# Patient Record
Sex: FEMALE | Race: WHITE | Employment: OTHER | URBAN - METROPOLITAN AREA
[De-identification: names, ages, dates, MRNs, and addresses within clinical notes are randomized per-mention and may not be internally consistent; named-entity substitution may affect disease eponyms.]

---

## 2020-09-27 ENCOUNTER — APPOINTMENT (OUTPATIENT)
Dept: GENERAL RADIOLOGY | Age: 75
End: 2020-09-27
Payer: MEDICARE

## 2020-09-27 ENCOUNTER — HOSPITAL ENCOUNTER (EMERGENCY)
Age: 75
Discharge: HOME | End: 2020-09-27
Attending: EMERGENCY MEDICINE
Payer: MEDICARE

## 2020-09-27 VITALS
HEART RATE: 62 BPM | SYSTOLIC BLOOD PRESSURE: 126 MMHG | WEIGHT: 166.89 LBS | RESPIRATION RATE: 19 BRPM | OXYGEN SATURATION: 100 % | BODY MASS INDEX: 32.59 KG/M2 | DIASTOLIC BLOOD PRESSURE: 41 MMHG | TEMPERATURE: 98.2 F

## 2020-09-27 PROCEDURE — 29125 APPL SHORT ARM SPLINT STATIC: CPT

## 2020-09-27 PROCEDURE — 99283 EMERGENCY DEPT VISIT LOW MDM: CPT

## 2020-09-27 PROCEDURE — 73120 X-RAY EXAM OF HAND: CPT

## 2020-09-27 PROCEDURE — 73100 X-RAY EXAM OF WRIST: CPT

## 2020-09-27 RX ORDER — FENTANYL CITRATE 50 UG/ML
50 INJECTION, SOLUTION INTRAMUSCULAR; INTRAVENOUS ONCE
Status: DISCONTINUED | OUTPATIENT
Start: 2020-09-27 | End: 2020-09-27 | Stop reason: HOSPADM

## 2020-09-27 RX ORDER — ONDANSETRON 4 MG/1
4 TABLET, ORALLY DISINTEGRATING ORAL ONCE
Status: DISCONTINUED | OUTPATIENT
Start: 2020-09-27 | End: 2020-09-27 | Stop reason: HOSPADM

## 2020-09-27 RX ORDER — HYDROCODONE BITARTRATE AND ACETAMINOPHEN 5; 325 MG/1; MG/1
1 TABLET ORAL EVERY 6 HOURS PRN
Qty: 10 TABLET | Refills: 0 | Status: SHIPPED | OUTPATIENT
Start: 2020-09-27 | End: 2020-09-27

## 2020-09-27 ASSESSMENT — PAIN SCALES - GENERAL: PAINLEVEL_OUTOF10: 10

## 2020-09-27 ASSESSMENT — PAIN DESCRIPTION - LOCATION: LOCATION: WRIST

## 2020-09-27 ASSESSMENT — PAIN DESCRIPTION - PAIN TYPE: TYPE: ACUTE PAIN

## 2020-09-27 ASSESSMENT — PAIN DESCRIPTION - ORIENTATION: ORIENTATION: RIGHT

## 2020-09-27 NOTE — ED TRIAGE NOTES
Mechanical fall, was going out for a \"girls day\" and tripped over a leg of a chair while attempting to kiss her .     Did not hit head

## 2020-09-27 NOTE — ED NOTES
Bed: [facility]  Expected date: 9/27/20  Expected time: 12:13 PM  Means of arrival: [facility]  Comments:  75F mechanical fall, wrist injury     Barrett Cheney RN  09/27/20 7632

## 2020-09-28 ENCOUNTER — TELEPHONE (OUTPATIENT)
Dept: ORTHOPEDIC SURGERY | Age: 75
End: 2020-09-28

## 2020-09-28 NOTE — ED PROVIDER NOTES
As physician-in-triage, I performed a medical screening history and physical exam on Nelson Avitia. I also cared for and evaluated the patient in conjunction with the ED Advanced Practice Provider. All diagnostic, treatment, and disposition decisions were made by myself in conjunction with the advanced practice provider. For all further details of the patient's emergency department visit, please see the advanced practice provider's documentation. Patient presents the ER for evaluation acute mechanical fall and injury, patient has positive deformity the right wrist no pulse deficit. No elbow tenderness. No shoulder tenderness. No cervical thoracic or lumbar spine tenderness. GCS of 15. She is evidence of acute injury articular fracture distal radius and ulnar styloid, positive volar splint, outpatient follow-up with orthopedics. Definitive surgical management after splinting. Analgesia as necessary. Return if worse or new symptoms. Impression: Acute intra-articular fracture distal radius and ulnar styloid, dominant hand right.       Rodrick Morales MD  69/63/40 [id number]       Rodrick Morales MD  87/63/07 [id number]

## 2020-09-29 ENCOUNTER — OFFICE VISIT (OUTPATIENT)
Dept: ORTHOPEDIC SURGERY | Age: 75
End: 2020-09-29
Payer: MEDICARE

## 2020-09-29 ENCOUNTER — OFFICE VISIT (OUTPATIENT)
Dept: PRIMARY CARE CLINIC | Age: 75
End: 2020-09-29
Payer: MEDICARE

## 2020-09-29 VITALS — HEIGHT: 60 IN | TEMPERATURE: 97.7 F | BODY MASS INDEX: 31.41 KG/M2 | WEIGHT: 160 LBS | RESPIRATION RATE: 16 BRPM

## 2020-09-29 PROCEDURE — G8428 CUR MEDS NOT DOCUMENT: HCPCS | Performed by: NURSE PRACTITIONER

## 2020-09-29 PROCEDURE — G8417 CALC BMI ABV UP PARAM F/U: HCPCS | Performed by: ORTHOPAEDIC SURGERY

## 2020-09-29 PROCEDURE — G8427 DOCREV CUR MEDS BY ELIG CLIN: HCPCS | Performed by: ORTHOPAEDIC SURGERY

## 2020-09-29 PROCEDURE — 99203 OFFICE O/P NEW LOW 30 MIN: CPT | Performed by: ORTHOPAEDIC SURGERY

## 2020-09-29 PROCEDURE — 1090F PRES/ABSN URINE INCON ASSESS: CPT | Performed by: ORTHOPAEDIC SURGERY

## 2020-09-29 PROCEDURE — 4040F PNEUMOC VAC/ADMIN/RCVD: CPT | Performed by: ORTHOPAEDIC SURGERY

## 2020-09-29 PROCEDURE — 1036F TOBACCO NON-USER: CPT | Performed by: ORTHOPAEDIC SURGERY

## 2020-09-29 PROCEDURE — G8417 CALC BMI ABV UP PARAM F/U: HCPCS | Performed by: NURSE PRACTITIONER

## 2020-09-29 PROCEDURE — 99211 OFF/OP EST MAY X REQ PHY/QHP: CPT | Performed by: NURSE PRACTITIONER

## 2020-09-29 PROCEDURE — G8400 PT W/DXA NO RESULTS DOC: HCPCS | Performed by: ORTHOPAEDIC SURGERY

## 2020-09-29 PROCEDURE — 3017F COLORECTAL CA SCREEN DOC REV: CPT | Performed by: ORTHOPAEDIC SURGERY

## 2020-09-29 PROCEDURE — 1123F ACP DISCUSS/DSCN MKR DOCD: CPT | Performed by: ORTHOPAEDIC SURGERY

## 2020-09-29 RX ORDER — CLINDAMYCIN HYDROCHLORIDE 300 MG/1
300 CAPSULE ORAL 3 TIMES DAILY
Qty: 15 CAPSULE | Refills: 0 | Status: SHIPPED | OUTPATIENT
Start: 2020-09-29 | End: 2020-10-04

## 2020-09-29 NOTE — ED PROVIDER NOTES
[address] [facility]        Pt Name: Deep Knight  MRN: [id number]  [place] 1945  Date of evaluation: 9/27/2020  Provider: Jose Kang PA-C  PCP: Rick Sanchez MD     I have seen and evaluated this patient with my supervising physician Tori Jolly. CHIEF COMPLAINT       Chief Complaint   Patient presents with    Fall     mechanical fall with right wrist injury       HISTORY OF PRESENT ILLNESS   (Location/Symptom, Timing/Onset, Context/Setting, Quality, Duration, Modifying Factors, Severity)  Note limiting factors. Deep Knight is a 77 y.o. female who presents here to the emergency department, she states that she was on her way to a [employer] day out, she tripped over the cord of a electric reclining chair, fell onto her outstretched right wrist, now admits to pain and disfigurement. Pain level 10/10 with movement better with rest.  She denies any other injury, denies elbow pain, neck pain, loss of consciousness or head injury. Nursing Notes were all reviewed and agreed with or any disagreements were addressed  in the HPI.     REVIEW OF SYSTEMS    (2-9 systems for level 4, 10 or more for level 5)     Review of Systems   Pertinent positives and negatives in the HPI otherwise ROS negative    PAST MEDICAL HISTORY     Past Medical History:   Diagnosis Date    Anxiety     CAD (coronary artery disease)     Cerebral artery occlusion with cerebral infarction ([address]     mini strokes    Depression     Hx of blood clots 2011     lungs after heart attach    IBS (irritable bowel syndrome)     w constipation    MI (myocardial infarction) ([address]     Pneumonia     Prolonged emergence from general anesthesia     Renal failure        SURGICAL HISTORY     Past Surgical History:   Procedure Laterality Date    ARM SURGERY Left     DIAPHRAGMATIC HERNIA REPAIR      EYE SURGERY      HAND SURGERY Left [address]       Discharge Medication List as of 9/27/2020  2:05 PM      CONTINUE these medications which have NOT CHANGED    Details   pantoprazole sodium (PROTONIX) 40 MG PACK packet Take 40 mg by mouth every morning (before breakfast)      carvedilol (COREG) 6.25 MG tablet Take 6.25 mg by mouth 2 times daily (with meals)      traMADol (ULTRAM) 50 MG tablet Take 50 mg by mouth every 6 hours as needed for Pain      atorvastatin (LIPITOR) 10 MG tablet Take 10 mg by mouth daily      LORazepam (ATIVAN) 0.5 MG tablet Take 0.5 mg by mouth daily Indications: 2 mg at nite prn      nortriptyline (PAMELOR) 10 MG capsule Take 10 mg by mouth nightly      lisinopril (PRINIVIL;ZESTRIL) 2.5 MG tablet Take 2.5 mg by mouth daily      fexofenadine (ALLEGRA) 30 MG tablet Take 30 mg by mouth 2 times daily      promethazine (PHENERGAN) 12.5 MG tablet Take 12.5 mg by mouth every 6 hours as needed for Nausea      polyethyl glycol-propyl glycol 0.4-0.3 % (SYSTANE) 0.4-0.3 % ophthalmic solution 1 drop as needed for Dry Eyes (6 drops daily)      cycloSPORINE (RESTASIS) 0.05 % ophthalmic emulsion Place 2 drops into the left eye 2 times daily      loteprednol (ALREX) 0.2 % SUSP 1 drop 2 times daily      fluticasone (FLONASE) 50 MCG/ACT nasal spray 1 spray by Nasal route daily      solifenacin (VESICARE) 5 MG tablet Take 10 mg by mouth daily      conjugated estrogens (PREMARIN) 0.625 MG/GM vaginal cream Place vaginally daily Place vaginally daily. , Vaginal, Historical Med      vitamin D (CHOLECALCIFEROL) 1000 UNITS TABS tablet Take 1,000 Units by mouth daily      Multiple Vitamins-Minerals (THERAPEUTIC MULTIVITAMIN-MINERALS) tablet Take 1 tablet by mouth daily      polycarbophil (FIBERCON) 625 MG tablet Take 625 mg by mouth daily      calcium gluconate 500 MG tablet Take 500 mg by mouth daily      vitamin B-12 (CYANOCOBALAMIN) 100 MCG tablet Take 50 mcg by mouth daily      polyethylene glycol (GLYCOLAX) packet Take 17 g by mouth daily as needed for Constipation             ALLERGIES     Augmentin [amoxicillin-pot clavulanate]; Bactrim [sulfamethoxazole-trimethoprim]; Carafate [sucralfate]; Hyoscyamine; Macrobid [nitrofurantoin]; Oxybutynin; Pcn [penicillins]; Pravastatin; Prevacid [lansoprazole]; Prilosec [omeprazole]; Sulfamethoxazole; and Trazodone and nefazodone    FAMILYHISTORY     History reviewed. No pertinent family history. SOCIAL HISTORY       Social History     Socioeconomic History    Marital status:      Spouse name: None    Number of children: None    Years of education: None    Highest education level: None   Occupational History    None   Social Needs    Financial resource strain: None    Food insecurity     Worry: None     Inability: None    Transportation needs     Medical: None     Non-medical: None   Tobacco Use    Smoking status: Never Smoker    Smokeless tobacco: Never Used   Substance and Sexual Activity    Alcohol use: No    Drug use: No    Sexual activity: None   Lifestyle    Physical activity     Days per week: None     Minutes per session: None    Stress: None   Relationships    Social connections     Talks on phone: None     Gets together: None     Attends Shinto service: None     Active member of club or organization: None     Attends meetings of clubs or organizations: None     Relationship status: None    Intimate partner violence     Fear of current or ex partner: None     Emotionally abused: None     Physically abused: None     Forced sexual activity: None   Other Topics Concern    None   Social History Narrative    None       SCREENINGS             PHYSICAL EXAM    (up to 7 for level 4, 8 or more for level 5)     ED Triage Vitals [09/27/20 1229]   BP Temp Temp src Pulse Resp SpO2 Height Weight   118/67 98.1 °F (36.7 °C) -- 65 15 99 % -- 166 lb 14.2 oz (75.7 kg)       Physical Exam  Vitals signs and nursing note reviewed.    Constitutional:       Appearance: She is well-developed. She is not diaphoretic. HENT:      Head: Normocephalic and atraumatic. Right Ear: External ear normal.      Left Ear: External ear normal.      Nose: Nose normal.   Eyes:      General:         Right eye: No discharge. Left eye: No discharge. Neck:      Musculoskeletal: Normal range of motion and neck supple. Pulmonary:      Effort: Pulmonary effort is normal. No respiratory distress. Breath sounds: No stridor. Musculoskeletal:      Right wrist: She exhibits decreased range of motion, tenderness, bony tenderness, swelling and deformity. Arms:    Skin:     General: Skin is warm and dry. Coloration: Skin is not pale. Neurological:      Mental Status: She is alert and oriented to person, place, and time. Psychiatric:         Behavior: Behavior normal.         DIAGNOSTIC RESULTS   LABS:    Labs Reviewed - No data to display    All other labs were within normal range or not returned as of this dictation. EKG: All EKG's are interpreted by the Emergency Department Physician who either signs orCo-signs this chart in the absence of a cardiologist.  Please see their note for interpretation of EKG. RADIOLOGY:   Non-plain film images such as CT, Ultrasound and MRI are read by the radiologist. Plain radiographic images are visualized andpreliminarily interpreted by the  ED Provider with the below findings:        Interpretation perthe Radiologist below, if available at the time of this note:    XR HAND RIGHT (2 VIEWS)   Final Result   Acute, intraarticular fracture of the distal radius. Acute fracture of the ulna styloid. No acute fracture of the hand. Findings of osteoarthritis. XR WRIST RIGHT (2 VIEWS)   Final Result   Acute, intraarticular fracture of the distal radius. Acute fracture of the ulna styloid. No acute fracture of the hand. Findings of osteoarthritis.            Xr Wrist Right (2 Views)    Result Date: 9/27/2020  EXAMINATION: 3 XRAY VIEWS OF THE RIGHT WRIST; 3 XRAY VIEWS OF THE RIGHT HAND 9/27/2020 12:44 pm COMPARISON: None. HISTORY: ORDERING SYSTEM PROVIDED HISTORY: TRAUMA, TECHNOLOGIST PROVIDED HISTORY: Reason for exam:->TRAUMA, Reason for Exam: right wrist pain after fall Acuity: Acute Type of Exam: Initial FINDINGS: Right wrist- There is an intra-articular fracture of the distal radius. The primary fracture line through the metaphysis is transverse. There are suspected vertical communications to the articular surface. There is mild dorsal dislocation and mild apex volar angulation. Fracture is impacted. There is a nondisplaced, oblique fracture through the base of the ulna styloid. There are cyst-like changes in the carpal bones, including scaphoid, capitate and lunate, likely degenerative. Right hand- No acute fracture or osseous injury of the hand is identified. There is a mild of osteoarthritis pattern, and including subchondral sclerosis of peritrapezial joints and interphalangeal joint space narrowing with mild osseous hypertrophy. Soft tissues are unremarkable. Acute, intraarticular fracture of the distal radius. Acute fracture of the ulna styloid. No acute fracture of the hand. Findings of osteoarthritis. Xr Hand Right (2 Views)    Result Date: 9/27/2020  EXAMINATION: 3 XRAY VIEWS OF THE RIGHT WRIST; 3 XRAY VIEWS OF THE RIGHT HAND 9/27/2020 12:44 pm COMPARISON: None. HISTORY: ORDERING SYSTEM PROVIDED HISTORY: TRAUMA, TECHNOLOGIST PROVIDED HISTORY: Reason for exam:->TRAUMA, Reason for Exam: right wrist pain after fall Acuity: Acute Type of Exam: Initial FINDINGS: Right wrist- There is an intra-articular fracture of the distal radius. The primary fracture line through the metaphysis is transverse. There are suspected vertical communications to the articular surface. There is mild dorsal dislocation and mild apex volar angulation. Fracture is impacted.  There is a nondisplaced, oblique fracture through the base of the ulna styloid. There are cyst-like changes in the carpal bones, including scaphoid, capitate and lunate, likely degenerative. Right hand- No acute fracture or osseous injury of the hand is identified. There is a mild of osteoarthritis pattern, and including subchondral sclerosis of peritrapezial joints and interphalangeal joint space narrowing with mild osseous hypertrophy. Soft tissues are unremarkable. Acute, intraarticular fracture of the distal radius. Acute fracture of the ulna styloid. No acute fracture of the hand. Findings of osteoarthritis. PROCEDURES   Unless otherwise noted below, none     Procedures   Volar OCL splint was placed by the emergency department technician, it was applied appropriately and the patient was neurovascularly intact as observed by myself. CRITICAL CARE TIME   N/A    CONSULTS:  None      EMERGENCY DEPARTMENT COURSE and DIFFERENTIAL DIAGNOSIS/MDM:   Vitals:    Vitals:    09/27/20 1229 09/27/20 1412   BP: 118/67 (!) 126/41   Pulse: 65 62   Resp: 15 19   Temp: 98.1 °F (36.7 °C) 98.2 °F (36.8 °C)   SpO2: 99% 100%   Weight: 166 lb 14.2 oz (75.7 kg)        Patient was given thefollowing medications:  Medications - No data to display        Fracture noted, patient denied wanting any other pain medication as she wanted to make sure that she could leave and continue to go out and enjoy the rest of her day. FINAL IMPRESSION      1.  Closed fracture of right wrist, initial encounter          DISPOSITION/PLAN   DISPOSITION Decision To Discharge 09/27/2020 02:22:49 PM      PATIENT REFERREDTO:  Joy Jackson MD  [address] [place] [address]  [phone]    Call in 2 days  For a recheck in 2-7 days      DISCHARGE MEDICATIONS:  Discharge Medication List as of 9/27/2020  2:05 PM          DISCONTINUED MEDICATIONS:  Discharge Medication List as of 9/27/2020  2:05 PM                 (Please note that portions ofthis note were completed with a voice recognition program.  Efforts were made to edit the dictations but occasionally words are mis-transcribed.)    Amish Lackey PA-C (electronically signed)           Amish Lackey PA-C  09/29/20 2272

## 2020-09-29 NOTE — PROGRESS NOTES
Newt Erp received a viral test for COVID-19. They were educated on isolation and quarantine as appropriate. For any symptoms, they were directed to seek care from their PCP, given contact information to establish with a doctor, directed to an urgent care or the emergency room.

## 2020-09-29 NOTE — PATIENT INSTRUCTIONS

## 2020-09-29 NOTE — LETTER
[address] [place] [address] Ortho & Spine  Surgery Scheduling Form:  DEMOGRAPHICS:                                                                                                              .  Patient Name:  Derick Cobb  Patient :  1945   Patient SS#:      Patient Phone:  [phone] (home) [phone] (work) Alt. Patient Phone:    Patient Address:  [place] [id number]    PCP:  Joselin Mcbride MD  Insurance:    Payor/Plan Subscr  Sex Relation Sub. Ins. ID Effective Group Num   1. MEDICARE - MEAurther [place] 1945 Female  [id number] 1/1/15                                    [address] [address]   2. [address] [place] M 1945 Female  [id number] PLAN F                                    [address]   Insurance ID Number:  DIAGNOSIS & PROCEDURE:                                                                                            .  Diagnosis:   Right distal radius fracture    S52.571A  Operation:  Open reduction internal fixation right wrist  [id number]  Location:  [facility]   Surgeon:  Tayler Mancilla MD    SCHEDULING INFORMATION:                                                                                         .    Surgeon's Scheduling Instruction:  10/02/2020    Requested Date:   10/2/2020 OR Time:  7:30am  Patient Arrival Time:  6:00am  OR Time Required:  45  Minutes  Anesthesia:  General    SA Required:  Yes  Equipment:  [place]  Mini C-Arm:  Yes    Standard C-Arm:  No  Status:  Outpatient    PAT Required:  Yes  Latex Allergy:  unknown    Defibrilator or Pacemaker:  unknown  Isolation Precautions:  unknown  Comments:                       Merari Roy MD 20   BILLING INFORMATION:                                                                                                    .    Procedure:       CPT Code Modifier                  Pre-Certification:

## 2020-09-29 NOTE — PROGRESS NOTES
CHIEF COMPLAINT: Right wrist pain/ moderately displaced distal radius fracture. DATE OF INJURY: 9/27/2020    HISTORY:  Ms. Bhanu Espinoza is a 77 y.o.  female right handed who presents today for evaluation of a right wrist injury. The patient reports that this injury occurred when she was leaning over to give her  a kiss and tripped over his chair. She was first seen and evaluated in [facility] ER, when she was x-rayed and splinted, and asked to f/u with Orthopedics. The patient denies any other injuries. Rates pain a 8-9/10 VAS severe, sharp, aching, constant and show no change. Movement makes the pain worse, the splint and resting makes the pain better. Alleviating factors elevation and rest. No numbness or tingling sensation.       Past Medical History:   Diagnosis Date    Anxiety     CAD (coronary artery disease)     Cerebral artery occlusion with cerebral infarction ([address]     mini strokes    Depression     Hx of blood clots 2011     lungs after heart attach    IBS (irritable bowel syndrome)     w constipation    MI (myocardial infarction) ([address]     Pneumonia     Prolonged emergence from general anesthesia     Renal failure        Past Surgical History:   Procedure Laterality Date    ARM SURGERY Left     DIAPHRAGMATIC HERNIA REPAIR      EYE SURGERY      HAND SURGERY Left        Social History     Socioeconomic History    Marital status:      Spouse name: Not on file    Number of children: Not on file    Years of education: Not on file    Highest education level: Not on file   Occupational History    Not on file   Social Needs    Financial resource strain: Not on file    Food insecurity     Worry: Not on file     Inability: Not on file    Transportation needs     Medical: Not on file     Non-medical: Not on file   Tobacco Use    Smoking status: Never Smoker    Smokeless tobacco: Never Used   Substance and Sexual Activity    Alcohol use: No    Drug use: No    Sexual activity: Not on file   Lifestyle    Physical activity     Days per week: Not on file     Minutes per session: Not on file    Stress: Not on file   Relationships    Social connections     Talks on phone: Not on file     Gets together: Not on file     Attends Scientologist service: Not on file     Active member of club or organization: Not on file     Attends meetings of clubs or organizations: Not on file     Relationship status: Not on file    Intimate partner violence     Fear of current or ex partner: Not on file     Emotionally abused: Not on file     Physically abused: Not on file     Forced sexual activity: Not on file   Other Topics Concern    Not on file   Social History Narrative    Not on file       History reviewed. No pertinent family history.     Current Outpatient Medications on File Prior to Visit   Medication Sig Dispense Refill    pantoprazole sodium (PROTONIX) 40 MG PACK packet Take 40 mg by mouth every morning (before breakfast)      carvedilol (COREG) 6.25 MG tablet Take 6.25 mg by mouth 2 times daily (with meals)      traMADol (ULTRAM) 50 MG tablet Take 50 mg by mouth every 6 hours as needed for Pain      atorvastatin (LIPITOR) 10 MG tablet Take 10 mg by mouth daily      LORazepam (ATIVAN) 0.5 MG tablet Take 0.5 mg by mouth daily Indications: 2 mg at nite prn      nortriptyline (PAMELOR) 10 MG capsule Take 10 mg by mouth nightly      lisinopril (PRINIVIL;ZESTRIL) 2.5 MG tablet Take 2.5 mg by mouth daily      fexofenadine (ALLEGRA) 30 MG tablet Take 30 mg by mouth 2 times daily      promethazine (PHENERGAN) 12.5 MG tablet Take 12.5 mg by mouth every 6 hours as needed for Nausea      polyethyl glycol-propyl glycol 0.4-0.3 % (SYSTANE) 0.4-0.3 % ophthalmic solution 1 drop as needed for Dry Eyes (6 drops daily)      cycloSPORINE (RESTASIS) 0.05 % ophthalmic emulsion Place 2 drops into the left eye 2 times daily      loteprednol (ALREX) 0.2 % SUSP 1 drop 2 times daily      fluticasone (FLONASE) 50 MCG/ACT nasal spray 1 spray by Nasal route daily      solifenacin (VESICARE) 5 MG tablet Take 10 mg by mouth daily      conjugated estrogens (PREMARIN) 0.625 MG/GM vaginal cream Place vaginally daily Place vaginally daily.  vitamin D (CHOLECALCIFEROL) 1000 UNITS TABS tablet Take 1,000 Units by mouth daily      Multiple Vitamins-Minerals (THERAPEUTIC MULTIVITAMIN-MINERALS) tablet Take 1 tablet by mouth daily      polycarbophil (FIBERCON) 625 MG tablet Take 625 mg by mouth daily      calcium gluconate 500 MG tablet Take 500 mg by mouth daily      vitamin B-12 (CYANOCOBALAMIN) 100 MCG tablet Take 50 mcg by mouth daily      polyethylene glycol (GLYCOLAX) packet Take 17 g by mouth daily as needed for Constipation       No current facility-administered medications on file prior to visit. Pertinent items are noted in HPI  Review of systems reviewed from Patient History Form dated on 9/29/2020 and available in the patient's chart under the Media tab. PHYSICAL EXAMINATION:  Ms. Yaritza Coelho is a very pleasant 77 y.o.  female who presents today in no acute distress, awake, alert, and oriented. She is well dressed, nourished and  groomed. Patient with normal affect. Height is  5' (1.524 m), weight is 160 lb (72.6 kg), Body mass index is 31.25 kg/m². Resting respiratory rate is 16. On evaluation of her right upper extremity, there is minimal deformity. There is moderate swelling and moderate ecchymosis. She is tender to palpation over the distal radius, and otherwise nontender over the remainder of the extremity. Range of motion is decreased secondary to pain over the right wrist, but no mechanical block. The skin overlying the right wrist is intact without evidence of lesion, laceration or abrasion. Distal pulses are 2+ and symmetric bilaterally. Sensation is grossly intact to light touch and symmetric bilaterally.     IMAGING:  Xrays dated on 9/27/2020, 3 views of right

## 2020-09-30 ENCOUNTER — TELEPHONE (OUTPATIENT)
Dept: PHARMACY | Age: 75
End: 2020-09-30

## 2020-09-30 NOTE — PROGRESS NOTES
Pt unable to source drug allergies and what happens when she takes what, Yohan/ donavan notifed to assist patient with this and her medications for DOS. Pt understands to stop asa for procedure but not sure what to take dos.     Pharmacy also consulted to assist

## 2020-09-30 NOTE — PROGRESS NOTES
Preoperative Screening for Elective Surgery/Invasive Procedures While COVID-19 present in the community     Have you tested positive or have been told to self-isolate for COVID-19 like symptoms within the past 28 days? n   Do you currently have any of the following symptoms? o Fever >100.0 F or 99.9 F in immunocompromised patients?n  o New onset cough, shortness of breath or difficulty breathing?n  o New onset sore throat, myalgia (muscle aches and pains), headache, loss of taste/smell or diarrhea?n   Have you had a potential exposure to COVID-19 within the past 14 days by:  o Close contact with a confirmed case? n  o Close contact with a healthcare worker,  or essential infrastructure worker (grocery store, [employer], gas station, public utilities or transportation)? YES  o Do you reside in a congregate setting such as; skilled nursing facility, adult home, correctional facility, homeless shelter or other institutional setting? n  o Have you had recent travel to a known COVID-19 hotspot? Pt reside in [facility] if the patient has a positive screen by answering yes to one or more of the above questions. Patients who test positive or screen positive prior to surgery or on the day of surgery should be evaluated in conjunction with the surgeon/proceduralist/anesthesiologist to determine the urgency of the procedure.

## 2020-09-30 NOTE — PROGRESS NOTES
C-Difficile admission screening and protocol:     * Admitted with diarrhea? n     *Prior history of C-Diff. In last 3 months?n     *Antibiotic use in the past 6-8 weeks?n     *Prior hospitalization or nursing home in the last month?n        [address] time_____6_______        Surgery time____________    Take the following medications with a sip of water:    Do not eat or drink anything after 12:00 midnight prior to your surgery. This includes water chewing gum, mints and ice chips. You may brush your teeth and gargle the morning of your surgery, but do not swallow the water     Please see your family doctor/pediatrician for a history and physical and/or concerning medications. Bring any test results/reports from your physicians office. If you are under the care of a heart doctor or specialist doctor, please be aware that you may be asked to them for clearance    You may be asked to stop blood thinners such as Coumadin, Plavix, Fragmin, Lovenox, etc., or any anti-inflammatories such as:  Aspirin, Ibuprofen, Advil, Naproxen prior to your surgery. We also ask that you stop any OTC medications such as fish oil, vitamin E, glucosamine, garlic, Multivitamins, COQ 10, etc.    We ask that you do not smoke 24 hours prior to surgery  We ask that you do not  drink any alcoholic beverages 24 hours prior to surgery     You must make arrangements for a responsible adult to take you home after your surgery. For your safety you will not be allowed to leave alone or drive yourself home. Your surgery will be cancelled if you do not have a ride home. Also for your safety, it is strongly suggested that someone stay with you the first 24 hours after your surgery. A parent or legal guardian must accompany a child scheduled for surgery and plan to stay at the hospital until the child is discharged. Please do not bring other children with you.     For your comfort, please wear simple loose fitting clothing to the hospital.  Please do not bring valuables. Do not wear any make-up or nail polish on your fingers or toes      For your safety, please do not wear any jewelry or body piercing's on the day of surgery. All jewelry must be removed. If you have dentures, they will be removed before going to operating room. For your convenience, we will provide you with a container. If you wear contact lenses or glasses, they will be removed, please bring a case for them. If you have a living will and a durable power of  for healthcare, please bring in a copy. As part of our patient safety program to minimize surgical site infections, we ask you to do the following:    · Please notify your surgeon if you develop any illness between         now and the  day of your surgery. · This includes a cough, cold, fever, sore throat, nausea,         or vomiting, and diarrhea, etc.  ·  Please notify your surgeon if you experience dizziness, shortness         of breath or blurred vision between now and the time of your surgery. Do not shave your operative site 96 hours prior to surgery. For face and neck surgery, men may use an electric razor 48 hours   prior to surgery. You may shower the night before surgery or the morning of   your surgery with an antibacterial soap. You will need to bring a photo ID and insurance card    [facility] has an onsite pharmacy, would you like to utilize our pharmacy     If you will be staying overnight and use a C-pap machine, please bring   your C-pap to hospital     Our goal is to provide you with excellent care, therefore, visitors will be limited to two(2) in the room at a time so that we may focus on providing this care for you. Please contact pre-admission testing if you have any further questions.                  [facility] phone number:  [phone] PAT fax number:  [phone]  Please note these are generalized instructions for all surgical cases, you may be provided with more specific instructions according to your surgery.

## 2020-09-30 NOTE — PROGRESS NOTES
Pre-admission testing medication reconciliation attempted today. Kayden Jurfernando is unsure of current medications. Patient chart sent to [facility] for further review. Pt states her  helps her but he is not home and the 'List\" is in a cabnit she can not reach. Pt has hX of multiple mini strokes, 15+ medications, 77years old and multiple allergies she does not know what happens when given but states,  may know.     Brittany Lancaster RN

## 2020-10-01 ENCOUNTER — ANESTHESIA EVENT (OUTPATIENT)
Dept: OPERATING ROOM | Age: 75
End: 2020-10-01
Payer: MEDICARE

## 2020-10-01 LAB — SARS-COV-2, NAA: NOT DETECTED

## 2020-10-02 ENCOUNTER — HOSPITAL ENCOUNTER (OUTPATIENT)
Age: 75
Setting detail: OUTPATIENT SURGERY
Discharge: HOME | End: 2020-10-02
Attending: ORTHOPAEDIC SURGERY | Admitting: ORTHOPAEDIC SURGERY
Payer: MEDICARE

## 2020-10-02 ENCOUNTER — ANESTHESIA (OUTPATIENT)
Dept: OPERATING ROOM | Age: 75
End: 2020-10-02
Payer: MEDICARE

## 2020-10-02 ENCOUNTER — APPOINTMENT (OUTPATIENT)
Dept: GENERAL RADIOLOGY | Age: 75
End: 2020-10-02
Attending: ORTHOPAEDIC SURGERY
Payer: MEDICARE

## 2020-10-02 VITALS
WEIGHT: 150.79 LBS | OXYGEN SATURATION: 97 % | TEMPERATURE: 97 F | HEIGHT: 60 IN | SYSTOLIC BLOOD PRESSURE: 145 MMHG | HEART RATE: 70 BPM | DIASTOLIC BLOOD PRESSURE: 73 MMHG | BODY MASS INDEX: 29.61 KG/M2 | RESPIRATION RATE: 16 BRPM

## 2020-10-02 VITALS
TEMPERATURE: 98.6 F | RESPIRATION RATE: 5 BRPM | SYSTOLIC BLOOD PRESSURE: 109 MMHG | OXYGEN SATURATION: 98 % | DIASTOLIC BLOOD PRESSURE: 63 MMHG

## 2020-10-02 PROBLEM — S52.501A CLOSED FRACTURE OF RIGHT DISTAL RADIUS: Status: ACTIVE | Noted: 2020-10-02

## 2020-10-02 PROCEDURE — 7100000001 HC PACU RECOVERY - ADDTL 15 MIN: Performed by: ORTHOPAEDIC SURGERY

## 2020-10-02 PROCEDURE — 73100 X-RAY EXAM OF WRIST: CPT

## 2020-10-02 PROCEDURE — 7100000011 HC PHASE II RECOVERY - ADDTL 15 MIN: Performed by: ORTHOPAEDIC SURGERY

## 2020-10-02 PROCEDURE — 2709999900 HC NON-CHARGEABLE SUPPLY: Performed by: ORTHOPAEDIC SURGERY

## 2020-10-02 PROCEDURE — 6360000002 HC RX W HCPCS: Performed by: ORTHOPAEDIC SURGERY

## 2020-10-02 PROCEDURE — 3209999900 FLUORO FOR SURGICAL PROCEDURES

## 2020-10-02 PROCEDURE — 3600000014 HC SURGERY LEVEL 4 ADDTL 15MIN: Performed by: ORTHOPAEDIC SURGERY

## 2020-10-02 PROCEDURE — 7100000010 HC PHASE II RECOVERY - FIRST 15 MIN: Performed by: ORTHOPAEDIC SURGERY

## 2020-10-02 PROCEDURE — 2580000003 HC RX 258: Performed by: ORTHOPAEDIC SURGERY

## 2020-10-02 PROCEDURE — 25609 OPTX DST RD XART FX/EP SEP3+: CPT | Performed by: ORTHOPAEDIC SURGERY

## 2020-10-02 PROCEDURE — 6360000002 HC RX W HCPCS

## 2020-10-02 PROCEDURE — 6360000002 HC RX W HCPCS: Performed by: NURSE ANESTHETIST, CERTIFIED REGISTERED

## 2020-10-02 PROCEDURE — 2500000003 HC RX 250 WO HCPCS: Performed by: NURSE ANESTHETIST, CERTIFIED REGISTERED

## 2020-10-02 PROCEDURE — 25609 OPTX DST RD XART FX/EP SEP3+: CPT | Performed by: NURSE PRACTITIONER

## 2020-10-02 PROCEDURE — 2500000003 HC RX 250 WO HCPCS: Performed by: ORTHOPAEDIC SURGERY

## 2020-10-02 PROCEDURE — 6360000002 HC RX W HCPCS: Performed by: ANESTHESIOLOGY

## 2020-10-02 PROCEDURE — C1713 ANCHOR/SCREW BN/BN,TIS/BN: HCPCS | Performed by: ORTHOPAEDIC SURGERY

## 2020-10-02 PROCEDURE — 3600000004 HC SURGERY LEVEL 4 BASE: Performed by: ORTHOPAEDIC SURGERY

## 2020-10-02 PROCEDURE — 3700000001 HC ADD 15 MINUTES (ANESTHESIA): Performed by: ORTHOPAEDIC SURGERY

## 2020-10-02 PROCEDURE — 3700000000 HC ANESTHESIA ATTENDED CARE: Performed by: ORTHOPAEDIC SURGERY

## 2020-10-02 PROCEDURE — 2720000010 HC SURG SUPPLY STERILE: Performed by: ORTHOPAEDIC SURGERY

## 2020-10-02 PROCEDURE — 2580000003 HC RX 258: Performed by: ANESTHESIOLOGY

## 2020-10-02 PROCEDURE — 7100000000 HC PACU RECOVERY - FIRST 15 MIN: Performed by: ORTHOPAEDIC SURGERY

## 2020-10-02 DEVICE — LOCKING SCREW, FULLY THREADED,T8
Type: IMPLANTABLE DEVICE | Site: WRIST | Status: FUNCTIONAL
Brand: VARIAX

## 2020-10-02 DEVICE — VOLAR DR PLATE INTERM. RIGHT EXTRASHORT
Type: IMPLANTABLE DEVICE | Site: WRIST | Status: FUNCTIONAL
Brand: VARIAX

## 2020-10-02 DEVICE — BONE SCREW, FULLY THREADED, T8
Type: IMPLANTABLE DEVICE | Site: WRIST | Status: FUNCTIONAL
Brand: VARIAX

## 2020-10-02 RX ORDER — FENTANYL CITRATE 50 UG/ML
25 INJECTION, SOLUTION INTRAMUSCULAR; INTRAVENOUS EVERY 5 MIN PRN
Status: DISCONTINUED | OUTPATIENT
Start: 2020-10-02 | End: 2020-10-02 | Stop reason: HOSPADM

## 2020-10-02 RX ORDER — NORTRIPTYLINE HYDROCHLORIDE 25 MG/1
1 CAPSULE ORAL NIGHTLY
COMMUNITY
Start: 2020-09-17

## 2020-10-02 RX ORDER — ONDANSETRON 2 MG/ML
INJECTION INTRAMUSCULAR; INTRAVENOUS
Status: COMPLETED
Start: 2020-10-02 | End: 2020-10-02

## 2020-10-02 RX ORDER — SODIUM CHLORIDE 0.9 % (FLUSH) 0.9 %
10 SYRINGE (ML) INJECTION PRN
Status: DISCONTINUED | OUTPATIENT
Start: 2020-10-02 | End: 2020-10-02 | Stop reason: HOSPADM

## 2020-10-02 RX ORDER — LABETALOL HYDROCHLORIDE 5 MG/ML
5 INJECTION, SOLUTION INTRAVENOUS EVERY 10 MIN PRN
Status: DISCONTINUED | OUTPATIENT
Start: 2020-10-02 | End: 2020-10-02 | Stop reason: HOSPADM

## 2020-10-02 RX ORDER — BUPIVACAINE HYDROCHLORIDE 5 MG/ML
INJECTION, SOLUTION EPIDURAL; INTRACAUDAL
Status: COMPLETED | OUTPATIENT
Start: 2020-10-02 | End: 2020-10-02

## 2020-10-02 RX ORDER — FAMOTIDINE 40 MG/1
1 TABLET, FILM COATED ORAL NIGHTLY
COMMUNITY
Start: 2020-02-24

## 2020-10-02 RX ORDER — DEXAMETHASONE SODIUM PHOSPHATE 4 MG/ML
INJECTION, SOLUTION INTRA-ARTICULAR; INTRALESIONAL; INTRAMUSCULAR; INTRAVENOUS; SOFT TISSUE PRN
Status: DISCONTINUED | OUTPATIENT
Start: 2020-10-02 | End: 2020-10-02 | Stop reason: SDUPTHER

## 2020-10-02 RX ORDER — PROPOFOL 10 MG/ML
INJECTION, EMULSION INTRAVENOUS PRN
Status: DISCONTINUED | OUTPATIENT
Start: 2020-10-02 | End: 2020-10-02 | Stop reason: SDUPTHER

## 2020-10-02 RX ORDER — SODIUM CHLORIDE 9 MG/ML
INJECTION, SOLUTION INTRAVENOUS CONTINUOUS
Status: DISCONTINUED | OUTPATIENT
Start: 2020-10-02 | End: 2020-10-02 | Stop reason: HOSPADM

## 2020-10-02 RX ORDER — NITROGLYCERIN 0.4 MG/1
TABLET SUBLINGUAL PRN
Status: ON HOLD | COMMUNITY
Start: 2020-03-28 | End: 2021-01-01 | Stop reason: HOSPADM

## 2020-10-02 RX ORDER — MIDAZOLAM HYDROCHLORIDE 1 MG/ML
INJECTION INTRAMUSCULAR; INTRAVENOUS PRN
Status: DISCONTINUED | OUTPATIENT
Start: 2020-10-02 | End: 2020-10-02 | Stop reason: SDUPTHER

## 2020-10-02 RX ORDER — METHIMAZOLE 5 MG/1
2.5 TABLET ORAL
Status: ON HOLD | COMMUNITY
Start: 2019-11-05 | End: 2021-01-01

## 2020-10-02 RX ORDER — AMLODIPINE BESYLATE 2.5 MG/1
1 TABLET ORAL DAILY
Status: ON HOLD | COMMUNITY
Start: 2019-02-08 | End: 2021-01-01 | Stop reason: HOSPADM

## 2020-10-02 RX ORDER — SODIUM CHLORIDE 0.9 % (FLUSH) 0.9 %
10 SYRINGE (ML) INJECTION EVERY 12 HOURS SCHEDULED
Status: DISCONTINUED | OUTPATIENT
Start: 2020-10-02 | End: 2020-10-02 | Stop reason: HOSPADM

## 2020-10-02 RX ORDER — FENTANYL CITRATE 50 UG/ML
INJECTION, SOLUTION INTRAMUSCULAR; INTRAVENOUS PRN
Status: DISCONTINUED | OUTPATIENT
Start: 2020-10-02 | End: 2020-10-02 | Stop reason: SDUPTHER

## 2020-10-02 RX ORDER — LIDOCAINE HYDROCHLORIDE 20 MG/ML
INJECTION, SOLUTION EPIDURAL; INFILTRATION; INTRACAUDAL; PERINEURAL PRN
Status: DISCONTINUED | OUTPATIENT
Start: 2020-10-02 | End: 2020-10-02 | Stop reason: SDUPTHER

## 2020-10-02 RX ORDER — FLUTICASONE PROPIONATE 50 MCG
1 SPRAY, SUSPENSION (ML) NASAL NIGHTLY PRN
COMMUNITY
End: 2022-01-01

## 2020-10-02 RX ORDER — ATORVASTATIN CALCIUM 10 MG/1
1 TABLET, FILM COATED ORAL NIGHTLY
Status: ON HOLD | COMMUNITY
Start: 2020-08-23 | End: 2021-01-01 | Stop reason: HOSPADM

## 2020-10-02 RX ORDER — ONDANSETRON 2 MG/ML
4 INJECTION INTRAMUSCULAR; INTRAVENOUS ONCE
Status: COMPLETED | OUTPATIENT
Start: 2020-10-02 | End: 2020-10-02

## 2020-10-02 RX ORDER — PROMETHAZINE HYDROCHLORIDE 25 MG/ML
6.25 INJECTION, SOLUTION INTRAMUSCULAR; INTRAVENOUS
Status: DISCONTINUED | OUTPATIENT
Start: 2020-10-02 | End: 2020-10-02 | Stop reason: HOSPADM

## 2020-10-02 RX ORDER — ASPIRIN 81 MG/1
81 TABLET ORAL EVERY MORNING
COMMUNITY

## 2020-10-02 RX ORDER — ONDANSETRON 2 MG/ML
INJECTION INTRAMUSCULAR; INTRAVENOUS PRN
Status: DISCONTINUED | OUTPATIENT
Start: 2020-10-02 | End: 2020-10-02 | Stop reason: SDUPTHER

## 2020-10-02 RX ORDER — CLINDAMYCIN HYDROCHLORIDE 300 MG/1
300 CAPSULE ORAL EVERY 6 HOURS
Status: ON HOLD | COMMUNITY
Start: 2020-09-29 | End: 2020-10-02

## 2020-10-02 RX ORDER — CHOLECALCIFEROL (VITAMIN D3) 125 MCG
1000 CAPSULE ORAL DAILY
COMMUNITY

## 2020-10-02 RX ORDER — CARVEDILOL 6.25 MG/1
TABLET ORAL
Status: ON HOLD | COMMUNITY
Start: 2020-07-26 | End: 2021-01-01 | Stop reason: HOSPADM

## 2020-10-02 RX ADMIN — HYDROMORPHONE HYDROCHLORIDE 0.5 MG: 1 INJECTION, SOLUTION INTRAMUSCULAR; INTRAVENOUS; SUBCUTANEOUS at 08:25

## 2020-10-02 RX ADMIN — FENTANYL CITRATE 50 MCG: 50 INJECTION INTRAMUSCULAR; INTRAVENOUS at 07:40

## 2020-10-02 RX ADMIN — CEFAZOLIN 2 G: 10 INJECTION, POWDER, FOR SOLUTION INTRAVENOUS at 07:29

## 2020-10-02 RX ADMIN — HYDROMORPHONE HYDROCHLORIDE 0.5 MG: 1 INJECTION, SOLUTION INTRAMUSCULAR; INTRAVENOUS; SUBCUTANEOUS at 08:39

## 2020-10-02 RX ADMIN — ONDANSETRON 4 MG: 2 INJECTION INTRAMUSCULAR; INTRAVENOUS at 09:10

## 2020-10-02 RX ADMIN — FENTANYL CITRATE 50 MCG: 50 INJECTION INTRAMUSCULAR; INTRAVENOUS at 07:49

## 2020-10-02 RX ADMIN — MIDAZOLAM 2 MG: 1 INJECTION INTRAMUSCULAR; INTRAVENOUS at 07:29

## 2020-10-02 RX ADMIN — SODIUM CHLORIDE: 9 INJECTION, SOLUTION INTRAVENOUS at 07:30

## 2020-10-02 RX ADMIN — ONDANSETRON 4 MG: 2 INJECTION INTRAMUSCULAR; INTRAVENOUS at 07:57

## 2020-10-02 RX ADMIN — PROPOFOL 125 MG: 10 INJECTION, EMULSION INTRAVENOUS at 07:35

## 2020-10-02 RX ADMIN — LIDOCAINE HYDROCHLORIDE 5 ML: 20 INJECTION, SOLUTION EPIDURAL; INFILTRATION; INTRACAUDAL; PERINEURAL at 07:34

## 2020-10-02 RX ADMIN — DEXAMETHASONE SODIUM PHOSPHATE 4 MG: 4 INJECTION, SOLUTION INTRAMUSCULAR; INTRAVENOUS at 07:42

## 2020-10-02 ASSESSMENT — PAIN DESCRIPTION - ONSET
ONSET: ON-GOING
ONSET: ON-GOING
ONSET: AWAKENED FROM SLEEP
ONSET: ON-GOING

## 2020-10-02 ASSESSMENT — PAIN DESCRIPTION - PAIN TYPE
TYPE: SURGICAL PAIN
TYPE: ACUTE PAIN
TYPE: SURGICAL PAIN

## 2020-10-02 ASSESSMENT — PAIN DESCRIPTION - FREQUENCY
FREQUENCY: CONTINUOUS

## 2020-10-02 ASSESSMENT — PULMONARY FUNCTION TESTS
PIF_VALUE: 20
PIF_VALUE: 10
PIF_VALUE: 18
PIF_VALUE: 11
PIF_VALUE: 10
PIF_VALUE: 19
PIF_VALUE: 3
PIF_VALUE: 3
PIF_VALUE: 1
PIF_VALUE: 4
PIF_VALUE: 21
PIF_VALUE: 1
PIF_VALUE: 18
PIF_VALUE: 3
PIF_VALUE: 16
PIF_VALUE: 0
PIF_VALUE: 18
PIF_VALUE: 3
PIF_VALUE: 18
PIF_VALUE: 20
PIF_VALUE: 12
PIF_VALUE: 1
PIF_VALUE: 3
PIF_VALUE: 18
PIF_VALUE: 3
PIF_VALUE: 3
PIF_VALUE: 19
PIF_VALUE: 3
PIF_VALUE: 18
PIF_VALUE: 3
PIF_VALUE: 19
PIF_VALUE: 3
PIF_VALUE: 20
PIF_VALUE: 18
PIF_VALUE: 10
PIF_VALUE: 1
PIF_VALUE: 3
PIF_VALUE: 19
PIF_VALUE: 3
PIF_VALUE: 18
PIF_VALUE: 1
PIF_VALUE: 18

## 2020-10-02 ASSESSMENT — PAIN DESCRIPTION - DESCRIPTORS
DESCRIPTORS: ACHING

## 2020-10-02 ASSESSMENT — PAIN SCALES - GENERAL
PAINLEVEL_OUTOF10: 7
PAINLEVEL_OUTOF10: 7
PAINLEVEL_OUTOF10: 5
PAINLEVEL_OUTOF10: 7
PAINLEVEL_OUTOF10: 0
PAINLEVEL_OUTOF10: 7
PAINLEVEL_OUTOF10: 7
PAINLEVEL_OUTOF10: 0

## 2020-10-02 ASSESSMENT — PAIN DESCRIPTION - LOCATION
LOCATION: WRIST

## 2020-10-02 ASSESSMENT — PAIN DESCRIPTION - ORIENTATION
ORIENTATION: RIGHT

## 2020-10-02 ASSESSMENT — PAIN DESCRIPTION - PROGRESSION
CLINICAL_PROGRESSION: NOT CHANGED

## 2020-10-02 NOTE — BRIEF OP NOTE
Brief Postoperative Note      Patient: Monique Parada  YOB: 1945  MRN: [id number]    Date of Procedure: 10/2/2020    Pre-Op Diagnosis: Right distal radius fracture    Post-Op Diagnosis: Same       Procedure(s):  OPEN REDUCTION INTERNAL FIXATION RIGHT WRIST    Surgeon(s):  Merlinda Goring, MD    Assistant:  Surgical Assistant: Emmette Koyanagi  Physician Assistant: Isaiah Foley    Anesthesia: General    Estimated Blood Loss (mL): Minimal    Complications: None    Specimens:   * No specimens in log *    Implants:  Implant Name Type Inv. Item Serial No.  Lot No. LRB No. Used Action   PLATE VOLAR  INTRMED RT 10HL SHRT Screw/Plate/Nail/Ricci PLATE VOLAR  INTRMED RT 10HL SHRT  RENEE: ORTH[employer]  Right 1 Implanted   SCREW LK 2.7X14MM Screw/Plate/Nail/Ricci SCREW LK 2.7X14MM  RENEE: ORTHOPAEDICS  Right 1 Implanted   SCREW LOCKING 2. [id number] Screw/Plate/Nail/Ricci SCREW LOCKING 2. [id number]  RENEE: ORTHOPAEDICS  Right 1 Implanted   SCREW LOCKING 2. [id number] Screw/Plate/Nail/Ricci SCREW LOCKING 2. [id number]  RENEE: ORTHOPAEDICS  Right 5 Implanted   SCREW LOCKING [id number] Screw/Plate/Nail/Ricci SCREW LOCKING [id number]  RENEE: ORTHOPAEDICS  Right 2 Implanted   SCREW T8 BONE 2. [id number] Screw/Plate/Nail/Ricci SCREW T8 BONE 2. [id number]  RENEE: ORTHOPAEDICS  Right 1 Implanted         Drains: * No LDAs found *    Findings: Same    Electronically signed by Merlinda Goring, MD on 10/2/2020 at 8:22 AM

## 2020-10-02 NOTE — PROGRESS NOTES
Alert and oriented. tolerated sitting up and po fluids and crackers well. Denies pain. C/o slight nausea. Dressing remains clean dry intact. Fingers are warm, move well, ciara well.

## 2020-10-02 NOTE — H&P
Preoperative H&P Update    The patient's History and Physical in the medical record from 9/29/2020 was reviewed by me today. Past Medical History:   Diagnosis Date    Anxiety     CAD (coronary artery disease)     Cerebral artery occlusion with cerebral infarction ([address]     mini strokes    Depression     Graves disease     Hx of blood clots 2011     lungs after heart attach    Hyperlipidemia     Hypertension     IBS (irritable bowel syndrome)     w constipation    Kidney disease, chronic, stage III (moderate, EGFR 30-59 ml/min) (HCC)     [facility] infarction) [facility])     Pneumonia     Prolonged emergence from general anesthesia     Renal failure     Sleep apnea     does not need to use cpap per MD due to weight loss    Thyroid disease     hyper thyroidism    Urinary incontinence      Past Surgical History:   Procedure Laterality Date    ARM SURGERY Left     CHOLECYSTECTOMY      COLONOSCOPY      DIAPHRAGMATIC HERNIA REPAIR      EYE SURGERY      contaract    HAND SURGERY Left      No current facility-administered medications on file prior to encounter.       Current Outpatient Medications on File Prior to Encounter   Medication Sig Dispense Refill    aspirin EC 81 MG EC tablet Take 81 mg by mouth daily      pantoprazole sodium (PROTONIX) 40 MG PACK packet Take 40 mg by mouth every morning (before breakfast)      carvedilol (COREG) 6.25 MG tablet Take 6.25 mg by mouth 2 times daily (with meals)      traMADol (ULTRAM) 50 MG tablet Take 50 mg by mouth every 6 hours as needed for Pain      atorvastatin (LIPITOR) 10 MG tablet Take 10 mg by mouth daily      LORazepam (ATIVAN) 0.5 MG tablet Take 0.5 mg by mouth daily Indications: 2 mg at nite prn      nortriptyline (PAMELOR) 10 MG capsule Take 10 mg by mouth nightly      lisinopril (PRINIVIL;ZESTRIL) 2.5 MG tablet Take 2.5 mg by mouth daily      fexofenadine (ALLEGRA) 30 MG tablet Take 30 mg by mouth 2 times daily      polyethyl glycol-propyl glycol 0.4-0.3 % (SYSTANE) 0.4-0.3 % ophthalmic solution 1 drop as needed for Dry Eyes (6 drops daily)      cycloSPORINE (RESTASIS) 0.05 % ophthalmic emulsion Place 2 drops into the left eye 2 times daily      loteprednol (ALREX) 0.2 % SUSP 1 drop 2 times daily      fluticasone (FLONASE) 50 MCG/ACT nasal spray 1 spray by Nasal route daily      solifenacin (VESICARE) 5 MG tablet Take 10 mg by mouth daily      vitamin D (CHOLECALCIFEROL) 1000 UNITS TABS tablet Take 1,000 Units by mouth daily      Multiple Vitamins-Minerals (THERAPEUTIC MULTIVITAMIN-MINERALS) tablet Take 1 tablet by mouth daily      polycarbophil (FIBERCON) 625 MG tablet Take 625 mg by mouth daily      calcium gluconate 500 MG tablet Take 500 mg by mouth daily      vitamin B-12 (CYANOCOBALAMIN) 100 MCG tablet Take 50 mcg by mouth daily      polyethylene glycol (GLYCOLAX) packet Take 17 g by mouth daily as needed for Constipation      clindamycin (CLEOCIN) 300 MG capsule Take 1 capsule by mouth 3 times daily for 5 days 15 capsule 0    promethazine (PHENERGAN) 12.5 MG tablet Take 12.5 mg by mouth every 6 hours as needed for Nausea      conjugated estrogens (PREMARIN) 0.625 MG/GM vaginal cream Place vaginally daily Place vaginally daily. Allergies   Allergen Reactions    Augmentin [Amoxicillin-Pot Clavulanate]     Bactrim [Sulfamethoxazole-Trimethoprim]     Carafate [Sucralfate]     Hyoscyamine     Macrobid [Nitrofurantoin]     Oxybutynin     Pcn [Penicillins]     Pravastatin     Prevacid [Lansoprazole]     Prilosec [Omeprazole]     Sulfamethoxazole     Trazodone And Nefazodone       I reviewed the HPI, medications, allergies, reason for surgery, diagnosis and treatment plan and there has been no change. The patient was evaluated by me today.  Physical exam findings for this update include:    Vitals:    10/02/20 0632   BP: (!) 141/117   Pulse: 81   Resp: 18   Temp: 97.5 °F (36.4 °C)   SpO2: 99%     Airway

## 2020-10-02 NOTE — OP NOTE
[address] [place] [address] [facility]                                OPERATIVE REPORT    PATIENT NAME: Margot Garcia                   :        1945  MED REC NO:   [id number]                          ROOM:  ACCOUNT NO:   [de-identified]                           ADMIT DATE: 10/02/2020  PROVIDER:     Ewa Booth MD    DATE OF PROCEDURE:  10/02/2020    PRIMARY CARE PHYSICIAN:  Obdulia Okeefe MD    PREOPERATIVE DIAGNOSIS:  Right distal radius three-part intra-articular  displaced fracture. POSTOPERATIVE DIAGNOSIS:  Right distal radius three-part intra-articular  displaced fracture. OPERATION PERFORMED:  Open treatment of right distal radius three-part  intra-articular fracture with open reduction and internal fixation. SURGEON:  Ewa Booth MD    ASSISTANT:  Gina Simmonds, CNP    ANESTHESIA:  General anesthesia. ESTIMATED BLOOD LOSS:  Minimal.    COMPLICATIONS:  None. TOURNIQUET:  Right upper arm, 250 mmHg. IMPLANTS USED:  Gerson titanium intermediate volar locking plate with a  total of seven distal 2.7 locking screws and two proximal screws. INDICATIONS:  This is a 71-year-old white female, right-hand dominant,  who sustained a fall with a right wrist injury. She was found to have  an intra-articular displaced distal radius fracture. All risks,  benefits, and alternatives were discussed with the patient, and she  agreed to proceed with the surgical treatment. OPERATIVE PROCEDURE:  The patient's right wrist was marked. She  received 900 mg of clindamycin IV preoperatively. The patient was then  brought to the operating room and underwent general anesthesia. A  well-padded tourniquet was placed, right upper arm. The right upper  extremity was then prepped and draped in regular sterile routine  fashion. A time-out was called, confirming the patient's name, site,  and procedure.     Esmarch was used for exsanguination and tourniquet was inflated to 250  mmHg. A volar approach was performed. An incision was made over the  FCR tendon, and the tendon sheath was opened. We then incised the  pronator quadratus muscle with the cautery. We exposed the fracture and  was found to be moderately displaced. We used a Dairy elevator and we  were able to realign the fracture anatomically. It was found to be a  three-part intra-articular fracture. While maintaining the reduction, because it was unstable, we used a  styloid process K-wire for provisional fixation. While maintaining the  reduction, we put the intermediate volar locking plate from [employer] in  appropriate position. We put one screw in the oblong hole and we  reduced the fracture to the plate and locked it with a total of seven  distal 2.7 locking screws. We added one more locking screw in the  shaft. Overall, we were very satisfied with the anatomic reduction and  position of all the screws. At this point, we let the tourniquet down and hemostasis was secured. We irrigated the incision copiously with normal saline mixed with  gentamicin. We closed the subcu with a 3-0 Vicryl and the skin with a  4-0 Monocryl. Steri-Strips were then applied. Dressing was then  applied in the form of Xeroform, 4x4, sterile Webril, and a volar splint  was applied. The patient tolerated the procedure well and was taken to the recovery  in stable condition. Reggie Pompa CNP was 1st Assist given the nature of the procedure that needed advanced assistance. POSTOPERATIVE PLAN:  The patient will be discharged home.   She can start  range of motion of her fingers and in two weeks, we can start range of  motion of the wrist.        Opal Dye MD    D: 10/02/2020 9:06:11       T: 10/02/2020 13:38:33     [email or web address]  Job#: [id number]     Doc#: [id number]    CC:  Jose Luis Quiñonez MD

## 2020-10-02 NOTE — ANESTHESIA PRE PROCEDURE
Outpatient Medications on File Prior to Encounter   Medication Sig Dispense Refill    aspirin EC 81 MG EC tablet Take 81 mg by mouth daily      pantoprazole sodium (PROTONIX) 40 MG PACK packet Take 40 mg by mouth every morning (before breakfast)      carvedilol (COREG) 6.25 MG tablet Take 6.25 mg by mouth 2 times daily (with meals)      traMADol (ULTRAM) 50 MG tablet Take 50 mg by mouth every 6 hours as needed for Pain      atorvastatin (LIPITOR) 10 MG tablet Take 10 mg by mouth daily      LORazepam (ATIVAN) 0.5 MG tablet Take 0.5 mg by mouth daily Indications: 2 mg at nite prn      nortriptyline (PAMELOR) 10 MG capsule Take 10 mg by mouth nightly      lisinopril (PRINIVIL;ZESTRIL) 2.5 MG tablet Take 2.5 mg by mouth daily      fexofenadine (ALLEGRA) 30 MG tablet Take 30 mg by mouth 2 times daily      polyethyl glycol-propyl glycol 0.4-0.3 % (SYSTANE) 0.4-0.3 % ophthalmic solution 1 drop as needed for Dry Eyes (6 drops daily)      cycloSPORINE (RESTASIS) 0.05 % ophthalmic emulsion Place 2 drops into the left eye 2 times daily      loteprednol (ALREX) 0.2 % SUSP 1 drop 2 times daily      fluticasone (FLONASE) 50 MCG/ACT nasal spray 1 spray by Nasal route daily      solifenacin (VESICARE) 5 MG tablet Take 10 mg by mouth daily      vitamin D (CHOLECALCIFEROL) 1000 UNITS TABS tablet Take 1,000 Units by mouth daily      Multiple Vitamins-Minerals (THERAPEUTIC MULTIVITAMIN-MINERALS) tablet Take 1 tablet by mouth daily      polycarbophil (FIBERCON) 625 MG tablet Take 625 mg by mouth daily      calcium gluconate 500 MG tablet Take 500 mg by mouth daily      vitamin B-12 (CYANOCOBALAMIN) 100 MCG tablet Take 50 mcg by mouth daily      polyethylene glycol (GLYCOLAX) packet Take 17 g by mouth daily as needed for Constipation      clindamycin (CLEOCIN) 300 MG capsule Take 1 capsule by mouth 3 times daily for 5 days 15 capsule 0    promethazine (PHENERGAN) 12.5 MG tablet Take 12.5 mg by mouth every 6 hours as needed for Nausea      conjugated estrogens (PREMARIN) 0.625 MG/GM vaginal cream Place vaginally daily Place vaginally daily. Current Facility-Administered Medications   Medication Dose Route Frequency Provider Last Rate Last Dose    0.9 % sodium chloride infusion   Intravenous Continuous Kristen Tafoya MD        sodium chloride flush 0.9 % injection 10 mL  10 mL Intravenous 2 times per day Kristen Tafoya MD        sodium chloride flush 0.9 % injection 10 mL  10 mL Intravenous PRN Kristen Tafoya MD        ceFAZolin (ANCEF) 2 g in dextrose 5 % 100 mL IVPB  2 g Intravenous Once Graciela Arora MD         Vital Signs (Current)   Vitals:    10/02/20 06   BP: (!) 141/117   Pulse: 81   Resp: 18   Temp: 97.5 °F (36.4 °C)   SpO2: 99%     Vital Signs Statistics (for past 48 hrs)     Temp  Av.5 °F (36.4 °C)  Min: 97.5 °F (36.4 °C)   Min taken time: 10/02/20 6301  Max: 97.5 °F (36.4 °C)   Max taken time: 10/02/20 0632  Pulse  Av  Min: 80   Min taken time: 10/02/20 0632  Max: 81   Max taken time: 10/02/20 0632  Resp  Av  Min: 25   Min taken time: 10/02/20 6346  Max: 18   Max taken time: 10/02/20 0632  BP  Min: 141/117   Min taken time: 10/02/20 8103  Max: 141/117   Max taken time: 10/02/20 0632  SpO2  Av %  Min: 99 %   Min taken time: 10/02/20 8260  Max: 99 %   Max taken time: 10/02/20 0632    BP Readings from Last 3 Encounters:   10/02/20 (!) 141/117   20 (!) 126/41   // 120/60     BMI  Body mass index is 29.45 kg/m². Estimated body mass index is 29.45 kg/m² as calculated from the following:    Height as of this encounter: 5' (1.524 m). Weight as of this encounter: 150 lb 12.7 oz (68.4 kg).     CBC No results found for: WBC, RBC, HGB, HCT, MCV, RDW, PLT  CMP  No results found for: NA, K, CL, CO2, BUN, CREATININE, GFRAA, AGRATIO, LABGLOM, GLUCOSE, PROT, CALCIUM, BILITOT, ALKPHOS, AST, ALT  BMP  No results found for: NA, K, CL, CO2, BUN, CREATININE, CALCIUM, GFRAA, LABGLOM,

## 2020-10-02 NOTE — PROGRESS NOTES
Call placed to Dr. Mylene Kline pt remains pain 7/10, appears to be sleeping sats drop to 88-89 and recovers to low 90's quickly, no more IV narcotics per anesthesia

## 2020-10-02 NOTE — ANESTHESIA POSTPROCEDURE EVALUATION
[facility] Department of Anesthesiology  Post-Anesthesia Note       Name:  Emy Swain                                  Age:  77 y.o. MRN:  [id number]     Last Vitals & Oxygen Saturation: BP (!) 145/73   Pulse 70   Temp 97 °F (36.1 °C) (Temporal)   Resp 16   Ht 5' (1.524 m)   Wt 150 lb 12.7 oz (68.4 kg)   SpO2 97%   BMI 29.45 kg/m²   Patient Vitals for the past 4 hrs:   BP Temp Temp src Pulse Resp SpO2   10/02/20 1053 (!) 145/73 -- -- 70 16 97 %   10/02/20 1025 (!) 149/75 97 °F (36.1 °C) Temporal 78 16 97 %   10/02/20 1015 -- 97.1 °F (36.2 °C) Temporal -- -- --   10/02/20 1005 134/89 -- -- 65 16 100 %   10/02/20 1000 (!) 139/102 -- -- 67 14 100 %   10/02/20 0955 -- -- -- 68 17 97 %   10/02/20 0950 -- -- -- 60 18 99 %   10/02/20 0945 115/69 -- -- 56 15 100 %   10/02/20 0940 -- -- -- 60 12 100 %   10/02/20 0935 -- -- -- 57 19 96 %   10/02/20 0930 (!) 143/66 -- -- 50 17 96 %   10/02/20 0925 -- -- -- 58 15 99 %   10/02/20 0920 -- -- -- 59 17 100 %   10/02/20 0915 (!) 145/67 -- -- 61 15 95 %   10/02/20 0910 -- -- -- 61 19 99 %   10/02/20 0905 -- -- -- 62 15 91 %   10/02/20 0900 139/62 -- -- 59 21 99 %   10/02/20 0856 -- -- -- 62 19 99 %   10/02/20 0855 -- -- -- 62 9 (!) 87 %   10/02/20 0850 -- -- -- 60 19 98 %   10/02/20 0845 133/66 -- -- 63 19 97 %   10/02/20 0844 -- -- -- 62 18 (!) 88 %   10/02/20 0840 -- -- -- 66 16 92 %   10/02/20 0835 124/71 -- -- 65 13 90 %   10/02/20 0830 124/72 -- -- 69 12 91 %   10/02/20 0825 134/77 -- -- 71 17 98 %   10/02/20 0820 121/64 -- -- 69 14 97 %   10/02/20 0816 121/64 97 °F (36.1 °C) Temporal 70 13 94 %       Level of consciousness:  Awake, alert    Respiratory: Respirations easy, no distress. Stable. Cardiovascular: Hemodynamically stable. Hydration: Adequate. PONV: Adequately managed. Post-op pain: Adequately controlled. Post-op assessment: Tolerated anesthetic well without complication. Complications:  None.     Meenakshi Nava MD  October 2, 2020 11:58 AM

## 2020-10-16 ENCOUNTER — OFFICE VISIT (OUTPATIENT)
Dept: ORTHOPEDIC SURGERY | Age: 75
End: 2020-10-16
Payer: MEDICARE

## 2020-10-16 VITALS — WEIGHT: 150 LBS | BODY MASS INDEX: 29.45 KG/M2 | HEIGHT: 60 IN | TEMPERATURE: 97.3 F

## 2020-10-16 PROCEDURE — APPNB15 APP NON BILLABLE TIME 0-15 MINS: Performed by: NURSE PRACTITIONER

## 2020-10-16 PROCEDURE — 99024 POSTOP FOLLOW-UP VISIT: CPT | Performed by: NURSE PRACTITIONER

## 2020-10-16 PROCEDURE — L3908 WHO COCK-UP NONMOLDE PRE OTS: HCPCS | Performed by: ORTHOPAEDIC SURGERY

## 2020-10-18 NOTE — PROGRESS NOTES
DIAGNOSIS:  Right distal radius 3 parts intra articular comminuted fracture, status post ORIF. DATE OF SURGERY:  10/2/2020. HISTORY OF PRESENT ILLNESS:  Ms. Dalton Webb 77 y.o.  female right handed returns today  for 2 weeks postoperative visit. The patient denies any significant pain in the right wrist.  Rates pain a 0-1/10 VAS mild, aching, intermittent and are improving. Aggravating factors movement. Alleviating factors rest.  No numbness or tingling sensation. No fever or Chills. She  is in a splint. PHYSICAL EXAMINATION:  The incision is completely healed . No signs of any erythema or drainage. She  has no pain with the active or passive range of motion of the right wrist, but decrease ROM. She  has intact sensation, distally, and she  is neurovascularly intact. IMAGING:  Three views right wrist taken today in the office showed anatomic alignment of right distal radius, plate and screws in good position, no loosening. IMPRESSION:  2 weeks out from right distal radius ORIF and doing very well. PLAN:  I have told the patient to work on ROM, as well as strengthening exercises. A removable forearm brace applied. No heavy impact activities. The patient will come back for a follow up in 6 weeks. At that time, we will take 3 views of the right wrist. PT if needed then. As this patient has demonstrated risk factors for osteoporosis, such as age greater than [de-identified] years and evidence of a fracture, I have referred the patient back to the primary care physician for evaluation for osteoporosis, including consideration for DEXA scanning, if this is felt to be clinically indicated. The patient is advised to contact the primary care physician to follow-up for further evaluation. Procedures    Callie Lopez Titan Wrist Long Forearm Brace     Patient was prescribed a Callie Lopez Titan Wrist and Forearm Brace.    The right wrist will require stabilization / immobilization from this semi-rigid / rigid orthosis to improve their function. The orthosis will assist in protecting the affected area, provide functional support and facilitate healing. The patient was educated and fit by a healthcare professional with expert knowledge and specialization in brace application while under the direct supervision of the treating physician. Verbal and written instructions for the use of and application of this item were provided. They were instructed to contact the office immediately should the brace result in increased pain, decreased sensation, increased swelling or worsening of the condition.          Lucian Heller, ULYSSES - CNP

## 2020-11-25 ENCOUNTER — OFFICE VISIT (OUTPATIENT)
Dept: ORTHOPEDIC SURGERY | Age: 75
End: 2020-11-25

## 2020-11-25 VITALS — BODY MASS INDEX: 29.45 KG/M2 | TEMPERATURE: 97.2 F | HEIGHT: 60 IN | WEIGHT: 150 LBS

## 2020-11-25 PROCEDURE — 99024 POSTOP FOLLOW-UP VISIT: CPT | Performed by: ORTHOPAEDIC SURGERY

## 2020-11-25 NOTE — PROGRESS NOTES
DIAGNOSIS:  Right distal radius 3 parts intra articular comminuted fracture, status post ORIF. DATE OF SURGERY:  10/2/2020. HISTORY OF PRESENT ILLNESS:  Ms. Nicholson Catching 77 y.o.  female right handed returns today  for 2 months postoperative visit. The patient denies any significant pain in the right wrist.  Rates pain a 0/10 VAS mild, aching, intermittent and are improving. Aggravating factors movement. Alleviating factors rest. No numbness or tingling sensation. No fever or Chills. PHYSICAL EXAMINATION:  The incision is completely healed . No signs of any erythema or drainage. She has no pain with the active or passive range of motion of the right wrist, but decrease ROM. She  has intact sensation, distally, and she  is neurovascularly intact. IMAGING:  Three views right wrist taken today in the office showed anatomic alignment of right distal radius, plate and screws in good position, no loosening. IMPRESSION:  2 months out from right distal radius ORIF and doing very well. PLAN:  I have told the patient to work on ROM, as well as strengthening exercises. D/c removable forearm brace. No heavy impact activities. The patient will come back for a follow up in 6 weeks. At that time, we will take 3 views of the right wrist. PT if needed then. As this patient has demonstrated risk factors for osteoporosis, such as age greater than [de-identified] years and evidence of a fracture, I have referred the patient back to the primary care physician for evaluation for osteoporosis, including consideration for DEXA scanning, if this is felt to be clinically indicated. The patient is advised to contact the primary care physician to follow-up for further evaluation.        Heather Shea MD

## 2021-01-01 ENCOUNTER — TELEPHONE (OUTPATIENT)
Dept: CARDIOLOGY CLINIC | Age: 76
End: 2021-01-01

## 2021-01-01 ENCOUNTER — APPOINTMENT (OUTPATIENT)
Dept: GENERAL RADIOLOGY | Age: 76
DRG: 215 | End: 2021-01-01
Payer: MEDICARE

## 2021-01-01 ENCOUNTER — APPOINTMENT (OUTPATIENT)
Dept: CARDIAC CATH/INVASIVE PROCEDURES | Age: 76
DRG: 215 | End: 2021-01-01
Payer: MEDICARE

## 2021-01-01 ENCOUNTER — APPOINTMENT (OUTPATIENT)
Dept: CT IMAGING | Age: 76
DRG: 637 | End: 2021-01-01
Payer: MEDICARE

## 2021-01-01 ENCOUNTER — OFFICE VISIT (OUTPATIENT)
Dept: CARDIOLOGY CLINIC | Age: 76
End: 2021-01-01
Payer: MEDICARE

## 2021-01-01 ENCOUNTER — TELEPHONE (OUTPATIENT)
Dept: PHARMACY | Age: 76
End: 2021-01-01

## 2021-01-01 ENCOUNTER — APPOINTMENT (OUTPATIENT)
Dept: CT IMAGING | Age: 76
DRG: 215 | End: 2021-01-01
Payer: MEDICARE

## 2021-01-01 ENCOUNTER — VIRTUAL VISIT (OUTPATIENT)
Dept: PHARMACY | Age: 76
End: 2021-01-01
Payer: MEDICARE

## 2021-01-01 ENCOUNTER — HOSPITAL ENCOUNTER (INPATIENT)
Age: 76
LOS: 3 days | Discharge: HOME HEALTH | DRG: 637 | End: 2021-09-16
Attending: STUDENT IN AN ORGANIZED HEALTH CARE EDUCATION/TRAINING PROGRAM | Admitting: STUDENT IN AN ORGANIZED HEALTH CARE EDUCATION/TRAINING PROGRAM
Payer: MEDICARE

## 2021-01-01 ENCOUNTER — HOSPITAL ENCOUNTER (INPATIENT)
Age: 76
LOS: 12 days | Discharge: HOME HEALTH | DRG: 215 | End: 2021-08-30
Attending: EMERGENCY MEDICINE | Admitting: INTERNAL MEDICINE
Payer: MEDICARE

## 2021-01-01 ENCOUNTER — HOSPITAL ENCOUNTER (OUTPATIENT)
Dept: CARDIAC REHAB | Age: 76
Setting detail: THERAPIES SERIES
Discharge: HOME | End: 2021-12-27
Payer: MEDICARE

## 2021-01-01 VITALS
WEIGHT: 136.02 LBS | OXYGEN SATURATION: 95 % | HEART RATE: 69 BPM | RESPIRATION RATE: 16 BRPM | TEMPERATURE: 98.1 F | DIASTOLIC BLOOD PRESSURE: 69 MMHG | HEIGHT: 60 IN | BODY MASS INDEX: 26.71 KG/M2 | SYSTOLIC BLOOD PRESSURE: 121 MMHG

## 2021-01-01 VITALS
HEIGHT: 60 IN | OXYGEN SATURATION: 100 % | DIASTOLIC BLOOD PRESSURE: 64 MMHG | HEART RATE: 86 BPM | BODY MASS INDEX: 26.07 KG/M2 | WEIGHT: 132.8 LBS | SYSTOLIC BLOOD PRESSURE: 120 MMHG

## 2021-01-01 VITALS
HEIGHT: 60 IN | BODY MASS INDEX: 23.13 KG/M2 | DIASTOLIC BLOOD PRESSURE: 60 MMHG | HEART RATE: 85 BPM | WEIGHT: 117.8 LBS | SYSTOLIC BLOOD PRESSURE: 98 MMHG

## 2021-01-01 VITALS
HEIGHT: 60 IN | SYSTOLIC BLOOD PRESSURE: 100 MMHG | BODY MASS INDEX: 22.38 KG/M2 | HEART RATE: 83 BPM | WEIGHT: 114 LBS | DIASTOLIC BLOOD PRESSURE: 70 MMHG

## 2021-01-01 VITALS
DIASTOLIC BLOOD PRESSURE: 64 MMHG | HEART RATE: 116 BPM | WEIGHT: 125 LBS | HEIGHT: 60 IN | OXYGEN SATURATION: 98 % | BODY MASS INDEX: 24.54 KG/M2 | SYSTOLIC BLOOD PRESSURE: 112 MMHG

## 2021-01-01 VITALS
HEART RATE: 97 BPM | SYSTOLIC BLOOD PRESSURE: 142 MMHG | TEMPERATURE: 97.6 F | HEIGHT: 60 IN | DIASTOLIC BLOOD PRESSURE: 74 MMHG | RESPIRATION RATE: 16 BRPM | WEIGHT: 141.09 LBS | OXYGEN SATURATION: 92 % | BODY MASS INDEX: 27.7 KG/M2

## 2021-01-01 DIAGNOSIS — E87.6 HYPOKALEMIA: Primary | ICD-10-CM

## 2021-01-01 DIAGNOSIS — I50.21 ACUTE SYSTOLIC HEART FAILURE (HCC): Primary | ICD-10-CM

## 2021-01-01 DIAGNOSIS — I25.10 CORONARY ARTERY DISEASE INVOLVING NATIVE CORONARY ARTERY OF NATIVE HEART WITHOUT ANGINA PECTORIS: ICD-10-CM

## 2021-01-01 DIAGNOSIS — I21.02 ST ELEVATION MYOCARDIAL INFARCTION INVOLVING LEFT ANTERIOR DESCENDING (LAD) CORONARY ARTERY (HCC): Primary | ICD-10-CM

## 2021-01-01 DIAGNOSIS — E16.2 HYPOGLYCEMIA: ICD-10-CM

## 2021-01-01 DIAGNOSIS — K52.9 GASTROENTERITIS: ICD-10-CM

## 2021-01-01 DIAGNOSIS — I50.22 CHRONIC SYSTOLIC CONGESTIVE HEART FAILURE (HCC): Primary | ICD-10-CM

## 2021-01-01 DIAGNOSIS — I21.02 ST ELEVATION MYOCARDIAL INFARCTION INVOLVING LEFT ANTERIOR DESCENDING (LAD) CORONARY ARTERY (HCC): ICD-10-CM

## 2021-01-01 DIAGNOSIS — I50.21 ACUTE SYSTOLIC HEART FAILURE (HCC): ICD-10-CM

## 2021-01-01 DIAGNOSIS — R53.83 OTHER FATIGUE: ICD-10-CM

## 2021-01-01 DIAGNOSIS — E87.1 HYPONATREMIA: ICD-10-CM

## 2021-01-01 DIAGNOSIS — Z99.81 REQUIRES OXYGEN THERAPY: ICD-10-CM

## 2021-01-01 DIAGNOSIS — I21.3 ST ELEVATION MYOCARDIAL INFARCTION (STEMI), UNSPECIFIED ARTERY (HCC): Primary | ICD-10-CM

## 2021-01-01 DIAGNOSIS — N17.9 ACUTE RENAL FAILURE, UNSPECIFIED ACUTE RENAL FAILURE TYPE (HCC): Primary | ICD-10-CM

## 2021-01-01 LAB
A/G RATIO: 0.7 (ref 1.1–2.2)
ABO/RH: NORMAL
ACETOHEXAMIDE: NOT DETECTED NG/ML
ALBUMIN SERPL-MCNC: 2.6 G/DL (ref 3.4–5)
ALBUMIN SERPL-MCNC: 2.7 G/DL (ref 3.4–5)
ALBUMIN SERPL-MCNC: 2.7 G/DL (ref 3.4–5)
ALBUMIN SERPL-MCNC: 2.8 G/DL (ref 3.4–5)
ALBUMIN SERPL-MCNC: 2.8 G/DL (ref 3.4–5)
ALBUMIN SERPL-MCNC: 2.9 G/DL (ref 3.4–5)
ALBUMIN SERPL-MCNC: 3 G/DL (ref 3.4–5)
ALBUMIN SERPL-MCNC: 3.1 G/DL (ref 3.4–5)
ALBUMIN SERPL-MCNC: 3.7 G/DL (ref 3.4–5)
ALBUMIN SERPL-MCNC: 3.7 G/DL (ref 3.4–5)
ALP BLD-CCNC: 101 U/L (ref 40–129)
ALP BLD-CCNC: 101 U/L (ref 40–129)
ALP BLD-CCNC: 104 U/L (ref 40–129)
ALP BLD-CCNC: 104 U/L (ref 40–129)
ALP BLD-CCNC: 112 U/L (ref 40–129)
ALP BLD-CCNC: 119 U/L (ref 40–129)
ALP BLD-CCNC: 121 U/L (ref 40–129)
ALP BLD-CCNC: 128 U/L (ref 40–129)
ALP BLD-CCNC: 136 U/L (ref 40–129)
ALP BLD-CCNC: 143 U/L (ref 40–129)
ALP BLD-CCNC: 161 U/L (ref 40–129)
ALP BLD-CCNC: 208 U/L (ref 40–129)
ALP BLD-CCNC: 273 U/L (ref 40–129)
ALP BLD-CCNC: 86 U/L (ref 40–129)
ALP BLD-CCNC: 87 U/L (ref 40–129)
ALP BLD-CCNC: 90 U/L (ref 40–129)
ALP BLD-CCNC: 94 U/L (ref 40–129)
ALP BLD-CCNC: 98 U/L (ref 40–129)
ALT SERPL-CCNC: 10 U/L (ref 10–40)
ALT SERPL-CCNC: 10 U/L (ref 10–40)
ALT SERPL-CCNC: 105 U/L (ref 10–40)
ALT SERPL-CCNC: 11 U/L (ref 10–40)
ALT SERPL-CCNC: 13 U/L (ref 10–40)
ALT SERPL-CCNC: 14 U/L (ref 10–40)
ALT SERPL-CCNC: 145 U/L (ref 10–40)
ALT SERPL-CCNC: 16 U/L (ref 10–40)
ALT SERPL-CCNC: 19 U/L (ref 10–40)
ALT SERPL-CCNC: 19 U/L (ref 10–40)
ALT SERPL-CCNC: 20 U/L (ref 10–40)
ALT SERPL-CCNC: 28 U/L (ref 10–40)
ALT SERPL-CCNC: 40 U/L (ref 10–40)
ALT SERPL-CCNC: 66 U/L (ref 10–40)
ANION GAP SERPL CALCULATED.3IONS-SCNC: 10 MMOL/L (ref 3–16)
ANION GAP SERPL CALCULATED.3IONS-SCNC: 11 MMOL/L (ref 3–16)
ANION GAP SERPL CALCULATED.3IONS-SCNC: 12 MMOL/L (ref 3–16)
ANION GAP SERPL CALCULATED.3IONS-SCNC: 13 MMOL/L (ref 3–16)
ANION GAP SERPL CALCULATED.3IONS-SCNC: 14 MMOL/L (ref 3–16)
ANION GAP SERPL CALCULATED.3IONS-SCNC: 15 MMOL/L (ref 3–16)
ANION GAP SERPL CALCULATED.3IONS-SCNC: 16 MMOL/L (ref 3–16)
ANION GAP SERPL CALCULATED.3IONS-SCNC: 16 MMOL/L (ref 3–16)
ANION GAP SERPL CALCULATED.3IONS-SCNC: 19 MMOL/L (ref 3–16)
ANION GAP SERPL CALCULATED.3IONS-SCNC: 21 MMOL/L (ref 3–16)
ANISOCYTOSIS: ABNORMAL
ANTI-XA UNFRAC HEPARIN: 0.18 IU/ML (ref 0.3–0.7)
ANTIBODY SCREEN: NORMAL
APTT: 30 SEC (ref 26.2–38.6)
APTT: 34.8 SEC (ref 26.2–38.6)
AST SERPL-CCNC: 125 U/L (ref 15–37)
AST SERPL-CCNC: 17 U/L (ref 15–37)
AST SERPL-CCNC: 17 U/L (ref 15–37)
AST SERPL-CCNC: 18 U/L (ref 15–37)
AST SERPL-CCNC: 18 U/L (ref 15–37)
AST SERPL-CCNC: 19 U/L (ref 15–37)
AST SERPL-CCNC: 23 U/L (ref 15–37)
AST SERPL-CCNC: 23 U/L (ref 15–37)
AST SERPL-CCNC: 236 U/L (ref 15–37)
AST SERPL-CCNC: 24 U/L (ref 15–37)
AST SERPL-CCNC: 27 U/L (ref 15–37)
AST SERPL-CCNC: 29 U/L (ref 15–37)
AST SERPL-CCNC: 34 U/L (ref 15–37)
AST SERPL-CCNC: 38 U/L (ref 15–37)
AST SERPL-CCNC: 44 U/L (ref 15–37)
AST SERPL-CCNC: 49 U/L (ref 15–37)
AST SERPL-CCNC: 494 U/L (ref 15–37)
AST SERPL-CCNC: 77 U/L (ref 15–37)
ATYPICAL LYMPHOCYTE RELATIVE PERCENT: 2 % (ref 0–6)
BACTERIA: ABNORMAL /HPF
BASE EXCESS ARTERIAL: -1.2 MMOL/L (ref -3–3)
BASE EXCESS ARTERIAL: -2 (ref -3–3)
BASE EXCESS ARTERIAL: -5.4 MMOL/L (ref -3–3)
BASE EXCESS MIXED: -1
BASE EXCESS MIXED: 0
BASE EXCESS MIXED: 4
BASE EXCESS VENOUS: -5 MMOL/L
BASOPHILS ABSOLUTE: 0 K/UL (ref 0–0.2)
BASOPHILS ABSOLUTE: 0.1 K/UL (ref 0–0.2)
BASOPHILS RELATIVE PERCENT: 0 %
BASOPHILS RELATIVE PERCENT: 0.2 %
BASOPHILS RELATIVE PERCENT: 0.3 %
BASOPHILS RELATIVE PERCENT: 0.4 %
BASOPHILS RELATIVE PERCENT: 0.5 %
BASOPHILS RELATIVE PERCENT: 0.6 %
BASOPHILS RELATIVE PERCENT: 0.7 %
BASOPHILS RELATIVE PERCENT: 0.9 %
BILIRUB SERPL-MCNC: 0.4 MG/DL (ref 0–1)
BILIRUB SERPL-MCNC: 0.5 MG/DL (ref 0–1)
BILIRUB SERPL-MCNC: 0.6 MG/DL (ref 0–1)
BILIRUB SERPL-MCNC: 0.7 MG/DL (ref 0–1)
BILIRUB SERPL-MCNC: 0.8 MG/DL (ref 0–1)
BILIRUB SERPL-MCNC: 1 MG/DL (ref 0–1)
BILIRUB SERPL-MCNC: 1 MG/DL (ref 0–1)
BILIRUB SERPL-MCNC: 1.1 MG/DL (ref 0–1)
BILIRUB SERPL-MCNC: 1.2 MG/DL (ref 0–1)
BILIRUB SERPL-MCNC: 1.2 MG/DL (ref 0–1)
BILIRUB SERPL-MCNC: 1.5 MG/DL (ref 0–1)
BILIRUB SERPL-MCNC: 1.7 MG/DL (ref 0–1)
BILIRUB SERPL-MCNC: 2.3 MG/DL (ref 0–1)
BILIRUBIN DIRECT: 0.3 MG/DL (ref 0–0.3)
BILIRUBIN DIRECT: 0.4 MG/DL (ref 0–0.3)
BILIRUBIN DIRECT: <0.2 MG/DL (ref 0–0.3)
BILIRUBIN URINE: ABNORMAL
BILIRUBIN URINE: NEGATIVE
BILIRUBIN, INDIRECT: 0.7 MG/DL (ref 0–1)
BILIRUBIN, INDIRECT: 0.8 MG/DL (ref 0–1)
BILIRUBIN, INDIRECT: 0.9 MG/DL (ref 0–1)
BILIRUBIN, INDIRECT: 1.1 MG/DL (ref 0–1)
BILIRUBIN, INDIRECT: 1.3 MG/DL (ref 0–1)
BILIRUBIN, INDIRECT: ABNORMAL MG/DL (ref 0–1)
BLOOD BANK DISPENSE STATUS: NORMAL
BLOOD BANK PRODUCT CODE: NORMAL
BLOOD, URINE: ABNORMAL
BLOOD, URINE: NEGATIVE
BPU ID: NORMAL
BUN BLDV-MCNC: 19 MG/DL (ref 7–20)
BUN BLDV-MCNC: 20 MG/DL (ref 7–20)
BUN BLDV-MCNC: 23 MG/DL (ref 7–20)
BUN BLDV-MCNC: 24 MG/DL (ref 7–20)
BUN BLDV-MCNC: 25 MG/DL (ref 7–20)
BUN BLDV-MCNC: 25 MG/DL (ref 7–20)
BUN BLDV-MCNC: 26 MG/DL (ref 7–20)
BUN BLDV-MCNC: 26 MG/DL (ref 7–20)
BUN BLDV-MCNC: 27 MG/DL (ref 7–20)
BUN BLDV-MCNC: 30 MG/DL (ref 7–20)
BUN BLDV-MCNC: 31 MG/DL (ref 7–20)
BUN BLDV-MCNC: 32 MG/DL (ref 7–20)
BUN BLDV-MCNC: 33 MG/DL (ref 7–20)
BUN BLDV-MCNC: 34 MG/DL (ref 7–20)
BUN BLDV-MCNC: 35 MG/DL (ref 7–20)
BUN BLDV-MCNC: 35 MG/DL (ref 7–20)
BUN BLDV-MCNC: 36 MG/DL (ref 7–20)
BUN BLDV-MCNC: 40 MG/DL (ref 7–20)
BUN BLDV-MCNC: 51 MG/DL (ref 7–20)
BUN BLDV-MCNC: 54 MG/DL (ref 7–20)
CALCIUM SERPL-MCNC: 7.9 MG/DL (ref 8.3–10.6)
CALCIUM SERPL-MCNC: 8 MG/DL (ref 8.3–10.6)
CALCIUM SERPL-MCNC: 8 MG/DL (ref 8.3–10.6)
CALCIUM SERPL-MCNC: 8.1 MG/DL (ref 8.3–10.6)
CALCIUM SERPL-MCNC: 8.2 MG/DL (ref 8.3–10.6)
CALCIUM SERPL-MCNC: 8.2 MG/DL (ref 8.3–10.6)
CALCIUM SERPL-MCNC: 8.3 MG/DL (ref 8.3–10.6)
CALCIUM SERPL-MCNC: 8.3 MG/DL (ref 8.3–10.6)
CALCIUM SERPL-MCNC: 8.4 MG/DL (ref 8.3–10.6)
CALCIUM SERPL-MCNC: 8.6 MG/DL (ref 8.3–10.6)
CALCIUM SERPL-MCNC: 8.7 MG/DL (ref 8.3–10.6)
CALCIUM SERPL-MCNC: 9 MG/DL (ref 8.3–10.6)
CALCIUM SERPL-MCNC: 9.3 MG/DL (ref 8.3–10.6)
CARBOXYHEMOGLOBIN ARTERIAL: 1.3 % (ref 0–1.5)
CARBOXYHEMOGLOBIN ARTERIAL: 1.7 % (ref 0–1.5)
CARBOXYHEMOGLOBIN: 1.3 %
CHLORIDE BLD-SCNC: 100 MMOL/L (ref 99–110)
CHLORIDE BLD-SCNC: 101 MMOL/L (ref 99–110)
CHLORIDE BLD-SCNC: 101 MMOL/L (ref 99–110)
CHLORIDE BLD-SCNC: 102 MMOL/L (ref 99–110)
CHLORIDE BLD-SCNC: 104 MMOL/L (ref 99–110)
CHLORIDE BLD-SCNC: 105 MMOL/L (ref 99–110)
CHLORIDE BLD-SCNC: 89 MMOL/L (ref 99–110)
CHLORIDE BLD-SCNC: 91 MMOL/L (ref 99–110)
CHLORIDE BLD-SCNC: 92 MMOL/L (ref 99–110)
CHLORIDE BLD-SCNC: 94 MMOL/L (ref 99–110)
CHLORIDE BLD-SCNC: 95 MMOL/L (ref 99–110)
CHLORIDE BLD-SCNC: 95 MMOL/L (ref 99–110)
CHLORIDE BLD-SCNC: 96 MMOL/L (ref 99–110)
CHLORIDE BLD-SCNC: 97 MMOL/L (ref 99–110)
CHLORIDE BLD-SCNC: 98 MMOL/L (ref 99–110)
CHLORIDE BLD-SCNC: 98 MMOL/L (ref 99–110)
CHLORIDE BLD-SCNC: 99 MMOL/L (ref 99–110)
CHLORIDE BLD-SCNC: 99 MMOL/L (ref 99–110)
CHLORIDE URINE RANDOM: 65 MMOL/L
CHLORPROPAMIDE: NOT DETECTED NG/ML
CHOLESTEROL, TOTAL: 126 MG/DL (ref 0–199)
CLARITY: ABNORMAL
CLARITY: CLEAR
CO2: 14 MMOL/L (ref 21–32)
CO2: 16 MMOL/L (ref 21–32)
CO2: 17 MMOL/L (ref 21–32)
CO2: 18 MMOL/L (ref 21–32)
CO2: 19 MMOL/L (ref 21–32)
CO2: 20 MMOL/L (ref 21–32)
CO2: 23 MMOL/L (ref 21–32)
CO2: 23 MMOL/L (ref 21–32)
CO2: 24 MMOL/L (ref 21–32)
CO2: 24 MMOL/L (ref 21–32)
CO2: 25 MMOL/L (ref 21–32)
CO2: 25 MMOL/L (ref 21–32)
CO2: 26 MMOL/L (ref 21–32)
CO2: 27 MMOL/L (ref 21–32)
CO2: 27 MMOL/L (ref 21–32)
COLOR: ABNORMAL
COLOR: YELLOW
COMMENT UA: ABNORMAL
CREAT SERPL-MCNC: 1.2 MG/DL (ref 0.6–1.2)
CREAT SERPL-MCNC: 1.3 MG/DL (ref 0.6–1.2)
CREAT SERPL-MCNC: 1.4 MG/DL (ref 0.6–1.2)
CREAT SERPL-MCNC: 1.5 MG/DL (ref 0.6–1.2)
CREAT SERPL-MCNC: 1.5 MG/DL (ref 0.6–1.2)
CREAT SERPL-MCNC: 1.6 MG/DL (ref 0.6–1.2)
CREAT SERPL-MCNC: 1.6 MG/DL (ref 0.6–1.2)
CREAT SERPL-MCNC: 1.7 MG/DL (ref 0.6–1.2)
CREAT SERPL-MCNC: 2 MG/DL (ref 0.6–1.2)
CREAT SERPL-MCNC: 2.5 MG/DL (ref 0.6–1.2)
CREAT SERPL-MCNC: 2.9 MG/DL (ref 0.6–1.2)
CREAT SERPL-MCNC: 3.7 MG/DL (ref 0.6–1.2)
CREAT SERPL-MCNC: 3.9 MG/DL (ref 0.6–1.2)
DESCRIPTION BLOOD BANK: NORMAL
DIGOXIN LEVEL: 1.7 NG/ML (ref 0.8–2)
EKG ATRIAL RATE: 100 BPM
EKG ATRIAL RATE: 100 BPM
EKG ATRIAL RATE: 113 BPM
EKG ATRIAL RATE: 84 BPM
EKG ATRIAL RATE: 84 BPM
EKG ATRIAL RATE: 97 BPM
EKG ATRIAL RATE: 98 BPM
EKG ATRIAL RATE: 98 BPM
EKG DIAGNOSIS: NORMAL
EKG P AXIS: 3 DEGREES
EKG P AXIS: 33 DEGREES
EKG P AXIS: 36 DEGREES
EKG P AXIS: 46 DEGREES
EKG P AXIS: 48 DEGREES
EKG P AXIS: 48 DEGREES
EKG P AXIS: 55 DEGREES
EKG P AXIS: 57 DEGREES
EKG P-R INTERVAL: 160 MS
EKG P-R INTERVAL: 166 MS
EKG P-R INTERVAL: 180 MS
EKG P-R INTERVAL: 184 MS
EKG P-R INTERVAL: 190 MS
EKG P-R INTERVAL: 192 MS
EKG P-R INTERVAL: 194 MS
EKG P-R INTERVAL: 204 MS
EKG Q-T INTERVAL: 308 MS
EKG Q-T INTERVAL: 348 MS
EKG Q-T INTERVAL: 352 MS
EKG Q-T INTERVAL: 360 MS
EKG Q-T INTERVAL: 364 MS
EKG Q-T INTERVAL: 372 MS
EKG Q-T INTERVAL: 374 MS
EKG Q-T INTERVAL: 392 MS
EKG QRS DURATION: 76 MS
EKG QRS DURATION: 82 MS
EKG QRS DURATION: 84 MS
EKG QRS DURATION: 88 MS
EKG QRS DURATION: 90 MS
EKG QRS DURATION: 98 MS
EKG QTC CALCULATION (BAZETT): 411 MS
EKG QTC CALCULATION (BAZETT): 422 MS
EKG QTC CALCULATION (BAZETT): 449 MS
EKG QTC CALCULATION (BAZETT): 459 MS
EKG QTC CALCULATION (BAZETT): 463 MS
EKG QTC CALCULATION (BAZETT): 469 MS
EKG QTC CALCULATION (BAZETT): 474 MS
EKG QTC CALCULATION (BAZETT): 479 MS
EKG R AXIS: -2 DEGREES
EKG R AXIS: -21 DEGREES
EKG R AXIS: -8 DEGREES
EKG R AXIS: -80 DEGREES
EKG R AXIS: -84 DEGREES
EKG R AXIS: 1 DEGREES
EKG R AXIS: 25 DEGREES
EKG R AXIS: 30 DEGREES
EKG T AXIS: -54 DEGREES
EKG T AXIS: -89 DEGREES
EKG T AXIS: 156 DEGREES
EKG T AXIS: 19 DEGREES
EKG T AXIS: 55 DEGREES
EKG T AXIS: 76 DEGREES
EKG T AXIS: 80 DEGREES
EKG T AXIS: 90 DEGREES
EKG VENTRICULAR RATE: 100 BPM
EKG VENTRICULAR RATE: 100 BPM
EKG VENTRICULAR RATE: 113 BPM
EKG VENTRICULAR RATE: 84 BPM
EKG VENTRICULAR RATE: 84 BPM
EKG VENTRICULAR RATE: 97 BPM
EKG VENTRICULAR RATE: 98 BPM
EKG VENTRICULAR RATE: 98 BPM
EOSINOPHILS ABSOLUTE: 0 K/UL (ref 0–0.6)
EOSINOPHILS ABSOLUTE: 0 K/UL (ref 0–0.6)
EOSINOPHILS ABSOLUTE: 0.1 K/UL (ref 0–0.6)
EOSINOPHILS ABSOLUTE: 0.2 K/UL (ref 0–0.6)
EOSINOPHILS ABSOLUTE: 0.3 K/UL (ref 0–0.6)
EOSINOPHILS RELATIVE PERCENT: 0 %
EOSINOPHILS RELATIVE PERCENT: 0.5 %
EOSINOPHILS RELATIVE PERCENT: 0.7 %
EOSINOPHILS RELATIVE PERCENT: 0.9 %
EOSINOPHILS RELATIVE PERCENT: 1 %
EOSINOPHILS RELATIVE PERCENT: 1.5 %
EOSINOPHILS RELATIVE PERCENT: 1.6 %
EOSINOPHILS RELATIVE PERCENT: 1.7 %
EOSINOPHILS RELATIVE PERCENT: 1.8 %
EOSINOPHILS RELATIVE PERCENT: 2 %
EOSINOPHILS RELATIVE PERCENT: 2 %
EOSINOPHILS RELATIVE PERCENT: 2.2 %
EOSINOPHILS RELATIVE PERCENT: 2.3 %
EOSINOPHILS RELATIVE PERCENT: 2.4 %
EOSINOPHILS RELATIVE PERCENT: 2.5 %
EOSINOPHILS RELATIVE PERCENT: 2.6 %
EOSINOPHILS RELATIVE PERCENT: 2.7 %
EPITHELIAL CELLS, UA: ABNORMAL /HPF (ref 0–5)
ESTIMATED AVERAGE GLUCOSE: 119.8 MG/DL
FERRITIN: 253.6 NG/ML (ref 15–150)
FIBRINOGEN: 475 MG/DL (ref 200–397)
GFR AFRICAN AMERICAN: 14
GFR AFRICAN AMERICAN: 14
GFR AFRICAN AMERICAN: 19
GFR AFRICAN AMERICAN: 23
GFR AFRICAN AMERICAN: 29
GFR AFRICAN AMERICAN: 35
GFR AFRICAN AMERICAN: 38
GFR AFRICAN AMERICAN: 38
GFR AFRICAN AMERICAN: 41
GFR AFRICAN AMERICAN: 41
GFR AFRICAN AMERICAN: 44
GFR AFRICAN AMERICAN: 48
GFR AFRICAN AMERICAN: 53
GFR NON-AFRICAN AMERICAN: 11
GFR NON-AFRICAN AMERICAN: 12
GFR NON-AFRICAN AMERICAN: 16
GFR NON-AFRICAN AMERICAN: 19
GFR NON-AFRICAN AMERICAN: 24
GFR NON-AFRICAN AMERICAN: 29
GFR NON-AFRICAN AMERICAN: 31
GFR NON-AFRICAN AMERICAN: 31
GFR NON-AFRICAN AMERICAN: 34
GFR NON-AFRICAN AMERICAN: 34
GFR NON-AFRICAN AMERICAN: 37
GFR NON-AFRICAN AMERICAN: 40
GFR NON-AFRICAN AMERICAN: 44
GLIMEPIRIDE: NOT DETECTED NG/ML
GLIPIZIDE: PRESENT NG/ML
GLOBULIN: 3.9 G/DL
GLOBULIN: 4.1 G/DL
GLOBULIN: 4.2 G/DL
GLOBULIN: 4.3 G/DL
GLOBULIN: 4.5 G/DL
GLOBULIN: 5.4 G/DL
GLUCOSE BLD-MCNC: 105 MG/DL (ref 70–99)
GLUCOSE BLD-MCNC: 106 MG/DL (ref 70–99)
GLUCOSE BLD-MCNC: 108 MG/DL (ref 70–99)
GLUCOSE BLD-MCNC: 109 MG/DL (ref 70–99)
GLUCOSE BLD-MCNC: 111 MG/DL (ref 70–99)
GLUCOSE BLD-MCNC: 114 MG/DL (ref 70–99)
GLUCOSE BLD-MCNC: 116 MG/DL (ref 70–99)
GLUCOSE BLD-MCNC: 119 MG/DL (ref 70–99)
GLUCOSE BLD-MCNC: 119 MG/DL (ref 70–99)
GLUCOSE BLD-MCNC: 121 MG/DL (ref 70–99)
GLUCOSE BLD-MCNC: 122 MG/DL (ref 70–99)
GLUCOSE BLD-MCNC: 123 MG/DL (ref 70–99)
GLUCOSE BLD-MCNC: 125 MG/DL (ref 70–99)
GLUCOSE BLD-MCNC: 128 MG/DL (ref 70–99)
GLUCOSE BLD-MCNC: 128 MG/DL (ref 70–99)
GLUCOSE BLD-MCNC: 129 MG/DL (ref 70–99)
GLUCOSE BLD-MCNC: 130 MG/DL (ref 70–99)
GLUCOSE BLD-MCNC: 131 MG/DL (ref 70–99)
GLUCOSE BLD-MCNC: 131 MG/DL (ref 70–99)
GLUCOSE BLD-MCNC: 135 MG/DL (ref 70–99)
GLUCOSE BLD-MCNC: 137 MG/DL (ref 70–99)
GLUCOSE BLD-MCNC: 138 MG/DL (ref 70–99)
GLUCOSE BLD-MCNC: 139 MG/DL (ref 70–99)
GLUCOSE BLD-MCNC: 140 MG/DL (ref 70–99)
GLUCOSE BLD-MCNC: 141 MG/DL (ref 70–99)
GLUCOSE BLD-MCNC: 141 MG/DL (ref 70–99)
GLUCOSE BLD-MCNC: 142 MG/DL (ref 70–99)
GLUCOSE BLD-MCNC: 144 MG/DL (ref 70–99)
GLUCOSE BLD-MCNC: 147 MG/DL (ref 70–99)
GLUCOSE BLD-MCNC: 148 MG/DL (ref 70–99)
GLUCOSE BLD-MCNC: 151 MG/DL (ref 70–99)
GLUCOSE BLD-MCNC: 154 MG/DL (ref 70–99)
GLUCOSE BLD-MCNC: 154 MG/DL (ref 70–99)
GLUCOSE BLD-MCNC: 155 MG/DL (ref 70–99)
GLUCOSE BLD-MCNC: 155 MG/DL (ref 70–99)
GLUCOSE BLD-MCNC: 157 MG/DL (ref 70–99)
GLUCOSE BLD-MCNC: 158 MG/DL (ref 70–99)
GLUCOSE BLD-MCNC: 160 MG/DL (ref 70–99)
GLUCOSE BLD-MCNC: 161 MG/DL (ref 70–99)
GLUCOSE BLD-MCNC: 162 MG/DL (ref 70–99)
GLUCOSE BLD-MCNC: 163 MG/DL (ref 70–99)
GLUCOSE BLD-MCNC: 165 MG/DL (ref 70–99)
GLUCOSE BLD-MCNC: 165 MG/DL (ref 70–99)
GLUCOSE BLD-MCNC: 168 MG/DL (ref 70–99)
GLUCOSE BLD-MCNC: 169 MG/DL (ref 70–99)
GLUCOSE BLD-MCNC: 169 MG/DL (ref 70–99)
GLUCOSE BLD-MCNC: 170 MG/DL (ref 70–99)
GLUCOSE BLD-MCNC: 172 MG/DL (ref 70–99)
GLUCOSE BLD-MCNC: 173 MG/DL (ref 70–99)
GLUCOSE BLD-MCNC: 174 MG/DL (ref 70–99)
GLUCOSE BLD-MCNC: 176 MG/DL (ref 70–99)
GLUCOSE BLD-MCNC: 176 MG/DL (ref 70–99)
GLUCOSE BLD-MCNC: 180 MG/DL (ref 70–99)
GLUCOSE BLD-MCNC: 180 MG/DL (ref 70–99)
GLUCOSE BLD-MCNC: 182 MG/DL (ref 70–99)
GLUCOSE BLD-MCNC: 184 MG/DL (ref 70–99)
GLUCOSE BLD-MCNC: 185 MG/DL (ref 70–99)
GLUCOSE BLD-MCNC: 185 MG/DL (ref 70–99)
GLUCOSE BLD-MCNC: 187 MG/DL (ref 70–99)
GLUCOSE BLD-MCNC: 187 MG/DL (ref 70–99)
GLUCOSE BLD-MCNC: 188 MG/DL (ref 70–99)
GLUCOSE BLD-MCNC: 188 MG/DL (ref 70–99)
GLUCOSE BLD-MCNC: 191 MG/DL (ref 70–99)
GLUCOSE BLD-MCNC: 191 MG/DL (ref 70–99)
GLUCOSE BLD-MCNC: 192 MG/DL (ref 70–99)
GLUCOSE BLD-MCNC: 195 MG/DL (ref 70–99)
GLUCOSE BLD-MCNC: 195 MG/DL (ref 70–99)
GLUCOSE BLD-MCNC: 200 MG/DL (ref 70–99)
GLUCOSE BLD-MCNC: 201 MG/DL (ref 70–99)
GLUCOSE BLD-MCNC: 202 MG/DL (ref 70–99)
GLUCOSE BLD-MCNC: 202 MG/DL (ref 70–99)
GLUCOSE BLD-MCNC: 217 MG/DL (ref 70–99)
GLUCOSE BLD-MCNC: 221 MG/DL (ref 70–99)
GLUCOSE BLD-MCNC: 222 MG/DL (ref 70–99)
GLUCOSE BLD-MCNC: 240 MG/DL (ref 70–99)
GLUCOSE BLD-MCNC: 245 MG/DL (ref 70–99)
GLUCOSE BLD-MCNC: 288 MG/DL (ref 70–99)
GLUCOSE BLD-MCNC: 39 MG/DL (ref 70–99)
GLUCOSE BLD-MCNC: 48 MG/DL (ref 70–99)
GLUCOSE BLD-MCNC: 59 MG/DL (ref 70–99)
GLUCOSE BLD-MCNC: 93 MG/DL (ref 70–99)
GLUCOSE URINE: NEGATIVE MG/DL
GLUCOSE URINE: NEGATIVE MG/DL
GLYBURIDE: NOT DETECTED NG/ML
HAPTOGLOBIN: <10 MG/DL (ref 30–200)
HBA1C MFR BLD: 5.8 %
HCO3 ARTERIAL: 21.3 MMOL/L (ref 21–29)
HCO3 ARTERIAL: 23.6 MMOL/L (ref 21–29)
HCO3 ARTERIAL: 23.7 MMOL/L (ref 21–29)
HCO3 VENOUS: 21 MMOL/L (ref 23–29)
HCO3, MIXED: 24.9 MMOL/L
HCO3, MIXED: 25.5 MMOL/L
HCO3, MIXED: 28.9 MMOL/L
HCT VFR BLD CALC: 19.5 % (ref 36–48)
HCT VFR BLD CALC: 23.3 % (ref 36–48)
HCT VFR BLD CALC: 24.3 % (ref 36–48)
HCT VFR BLD CALC: 24.9 % (ref 36–48)
HCT VFR BLD CALC: 25.8 % (ref 36–48)
HCT VFR BLD CALC: 25.9 % (ref 36–48)
HCT VFR BLD CALC: 26.1 % (ref 36–48)
HCT VFR BLD CALC: 26.1 % (ref 36–48)
HCT VFR BLD CALC: 26.5 % (ref 36–48)
HCT VFR BLD CALC: 27.2 % (ref 36–48)
HCT VFR BLD CALC: 28.2 % (ref 36–48)
HCT VFR BLD CALC: 28.8 % (ref 36–48)
HCT VFR BLD CALC: 29.7 % (ref 36–48)
HCT VFR BLD CALC: 29.9 % (ref 36–48)
HCT VFR BLD CALC: 30.2 % (ref 36–48)
HCT VFR BLD CALC: 30.8 % (ref 36–48)
HCT VFR BLD CALC: 30.9 % (ref 36–48)
HCT VFR BLD CALC: 33.8 % (ref 36–48)
HCT VFR BLD CALC: 37.4 % (ref 36–48)
HCT VFR BLD CALC: 40.8 % (ref 36–48)
HDLC SERPL-MCNC: 58 MG/DL (ref 40–60)
HEMOGLOBIN, ART, EXTENDED: 10.3 G/DL (ref 12–16)
HEMOGLOBIN, ART, EXTENDED: 13.4 G/DL (ref 12–16)
HEMOGLOBIN: 10 G/DL (ref 12–16)
HEMOGLOBIN: 10 G/DL (ref 12–16)
HEMOGLOBIN: 10.2 G/DL (ref 12–16)
HEMOGLOBIN: 10.4 G/DL (ref 12–16)
HEMOGLOBIN: 10.5 G/DL (ref 12–16)
HEMOGLOBIN: 10.6 G/DL (ref 12–16)
HEMOGLOBIN: 11.7 G/DL (ref 12–16)
HEMOGLOBIN: 12.6 G/DL (ref 12–16)
HEMOGLOBIN: 13.7 G/DL (ref 12–16)
HEMOGLOBIN: 6.7 G/DL (ref 12–16)
HEMOGLOBIN: 8.2 G/DL (ref 12–16)
HEMOGLOBIN: 8.4 G/DL (ref 12–16)
HEMOGLOBIN: 8.5 G/DL (ref 12–16)
HEMOGLOBIN: 8.8 G/DL (ref 12–16)
HEMOGLOBIN: 9 G/DL (ref 12–16)
HEMOGLOBIN: 9.1 G/DL (ref 12–16)
HEMOGLOBIN: 9.1 G/DL (ref 12–16)
HEMOGLOBIN: 9.2 G/DL (ref 12–16)
HEMOGLOBIN: 9.5 G/DL (ref 12–16)
HEMOGLOBIN: 9.7 G/DL (ref 12–16)
HEPARIN INDUCED PLATELET ANTIBODY: NEGATIVE
INR BLD: 1.08 (ref 0.88–1.12)
INR BLD: 1.12 (ref 0.88–1.12)
IRON SATURATION: 13 % (ref 15–50)
IRON: 32 UG/DL (ref 37–145)
KETONES, URINE: NEGATIVE MG/DL
KETONES, URINE: NEGATIVE MG/DL
LACTIC ACID: 1.4 MMOL/L (ref 0.4–2)
LDL CHOLESTEROL CALCULATED: 58 MG/DL
LEUKOCYTE ESTERASE, URINE: ABNORMAL
LEUKOCYTE ESTERASE, URINE: NEGATIVE
LIPASE: 23 U/L (ref 13–60)
LIPASE: 53 U/L (ref 13–60)
LV EF: 25 %
LVEF MODALITY: NORMAL
LYMPHOCYTES ABSOLUTE: 0.5 K/UL (ref 1–5.1)
LYMPHOCYTES ABSOLUTE: 0.6 K/UL (ref 1–5.1)
LYMPHOCYTES ABSOLUTE: 0.7 K/UL (ref 1–5.1)
LYMPHOCYTES ABSOLUTE: 0.8 K/UL (ref 1–5.1)
LYMPHOCYTES ABSOLUTE: 0.8 K/UL (ref 1–5.1)
LYMPHOCYTES ABSOLUTE: 0.9 K/UL (ref 1–5.1)
LYMPHOCYTES ABSOLUTE: 0.9 K/UL (ref 1–5.1)
LYMPHOCYTES ABSOLUTE: 1 K/UL (ref 1–5.1)
LYMPHOCYTES ABSOLUTE: 1.1 K/UL (ref 1–5.1)
LYMPHOCYTES ABSOLUTE: 1.3 K/UL (ref 1–5.1)
LYMPHOCYTES ABSOLUTE: 1.3 K/UL (ref 1–5.1)
LYMPHOCYTES ABSOLUTE: 1.6 K/UL (ref 1–5.1)
LYMPHOCYTES ABSOLUTE: 1.8 K/UL (ref 1–5.1)
LYMPHOCYTES ABSOLUTE: 1.9 K/UL (ref 1–5.1)
LYMPHOCYTES ABSOLUTE: 1.9 K/UL (ref 1–5.1)
LYMPHOCYTES ABSOLUTE: 2 K/UL (ref 1–5.1)
LYMPHOCYTES ABSOLUTE: 2.4 K/UL (ref 1–5.1)
LYMPHOCYTES RELATIVE PERCENT: 10.9 %
LYMPHOCYTES RELATIVE PERCENT: 12.8 %
LYMPHOCYTES RELATIVE PERCENT: 13.4 %
LYMPHOCYTES RELATIVE PERCENT: 14 %
LYMPHOCYTES RELATIVE PERCENT: 14.6 %
LYMPHOCYTES RELATIVE PERCENT: 15.1 %
LYMPHOCYTES RELATIVE PERCENT: 19.1 %
LYMPHOCYTES RELATIVE PERCENT: 19.9 %
LYMPHOCYTES RELATIVE PERCENT: 20 %
LYMPHOCYTES RELATIVE PERCENT: 21 %
LYMPHOCYTES RELATIVE PERCENT: 3 %
LYMPHOCYTES RELATIVE PERCENT: 6.4 %
LYMPHOCYTES RELATIVE PERCENT: 8 %
LYMPHOCYTES RELATIVE PERCENT: 8.2 %
LYMPHOCYTES RELATIVE PERCENT: 9 %
LYMPHOCYTES RELATIVE PERCENT: 9.6 %
LYMPHOCYTES RELATIVE PERCENT: 9.9 %
MAGNESIUM: 1.4 MG/DL (ref 1.8–2.4)
MAGNESIUM: 1.5 MG/DL (ref 1.8–2.4)
MAGNESIUM: 1.7 MG/DL (ref 1.8–2.4)
MAGNESIUM: 1.8 MG/DL (ref 1.8–2.4)
MAGNESIUM: 1.9 MG/DL (ref 1.8–2.4)
MAGNESIUM: 2 MG/DL (ref 1.8–2.4)
MAGNESIUM: 2.1 MG/DL (ref 1.8–2.4)
MAGNESIUM: 2.2 MG/DL (ref 1.8–2.4)
MAGNESIUM: 2.2 MG/DL (ref 1.8–2.4)
MAGNESIUM: 2.3 MG/DL (ref 1.8–2.4)
MCH RBC QN AUTO: 30.1 PG (ref 26–34)
MCH RBC QN AUTO: 30.5 PG (ref 26–34)
MCH RBC QN AUTO: 30.9 PG (ref 26–34)
MCH RBC QN AUTO: 30.9 PG (ref 26–34)
MCH RBC QN AUTO: 31 PG (ref 26–34)
MCH RBC QN AUTO: 31.2 PG (ref 26–34)
MCH RBC QN AUTO: 31.5 PG (ref 26–34)
MCH RBC QN AUTO: 31.7 PG (ref 26–34)
MCH RBC QN AUTO: 31.8 PG (ref 26–34)
MCH RBC QN AUTO: 31.9 PG (ref 26–34)
MCH RBC QN AUTO: 31.9 PG (ref 26–34)
MCH RBC QN AUTO: 32.3 PG (ref 26–34)
MCH RBC QN AUTO: 32.3 PG (ref 26–34)
MCH RBC QN AUTO: 32.4 PG (ref 26–34)
MCH RBC QN AUTO: 32.4 PG (ref 26–34)
MCH RBC QN AUTO: 32.5 PG (ref 26–34)
MCH RBC QN AUTO: 32.8 PG (ref 26–34)
MCH RBC QN AUTO: 33.7 PG (ref 26–34)
MCHC RBC AUTO-ENTMCNC: 33.3 G/DL (ref 31–36)
MCHC RBC AUTO-ENTMCNC: 33.5 G/DL (ref 31–36)
MCHC RBC AUTO-ENTMCNC: 33.5 G/DL (ref 31–36)
MCHC RBC AUTO-ENTMCNC: 33.7 G/DL (ref 31–36)
MCHC RBC AUTO-ENTMCNC: 33.8 G/DL (ref 31–36)
MCHC RBC AUTO-ENTMCNC: 34 G/DL (ref 31–36)
MCHC RBC AUTO-ENTMCNC: 34.3 G/DL (ref 31–36)
MCHC RBC AUTO-ENTMCNC: 34.3 G/DL (ref 31–36)
MCHC RBC AUTO-ENTMCNC: 34.4 G/DL (ref 31–36)
MCHC RBC AUTO-ENTMCNC: 34.5 G/DL (ref 31–36)
MCHC RBC AUTO-ENTMCNC: 34.5 G/DL (ref 31–36)
MCHC RBC AUTO-ENTMCNC: 34.8 G/DL (ref 31–36)
MCHC RBC AUTO-ENTMCNC: 34.9 G/DL (ref 31–36)
MCHC RBC AUTO-ENTMCNC: 35 G/DL (ref 31–36)
MCHC RBC AUTO-ENTMCNC: 35.1 G/DL (ref 31–36)
MCHC RBC AUTO-ENTMCNC: 35.2 G/DL (ref 31–36)
MCHC RBC AUTO-ENTMCNC: 35.3 G/DL (ref 31–36)
MCHC RBC AUTO-ENTMCNC: 35.5 G/DL (ref 31–36)
MCV RBC AUTO: 88.2 FL (ref 80–100)
MCV RBC AUTO: 88.9 FL (ref 80–100)
MCV RBC AUTO: 89.1 FL (ref 80–100)
MCV RBC AUTO: 89.9 FL (ref 80–100)
MCV RBC AUTO: 90 FL (ref 80–100)
MCV RBC AUTO: 90.8 FL (ref 80–100)
MCV RBC AUTO: 91.8 FL (ref 80–100)
MCV RBC AUTO: 92.4 FL (ref 80–100)
MCV RBC AUTO: 92.6 FL (ref 80–100)
MCV RBC AUTO: 92.7 FL (ref 80–100)
MCV RBC AUTO: 93 FL (ref 80–100)
MCV RBC AUTO: 93.6 FL (ref 80–100)
MCV RBC AUTO: 94.1 FL (ref 80–100)
MCV RBC AUTO: 94.2 FL (ref 80–100)
MCV RBC AUTO: 94.2 FL (ref 80–100)
MCV RBC AUTO: 95.1 FL (ref 80–100)
MCV RBC AUTO: 95.3 FL (ref 80–100)
MCV RBC AUTO: 95.6 FL (ref 80–100)
MCV RBC AUTO: 96.2 FL (ref 80–100)
MCV RBC AUTO: 96.4 FL (ref 80–100)
METAMYELOCYTES RELATIVE PERCENT: 1 %
METAMYELOCYTES RELATIVE PERCENT: 1 %
METHEMOGLOBIN ARTERIAL: 0.2 %
METHEMOGLOBIN ARTERIAL: 0.6 %
METHEMOGLOBIN VENOUS: 0.5 %
MICROSCOPIC EXAMINATION: NORMAL
MICROSCOPIC EXAMINATION: YES
MONOCYTES ABSOLUTE: 0.5 K/UL (ref 0–1.3)
MONOCYTES ABSOLUTE: 0.6 K/UL (ref 0–1.3)
MONOCYTES ABSOLUTE: 0.7 K/UL (ref 0–1.3)
MONOCYTES ABSOLUTE: 0.8 K/UL (ref 0–1.3)
MONOCYTES ABSOLUTE: 0.8 K/UL (ref 0–1.3)
MONOCYTES ABSOLUTE: 0.9 K/UL (ref 0–1.3)
MONOCYTES ABSOLUTE: 1.1 K/UL (ref 0–1.3)
MONOCYTES ABSOLUTE: 1.1 K/UL (ref 0–1.3)
MONOCYTES ABSOLUTE: 1.3 K/UL (ref 0–1.3)
MONOCYTES ABSOLUTE: 1.3 K/UL (ref 0–1.3)
MONOCYTES ABSOLUTE: 1.4 K/UL (ref 0–1.3)
MONOCYTES ABSOLUTE: 1.5 K/UL (ref 0–1.3)
MONOCYTES RELATIVE PERCENT: 10.4 %
MONOCYTES RELATIVE PERCENT: 11.5 %
MONOCYTES RELATIVE PERCENT: 12.4 %
MONOCYTES RELATIVE PERCENT: 12.5 %
MONOCYTES RELATIVE PERCENT: 13.7 %
MONOCYTES RELATIVE PERCENT: 4 %
MONOCYTES RELATIVE PERCENT: 5 %
MONOCYTES RELATIVE PERCENT: 5.5 %
MONOCYTES RELATIVE PERCENT: 7.2 %
MONOCYTES RELATIVE PERCENT: 7.3 %
MONOCYTES RELATIVE PERCENT: 8 %
MONOCYTES RELATIVE PERCENT: 8.3 %
MONOCYTES RELATIVE PERCENT: 8.5 %
MONOCYTES RELATIVE PERCENT: 8.7 %
MONOCYTES RELATIVE PERCENT: 8.9 %
MONOCYTES RELATIVE PERCENT: 9.1 %
MONOCYTES RELATIVE PERCENT: 9.6 %
MRSA SCREEN RT-PCR: NORMAL
NATEGLINIDE: NOT DETECTED NG/ML
NEUTROPHILS ABSOLUTE: 14.4 K/UL (ref 1.7–7.7)
NEUTROPHILS ABSOLUTE: 15.9 K/UL (ref 1.7–7.7)
NEUTROPHILS ABSOLUTE: 4 K/UL (ref 1.7–7.7)
NEUTROPHILS ABSOLUTE: 4.3 K/UL (ref 1.7–7.7)
NEUTROPHILS ABSOLUTE: 4.8 K/UL (ref 1.7–7.7)
NEUTROPHILS ABSOLUTE: 5.1 K/UL (ref 1.7–7.7)
NEUTROPHILS ABSOLUTE: 6.1 K/UL (ref 1.7–7.7)
NEUTROPHILS ABSOLUTE: 6.3 K/UL (ref 1.7–7.7)
NEUTROPHILS ABSOLUTE: 6.8 K/UL (ref 1.7–7.7)
NEUTROPHILS ABSOLUTE: 7.5 K/UL (ref 1.7–7.7)
NEUTROPHILS ABSOLUTE: 7.8 K/UL (ref 1.7–7.7)
NEUTROPHILS ABSOLUTE: 8.3 K/UL (ref 1.7–7.7)
NEUTROPHILS ABSOLUTE: 8.5 K/UL (ref 1.7–7.7)
NEUTROPHILS ABSOLUTE: 9.3 K/UL (ref 1.7–7.7)
NEUTROPHILS ABSOLUTE: 9.4 K/UL (ref 1.7–7.7)
NEUTROPHILS ABSOLUTE: 9.6 K/UL (ref 1.7–7.7)
NEUTROPHILS ABSOLUTE: 9.7 K/UL (ref 1.7–7.7)
NEUTROPHILS RELATIVE PERCENT: 63.5 %
NEUTROPHILS RELATIVE PERCENT: 65.9 %
NEUTROPHILS RELATIVE PERCENT: 69.2 %
NEUTROPHILS RELATIVE PERCENT: 70 %
NEUTROPHILS RELATIVE PERCENT: 70.2 %
NEUTROPHILS RELATIVE PERCENT: 72.6 %
NEUTROPHILS RELATIVE PERCENT: 74.3 %
NEUTROPHILS RELATIVE PERCENT: 74.5 %
NEUTROPHILS RELATIVE PERCENT: 75.4 %
NEUTROPHILS RELATIVE PERCENT: 78 %
NEUTROPHILS RELATIVE PERCENT: 78.8 %
NEUTROPHILS RELATIVE PERCENT: 79.6 %
NEUTROPHILS RELATIVE PERCENT: 80.1 %
NEUTROPHILS RELATIVE PERCENT: 81.6 %
NEUTROPHILS RELATIVE PERCENT: 85 %
NEUTROPHILS RELATIVE PERCENT: 87.4 %
NEUTROPHILS RELATIVE PERCENT: 89 %
NITRITE, URINE: NEGATIVE
NITRITE, URINE: POSITIVE
O2 SAT, ARTERIAL: 86 % (ref 93–100)
O2 SAT, ARTERIAL: 98.4 %
O2 SAT, ARTERIAL: >100 %
O2 SAT, MIXED: 64 %
O2 SAT, MIXED: 65 %
O2 SAT, MIXED: 75 %
O2 SAT, VEN: 41 %
O2 THERAPY: ABNORMAL
ORGANISM: ABNORMAL
OSMOLALITY URINE: 235 MOSM/KG (ref 390–1070)
OSMOLALITY: 292 MOSM/KG (ref 280–301)
PCO2 ARTERIAL: 38.9 MMHG (ref 35–45)
PCO2 ARTERIAL: 41.6 MM HG (ref 35–45)
PCO2 ARTERIAL: 44.7 MMHG (ref 35–45)
PCO2 MIXED: 44 MM HG
PCO2 MIXED: 45.3 MM HG
PCO2 MIXED: 47.5 MM HG
PCO2, VEN: 41.1 MMHG (ref 40–50)
PDW BLD-RTO: 14.5 % (ref 12.4–15.4)
PDW BLD-RTO: 14.7 % (ref 12.4–15.4)
PDW BLD-RTO: 14.8 % (ref 12.4–15.4)
PDW BLD-RTO: 14.8 % (ref 12.4–15.4)
PDW BLD-RTO: 14.9 % (ref 12.4–15.4)
PDW BLD-RTO: 15 % (ref 12.4–15.4)
PDW BLD-RTO: 15.4 % (ref 12.4–15.4)
PDW BLD-RTO: 16.3 % (ref 12.4–15.4)
PDW BLD-RTO: 16.3 % (ref 12.4–15.4)
PDW BLD-RTO: 16.6 % (ref 12.4–15.4)
PDW BLD-RTO: 17.1 % (ref 12.4–15.4)
PDW BLD-RTO: 17.1 % (ref 12.4–15.4)
PDW BLD-RTO: 17.4 % (ref 12.4–15.4)
PDW BLD-RTO: 17.4 % (ref 12.4–15.4)
PDW BLD-RTO: 18.3 % (ref 12.4–15.4)
PDW BLD-RTO: 18.4 % (ref 12.4–15.4)
PDW BLD-RTO: 18.4 % (ref 12.4–15.4)
PDW BLD-RTO: 18.6 % (ref 12.4–15.4)
PDW BLD-RTO: 18.8 % (ref 12.4–15.4)
PDW BLD-RTO: 19 % (ref 12.4–15.4)
PERFORMED ON: ABNORMAL
PERFORMED ON: NORMAL
PH ARTERIAL: 7.29 (ref 7.35–7.45)
PH ARTERIAL: 7.36 (ref 7.35–7.45)
PH ARTERIAL: 7.39 (ref 7.35–7.45)
PH UA: 5 (ref 5–8)
PH UA: 5.5 (ref 5–8)
PH VENOUS: 7.32 (ref 7.35–7.45)
PH, MIXED: 7.34 (ref 7.35–7.45)
PH, MIXED: 7.36 (ref 7.35–7.45)
PH, MIXED: 7.41 (ref 7.35–7.45)
PHOSPHORUS: 2.7 MG/DL (ref 2.5–4.9)
PHOSPHORUS: 2.8 MG/DL (ref 2.5–4.9)
PHOSPHORUS: 2.9 MG/DL (ref 2.5–4.9)
PHOSPHORUS: 3 MG/DL (ref 2.5–4.9)
PHOSPHORUS: 3.1 MG/DL (ref 2.5–4.9)
PHOSPHORUS: 3.3 MG/DL (ref 2.5–4.9)
PHOSPHORUS: 3.4 MG/DL (ref 2.5–4.9)
PHOSPHORUS: 3.5 MG/DL (ref 2.5–4.9)
PHOSPHORUS: 3.6 MG/DL (ref 2.5–4.9)
PHOSPHORUS: 3.7 MG/DL (ref 2.5–4.9)
PHOSPHORUS: 4.9 MG/DL (ref 2.5–4.9)
PHOSPHORUS: 5 MG/DL (ref 2.5–4.9)
PLATELET # BLD: 111 K/UL (ref 135–450)
PLATELET # BLD: 111 K/UL (ref 135–450)
PLATELET # BLD: 115 K/UL (ref 135–450)
PLATELET # BLD: 119 K/UL (ref 135–450)
PLATELET # BLD: 136 K/UL (ref 135–450)
PLATELET # BLD: 141 K/UL (ref 135–450)
PLATELET # BLD: 147 K/UL (ref 135–450)
PLATELET # BLD: 149 K/UL (ref 135–450)
PLATELET # BLD: 152 K/UL (ref 135–450)
PLATELET # BLD: 158 K/UL (ref 135–450)
PLATELET # BLD: 169 K/UL (ref 135–450)
PLATELET # BLD: 190 K/UL (ref 135–450)
PLATELET # BLD: 193 K/UL (ref 135–450)
PLATELET # BLD: 201 K/UL (ref 135–450)
PLATELET # BLD: 219 K/UL (ref 135–450)
PLATELET # BLD: 237 K/UL (ref 135–450)
PLATELET # BLD: 49 K/UL (ref 135–450)
PLATELET # BLD: 58 K/UL (ref 135–450)
PLATELET # BLD: 62 K/UL (ref 135–450)
PLATELET # BLD: 64 K/UL (ref 135–450)
PLATELET # BLD: 84 K/UL (ref 135–450)
PLATELET SLIDE REVIEW: ABNORMAL
PMV BLD AUTO: 10 FL (ref 5–10.5)
PMV BLD AUTO: 7.7 FL (ref 5–10.5)
PMV BLD AUTO: 7.9 FL (ref 5–10.5)
PMV BLD AUTO: 8 FL (ref 5–10.5)
PMV BLD AUTO: 8.2 FL (ref 5–10.5)
PMV BLD AUTO: 8.3 FL (ref 5–10.5)
PMV BLD AUTO: 8.5 FL (ref 5–10.5)
PMV BLD AUTO: 8.6 FL (ref 5–10.5)
PMV BLD AUTO: 8.7 FL (ref 5–10.5)
PMV BLD AUTO: 8.7 FL (ref 5–10.5)
PMV BLD AUTO: 8.8 FL (ref 5–10.5)
PMV BLD AUTO: 9 FL (ref 5–10.5)
PMV BLD AUTO: 9.1 FL (ref 5–10.5)
PMV BLD AUTO: 9.2 FL (ref 5–10.5)
PMV BLD AUTO: 9.2 FL (ref 5–10.5)
PMV BLD AUTO: 9.3 FL (ref 5–10.5)
PO2 ARTERIAL: 104 MMHG (ref 75–108)
PO2 ARTERIAL: 147 MMHG (ref 75–108)
PO2 ARTERIAL: 54 MM HG (ref 75–108)
PO2 MIXED: 35 MM HG
PO2 MIXED: 36 MM HG
PO2 MIXED: 40 MM HG
PO2, VEN: <30 MMHG
POC ACT LR: 140 SEC
POC ACT LR: 145 SEC
POC ACT LR: 147 SEC
POC ACT LR: 148 SEC
POC ACT LR: 148 SEC
POC ACT LR: 151 SEC
POC ACT LR: 152 SEC
POC ACT LR: 153 SEC
POC ACT LR: 154 SEC
POC ACT LR: 155 SEC
POC ACT LR: 156 SEC
POC ACT LR: 157 SEC
POC ACT LR: 158 SEC
POC ACT LR: 159 SEC
POC ACT LR: 160 SEC
POC ACT LR: 161 SEC
POC ACT LR: 161 SEC
POC ACT LR: 162 SEC
POC ACT LR: 163 SEC
POC ACT LR: 164 SEC
POC ACT LR: 165 SEC
POC ACT LR: 166 SEC
POC ACT LR: 167 SEC
POC ACT LR: 167 SEC
POC ACT LR: 168 SEC
POC ACT LR: 169 SEC
POC ACT LR: 169 SEC
POC ACT LR: 170 SEC
POC ACT LR: 171 SEC
POC ACT LR: 172 SEC
POC ACT LR: 173 SEC
POC ACT LR: 174 SEC
POC ACT LR: 175 SEC
POC ACT LR: 176 SEC
POC ACT LR: 176 SEC
POC ACT LR: 177 SEC
POC ACT LR: 178 SEC
POC ACT LR: 179 SEC
POC ACT LR: 180 SEC
POC ACT LR: 181 SEC
POC ACT LR: 183 SEC
POC ACT LR: 184 SEC
POC ACT LR: 185 SEC
POC ACT LR: 187 SEC
POC ACT LR: 187 SEC
POC ACT LR: 189 SEC
POC ACT LR: 191 SEC
POC ACT LR: 191 SEC
POC ACT LR: 192 SEC
POC ACT LR: 194 SEC
POC ACT LR: 195 SEC
POC ACT LR: 197 SEC
POC ACT LR: 198 SEC
POC ACT LR: 198 SEC
POC ACT LR: 216 SEC
POC ACT LR: 232 SEC
POC ACT LR: 235 SEC
POC ACT LR: 293 SEC
POC ACT LR: 302 SEC
POC ACT LR: 357 SEC
POC ACT LR: 375 SEC
POC ACT LR: >400 SEC
POC SAMPLE TYPE: ABNORMAL
POC SAMPLE TYPE: ABNORMAL
POC SAMPLE TYPE: NORMAL
POC SAMPLE TYPE: NORMAL
POLYCHROMASIA: ABNORMAL
POTASSIUM REFLEX MAGNESIUM: 4.5 MMOL/L (ref 3.5–5.1)
POTASSIUM REFLEX MAGNESIUM: 4.6 MMOL/L (ref 3.5–5.1)
POTASSIUM REFLEX MAGNESIUM: 5.4 MMOL/L (ref 3.5–5.1)
POTASSIUM SERPL-SCNC: 3.3 MMOL/L (ref 3.5–5.1)
POTASSIUM SERPL-SCNC: 3.3 MMOL/L (ref 3.5–5.1)
POTASSIUM SERPL-SCNC: 3.5 MMOL/L (ref 3.5–5.1)
POTASSIUM SERPL-SCNC: 3.6 MMOL/L (ref 3.5–5.1)
POTASSIUM SERPL-SCNC: 3.7 MMOL/L (ref 3.5–5.1)
POTASSIUM SERPL-SCNC: 3.7 MMOL/L (ref 3.5–5.1)
POTASSIUM SERPL-SCNC: 3.8 MMOL/L (ref 3.5–5.1)
POTASSIUM SERPL-SCNC: 4 MMOL/L (ref 3.5–5.1)
POTASSIUM SERPL-SCNC: 4 MMOL/L (ref 3.5–5.1)
POTASSIUM SERPL-SCNC: 4.2 MMOL/L (ref 3.5–5.1)
POTASSIUM SERPL-SCNC: 4.4 MMOL/L (ref 3.5–5.1)
POTASSIUM SERPL-SCNC: 4.6 MMOL/L (ref 3.5–5.1)
POTASSIUM SERPL-SCNC: 5.2 MMOL/L (ref 3.5–5.1)
POTASSIUM SERPL-SCNC: 5.2 MMOL/L (ref 3.5–5.1)
POTASSIUM, UR: 20.4 MMOL/L
PREALBUMIN: 18.3 MG/DL (ref 20–40)
PRO-BNP: 1506 PG/ML (ref 0–449)
PRO-BNP: 3479 PG/ML (ref 0–449)
PROCALCITONIN: 0.09 NG/ML (ref 0–0.15)
PROCALCITONIN: 0.11 NG/ML (ref 0–0.15)
PROCALCITONIN: 0.16 NG/ML (ref 0–0.15)
PROCALCITONIN: 0.16 NG/ML (ref 0–0.15)
PROCALCITONIN: 0.19 NG/ML (ref 0–0.15)
PROCALCITONIN: 0.19 NG/ML (ref 0–0.15)
PROCALCITONIN: 0.22 NG/ML (ref 0–0.15)
PROCALCITONIN: 0.22 NG/ML (ref 0–0.15)
PROCALCITONIN: 0.3 NG/ML (ref 0–0.15)
PROCALCITONIN: 0.33 NG/ML (ref 0–0.15)
PROCALCITONIN: 0.38 NG/ML (ref 0–0.15)
PROCALCITONIN: 0.56 NG/ML (ref 0–0.15)
PROTEIN UA: >=300 MG/DL
PROTEIN UA: NEGATIVE MG/DL
PROTHROMBIN TIME: 12.3 SEC (ref 9.9–12.7)
PROTHROMBIN TIME: 12.7 SEC (ref 9.9–12.7)
RBC # BLD: 2.07 M/UL (ref 4–5.2)
RBC # BLD: 2.49 M/UL (ref 4–5.2)
RBC # BLD: 2.63 M/UL (ref 4–5.2)
RBC # BLD: 2.8 M/UL (ref 4–5.2)
RBC # BLD: 2.85 M/UL (ref 4–5.2)
RBC # BLD: 2.86 M/UL (ref 4–5.2)
RBC # BLD: 2.87 M/UL (ref 4–5.2)
RBC # BLD: 2.88 M/UL (ref 4–5.2)
RBC # BLD: 2.93 M/UL (ref 4–5.2)
RBC # BLD: 2.94 M/UL (ref 4–5.2)
RBC # BLD: 3.1 M/UL (ref 4–5.2)
RBC # BLD: 3.11 M/UL (ref 4–5.2)
RBC # BLD: 3.13 M/UL (ref 4–5.2)
RBC # BLD: 3.22 M/UL (ref 4–5.2)
RBC # BLD: 3.24 M/UL (ref 4–5.2)
RBC # BLD: 3.27 M/UL (ref 4–5.2)
RBC # BLD: 3.27 M/UL (ref 4–5.2)
RBC # BLD: 3.6 M/UL (ref 4–5.2)
RBC # BLD: 3.89 M/UL (ref 4–5.2)
RBC # BLD: 4.29 M/UL (ref 4–5.2)
RBC # BLD: NORMAL 10*6/UL
RBC UA: ABNORMAL /HPF (ref 0–4)
REPAGLINIDE: NOT DETECTED NG/ML
SLIDE REVIEW: ABNORMAL
SODIUM BLD-SCNC: 127 MMOL/L (ref 136–145)
SODIUM BLD-SCNC: 128 MMOL/L (ref 136–145)
SODIUM BLD-SCNC: 129 MMOL/L (ref 136–145)
SODIUM BLD-SCNC: 129 MMOL/L (ref 136–145)
SODIUM BLD-SCNC: 130 MMOL/L (ref 136–145)
SODIUM BLD-SCNC: 131 MMOL/L (ref 136–145)
SODIUM BLD-SCNC: 132 MMOL/L (ref 136–145)
SODIUM BLD-SCNC: 133 MMOL/L (ref 136–145)
SODIUM BLD-SCNC: 134 MMOL/L (ref 136–145)
SODIUM BLD-SCNC: 135 MMOL/L (ref 136–145)
SODIUM BLD-SCNC: 135 MMOL/L (ref 136–145)
SODIUM BLD-SCNC: 136 MMOL/L (ref 136–145)
SODIUM BLD-SCNC: 139 MMOL/L (ref 136–145)
SODIUM URINE: 63 MMOL/L
SPECIFIC GRAVITY UA: 1.01 (ref 1–1.03)
SPECIFIC GRAVITY UA: 1.01 (ref 1–1.03)
T4 FREE: 2.3 NG/DL (ref 0.9–1.8)
TCO2 ARTERIAL: 22.6 MMOL/L
TCO2 ARTERIAL: 24.8 MMOL/L
TCO2 ARTERIAL: 25 MMOL/L
TCO2 CALC MIXED: 26 MMOL/L
TCO2 CALC MIXED: 27 MMOL/L
TCO2 CALC MIXED: 30 MMOL/L
TCO2 CALC VENOUS: 22 MMOL/L
TOLAZAMIDE: NOT DETECTED NG/ML
TOLBUTAMIDE: NOT DETECTED NG/ML
TOTAL IRON BINDING CAPACITY: 252 UG/DL (ref 260–445)
TOTAL PROTEIN: 6.1 G/DL (ref 6.4–8.2)
TOTAL PROTEIN: 6.3 G/DL (ref 6.4–8.2)
TOTAL PROTEIN: 6.6 G/DL (ref 6.4–8.2)
TOTAL PROTEIN: 6.8 G/DL (ref 6.4–8.2)
TOTAL PROTEIN: 7.1 G/DL (ref 6.4–8.2)
TOTAL PROTEIN: 7.2 G/DL (ref 6.4–8.2)
TOTAL PROTEIN: 7.2 G/DL (ref 6.4–8.2)
TOTAL PROTEIN: 7.3 G/DL (ref 6.4–8.2)
TOTAL PROTEIN: 7.4 G/DL (ref 6.4–8.2)
TOTAL PROTEIN: 7.4 G/DL (ref 6.4–8.2)
TOTAL PROTEIN: 7.6 G/DL (ref 6.4–8.2)
TOTAL PROTEIN: 7.6 G/DL (ref 6.4–8.2)
TOTAL PROTEIN: 8.9 G/DL (ref 6.4–8.2)
TOTAL PROTEIN: 9.1 G/DL (ref 6.4–8.2)
TRIGL SERPL-MCNC: 52 MG/DL (ref 0–150)
TROPONIN: 0.03 NG/ML
TROPONIN: 1.14 NG/ML
TROPONIN: 8.67 NG/ML
TROPONIN: <0.01 NG/ML
TSH REFLEX FT4: 0.2 UIU/ML (ref 0.27–4.2)
URINE CULTURE, ROUTINE: ABNORMAL
URINE REFLEX TO CULTURE: NORMAL
URINE REFLEX TO CULTURE: YES
URINE TYPE: ABNORMAL
URINE TYPE: NORMAL
UROBILINOGEN, URINE: 0.2 E.U./DL
UROBILINOGEN, URINE: 1 E.U./DL
VLDLC SERPL CALC-MCNC: 10 MG/DL
WBC # BLD: 10.2 K/UL (ref 4–11)
WBC # BLD: 10.5 K/UL (ref 4–11)
WBC # BLD: 10.6 K/UL (ref 4–11)
WBC # BLD: 10.7 K/UL (ref 4–11)
WBC # BLD: 11 K/UL (ref 4–11)
WBC # BLD: 12.1 K/UL (ref 4–11)
WBC # BLD: 12.4 K/UL (ref 4–11)
WBC # BLD: 12.7 K/UL (ref 4–11)
WBC # BLD: 12.9 K/UL (ref 4–11)
WBC # BLD: 15.7 K/UL (ref 4–11)
WBC # BLD: 16.7 K/UL (ref 4–11)
WBC # BLD: 17.9 K/UL (ref 4–11)
WBC # BLD: 19.2 K/UL (ref 4–11)
WBC # BLD: 5.7 K/UL (ref 4–11)
WBC # BLD: 5.7 K/UL (ref 4–11)
WBC # BLD: 6.3 K/UL (ref 4–11)
WBC # BLD: 6.4 K/UL (ref 4–11)
WBC # BLD: 9.1 K/UL (ref 4–11)
WBC # BLD: 9.6 K/UL (ref 4–11)
WBC # BLD: 9.6 K/UL (ref 4–11)
WBC UA: ABNORMAL /HPF (ref 0–5)

## 2021-01-01 PROCEDURE — 2580000003 HC RX 258: Performed by: INTERNAL MEDICINE

## 2021-01-01 PROCEDURE — 6370000000 HC RX 637 (ALT 250 FOR IP): Performed by: INTERNAL MEDICINE

## 2021-01-01 PROCEDURE — 99232 SBSQ HOSP IP/OBS MODERATE 35: CPT | Performed by: INTERNAL MEDICINE

## 2021-01-01 PROCEDURE — 36592 COLLECT BLOOD FROM PICC: CPT

## 2021-01-01 PROCEDURE — 2100000000 HC CCU R&B

## 2021-01-01 PROCEDURE — 85347 COAGULATION TIME ACTIVATED: CPT

## 2021-01-01 PROCEDURE — 6360000002 HC RX W HCPCS: Performed by: STUDENT IN AN ORGANIZED HEALTH CARE EDUCATION/TRAINING PROGRAM

## 2021-01-01 PROCEDURE — C1874 STENT, COATED/COV W/DEL SYS: HCPCS

## 2021-01-01 PROCEDURE — 99291 CRITICAL CARE FIRST HOUR: CPT | Performed by: INTERNAL MEDICINE

## 2021-01-01 PROCEDURE — 2500000003 HC RX 250 WO HCPCS

## 2021-01-01 PROCEDURE — 2500000003 HC RX 250 WO HCPCS: Performed by: INTERNAL MEDICINE

## 2021-01-01 PROCEDURE — 6360000002 HC RX W HCPCS: Performed by: INTERNAL MEDICINE

## 2021-01-01 PROCEDURE — G8484 FLU IMMUNIZE NO ADMIN: HCPCS | Performed by: NURSE PRACTITIONER

## 2021-01-01 PROCEDURE — 97166 OT EVAL MOD COMPLEX 45 MIN: CPT

## 2021-01-01 PROCEDURE — 93308 TTE F-UP OR LMTD: CPT

## 2021-01-01 PROCEDURE — G8400 PT W/DXA NO RESULTS DOC: HCPCS | Performed by: NURSE PRACTITIONER

## 2021-01-01 PROCEDURE — 37799 UNLISTED PX VASCULAR SURGERY: CPT

## 2021-01-01 PROCEDURE — 83735 ASSAY OF MAGNESIUM: CPT

## 2021-01-01 PROCEDURE — 80053 COMPREHEN METABOLIC PANEL: CPT

## 2021-01-01 PROCEDURE — 93454 CORONARY ARTERY ANGIO S&I: CPT | Performed by: INTERNAL MEDICINE

## 2021-01-01 PROCEDURE — 2700000000 HC OXYGEN THERAPY PER DAY

## 2021-01-01 PROCEDURE — 84145 PROCALCITONIN (PCT): CPT

## 2021-01-01 PROCEDURE — 93010 ELECTROCARDIOGRAM REPORT: CPT | Performed by: INTERNAL MEDICINE

## 2021-01-01 PROCEDURE — 85025 COMPLETE CBC W/AUTO DIFF WBC: CPT

## 2021-01-01 PROCEDURE — 6360000002 HC RX W HCPCS: Performed by: NURSE PRACTITIONER

## 2021-01-01 PROCEDURE — 94660 CPAP INITIATION&MGMT: CPT

## 2021-01-01 PROCEDURE — G0480 DRUG TEST DEF 1-7 CLASSES: HCPCS

## 2021-01-01 PROCEDURE — 80076 HEPATIC FUNCTION PANEL: CPT

## 2021-01-01 PROCEDURE — C1751 CATH, INF, PER/CENT/MIDLINE: HCPCS

## 2021-01-01 PROCEDURE — 84100 ASSAY OF PHOSPHORUS: CPT

## 2021-01-01 PROCEDURE — 99222 1ST HOSP IP/OBS MODERATE 55: CPT | Performed by: THORACIC SURGERY (CARDIOTHORACIC VASCULAR SURGERY)

## 2021-01-01 PROCEDURE — 85730 THROMBOPLASTIN TIME PARTIAL: CPT

## 2021-01-01 PROCEDURE — 94761 N-INVAS EAR/PLS OXIMETRY MLT: CPT

## 2021-01-01 PROCEDURE — C8929 TTE W OR WO FOL WCON,DOPPLER: HCPCS

## 2021-01-01 PROCEDURE — 71045 X-RAY EXAM CHEST 1 VIEW: CPT

## 2021-01-01 PROCEDURE — 83935 ASSAY OF URINE OSMOLALITY: CPT

## 2021-01-01 PROCEDURE — 93005 ELECTROCARDIOGRAM TRACING: CPT | Performed by: INTERNAL MEDICINE

## 2021-01-01 PROCEDURE — 1111F DSCHRG MED/CURRENT MED MERGE: CPT | Performed by: NURSE PRACTITIONER

## 2021-01-01 PROCEDURE — 1090F PRES/ABSN URINE INCON ASSESS: CPT | Performed by: NURSE PRACTITIONER

## 2021-01-01 PROCEDURE — 87641 MR-STAPH DNA AMP PROBE: CPT

## 2021-01-01 PROCEDURE — C1887 CATHETER, GUIDING: HCPCS

## 2021-01-01 PROCEDURE — 94760 N-INVAS EAR/PLS OXIMETRY 1: CPT

## 2021-01-01 PROCEDURE — 2580000003 HC RX 258: Performed by: NURSE PRACTITIONER

## 2021-01-01 PROCEDURE — 87088 URINE BACTERIA CULTURE: CPT

## 2021-01-01 PROCEDURE — 83605 ASSAY OF LACTIC ACID: CPT

## 2021-01-01 PROCEDURE — 99233 SBSQ HOSP IP/OBS HIGH 50: CPT | Performed by: INTERNAL MEDICINE

## 2021-01-01 PROCEDURE — 93005 ELECTROCARDIOGRAM TRACING: CPT | Performed by: STUDENT IN AN ORGANIZED HEALTH CARE EDUCATION/TRAINING PROGRAM

## 2021-01-01 PROCEDURE — 85610 PROTHROMBIN TIME: CPT

## 2021-01-01 PROCEDURE — 99153 MOD SED SAME PHYS/QHP EA: CPT

## 2021-01-01 PROCEDURE — 3017F COLORECTAL CA SCREEN DOC REV: CPT | Performed by: NURSE PRACTITIONER

## 2021-01-01 PROCEDURE — 4A1239Z MONITORING OF CARDIAC OUTPUT, PERCUTANEOUS APPROACH: ICD-10-PCS | Performed by: INTERNAL MEDICINE

## 2021-01-01 PROCEDURE — 94375 RESPIRATORY FLOW VOLUME LOOP: CPT | Performed by: INTERNAL MEDICINE

## 2021-01-01 PROCEDURE — 2580000003 HC RX 258: Performed by: STUDENT IN AN ORGANIZED HEALTH CARE EDUCATION/TRAINING PROGRAM

## 2021-01-01 PROCEDURE — 1123F ACP DISCUSS/DSCN MKR DOCD: CPT | Performed by: NURSE PRACTITIONER

## 2021-01-01 PROCEDURE — 92921 PR PRQ TRLUML CORONARY ANGIOPLASTY ADDL BRANCH: CPT | Performed by: INTERNAL MEDICINE

## 2021-01-01 PROCEDURE — 4A133B3 MONITORING OF ARTERIAL PRESSURE, PULMONARY, PERCUTANEOUS APPROACH: ICD-10-PCS | Performed by: INTERNAL MEDICINE

## 2021-01-01 PROCEDURE — C1753 CATH, INTRAVAS ULTRASOUND: HCPCS

## 2021-01-01 PROCEDURE — 36415 COLL VENOUS BLD VENIPUNCTURE: CPT

## 2021-01-01 PROCEDURE — 6360000002 HC RX W HCPCS

## 2021-01-01 PROCEDURE — G8420 CALC BMI NORM PARAMETERS: HCPCS | Performed by: NURSE PRACTITIONER

## 2021-01-01 PROCEDURE — P9035 PLATELET PHERES LEUKOREDUCED: HCPCS

## 2021-01-01 PROCEDURE — 70450 CT HEAD/BRAIN W/O DYE: CPT

## 2021-01-01 PROCEDURE — 86923 COMPATIBILITY TEST ELECTRIC: CPT

## 2021-01-01 PROCEDURE — 2060000000 HC ICU INTERMEDIATE R&B

## 2021-01-01 PROCEDURE — 81003 URINALYSIS AUTO W/O SCOPE: CPT

## 2021-01-01 PROCEDURE — 97116 GAIT TRAINING THERAPY: CPT

## 2021-01-01 PROCEDURE — 99152 MOD SED SAME PHYS/QHP 5/>YRS: CPT | Performed by: INTERNAL MEDICINE

## 2021-01-01 PROCEDURE — 36569 INSJ PICC 5 YR+ W/O IMAGING: CPT

## 2021-01-01 PROCEDURE — 84134 ASSAY OF PREALBUMIN: CPT

## 2021-01-01 PROCEDURE — 82803 BLOOD GASES ANY COMBINATION: CPT

## 2021-01-01 PROCEDURE — 2709999900 HC NON-CHARGEABLE SUPPLY

## 2021-01-01 PROCEDURE — 84443 ASSAY THYROID STIM HORMONE: CPT

## 2021-01-01 PROCEDURE — 99233 SBSQ HOSP IP/OBS HIGH 50: CPT | Performed by: THORACIC SURGERY (CARDIOTHORACIC VASCULAR SURGERY)

## 2021-01-01 PROCEDURE — 1036F TOBACCO NON-USER: CPT | Performed by: NURSE PRACTITIONER

## 2021-01-01 PROCEDURE — 80048 BASIC METABOLIC PNL TOTAL CA: CPT

## 2021-01-01 PROCEDURE — 99284 EMERGENCY DEPT VISIT MOD MDM: CPT

## 2021-01-01 PROCEDURE — 86900 BLOOD TYPING SEROLOGIC ABO: CPT

## 2021-01-01 PROCEDURE — B2111ZZ FLUOROSCOPY OF MULTIPLE CORONARY ARTERIES USING LOW OSMOLAR CONTRAST: ICD-10-PCS | Performed by: INTERNAL MEDICINE

## 2021-01-01 PROCEDURE — 92920 PRQ TRLUML C ANGIOP 1ART&/BR: CPT

## 2021-01-01 PROCEDURE — C9602 PERC D-E COR STENT ATHER S: HCPCS

## 2021-01-01 PROCEDURE — 97530 THERAPEUTIC ACTIVITIES: CPT

## 2021-01-01 PROCEDURE — 85384 FIBRINOGEN ACTIVITY: CPT

## 2021-01-01 PROCEDURE — 92979 ENDOLUMINL IVUS OCT C EA: CPT

## 2021-01-01 PROCEDURE — 99152 MOD SED SAME PHYS/QHP 5/>YRS: CPT

## 2021-01-01 PROCEDURE — 4A023N7 MEASUREMENT OF CARDIAC SAMPLING AND PRESSURE, LEFT HEART, PERCUTANEOUS APPROACH: ICD-10-PCS | Performed by: INTERNAL MEDICINE

## 2021-01-01 PROCEDURE — 6370000000 HC RX 637 (ALT 250 FOR IP): Performed by: STUDENT IN AN ORGANIZED HEALTH CARE EDUCATION/TRAINING PROGRAM

## 2021-01-01 PROCEDURE — 83036 HEMOGLOBIN GLYCOSYLATED A1C: CPT

## 2021-01-01 PROCEDURE — 86850 RBC ANTIBODY SCREEN: CPT

## 2021-01-01 PROCEDURE — C1894 INTRO/SHEATH, NON-LASER: HCPCS

## 2021-01-01 PROCEDURE — 2580000003 HC RX 258

## 2021-01-01 PROCEDURE — 97110 THERAPEUTIC EXERCISES: CPT

## 2021-01-01 PROCEDURE — 93460 R&L HRT ART/VENTRICLE ANGIO: CPT | Performed by: INTERNAL MEDICINE

## 2021-01-01 PROCEDURE — 80061 LIPID PANEL: CPT

## 2021-01-01 PROCEDURE — 92978 ENDOLUMINL IVUS OCT C 1ST: CPT

## 2021-01-01 PROCEDURE — 97535 SELF CARE MNGMENT TRAINING: CPT

## 2021-01-01 PROCEDURE — 92979 ENDOLUMINL IVUS OCT C EA: CPT | Performed by: INTERNAL MEDICINE

## 2021-01-01 PROCEDURE — 82436 ASSAY OF URINE CHLORIDE: CPT

## 2021-01-01 PROCEDURE — 76937 US GUIDE VASCULAR ACCESS: CPT

## 2021-01-01 PROCEDURE — 0271376 DILATION OF CORONARY ARTERY, TWO ARTERIES, BIFURCATION, WITH FOUR OR MORE DRUG-ELUTING INTRALUMINAL DEVICES, PERCUTANEOUS APPROACH: ICD-10-PCS | Performed by: INTERNAL MEDICINE

## 2021-01-01 PROCEDURE — 99214 OFFICE O/P EST MOD 30 MIN: CPT | Performed by: NURSE PRACTITIONER

## 2021-01-01 PROCEDURE — 93460 R&L HRT ART/VENTRICLE ANGIO: CPT

## 2021-01-01 PROCEDURE — 99231 SBSQ HOSP IP/OBS SF/LOW 25: CPT | Performed by: THORACIC SURGERY (CARDIOTHORACIC VASCULAR SURGERY)

## 2021-01-01 PROCEDURE — 87086 URINE CULTURE/COLONY COUNT: CPT

## 2021-01-01 PROCEDURE — 94010 BREATHING CAPACITY TEST: CPT

## 2021-01-01 PROCEDURE — 99213 OFFICE O/P EST LOW 20 MIN: CPT | Performed by: NURSE PRACTITIONER

## 2021-01-01 PROCEDURE — C1769 GUIDE WIRE: HCPCS

## 2021-01-01 PROCEDURE — 85027 COMPLETE CBC AUTOMATED: CPT

## 2021-01-01 PROCEDURE — 81001 URINALYSIS AUTO W/SCOPE: CPT

## 2021-01-01 PROCEDURE — 83880 ASSAY OF NATRIURETIC PEPTIDE: CPT

## 2021-01-01 PROCEDURE — P9016 RBC LEUKOCYTES REDUCED: HCPCS

## 2021-01-01 PROCEDURE — 93000 ELECTROCARDIOGRAM COMPLETE: CPT | Performed by: NURSE PRACTITIONER

## 2021-01-01 PROCEDURE — 4040F PNEUMOC VAC/ADMIN/RCVD: CPT | Performed by: NURSE PRACTITIONER

## 2021-01-01 PROCEDURE — 33990 INSJ PERQ VAD L HRT ARTERIAL: CPT | Performed by: INTERNAL MEDICINE

## 2021-01-01 PROCEDURE — 84484 ASSAY OF TROPONIN QUANT: CPT

## 2021-01-01 PROCEDURE — P9041 ALBUMIN (HUMAN),5%, 50ML: HCPCS | Performed by: INTERNAL MEDICINE

## 2021-01-01 PROCEDURE — 83010 ASSAY OF HAPTOGLOBIN QUANT: CPT

## 2021-01-01 PROCEDURE — 33992 RMVL PERQ LEFT HEART VAD: CPT | Performed by: INTERNAL MEDICINE

## 2021-01-01 PROCEDURE — 02C13ZZ EXTIRPATION OF MATTER FROM CORONARY ARTERY, TWO ARTERIES, PERCUTANEOUS APPROACH: ICD-10-PCS | Performed by: INTERNAL MEDICINE

## 2021-01-01 PROCEDURE — 84300 ASSAY OF URINE SODIUM: CPT

## 2021-01-01 PROCEDURE — G8427 DOCREV CUR MEDS BY ELIG CLIN: HCPCS | Performed by: NURSE PRACTITIONER

## 2021-01-01 PROCEDURE — 86901 BLOOD TYPING SEROLOGIC RH(D): CPT

## 2021-01-01 PROCEDURE — 74176 CT ABD & PELVIS W/O CONTRAST: CPT

## 2021-01-01 PROCEDURE — 82728 ASSAY OF FERRITIN: CPT

## 2021-01-01 PROCEDURE — 6360000004 HC RX CONTRAST MEDICATION: Performed by: INTERNAL MEDICINE

## 2021-01-01 PROCEDURE — 2720000010 HC SURG SUPPLY STERILE

## 2021-01-01 PROCEDURE — G8417 CALC BMI ABV UP PARAM F/U: HCPCS | Performed by: NURSE PRACTITIONER

## 2021-01-01 PROCEDURE — 85520 HEPARIN ASSAY: CPT

## 2021-01-01 PROCEDURE — 71046 X-RAY EXAM CHEST 2 VIEWS: CPT

## 2021-01-01 PROCEDURE — 80162 ASSAY OF DIGOXIN TOTAL: CPT

## 2021-01-01 PROCEDURE — 99223 1ST HOSP IP/OBS HIGH 75: CPT | Performed by: INTERNAL MEDICINE

## 2021-01-01 PROCEDURE — 97162 PT EVAL MOD COMPLEX 30 MIN: CPT

## 2021-01-01 PROCEDURE — C1725 CATH, TRANSLUMIN NON-LASER: HCPCS

## 2021-01-01 PROCEDURE — 87186 SC STD MICRODIL/AGAR DIL: CPT

## 2021-01-01 PROCEDURE — B241ZZ3 ULTRASONOGRAPHY OF MULTIPLE CORONARY ARTERIES, INTRAVASCULAR: ICD-10-PCS | Performed by: INTERNAL MEDICINE

## 2021-01-01 PROCEDURE — 99213 OFFICE O/P EST LOW 20 MIN: CPT | Performed by: THORACIC SURGERY (CARDIOTHORACIC VASCULAR SURGERY)

## 2021-01-01 PROCEDURE — 02HA3RZ INSERTION OF SHORT-TERM EXTERNAL HEART ASSIST SYSTEM INTO HEART, PERCUTANEOUS APPROACH: ICD-10-PCS | Performed by: INTERNAL MEDICINE

## 2021-01-01 PROCEDURE — 94150 VITAL CAPACITY TEST: CPT

## 2021-01-01 PROCEDURE — C9600 PERC DRUG-EL COR STENT SING: HCPCS

## 2021-01-01 PROCEDURE — 02HV33Z INSERTION OF INFUSION DEVICE INTO SUPERIOR VENA CAVA, PERCUTANEOUS APPROACH: ICD-10-PCS | Performed by: INTERNAL MEDICINE

## 2021-01-01 PROCEDURE — 83540 ASSAY OF IRON: CPT

## 2021-01-01 PROCEDURE — 93970 EXTREMITY STUDY: CPT

## 2021-01-01 PROCEDURE — 33990 INSJ PERQ VAD L HRT ARTERIAL: CPT

## 2021-01-01 PROCEDURE — 93005 ELECTROCARDIOGRAM TRACING: CPT | Performed by: NURSE PRACTITIONER

## 2021-01-01 PROCEDURE — 83690 ASSAY OF LIPASE: CPT

## 2021-01-01 PROCEDURE — 71250 CT THORAX DX C-: CPT

## 2021-01-01 PROCEDURE — 5A0221D ASSISTANCE WITH CARDIAC OUTPUT USING IMPELLER PUMP, CONTINUOUS: ICD-10-PCS | Performed by: INTERNAL MEDICINE

## 2021-01-01 PROCEDURE — 93880 EXTRACRANIAL BILAT STUDY: CPT

## 2021-01-01 PROCEDURE — 93798 PHYS/QHP OP CAR RHAB W/ECG: CPT

## 2021-01-01 PROCEDURE — 02HQ32Z INSERTION OF MONITORING DEVICE INTO RIGHT PULMONARY ARTERY, PERCUTANEOUS APPROACH: ICD-10-PCS | Performed by: INTERNAL MEDICINE

## 2021-01-01 PROCEDURE — 83930 ASSAY OF BLOOD OSMOLALITY: CPT

## 2021-01-01 PROCEDURE — B2151ZZ FLUOROSCOPY OF LEFT HEART USING LOW OSMOLAR CONTRAST: ICD-10-PCS | Performed by: INTERNAL MEDICINE

## 2021-01-01 PROCEDURE — 84133 ASSAY OF URINE POTASSIUM: CPT

## 2021-01-01 PROCEDURE — 92920 PRQ TRLUML C ANGIOP 1ART&/BR: CPT | Performed by: INTERNAL MEDICINE

## 2021-01-01 PROCEDURE — 02PA3RZ REMOVAL OF SHORT-TERM EXTERNAL HEART ASSIST SYSTEM FROM HEART, PERCUTANEOUS APPROACH: ICD-10-PCS | Performed by: INTERNAL MEDICINE

## 2021-01-01 PROCEDURE — 83550 IRON BINDING TEST: CPT

## 2021-01-01 PROCEDURE — 33992 RMVL PERQ LEFT HEART VAD: CPT

## 2021-01-01 PROCEDURE — 99239 HOSP IP/OBS DSCHRG MGMT >30: CPT | Performed by: INTERNAL MEDICINE

## 2021-01-01 PROCEDURE — 86022 PLATELET ANTIBODIES: CPT

## 2021-01-01 PROCEDURE — C1761 HC CATH TRANSLUM INTRAVASCULAR LITHOTRIPSY CORONARY: HCPCS

## 2021-01-01 PROCEDURE — 92941 PRQ TRLML REVSC TOT OCCL AMI: CPT

## 2021-01-01 PROCEDURE — 6360000004 HC RX CONTRAST MEDICATION

## 2021-01-01 PROCEDURE — 03FY3ZZ FRAGMENTATION OF UPPER ARTERY, PERCUTANEOUS APPROACH: ICD-10-PCS | Performed by: INTERNAL MEDICINE

## 2021-01-01 PROCEDURE — 02713ZZ DILATION OF CORONARY ARTERY, TWO ARTERIES, PERCUTANEOUS APPROACH: ICD-10-PCS | Performed by: INTERNAL MEDICINE

## 2021-01-01 PROCEDURE — 92941 PRQ TRLML REVSC TOT OCCL AMI: CPT | Performed by: INTERNAL MEDICINE

## 2021-01-01 PROCEDURE — C1724 CATH, TRANS ATHEREC,ROTATION: HCPCS

## 2021-01-01 PROCEDURE — 85049 AUTOMATED PLATELET COUNT: CPT

## 2021-01-01 PROCEDURE — APPNB15 APP NON BILLABLE TIME 0-15 MINS: Performed by: NURSE PRACTITIONER

## 2021-01-01 PROCEDURE — 92933 PRQ TRLML C ATHRC ST ANGIOP1: CPT | Performed by: INTERNAL MEDICINE

## 2021-01-01 PROCEDURE — 84439 ASSAY OF FREE THYROXINE: CPT

## 2021-01-01 PROCEDURE — 92978 ENDOLUMINL IVUS OCT C 1ST: CPT | Performed by: INTERNAL MEDICINE

## 2021-01-01 RX ORDER — INSULIN GLARGINE 100 [IU]/ML
8 INJECTION, SOLUTION SUBCUTANEOUS EVERY MORNING
Status: DISCONTINUED | OUTPATIENT
Start: 2021-01-01 | End: 2021-01-01 | Stop reason: HOSPADM

## 2021-01-01 RX ORDER — ALBUMIN, HUMAN INJ 5% 5 %
12.5 SOLUTION INTRAVENOUS ONCE
Status: COMPLETED | OUTPATIENT
Start: 2021-01-01 | End: 2021-01-01

## 2021-01-01 RX ORDER — FUROSEMIDE 10 MG/ML
20 INJECTION INTRAMUSCULAR; INTRAVENOUS DAILY
Status: DISCONTINUED | OUTPATIENT
Start: 2021-01-01 | End: 2021-01-01

## 2021-01-01 RX ORDER — METHIMAZOLE 5 MG/1
2.5 TABLET ORAL
Status: DISCONTINUED | OUTPATIENT
Start: 2021-01-01 | End: 2021-01-01

## 2021-01-01 RX ORDER — UBIDECARENONE 75 MG
50 CAPSULE ORAL DAILY
Status: DISCONTINUED | OUTPATIENT
Start: 2021-01-01 | End: 2021-01-01 | Stop reason: HOSPADM

## 2021-01-01 RX ORDER — MORPHINE SULFATE 2 MG/ML
2 INJECTION, SOLUTION INTRAMUSCULAR; INTRAVENOUS
Status: ACTIVE | OUTPATIENT
Start: 2021-01-01 | End: 2021-01-01

## 2021-01-01 RX ORDER — MILRINONE LACTATE 0.2 MG/ML
0.15 INJECTION, SOLUTION INTRAVENOUS CONTINUOUS
Status: DISCONTINUED | OUTPATIENT
Start: 2021-01-01 | End: 2021-01-01

## 2021-01-01 RX ORDER — ATORVASTATIN CALCIUM 40 MG/1
40 TABLET, FILM COATED ORAL DAILY
Qty: 90 TABLET | Refills: 3 | Status: SHIPPED | OUTPATIENT
Start: 2021-01-01

## 2021-01-01 RX ORDER — ACETAMINOPHEN 325 MG/1
650 TABLET ORAL EVERY 4 HOURS PRN
Status: DISCONTINUED | OUTPATIENT
Start: 2021-01-01 | End: 2021-01-01 | Stop reason: HOSPADM

## 2021-01-01 RX ORDER — EPTIFIBATIDE 0.75 MG/ML
1 INJECTION, SOLUTION INTRAVENOUS CONTINUOUS
Status: ACTIVE | OUTPATIENT
Start: 2021-01-01 | End: 2021-01-01

## 2021-01-01 RX ORDER — LISINOPRIL 2.5 MG/1
2.5 TABLET ORAL DAILY
Qty: 30 TABLET | Refills: 3 | Status: ON HOLD | OUTPATIENT
Start: 2021-01-01 | End: 2021-01-01 | Stop reason: HOSPADM

## 2021-01-01 RX ORDER — FUROSEMIDE 10 MG/ML
40 INJECTION INTRAMUSCULAR; INTRAVENOUS ONCE
Status: COMPLETED | OUTPATIENT
Start: 2021-01-01 | End: 2021-01-01

## 2021-01-01 RX ORDER — FUROSEMIDE 10 MG/ML
20 INJECTION INTRAMUSCULAR; INTRAVENOUS ONCE
Status: DISCONTINUED | OUTPATIENT
Start: 2021-01-01 | End: 2021-01-01

## 2021-01-01 RX ORDER — SODIUM CHLORIDE 0.9 % (FLUSH) 0.9 %
5-40 SYRINGE (ML) INJECTION EVERY 12 HOURS SCHEDULED
Status: DISCONTINUED | OUTPATIENT
Start: 2021-01-01 | End: 2021-01-01 | Stop reason: SDUPTHER

## 2021-01-01 RX ORDER — MIDODRINE HYDROCHLORIDE 5 MG/1
5 TABLET ORAL 3 TIMES DAILY
Qty: 90 TABLET | Refills: 3 | Status: SHIPPED | OUTPATIENT
Start: 2021-01-01 | End: 2021-01-01

## 2021-01-01 RX ORDER — DESVENLAFAXINE 25 MG/1
25 TABLET, EXTENDED RELEASE ORAL DAILY
Status: DISCONTINUED | OUTPATIENT
Start: 2021-01-01 | End: 2021-01-01 | Stop reason: HOSPADM

## 2021-01-01 RX ORDER — DEXTROSE MONOHYDRATE 50 MG/ML
100 INJECTION, SOLUTION INTRAVENOUS PRN
Status: DISCONTINUED | OUTPATIENT
Start: 2021-01-01 | End: 2021-01-01 | Stop reason: HOSPADM

## 2021-01-01 RX ORDER — HEPARIN SODIUM 1000 [USP'U]/ML
4000 INJECTION, SOLUTION INTRAVENOUS; SUBCUTANEOUS ONCE
Status: DISCONTINUED | OUTPATIENT
Start: 2021-01-01 | End: 2021-01-01

## 2021-01-01 RX ORDER — ROSUVASTATIN CALCIUM 40 MG/1
40 TABLET, COATED ORAL EVERY EVENING
COMMUNITY
End: 2021-01-01 | Stop reason: ALTCHOICE

## 2021-01-01 RX ORDER — DOCUSATE SODIUM 100 MG/1
100 CAPSULE, LIQUID FILLED ORAL DAILY
Status: DISCONTINUED | OUTPATIENT
Start: 2021-01-01 | End: 2021-01-01 | Stop reason: HOSPADM

## 2021-01-01 RX ORDER — FAMOTIDINE 20 MG/1
20 TABLET, FILM COATED ORAL DAILY
Status: DISCONTINUED | OUTPATIENT
Start: 2021-01-01 | End: 2021-01-01 | Stop reason: HOSPADM

## 2021-01-01 RX ORDER — NEOMYCIN/POLYMYXIN B/PRAMOXINE 3.5-10K-1
1 CREAM (GRAM) TOPICAL DAILY
COMMUNITY

## 2021-01-01 RX ORDER — SPIRONOLACTONE 25 MG/1
25 TABLET ORAL DAILY
Status: DISCONTINUED | OUTPATIENT
Start: 2021-01-01 | End: 2021-01-01 | Stop reason: HOSPADM

## 2021-01-01 RX ORDER — SPIRONOLACTONE 25 MG/1
TABLET ORAL
Qty: 30 TABLET | Refills: 3 | OUTPATIENT
Start: 2021-01-01

## 2021-01-01 RX ORDER — HEPARIN SODIUM 10000 [USP'U]/100ML
0-3000 INJECTION, SOLUTION INTRAVENOUS CONTINUOUS
Status: DISCONTINUED | OUTPATIENT
Start: 2021-01-01 | End: 2021-01-01

## 2021-01-01 RX ORDER — MIDODRINE HYDROCHLORIDE 10 MG/1
10 TABLET ORAL 3 TIMES DAILY
Qty: 90 TABLET | Refills: 3 | Status: SHIPPED | OUTPATIENT
Start: 2021-01-01 | End: 2022-01-01

## 2021-01-01 RX ORDER — DEXTROSE MONOHYDRATE 25 G/50ML
12.5 INJECTION, SOLUTION INTRAVENOUS PRN
Status: DISCONTINUED | OUTPATIENT
Start: 2021-01-01 | End: 2021-01-01 | Stop reason: HOSPADM

## 2021-01-01 RX ORDER — MAGNESIUM SULFATE IN WATER 40 MG/ML
2000 INJECTION, SOLUTION INTRAVENOUS ONCE
Status: DISCONTINUED | OUTPATIENT
Start: 2021-01-01 | End: 2021-01-01

## 2021-01-01 RX ORDER — SODIUM CHLORIDE 0.9 % (FLUSH) 0.9 %
5-40 SYRINGE (ML) INJECTION EVERY 12 HOURS SCHEDULED
Status: DISCONTINUED | OUTPATIENT
Start: 2021-01-01 | End: 2021-01-01 | Stop reason: HOSPADM

## 2021-01-01 RX ORDER — FUROSEMIDE 40 MG/1
40 TABLET ORAL DAILY
Qty: 30 TABLET | Refills: 3 | Status: ON HOLD | OUTPATIENT
Start: 2021-01-01 | End: 2021-01-01 | Stop reason: HOSPADM

## 2021-01-01 RX ORDER — EPTIFIBATIDE 0.75 MG/ML
1 INJECTION, SOLUTION INTRAVENOUS CONTINUOUS
Status: DISCONTINUED | OUTPATIENT
Start: 2021-01-01 | End: 2021-01-01 | Stop reason: SDUPTHER

## 2021-01-01 RX ORDER — DEXTROSE MONOHYDRATE 25 G/50ML
INJECTION, SOLUTION INTRAVENOUS
Status: COMPLETED
Start: 2021-01-01 | End: 2021-01-01

## 2021-01-01 RX ORDER — TICAGRELOR 90 MG/1
TABLET ORAL
Qty: 30 TABLET | Refills: 5 | Status: SHIPPED | OUTPATIENT
Start: 2021-01-01

## 2021-01-01 RX ORDER — LEVOTHYROXINE SODIUM 0.1 MG/1
100 TABLET ORAL DAILY
Status: DISCONTINUED | OUTPATIENT
Start: 2021-01-01 | End: 2021-01-01 | Stop reason: HOSPADM

## 2021-01-01 RX ORDER — FUROSEMIDE 10 MG/ML
40 INJECTION INTRAMUSCULAR; INTRAVENOUS DAILY
Status: DISCONTINUED | OUTPATIENT
Start: 2021-01-01 | End: 2021-01-01

## 2021-01-01 RX ORDER — NICOTINE POLACRILEX 4 MG
15 LOZENGE BUCCAL PRN
Status: DISCONTINUED | OUTPATIENT
Start: 2021-01-01 | End: 2021-01-01 | Stop reason: HOSPADM

## 2021-01-01 RX ORDER — ROSUVASTATIN CALCIUM 40 MG/1
40 TABLET, COATED ORAL NIGHTLY
Status: DISCONTINUED | OUTPATIENT
Start: 2021-01-01 | End: 2021-01-01 | Stop reason: HOSPADM

## 2021-01-01 RX ORDER — SODIUM CHLORIDE 9 MG/ML
INJECTION, SOLUTION INTRAVENOUS CONTINUOUS
Status: DISCONTINUED | OUTPATIENT
Start: 2021-01-01 | End: 2021-01-01

## 2021-01-01 RX ORDER — SODIUM CHLORIDE 9 MG/ML
25 INJECTION, SOLUTION INTRAVENOUS PRN
Status: DISCONTINUED | OUTPATIENT
Start: 2021-01-01 | End: 2021-01-01 | Stop reason: HOSPADM

## 2021-01-01 RX ORDER — METOPROLOL SUCCINATE 25 MG/1
12.5 TABLET, EXTENDED RELEASE ORAL DAILY
Qty: 30 TABLET | Refills: 3 | Status: SHIPPED | OUTPATIENT
Start: 2021-01-01 | End: 2021-01-01

## 2021-01-01 RX ORDER — GLIPIZIDE 5 MG/1
5 TABLET ORAL ONCE
Status: ON HOLD | COMMUNITY
End: 2021-01-01 | Stop reason: HOSPADM

## 2021-01-01 RX ORDER — FAMOTIDINE 20 MG/1
20 TABLET, FILM COATED ORAL NIGHTLY
Status: DISCONTINUED | OUTPATIENT
Start: 2021-01-01 | End: 2021-01-01 | Stop reason: HOSPADM

## 2021-01-01 RX ORDER — METOPROLOL SUCCINATE 25 MG/1
TABLET, EXTENDED RELEASE ORAL
Qty: 30 TABLET | Refills: 3 | OUTPATIENT
Start: 2021-01-01

## 2021-01-01 RX ORDER — SODIUM CHLORIDE 0.9 % (FLUSH) 0.9 %
5-40 SYRINGE (ML) INJECTION PRN
Status: DISCONTINUED | OUTPATIENT
Start: 2021-01-01 | End: 2021-01-01 | Stop reason: SDUPTHER

## 2021-01-01 RX ORDER — LIDOCAINE HYDROCHLORIDE 10 MG/ML
5 INJECTION, SOLUTION EPIDURAL; INFILTRATION; INTRACAUDAL; PERINEURAL ONCE
Status: DISCONTINUED | OUTPATIENT
Start: 2021-01-01 | End: 2021-01-01 | Stop reason: HOSPADM

## 2021-01-01 RX ORDER — SODIUM CHLORIDE, SODIUM LACTATE, POTASSIUM CHLORIDE, AND CALCIUM CHLORIDE .6; .31; .03; .02 G/100ML; G/100ML; G/100ML; G/100ML
1000 INJECTION, SOLUTION INTRAVENOUS ONCE
Status: COMPLETED | OUTPATIENT
Start: 2021-01-01 | End: 2021-01-01

## 2021-01-01 RX ORDER — SODIUM CHLORIDE 9 MG/ML
25 INJECTION, SOLUTION INTRAVENOUS PRN
Status: DISCONTINUED | OUTPATIENT
Start: 2021-01-01 | End: 2021-01-01 | Stop reason: SDUPTHER

## 2021-01-01 RX ORDER — ROSUVASTATIN CALCIUM 40 MG/1
40 TABLET, COATED ORAL NIGHTLY
Qty: 30 TABLET | Refills: 3 | Status: SHIPPED | OUTPATIENT
Start: 2021-01-01 | End: 2021-01-01

## 2021-01-01 RX ORDER — POLYVINYL ALCOHOL 14 MG/ML
2 SOLUTION/ DROPS OPHTHALMIC PRN
Status: DISCONTINUED | OUTPATIENT
Start: 2021-01-01 | End: 2021-01-01 | Stop reason: HOSPADM

## 2021-01-01 RX ORDER — DEXTROSE, SODIUM CHLORIDE, SODIUM LACTATE, POTASSIUM CHLORIDE, AND CALCIUM CHLORIDE 5; .6; .31; .03; .02 G/100ML; G/100ML; G/100ML; G/100ML; G/100ML
INJECTION, SOLUTION INTRAVENOUS CONTINUOUS
Status: DISPENSED | OUTPATIENT
Start: 2021-01-01 | End: 2021-01-01

## 2021-01-01 RX ORDER — LISINOPRIL 5 MG/1
2.5 TABLET ORAL DAILY
Status: DISCONTINUED | OUTPATIENT
Start: 2021-01-01 | End: 2021-01-01 | Stop reason: HOSPADM

## 2021-01-01 RX ORDER — ATROPINE SULFATE 0.4 MG/ML
0.5 AMPUL (ML) INJECTION
Status: ACTIVE | OUTPATIENT
Start: 2021-01-01 | End: 2021-01-01

## 2021-01-01 RX ORDER — 0.9 % SODIUM CHLORIDE 0.9 %
1000 INTRAVENOUS SOLUTION INTRAVENOUS ONCE
Status: COMPLETED | OUTPATIENT
Start: 2021-01-01 | End: 2021-01-01

## 2021-01-01 RX ORDER — PROMETHAZINE HYDROCHLORIDE 25 MG/1
12.5 TABLET ORAL EVERY 6 HOURS PRN
Status: DISCONTINUED | OUTPATIENT
Start: 2021-01-01 | End: 2021-01-01 | Stop reason: HOSPADM

## 2021-01-01 RX ORDER — SODIUM CHLORIDE 9 MG/ML
INJECTION, SOLUTION INTRAVENOUS PRN
Status: DISCONTINUED | OUTPATIENT
Start: 2021-01-01 | End: 2021-01-01 | Stop reason: SDUPTHER

## 2021-01-01 RX ORDER — ASPIRIN 81 MG/1
81 TABLET, CHEWABLE ORAL DAILY
Qty: 30 TABLET | Refills: 3 | Status: SHIPPED | OUTPATIENT
Start: 2021-01-01 | End: 2021-01-01

## 2021-01-01 RX ORDER — METOPROLOL SUCCINATE 25 MG/1
25 TABLET, EXTENDED RELEASE ORAL DAILY
Qty: 90 TABLET | Refills: 3 | Status: SHIPPED | OUTPATIENT
Start: 2021-01-01

## 2021-01-01 RX ORDER — MAGNESIUM SULFATE IN WATER 40 MG/ML
2000 INJECTION, SOLUTION INTRAVENOUS ONCE
Status: COMPLETED | OUTPATIENT
Start: 2021-01-01 | End: 2021-01-01

## 2021-01-01 RX ORDER — FUROSEMIDE 10 MG/ML
20 INJECTION INTRAMUSCULAR; INTRAVENOUS ONCE
Status: COMPLETED | OUTPATIENT
Start: 2021-01-01 | End: 2021-01-01

## 2021-01-01 RX ORDER — SPIRONOLACTONE 25 MG/1
50 TABLET ORAL DAILY
Status: DISCONTINUED | OUTPATIENT
Start: 2021-01-01 | End: 2021-01-01

## 2021-01-01 RX ORDER — OXYCODONE HYDROCHLORIDE 5 MG/1
5 TABLET ORAL EVERY 4 HOURS PRN
Status: DISCONTINUED | OUTPATIENT
Start: 2021-01-01 | End: 2021-01-01 | Stop reason: HOSPADM

## 2021-01-01 RX ORDER — POTASSIUM CHLORIDE 29.8 MG/ML
20 INJECTION INTRAVENOUS ONCE
Status: COMPLETED | OUTPATIENT
Start: 2021-01-01 | End: 2021-01-01

## 2021-01-01 RX ORDER — 0.9 % SODIUM CHLORIDE 0.9 %
250 INTRAVENOUS SOLUTION INTRAVENOUS PRN
Status: DISCONTINUED | OUTPATIENT
Start: 2021-01-01 | End: 2021-01-01 | Stop reason: HOSPADM

## 2021-01-01 RX ORDER — LEVOTHYROXINE SODIUM 88 UG/1
88 TABLET ORAL DAILY
COMMUNITY

## 2021-01-01 RX ORDER — FUROSEMIDE 10 MG/ML
60 INJECTION INTRAMUSCULAR; INTRAVENOUS DAILY
Status: DISCONTINUED | OUTPATIENT
Start: 2021-01-01 | End: 2021-01-01

## 2021-01-01 RX ORDER — DEXTROSE, SODIUM CHLORIDE, SODIUM LACTATE, POTASSIUM CHLORIDE, AND CALCIUM CHLORIDE 5; .6; .31; .03; .02 G/100ML; G/100ML; G/100ML; G/100ML; G/100ML
INJECTION, SOLUTION INTRAVENOUS CONTINUOUS
Status: DISCONTINUED | OUTPATIENT
Start: 2021-01-01 | End: 2021-01-01

## 2021-01-01 RX ORDER — ACETAMINOPHEN 325 MG/1
650 TABLET ORAL EVERY 6 HOURS PRN
Status: DISCONTINUED | OUTPATIENT
Start: 2021-01-01 | End: 2021-01-01 | Stop reason: HOSPADM

## 2021-01-01 RX ORDER — DIGOXIN 125 MCG
125 TABLET ORAL DAILY
Status: DISCONTINUED | OUTPATIENT
Start: 2021-01-01 | End: 2021-01-01 | Stop reason: HOSPADM

## 2021-01-01 RX ORDER — POTASSIUM CHLORIDE 750 MG/1
40 TABLET, FILM COATED, EXTENDED RELEASE ORAL
Status: DISCONTINUED | OUTPATIENT
Start: 2021-01-01 | End: 2021-01-01 | Stop reason: HOSPADM

## 2021-01-01 RX ORDER — HEPARIN SODIUM 1000 [USP'U]/ML
30 INJECTION, SOLUTION INTRAVENOUS; SUBCUTANEOUS PRN
Status: DISCONTINUED | OUTPATIENT
Start: 2021-01-01 | End: 2021-01-01

## 2021-01-01 RX ORDER — AMPICILLIN TRIHYDRATE 250 MG
1 CAPSULE ORAL DAILY
COMMUNITY
End: 2022-01-01

## 2021-01-01 RX ORDER — DESVENLAFAXINE 50 MG/1
25 TABLET, EXTENDED RELEASE ORAL DAILY
COMMUNITY
End: 2021-01-01

## 2021-01-01 RX ORDER — METOPROLOL SUCCINATE 25 MG/1
12.5 TABLET, EXTENDED RELEASE ORAL DAILY
Status: DISCONTINUED | OUTPATIENT
Start: 2021-01-01 | End: 2021-01-01

## 2021-01-01 RX ORDER — NITROGLYCERIN 20 MG/100ML
20 INJECTION INTRAVENOUS CONTINUOUS
Status: DISCONTINUED | OUTPATIENT
Start: 2021-01-01 | End: 2021-01-01

## 2021-01-01 RX ORDER — VITAMIN B COMPLEX
1 TABLET ORAL DAILY
Status: DISCONTINUED | OUTPATIENT
Start: 2021-01-01 | End: 2021-01-01 | Stop reason: HOSPADM

## 2021-01-01 RX ORDER — SODIUM CHLORIDE 0.9 % (FLUSH) 0.9 %
5-40 SYRINGE (ML) INJECTION PRN
Status: DISCONTINUED | OUTPATIENT
Start: 2021-01-01 | End: 2021-01-01 | Stop reason: HOSPADM

## 2021-01-01 RX ORDER — ONDANSETRON 2 MG/ML
4 INJECTION INTRAMUSCULAR; INTRAVENOUS EVERY 6 HOURS PRN
Status: DISCONTINUED | OUTPATIENT
Start: 2021-01-01 | End: 2021-01-01 | Stop reason: HOSPADM

## 2021-01-01 RX ORDER — 0.9 % SODIUM CHLORIDE 0.9 %
500 INTRAVENOUS SOLUTION INTRAVENOUS ONCE
Status: COMPLETED | OUTPATIENT
Start: 2021-01-01 | End: 2021-01-01

## 2021-01-01 RX ORDER — SPIRONOLACTONE 25 MG/1
12.5 TABLET ORAL DAILY
Qty: 45 TABLET | Refills: 3 | Status: SHIPPED | OUTPATIENT
Start: 2021-01-01

## 2021-01-01 RX ORDER — EPTIFIBATIDE 0.75 MG/ML
1 INJECTION, SOLUTION INTRAVENOUS CONTINUOUS
Status: DISCONTINUED | OUTPATIENT
Start: 2021-01-01 | End: 2021-01-01

## 2021-01-01 RX ORDER — CEFDINIR 300 MG/1
300 CAPSULE ORAL EVERY 12 HOURS SCHEDULED
Status: COMPLETED | OUTPATIENT
Start: 2021-01-01 | End: 2021-01-01

## 2021-01-01 RX ORDER — ASPIRIN 81 MG/1
81 TABLET, CHEWABLE ORAL DAILY
Status: DISCONTINUED | OUTPATIENT
Start: 2021-01-01 | End: 2021-01-01 | Stop reason: HOSPADM

## 2021-01-01 RX ORDER — HEPARIN SODIUM 1000 [USP'U]/ML
60 INJECTION, SOLUTION INTRAVENOUS; SUBCUTANEOUS PRN
Status: DISCONTINUED | OUTPATIENT
Start: 2021-01-01 | End: 2021-01-01

## 2021-01-01 RX ORDER — SPIRONOLACTONE 25 MG/1
25 TABLET ORAL DAILY
Qty: 30 TABLET | Refills: 3 | Status: SHIPPED | OUTPATIENT
Start: 2021-01-01 | End: 2021-01-01

## 2021-01-01 RX ORDER — POTASSIUM CHLORIDE 29.8 MG/ML
20 INJECTION INTRAVENOUS PRN
Status: DISCONTINUED | OUTPATIENT
Start: 2021-01-01 | End: 2021-01-01

## 2021-01-01 RX ORDER — FAMOTIDINE 20 MG/1
20 TABLET, FILM COATED ORAL 2 TIMES DAILY
Status: DISCONTINUED | OUTPATIENT
Start: 2021-01-01 | End: 2021-01-01

## 2021-01-01 RX ORDER — METOPROLOL SUCCINATE 25 MG/1
12.5 TABLET, EXTENDED RELEASE ORAL DAILY
Status: DISCONTINUED | OUTPATIENT
Start: 2021-01-01 | End: 2021-01-01 | Stop reason: HOSPADM

## 2021-01-01 RX ORDER — MORPHINE SULFATE 2 MG/ML
2 INJECTION, SOLUTION INTRAMUSCULAR; INTRAVENOUS EVERY 4 HOURS PRN
Status: DISCONTINUED | OUTPATIENT
Start: 2021-01-01 | End: 2021-01-01 | Stop reason: HOSPADM

## 2021-01-01 RX ORDER — HEPARIN SODIUM 5000 [USP'U]/ML
5000 INJECTION, SOLUTION INTRAVENOUS; SUBCUTANEOUS EVERY 8 HOURS SCHEDULED
Status: DISCONTINUED | OUTPATIENT
Start: 2021-01-01 | End: 2021-01-01 | Stop reason: HOSPADM

## 2021-01-01 RX ORDER — LISINOPRIL 5 MG/1
2.5 TABLET ORAL DAILY
Status: DISCONTINUED | OUTPATIENT
Start: 2021-01-01 | End: 2021-01-01

## 2021-01-01 RX ORDER — ONDANSETRON 2 MG/ML
4 INJECTION INTRAMUSCULAR; INTRAVENOUS EVERY 6 HOURS PRN
Status: DISCONTINUED | OUTPATIENT
Start: 2021-01-01 | End: 2021-01-01 | Stop reason: SDUPTHER

## 2021-01-01 RX ORDER — ACETAMINOPHEN 650 MG/1
650 SUPPOSITORY RECTAL EVERY 6 HOURS PRN
Status: DISCONTINUED | OUTPATIENT
Start: 2021-01-01 | End: 2021-01-01 | Stop reason: HOSPADM

## 2021-01-01 RX ORDER — LORAZEPAM 0.5 MG/1
0.5 TABLET ORAL DAILY
Status: DISCONTINUED | OUTPATIENT
Start: 2021-01-01 | End: 2021-01-01 | Stop reason: HOSPADM

## 2021-01-01 RX ORDER — HEPARIN SODIUM 10000 [USP'U]/100ML
0-3000 INJECTION, SOLUTION INTRAVENOUS CONTINUOUS
Status: DISCONTINUED | OUTPATIENT
Start: 2021-01-01 | End: 2021-01-01 | Stop reason: SDUPTHER

## 2021-01-01 RX ORDER — POLYETHYLENE GLYCOL 3350 17 G/17G
17 POWDER, FOR SOLUTION ORAL DAILY PRN
Status: DISCONTINUED | OUTPATIENT
Start: 2021-01-01 | End: 2021-01-01 | Stop reason: HOSPADM

## 2021-01-01 RX ORDER — HYDRALAZINE HYDROCHLORIDE 20 MG/ML
10 INJECTION INTRAMUSCULAR; INTRAVENOUS
Status: DISCONTINUED | OUTPATIENT
Start: 2021-01-01 | End: 2021-01-01 | Stop reason: HOSPADM

## 2021-01-01 RX ORDER — MAGNESIUM SULFATE 1 G/100ML
1000 INJECTION INTRAVENOUS ONCE
Status: COMPLETED | OUTPATIENT
Start: 2021-01-01 | End: 2021-01-01

## 2021-01-01 RX ORDER — POTASSIUM CHLORIDE 29.8 MG/ML
20 INJECTION INTRAVENOUS
Status: COMPLETED | OUTPATIENT
Start: 2021-01-01 | End: 2021-01-01

## 2021-01-01 RX ORDER — SPIRONOLACTONE 25 MG/1
TABLET ORAL
Qty: 90 TABLET | Refills: 1 | OUTPATIENT
Start: 2021-01-01

## 2021-01-01 RX ORDER — POTASSIUM CHLORIDE 20 MEQ/1
40 TABLET, EXTENDED RELEASE ORAL ONCE
Qty: 2 TABLET | Refills: 0 | Status: SHIPPED | OUTPATIENT
Start: 2021-01-01 | End: 2022-01-01

## 2021-01-01 RX ORDER — ATORVASTATIN CALCIUM 40 MG/1
40 TABLET, FILM COATED ORAL DAILY
Qty: 90 TABLET | Refills: 3
Start: 2021-01-01 | End: 2021-01-01 | Stop reason: SDUPTHER

## 2021-01-01 RX ORDER — NORTRIPTYLINE HYDROCHLORIDE 25 MG/1
25 CAPSULE ORAL NIGHTLY
Status: DISCONTINUED | OUTPATIENT
Start: 2021-01-01 | End: 2021-01-01 | Stop reason: HOSPADM

## 2021-01-01 RX ORDER — ROSUVASTATIN CALCIUM 40 MG/1
TABLET, COATED ORAL
Qty: 90 TABLET | Refills: 0 | Status: SHIPPED | OUTPATIENT
Start: 2021-01-01 | End: 2021-01-01

## 2021-01-01 RX ORDER — ACETAMINOPHEN 325 MG/1
650 TABLET ORAL EVERY 4 HOURS PRN
Status: DISCONTINUED | OUTPATIENT
Start: 2021-01-01 | End: 2021-01-01 | Stop reason: SDUPTHER

## 2021-01-01 RX ORDER — SODIUM CHLORIDE 9 MG/ML
INJECTION, SOLUTION INTRAVENOUS PRN
Status: DISCONTINUED | OUTPATIENT
Start: 2021-01-01 | End: 2021-01-01 | Stop reason: HOSPADM

## 2021-01-01 RX ORDER — FLUTICASONE PROPIONATE 50 MCG
1 SPRAY, SUSPENSION (ML) NASAL NIGHTLY PRN
Status: DISCONTINUED | OUTPATIENT
Start: 2021-01-01 | End: 2021-01-01 | Stop reason: HOSPADM

## 2021-01-01 RX ORDER — ATROPINE SULFATE 0.1 MG/ML
INJECTION INTRAVENOUS
Status: DISPENSED
Start: 2021-01-01 | End: 2021-01-01

## 2021-01-01 RX ORDER — LISINOPRIL 2.5 MG/1
2.5 TABLET ORAL DAILY
Qty: 90 TABLET | Refills: 3 | Status: SHIPPED | OUTPATIENT
Start: 2021-01-01 | End: 2021-01-01

## 2021-01-01 RX ORDER — CIPROFLOXACIN 500 MG/1
250 TABLET, FILM COATED ORAL EVERY 12 HOURS SCHEDULED
Status: DISCONTINUED | OUTPATIENT
Start: 2021-01-01 | End: 2021-01-01

## 2021-01-01 RX ORDER — DIGOXIN 0.25 MG/ML
250 INJECTION INTRAMUSCULAR; INTRAVENOUS ONCE
Status: COMPLETED | OUTPATIENT
Start: 2021-01-01 | End: 2021-01-01

## 2021-01-01 RX ORDER — DIGOXIN 125 MCG
125 TABLET ORAL DAILY
Qty: 30 TABLET | Refills: 3 | Status: ON HOLD | OUTPATIENT
Start: 2021-01-01 | End: 2021-01-01 | Stop reason: HOSPADM

## 2021-01-01 RX ORDER — HEPARIN SODIUM 1000 [USP'U]/ML
60 INJECTION, SOLUTION INTRAVENOUS; SUBCUTANEOUS ONCE
Status: DISCONTINUED | OUTPATIENT
Start: 2021-01-01 | End: 2021-01-01

## 2021-01-01 RX ORDER — SPIRONOLACTONE 25 MG/1
25 TABLET ORAL DAILY
Status: DISCONTINUED | OUTPATIENT
Start: 2021-01-01 | End: 2021-01-01

## 2021-01-01 RX ADMIN — HEPARIN SODIUM 5000 UNITS: 5000 INJECTION INTRAVENOUS; SUBCUTANEOUS at 23:24

## 2021-01-01 RX ADMIN — FUROSEMIDE 60 MG: 10 INJECTION, SOLUTION INTRAMUSCULAR; INTRAVENOUS at 15:14

## 2021-01-01 RX ADMIN — Medication 50 MCG: at 08:32

## 2021-01-01 RX ADMIN — ROSUVASTATIN CALCIUM 40 MG: 40 TABLET, FILM COATED ORAL at 20:31

## 2021-01-01 RX ADMIN — INSULIN LISPRO 2 UNITS: 100 INJECTION, SOLUTION INTRAVENOUS; SUBCUTANEOUS at 12:10

## 2021-01-01 RX ADMIN — SODIUM CHLORIDE, POTASSIUM CHLORIDE, SODIUM LACTATE AND CALCIUM CHLORIDE 1000 ML: 600; 310; 30; 20 INJECTION, SOLUTION INTRAVENOUS at 02:13

## 2021-01-01 RX ADMIN — IRON SUCROSE 200 MG: 20 INJECTION, SOLUTION INTRAVENOUS at 18:20

## 2021-01-01 RX ADMIN — ROSUVASTATIN CALCIUM 40 MG: 40 TABLET, FILM COATED ORAL at 01:01

## 2021-01-01 RX ADMIN — TICAGRELOR 90 MG: 90 TABLET ORAL at 22:20

## 2021-01-01 RX ADMIN — FAMOTIDINE 20 MG: 20 TABLET ORAL at 09:23

## 2021-01-01 RX ADMIN — METOPROLOL SUCCINATE 12.5 MG: 25 TABLET, EXTENDED RELEASE ORAL at 10:41

## 2021-01-01 RX ADMIN — SODIUM CHLORIDE 25 ML: 9 INJECTION, SOLUTION INTRAVENOUS at 16:24

## 2021-01-01 RX ADMIN — TICAGRELOR 90 MG: 90 TABLET ORAL at 09:02

## 2021-01-01 RX ADMIN — ASPIRIN 81 MG: 81 TABLET, CHEWABLE ORAL at 09:11

## 2021-01-01 RX ADMIN — INSULIN LISPRO 1 UNITS: 100 INJECTION, SOLUTION INTRAVENOUS; SUBCUTANEOUS at 20:57

## 2021-01-01 RX ADMIN — PROMETHAZINE HYDROCHLORIDE 12.5 MG: 25 TABLET ORAL at 04:29

## 2021-01-01 RX ADMIN — ENOXAPARIN SODIUM 40 MG: 40 INJECTION SUBCUTANEOUS at 07:48

## 2021-01-01 RX ADMIN — Medication 1000 UNITS: at 10:20

## 2021-01-01 RX ADMIN — METOPROLOL SUCCINATE 12.5 MG: 25 TABLET, EXTENDED RELEASE ORAL at 07:48

## 2021-01-01 RX ADMIN — ROSUVASTATIN CALCIUM 40 MG: 40 TABLET, FILM COATED ORAL at 21:34

## 2021-01-01 RX ADMIN — MEROPENEM 500 MG: 500 INJECTION, POWDER, FOR SOLUTION INTRAVENOUS at 10:12

## 2021-01-01 RX ADMIN — INSULIN LISPRO 2 UNITS: 100 INJECTION, SOLUTION INTRAVENOUS; SUBCUTANEOUS at 12:05

## 2021-01-01 RX ADMIN — TICAGRELOR 90 MG: 90 TABLET ORAL at 08:16

## 2021-01-01 RX ADMIN — FAMOTIDINE 20 MG: 20 TABLET ORAL at 09:02

## 2021-01-01 RX ADMIN — TICAGRELOR 90 MG: 90 TABLET ORAL at 09:30

## 2021-01-01 RX ADMIN — INSULIN LISPRO 2 UNITS: 100 INJECTION, SOLUTION INTRAVENOUS; SUBCUTANEOUS at 12:39

## 2021-01-01 RX ADMIN — LEVOTHYROXINE SODIUM 100 MCG: 0.1 TABLET ORAL at 05:11

## 2021-01-01 RX ADMIN — LORAZEPAM 0.5 MG: 0.5 TABLET ORAL at 09:02

## 2021-01-01 RX ADMIN — LEVOTHYROXINE SODIUM 100 MCG: 0.1 TABLET ORAL at 15:15

## 2021-01-01 RX ADMIN — POTASSIUM BICARBONATE 40 MEQ: 782 TABLET, EFFERVESCENT ORAL at 13:09

## 2021-01-01 RX ADMIN — FUROSEMIDE 20 MG: 10 INJECTION, SOLUTION INTRAMUSCULAR; INTRAVENOUS at 13:29

## 2021-01-01 RX ADMIN — LEVOTHYROXINE SODIUM 100 MCG: 0.1 TABLET ORAL at 06:46

## 2021-01-01 RX ADMIN — CEFDINIR 300 MG: 300 CAPSULE ORAL at 20:50

## 2021-01-01 RX ADMIN — OXYCODONE 5 MG: 5 TABLET ORAL at 06:31

## 2021-01-01 RX ADMIN — NORTRIPTYLINE HYDROCHLORIDE 25 MG: 25 CAPSULE ORAL at 21:32

## 2021-01-01 RX ADMIN — Medication 50 MCG: at 09:30

## 2021-01-01 RX ADMIN — NORTRIPTYLINE HYDROCHLORIDE 25 MG: 25 CAPSULE ORAL at 22:01

## 2021-01-01 RX ADMIN — ASPIRIN 81 MG: 81 TABLET, CHEWABLE ORAL at 09:31

## 2021-01-01 RX ADMIN — FUROSEMIDE 40 MG: 10 INJECTION, SOLUTION INTRAMUSCULAR; INTRAVENOUS at 09:30

## 2021-01-01 RX ADMIN — CEFDINIR 300 MG: 300 CAPSULE ORAL at 22:02

## 2021-01-01 RX ADMIN — NITROGLYCERIN 25 MCG/MIN: 20 INJECTION INTRAVENOUS at 22:00

## 2021-01-01 RX ADMIN — DIGOXIN 125 MCG: 125 TABLET ORAL at 08:16

## 2021-01-01 RX ADMIN — POTASSIUM CHLORIDE 40 MEQ: 750 TABLET, FILM COATED, EXTENDED RELEASE ORAL at 08:56

## 2021-01-01 RX ADMIN — MEROPENEM 500 MG: 500 INJECTION, POWDER, FOR SOLUTION INTRAVENOUS at 18:00

## 2021-01-01 RX ADMIN — SODIUM CHLORIDE: 9 INJECTION, SOLUTION INTRAVENOUS at 02:19

## 2021-01-01 RX ADMIN — Medication 1000 UNITS: at 08:47

## 2021-01-01 RX ADMIN — ASPIRIN 81 MG: 81 TABLET, CHEWABLE ORAL at 09:36

## 2021-01-01 RX ADMIN — NORTRIPTYLINE HYDROCHLORIDE 25 MG: 25 CAPSULE ORAL at 20:31

## 2021-01-01 RX ADMIN — Medication 10 ML: at 07:48

## 2021-01-01 RX ADMIN — ROSUVASTATIN CALCIUM 40 MG: 40 TABLET, FILM COATED ORAL at 22:20

## 2021-01-01 RX ADMIN — Medication 1000 UNITS: at 09:36

## 2021-01-01 RX ADMIN — POTASSIUM CHLORIDE 20 MEQ: 29.8 INJECTION, SOLUTION INTRAVENOUS at 10:37

## 2021-01-01 RX ADMIN — LEVOTHYROXINE SODIUM 100 MCG: 0.1 TABLET ORAL at 05:13

## 2021-01-01 RX ADMIN — ACETAMINOPHEN 650 MG: 325 TABLET ORAL at 20:47

## 2021-01-01 RX ADMIN — INSULIN LISPRO 2 UNITS: 100 INJECTION, SOLUTION INTRAVENOUS; SUBCUTANEOUS at 17:15

## 2021-01-01 RX ADMIN — INSULIN LISPRO 2 UNITS: 100 INJECTION, SOLUTION INTRAVENOUS; SUBCUTANEOUS at 13:18

## 2021-01-01 RX ADMIN — SPIRONOLACTONE 25 MG: 25 TABLET ORAL at 07:49

## 2021-01-01 RX ADMIN — DIGOXIN 125 MCG: 125 TABLET ORAL at 07:48

## 2021-01-01 RX ADMIN — ONDANSETRON 4 MG: 2 INJECTION INTRAMUSCULAR; INTRAVENOUS at 16:29

## 2021-01-01 RX ADMIN — ROSUVASTATIN CALCIUM 40 MG: 40 TABLET, FILM COATED ORAL at 22:12

## 2021-01-01 RX ADMIN — EPTIFIBATIDE 1 MCG/KG/MIN: 0.75 INJECTION INTRAVENOUS at 13:37

## 2021-01-01 RX ADMIN — INSULIN GLARGINE 8 UNITS: 100 INJECTION, SOLUTION SUBCUTANEOUS at 09:33

## 2021-01-01 RX ADMIN — ENOXAPARIN SODIUM 40 MG: 40 INJECTION SUBCUTANEOUS at 08:54

## 2021-01-01 RX ADMIN — Medication 10 ML: at 21:33

## 2021-01-01 RX ADMIN — METOPROLOL SUCCINATE 12.5 MG: 25 TABLET, EXTENDED RELEASE ORAL at 08:56

## 2021-01-01 RX ADMIN — HEPARIN SODIUM: 10000 INJECTION INTRAVENOUS; SUBCUTANEOUS at 12:47

## 2021-01-01 RX ADMIN — Medication 10 ML: at 08:32

## 2021-01-01 RX ADMIN — Medication 10 ML: at 21:42

## 2021-01-01 RX ADMIN — Medication 10 ML: at 09:31

## 2021-01-01 RX ADMIN — Medication 1000 UNITS: at 15:15

## 2021-01-01 RX ADMIN — TICAGRELOR 90 MG: 90 TABLET ORAL at 21:40

## 2021-01-01 RX ADMIN — LEVOTHYROXINE SODIUM 100 MCG: 0.1 TABLET ORAL at 08:34

## 2021-01-01 RX ADMIN — ASPIRIN 81 MG: 81 TABLET, CHEWABLE ORAL at 07:49

## 2021-01-01 RX ADMIN — MORPHINE SULFATE 2 MG: 2 INJECTION, SOLUTION INTRAMUSCULAR; INTRAVENOUS at 13:04

## 2021-01-01 RX ADMIN — FAMOTIDINE 20 MG: 20 TABLET ORAL at 09:30

## 2021-01-01 RX ADMIN — MEROPENEM 500 MG: 500 INJECTION, POWDER, FOR SOLUTION INTRAVENOUS at 20:52

## 2021-01-01 RX ADMIN — TICAGRELOR 180 MG: 90 TABLET ORAL at 15:13

## 2021-01-01 RX ADMIN — ENOXAPARIN SODIUM 40 MG: 40 INJECTION SUBCUTANEOUS at 08:31

## 2021-01-01 RX ADMIN — Medication 10 ML: at 09:24

## 2021-01-01 RX ADMIN — LEVOTHYROXINE SODIUM 100 MCG: 0.1 TABLET ORAL at 06:43

## 2021-01-01 RX ADMIN — SPIRONOLACTONE 25 MG: 25 TABLET ORAL at 11:08

## 2021-01-01 RX ADMIN — ALBUMIN (HUMAN) 12.5 G: 12.5 INJECTION, SOLUTION INTRAVENOUS at 08:23

## 2021-01-01 RX ADMIN — FAMOTIDINE 20 MG: 20 TABLET, FILM COATED ORAL at 21:34

## 2021-01-01 RX ADMIN — ROSUVASTATIN CALCIUM 40 MG: 40 TABLET, FILM COATED ORAL at 20:45

## 2021-01-01 RX ADMIN — LEVOTHYROXINE SODIUM 100 MCG: 0.1 TABLET ORAL at 06:35

## 2021-01-01 RX ADMIN — Medication 50 MCG: at 15:16

## 2021-01-01 RX ADMIN — DOCUSATE SODIUM 100 MG: 100 CAPSULE ORAL at 07:49

## 2021-01-01 RX ADMIN — SODIUM CHLORIDE 1000 ML: 9 INJECTION, SOLUTION INTRAVENOUS at 00:00

## 2021-01-01 RX ADMIN — INSULIN LISPRO 2 UNITS: 100 INJECTION, SOLUTION INTRAVENOUS; SUBCUTANEOUS at 09:01

## 2021-01-01 RX ADMIN — TICAGRELOR 90 MG: 90 TABLET ORAL at 10:41

## 2021-01-01 RX ADMIN — ASPIRIN 81 MG: 81 TABLET, CHEWABLE ORAL at 08:55

## 2021-01-01 RX ADMIN — Medication 10 ML: at 12:15

## 2021-01-01 RX ADMIN — OXYCODONE 5 MG: 5 TABLET ORAL at 21:41

## 2021-01-01 RX ADMIN — Medication 10 ML: at 20:53

## 2021-01-01 RX ADMIN — INSULIN LISPRO 1 UNITS: 100 INJECTION, SOLUTION INTRAVENOUS; SUBCUTANEOUS at 17:46

## 2021-01-01 RX ADMIN — ASPIRIN 81 MG: 81 TABLET, CHEWABLE ORAL at 09:02

## 2021-01-01 RX ADMIN — FUROSEMIDE 40 MG: 10 INJECTION, SOLUTION INTRAMUSCULAR; INTRAVENOUS at 15:16

## 2021-01-01 RX ADMIN — ENOXAPARIN SODIUM 40 MG: 40 INJECTION SUBCUTANEOUS at 08:16

## 2021-01-01 RX ADMIN — FUROSEMIDE 60 MG: 10 INJECTION, SOLUTION INTRAMUSCULAR; INTRAVENOUS at 12:18

## 2021-01-01 RX ADMIN — INSULIN LISPRO 2 UNITS: 100 INJECTION, SOLUTION INTRAVENOUS; SUBCUTANEOUS at 20:59

## 2021-01-01 RX ADMIN — CEFDINIR 300 MG: 300 CAPSULE ORAL at 20:44

## 2021-01-01 RX ADMIN — Medication 1000 UNITS: at 09:23

## 2021-01-01 RX ADMIN — LORAZEPAM 0.5 MG: 0.5 TABLET ORAL at 08:56

## 2021-01-01 RX ADMIN — INSULIN LISPRO 2 UNITS: 100 INJECTION, SOLUTION INTRAVENOUS; SUBCUTANEOUS at 10:23

## 2021-01-01 RX ADMIN — CEFDINIR 300 MG: 300 CAPSULE ORAL at 09:31

## 2021-01-01 RX ADMIN — MORPHINE SULFATE 2 MG: 2 INJECTION, SOLUTION INTRAMUSCULAR; INTRAVENOUS at 04:31

## 2021-01-01 RX ADMIN — PROMETHAZINE HYDROCHLORIDE 12.5 MG: 25 TABLET ORAL at 23:50

## 2021-01-01 RX ADMIN — Medication 50 MCG: at 08:55

## 2021-01-01 RX ADMIN — HEPARIN SODIUM 300 UNITS/HR: 10000 INJECTION, SOLUTION INTRAVENOUS at 00:52

## 2021-01-01 RX ADMIN — HEPARIN SODIUM: 10000 INJECTION INTRAVENOUS; SUBCUTANEOUS at 18:20

## 2021-01-01 RX ADMIN — ROSUVASTATIN CALCIUM 40 MG: 40 TABLET, FILM COATED ORAL at 21:41

## 2021-01-01 RX ADMIN — FAMOTIDINE 20 MG: 20 TABLET ORAL at 09:31

## 2021-01-01 RX ADMIN — INSULIN LISPRO 2 UNITS: 100 INJECTION, SOLUTION INTRAVENOUS; SUBCUTANEOUS at 08:17

## 2021-01-01 RX ADMIN — METOPROLOL SUCCINATE 12.5 MG: 25 TABLET, EXTENDED RELEASE ORAL at 10:29

## 2021-01-01 RX ADMIN — INSULIN LISPRO 4 UNITS: 100 INJECTION, SOLUTION INTRAVENOUS; SUBCUTANEOUS at 08:53

## 2021-01-01 RX ADMIN — LORAZEPAM 0.5 MG: 0.5 TABLET ORAL at 08:47

## 2021-01-01 RX ADMIN — INSULIN LISPRO 2 UNITS: 100 INJECTION, SOLUTION INTRAVENOUS; SUBCUTANEOUS at 22:13

## 2021-01-01 RX ADMIN — INSULIN LISPRO 2 UNITS: 100 INJECTION, SOLUTION INTRAVENOUS; SUBCUTANEOUS at 11:36

## 2021-01-01 RX ADMIN — NORTRIPTYLINE HYDROCHLORIDE 25 MG: 25 CAPSULE ORAL at 21:40

## 2021-01-01 RX ADMIN — FAMOTIDINE 20 MG: 20 TABLET ORAL at 09:36

## 2021-01-01 RX ADMIN — CEFDINIR 300 MG: 300 CAPSULE ORAL at 09:30

## 2021-01-01 RX ADMIN — INSULIN GLARGINE 8 UNITS: 100 INJECTION, SOLUTION SUBCUTANEOUS at 07:49

## 2021-01-01 RX ADMIN — Medication 1000 UNITS: at 07:49

## 2021-01-01 RX ADMIN — Medication 50 MCG: at 08:14

## 2021-01-01 RX ADMIN — SODIUM CHLORIDE: 9 INJECTION, SOLUTION INTRAVENOUS at 09:35

## 2021-01-01 RX ADMIN — INSULIN GLARGINE 8 UNITS: 100 INJECTION, SOLUTION SUBCUTANEOUS at 08:53

## 2021-01-01 RX ADMIN — NORTRIPTYLINE HYDROCHLORIDE 25 MG: 25 CAPSULE ORAL at 20:52

## 2021-01-01 RX ADMIN — ONDANSETRON 4 MG: 2 INJECTION INTRAMUSCULAR; INTRAVENOUS at 02:17

## 2021-01-01 RX ADMIN — LORAZEPAM 0.5 MG: 0.5 TABLET ORAL at 07:49

## 2021-01-01 RX ADMIN — Medication 1000 UNITS: at 08:55

## 2021-01-01 RX ADMIN — CEFDINIR 300 MG: 300 CAPSULE ORAL at 09:56

## 2021-01-01 RX ADMIN — OXYCODONE 5 MG: 5 TABLET ORAL at 20:47

## 2021-01-01 RX ADMIN — ASPIRIN 81 MG: 81 TABLET, CHEWABLE ORAL at 08:16

## 2021-01-01 RX ADMIN — DOCUSATE SODIUM 100 MG: 100 CAPSULE ORAL at 09:30

## 2021-01-01 RX ADMIN — ASPIRIN 81 MG: 81 TABLET, CHEWABLE ORAL at 10:20

## 2021-01-01 RX ADMIN — TICAGRELOR 90 MG: 90 TABLET ORAL at 20:44

## 2021-01-01 RX ADMIN — INSULIN GLARGINE 8 UNITS: 100 INJECTION, SOLUTION SUBCUTANEOUS at 08:54

## 2021-01-01 RX ADMIN — INSULIN GLARGINE 8 UNITS: 100 INJECTION, SOLUTION SUBCUTANEOUS at 09:02

## 2021-01-01 RX ADMIN — TICAGRELOR 90 MG: 90 TABLET ORAL at 12:07

## 2021-01-01 RX ADMIN — DOCUSATE SODIUM 100 MG: 100 CAPSULE ORAL at 09:31

## 2021-01-01 RX ADMIN — Medication 50 MCG: at 09:23

## 2021-01-01 RX ADMIN — INSULIN LISPRO 4 UNITS: 100 INJECTION, SOLUTION INTRAVENOUS; SUBCUTANEOUS at 13:05

## 2021-01-01 RX ADMIN — INSULIN LISPRO 2 UNITS: 100 INJECTION, SOLUTION INTRAVENOUS; SUBCUTANEOUS at 09:24

## 2021-01-01 RX ADMIN — INSULIN GLARGINE 8 UNITS: 100 INJECTION, SOLUTION SUBCUTANEOUS at 13:49

## 2021-01-01 RX ADMIN — INSULIN GLARGINE 8 UNITS: 100 INJECTION, SOLUTION SUBCUTANEOUS at 08:31

## 2021-01-01 RX ADMIN — SODIUM CHLORIDE: 9 INJECTION, SOLUTION INTRAVENOUS at 14:40

## 2021-01-01 RX ADMIN — ACETAMINOPHEN 650 MG: 325 TABLET ORAL at 08:06

## 2021-01-01 RX ADMIN — ROSUVASTATIN CALCIUM 40 MG: 40 TABLET, FILM COATED ORAL at 21:32

## 2021-01-01 RX ADMIN — CEFDINIR 300 MG: 300 CAPSULE ORAL at 21:30

## 2021-01-01 RX ADMIN — MEROPENEM 500 MG: 500 INJECTION, POWDER, FOR SOLUTION INTRAVENOUS at 00:20

## 2021-01-01 RX ADMIN — LORAZEPAM 0.5 MG: 0.5 TABLET ORAL at 09:31

## 2021-01-01 RX ADMIN — SODIUM CHLORIDE 25 ML: 9 INJECTION, SOLUTION INTRAVENOUS at 10:10

## 2021-01-01 RX ADMIN — Medication 10 ML: at 21:32

## 2021-01-01 RX ADMIN — HEPARIN SODIUM 5000 UNITS: 5000 INJECTION INTRAVENOUS; SUBCUTANEOUS at 13:52

## 2021-01-01 RX ADMIN — NORTRIPTYLINE HYDROCHLORIDE 25 MG: 25 CAPSULE ORAL at 20:36

## 2021-01-01 RX ADMIN — HEPARIN SODIUM 5000 UNITS: 5000 INJECTION INTRAVENOUS; SUBCUTANEOUS at 14:44

## 2021-01-01 RX ADMIN — ASPIRIN 81 MG: 81 TABLET, CHEWABLE ORAL at 08:48

## 2021-01-01 RX ADMIN — Medication 10 ML: at 09:03

## 2021-01-01 RX ADMIN — POTASSIUM BICARBONATE 40 MEQ: 782 TABLET, EFFERVESCENT ORAL at 09:32

## 2021-01-01 RX ADMIN — ASPIRIN 81 MG: 81 TABLET, CHEWABLE ORAL at 08:32

## 2021-01-01 RX ADMIN — INSULIN LISPRO 1 UNITS: 100 INJECTION, SOLUTION INTRAVENOUS; SUBCUTANEOUS at 20:43

## 2021-01-01 RX ADMIN — INSULIN LISPRO 2 UNITS: 100 INJECTION, SOLUTION INTRAVENOUS; SUBCUTANEOUS at 17:52

## 2021-01-01 RX ADMIN — TICAGRELOR 90 MG: 90 TABLET ORAL at 22:02

## 2021-01-01 RX ADMIN — ALBUMIN (HUMAN) 12.5 G: 12.5 INJECTION, SOLUTION INTRAVENOUS at 09:11

## 2021-01-01 RX ADMIN — DOCUSATE SODIUM 100 MG: 100 CAPSULE ORAL at 10:59

## 2021-01-01 RX ADMIN — HEPARIN SODIUM: 10000 INJECTION INTRAVENOUS; SUBCUTANEOUS at 23:30

## 2021-01-01 RX ADMIN — SPIRONOLACTONE 50 MG: 25 TABLET ORAL at 08:14

## 2021-01-01 RX ADMIN — Medication 10 ML: at 22:13

## 2021-01-01 RX ADMIN — SODIUM CHLORIDE 500 ML: 9 INJECTION, SOLUTION INTRAVENOUS at 16:05

## 2021-01-01 RX ADMIN — MILRINONE LACTATE IN DEXTROSE 0.2 MCG/KG/MIN: 200 INJECTION, SOLUTION INTRAVENOUS at 09:40

## 2021-01-01 RX ADMIN — INSULIN LISPRO 1 UNITS: 100 INJECTION, SOLUTION INTRAVENOUS; SUBCUTANEOUS at 21:41

## 2021-01-01 RX ADMIN — SODIUM CHLORIDE: 9 INJECTION, SOLUTION INTRAVENOUS at 22:13

## 2021-01-01 RX ADMIN — OXYCODONE 5 MG: 5 TABLET ORAL at 23:17

## 2021-01-01 RX ADMIN — DOCUSATE SODIUM 100 MG: 100 CAPSULE ORAL at 15:15

## 2021-01-01 RX ADMIN — NORTRIPTYLINE HYDROCHLORIDE 25 MG: 25 CAPSULE ORAL at 20:45

## 2021-01-01 RX ADMIN — Medication 10 ML: at 21:46

## 2021-01-01 RX ADMIN — MEROPENEM 500 MG: 500 INJECTION, POWDER, FOR SOLUTION INTRAVENOUS at 12:09

## 2021-01-01 RX ADMIN — MAGNESIUM SULFATE HEPTAHYDRATE 2000 MG: 40 INJECTION, SOLUTION INTRAVENOUS at 16:25

## 2021-01-01 RX ADMIN — NITROGLYCERIN 75 MCG/MIN: 20 INJECTION INTRAVENOUS at 15:16

## 2021-01-01 RX ADMIN — Medication 1000 UNITS: at 09:31

## 2021-01-01 RX ADMIN — LEVOTHYROXINE SODIUM 100 MCG: 0.1 TABLET ORAL at 09:36

## 2021-01-01 RX ADMIN — HEPARIN SODIUM 5000 UNITS: 5000 INJECTION INTRAVENOUS; SUBCUTANEOUS at 22:22

## 2021-01-01 RX ADMIN — INSULIN GLARGINE 8 UNITS: 100 INJECTION, SOLUTION SUBCUTANEOUS at 09:24

## 2021-01-01 RX ADMIN — DOCUSATE SODIUM 100 MG: 100 CAPSULE ORAL at 08:55

## 2021-01-01 RX ADMIN — FAMOTIDINE 20 MG: 20 TABLET ORAL at 08:31

## 2021-01-01 RX ADMIN — FAMOTIDINE 20 MG: 20 TABLET ORAL at 10:20

## 2021-01-01 RX ADMIN — FAMOTIDINE 20 MG: 20 TABLET, FILM COATED ORAL at 22:21

## 2021-01-01 RX ADMIN — FAMOTIDINE 20 MG: 20 TABLET, FILM COATED ORAL at 23:18

## 2021-01-01 RX ADMIN — INSULIN LISPRO 1 UNITS: 100 INJECTION, SOLUTION INTRAVENOUS; SUBCUTANEOUS at 22:15

## 2021-01-01 RX ADMIN — FAMOTIDINE 20 MG: 20 TABLET ORAL at 08:16

## 2021-01-01 RX ADMIN — ROSUVASTATIN CALCIUM 40 MG: 40 TABLET, FILM COATED ORAL at 21:30

## 2021-01-01 RX ADMIN — SODIUM CHLORIDE, SODIUM LACTATE, POTASSIUM CHLORIDE, CALCIUM CHLORIDE AND DEXTROSE MONOHYDRATE: 5; 600; 310; 30; 20 INJECTION, SOLUTION INTRAVENOUS at 04:55

## 2021-01-01 RX ADMIN — DIGOXIN 125 MCG: 125 TABLET ORAL at 08:55

## 2021-01-01 RX ADMIN — INSULIN LISPRO 4 UNITS: 100 INJECTION, SOLUTION INTRAVENOUS; SUBCUTANEOUS at 16:26

## 2021-01-01 RX ADMIN — TICAGRELOR 90 MG: 90 TABLET ORAL at 21:32

## 2021-01-01 RX ADMIN — Medication 1000 UNITS: at 08:16

## 2021-01-01 RX ADMIN — ALTEPLASE 1 MG: 2.2 INJECTION, POWDER, LYOPHILIZED, FOR SOLUTION INTRAVENOUS at 06:18

## 2021-01-01 RX ADMIN — LORAZEPAM 0.5 MG: 0.5 TABLET ORAL at 09:23

## 2021-01-01 RX ADMIN — INSULIN LISPRO 4 UNITS: 100 INJECTION, SOLUTION INTRAVENOUS; SUBCUTANEOUS at 13:50

## 2021-01-01 RX ADMIN — LEVOTHYROXINE SODIUM 100 MCG: 0.1 TABLET ORAL at 10:21

## 2021-01-01 RX ADMIN — MAGNESIUM SULFATE HEPTAHYDRATE 1000 MG: 1 INJECTION, SOLUTION INTRAVENOUS at 21:40

## 2021-01-01 RX ADMIN — ROSUVASTATIN CALCIUM 40 MG: 40 TABLET, FILM COATED ORAL at 23:18

## 2021-01-01 RX ADMIN — HEPARIN SODIUM 5000 UNITS: 5000 INJECTION INTRAVENOUS; SUBCUTANEOUS at 06:46

## 2021-01-01 RX ADMIN — TICAGRELOR 90 MG: 90 TABLET ORAL at 08:57

## 2021-01-01 RX ADMIN — INSULIN GLARGINE 8 UNITS: 100 INJECTION, SOLUTION SUBCUTANEOUS at 08:17

## 2021-01-01 RX ADMIN — INSULIN LISPRO 1 UNITS: 100 INJECTION, SOLUTION INTRAVENOUS; SUBCUTANEOUS at 21:40

## 2021-01-01 RX ADMIN — FAMOTIDINE 20 MG: 20 TABLET ORAL at 08:54

## 2021-01-01 RX ADMIN — INSULIN LISPRO 2 UNITS: 100 INJECTION, SOLUTION INTRAVENOUS; SUBCUTANEOUS at 13:21

## 2021-01-01 RX ADMIN — TICAGRELOR 90 MG: 90 TABLET ORAL at 23:18

## 2021-01-01 RX ADMIN — SODIUM CHLORIDE 25 ML: 9 INJECTION, SOLUTION INTRAVENOUS at 10:35

## 2021-01-01 RX ADMIN — INSULIN LISPRO 1 UNITS: 100 INJECTION, SOLUTION INTRAVENOUS; SUBCUTANEOUS at 20:53

## 2021-01-01 RX ADMIN — MEROPENEM 500 MG: 500 INJECTION, POWDER, FOR SOLUTION INTRAVENOUS at 13:19

## 2021-01-01 RX ADMIN — INSULIN LISPRO 1 UNITS: 100 INJECTION, SOLUTION INTRAVENOUS; SUBCUTANEOUS at 20:36

## 2021-01-01 RX ADMIN — NORTRIPTYLINE HYDROCHLORIDE 25 MG: 25 CAPSULE ORAL at 22:16

## 2021-01-01 RX ADMIN — LEVOTHYROXINE SODIUM 100 MCG: 0.1 TABLET ORAL at 06:17

## 2021-01-01 RX ADMIN — INSULIN LISPRO 2 UNITS: 100 INJECTION, SOLUTION INTRAVENOUS; SUBCUTANEOUS at 13:04

## 2021-01-01 RX ADMIN — ROSUVASTATIN CALCIUM 40 MG: 40 TABLET, FILM COATED ORAL at 21:40

## 2021-01-01 RX ADMIN — LORAZEPAM 0.5 MG: 0.5 TABLET ORAL at 10:21

## 2021-01-01 RX ADMIN — FUROSEMIDE 20 MG: 10 INJECTION, SOLUTION INTRAMUSCULAR; INTRAVENOUS at 13:04

## 2021-01-01 RX ADMIN — TICAGRELOR 90 MG: 90 TABLET ORAL at 21:30

## 2021-01-01 RX ADMIN — INSULIN LISPRO 2 UNITS: 100 INJECTION, SOLUTION INTRAVENOUS; SUBCUTANEOUS at 17:21

## 2021-01-01 RX ADMIN — FAMOTIDINE 20 MG: 20 TABLET ORAL at 08:50

## 2021-01-01 RX ADMIN — NORTRIPTYLINE HYDROCHLORIDE 25 MG: 25 CAPSULE ORAL at 01:03

## 2021-01-01 RX ADMIN — ENOXAPARIN SODIUM 40 MG: 40 INJECTION SUBCUTANEOUS at 09:32

## 2021-01-01 RX ADMIN — TICAGRELOR 90 MG: 90 TABLET ORAL at 20:32

## 2021-01-01 RX ADMIN — LORAZEPAM 0.5 MG: 0.5 TABLET ORAL at 15:15

## 2021-01-01 RX ADMIN — IOPAMIDOL 105 ML: 755 INJECTION, SOLUTION INTRAVENOUS at 20:59

## 2021-01-01 RX ADMIN — SPIRONOLACTONE 25 MG: 25 TABLET ORAL at 14:48

## 2021-01-01 RX ADMIN — DESVENLAFAXINE SUCCINATE 25 MG: 25 TABLET, EXTENDED RELEASE ORAL at 09:02

## 2021-01-01 RX ADMIN — INSULIN LISPRO 2 UNITS: 100 INJECTION, SOLUTION INTRAVENOUS; SUBCUTANEOUS at 16:57

## 2021-01-01 RX ADMIN — ROSUVASTATIN CALCIUM 40 MG: 40 TABLET, FILM COATED ORAL at 20:36

## 2021-01-01 RX ADMIN — DIGOXIN 250 MCG: 0.25 INJECTION INTRAMUSCULAR; INTRAVENOUS at 12:32

## 2021-01-01 RX ADMIN — POTASSIUM CHLORIDE 20 MEQ: 29.8 INJECTION, SOLUTION INTRAVENOUS at 11:56

## 2021-01-01 RX ADMIN — Medication 50 MCG: at 07:48

## 2021-01-01 RX ADMIN — MAGNESIUM SULFATE HEPTAHYDRATE 2000 MG: 40 INJECTION, SOLUTION INTRAVENOUS at 09:38

## 2021-01-01 RX ADMIN — Medication 10 ML: at 09:02

## 2021-01-01 RX ADMIN — LEVOTHYROXINE SODIUM 100 MCG: 0.1 TABLET ORAL at 07:11

## 2021-01-01 RX ADMIN — CEFDINIR 300 MG: 300 CAPSULE ORAL at 15:15

## 2021-01-01 RX ADMIN — HEPARIN SODIUM: 10000 INJECTION INTRAVENOUS; SUBCUTANEOUS at 15:08

## 2021-01-01 RX ADMIN — ASPIRIN 81 MG: 81 TABLET, CHEWABLE ORAL at 09:23

## 2021-01-01 RX ADMIN — DESVENLAFAXINE SUCCINATE 25 MG: 25 TABLET, EXTENDED RELEASE ORAL at 10:28

## 2021-01-01 RX ADMIN — HEPARIN SODIUM 5000 UNITS: 5000 INJECTION INTRAVENOUS; SUBCUTANEOUS at 07:11

## 2021-01-01 RX ADMIN — INSULIN LISPRO 2 UNITS: 100 INJECTION, SOLUTION INTRAVENOUS; SUBCUTANEOUS at 16:43

## 2021-01-01 RX ADMIN — HEPARIN SODIUM 5000 UNITS: 5000 INJECTION INTRAVENOUS; SUBCUTANEOUS at 21:34

## 2021-01-01 RX ADMIN — Medication 1000 UNITS: at 09:30

## 2021-01-01 RX ADMIN — MEROPENEM 500 MG: 500 INJECTION, POWDER, FOR SOLUTION INTRAVENOUS at 03:08

## 2021-01-01 RX ADMIN — HEPARIN SODIUM 450 UNITS/HR: 10000 INJECTION, SOLUTION INTRAVENOUS at 11:42

## 2021-01-01 RX ADMIN — SODIUM CHLORIDE: 9 INJECTION, SOLUTION INTRAVENOUS at 11:50

## 2021-01-01 RX ADMIN — Medication 10 ML: at 10:22

## 2021-01-01 RX ADMIN — ACETAMINOPHEN 650 MG: 325 TABLET ORAL at 21:32

## 2021-01-01 RX ADMIN — TICAGRELOR 90 MG: 90 TABLET ORAL at 08:31

## 2021-01-01 RX ADMIN — PROMETHAZINE HYDROCHLORIDE 12.5 MG: 25 TABLET ORAL at 22:03

## 2021-01-01 RX ADMIN — LISINOPRIL 2.5 MG: 5 TABLET ORAL at 11:58

## 2021-01-01 RX ADMIN — INSULIN LISPRO 1 UNITS: 100 INJECTION, SOLUTION INTRAVENOUS; SUBCUTANEOUS at 12:09

## 2021-01-01 RX ADMIN — DOCUSATE SODIUM 100 MG: 100 CAPSULE ORAL at 09:02

## 2021-01-01 RX ADMIN — POTASSIUM CHLORIDE 40 MEQ: 750 TABLET, FILM COATED, EXTENDED RELEASE ORAL at 07:48

## 2021-01-01 RX ADMIN — ONDANSETRON 4 MG: 2 INJECTION INTRAMUSCULAR; INTRAVENOUS at 22:35

## 2021-01-01 RX ADMIN — INSULIN LISPRO 4 UNITS: 100 INJECTION, SOLUTION INTRAVENOUS; SUBCUTANEOUS at 12:32

## 2021-01-01 RX ADMIN — LEVOTHYROXINE SODIUM 100 MCG: 0.1 TABLET ORAL at 06:18

## 2021-01-01 RX ADMIN — FAMOTIDINE 20 MG: 20 TABLET ORAL at 15:14

## 2021-01-01 RX ADMIN — LORAZEPAM 0.5 MG: 0.5 TABLET ORAL at 09:30

## 2021-01-01 RX ADMIN — ONDANSETRON 4 MG: 2 INJECTION INTRAMUSCULAR; INTRAVENOUS at 12:09

## 2021-01-01 RX ADMIN — POLYETHYLENE GLYCOL 3350 17 G: 17 POWDER, FOR SOLUTION ORAL at 09:56

## 2021-01-01 RX ADMIN — ONDANSETRON 4 MG: 2 INJECTION INTRAMUSCULAR; INTRAVENOUS at 08:45

## 2021-01-01 RX ADMIN — ASPIRIN 81 MG: 81 TABLET, CHEWABLE ORAL at 09:30

## 2021-01-01 RX ADMIN — MEROPENEM 500 MG: 500 INJECTION, POWDER, FOR SOLUTION INTRAVENOUS at 01:55

## 2021-01-01 RX ADMIN — DOCUSATE SODIUM 100 MG: 100 CAPSULE ORAL at 08:14

## 2021-01-01 RX ADMIN — SODIUM CHLORIDE, SODIUM LACTATE, POTASSIUM CHLORIDE, CALCIUM CHLORIDE AND DEXTROSE MONOHYDRATE: 5; 600; 310; 30; 20 INJECTION, SOLUTION INTRAVENOUS at 02:41

## 2021-01-01 RX ADMIN — CEFDINIR 300 MG: 300 CAPSULE ORAL at 08:57

## 2021-01-01 RX ADMIN — SODIUM CHLORIDE, SODIUM LACTATE, POTASSIUM CHLORIDE, CALCIUM CHLORIDE AND DEXTROSE MONOHYDRATE: 5; 600; 310; 30; 20 INJECTION, SOLUTION INTRAVENOUS at 02:08

## 2021-01-01 RX ADMIN — Medication 10 ML: at 20:33

## 2021-01-01 RX ADMIN — INSULIN LISPRO 2 UNITS: 100 INJECTION, SOLUTION INTRAVENOUS; SUBCUTANEOUS at 13:23

## 2021-01-01 RX ADMIN — INSULIN LISPRO 2 UNITS: 100 INJECTION, SOLUTION INTRAVENOUS; SUBCUTANEOUS at 09:31

## 2021-01-01 RX ADMIN — ONDANSETRON 4 MG: 2 INJECTION INTRAMUSCULAR; INTRAVENOUS at 13:54

## 2021-01-01 RX ADMIN — INSULIN GLARGINE 8 UNITS: 100 INJECTION, SOLUTION SUBCUTANEOUS at 09:45

## 2021-01-01 RX ADMIN — Medication 10 ML: at 09:01

## 2021-01-01 RX ADMIN — LORAZEPAM 0.5 MG: 0.5 TABLET ORAL at 08:06

## 2021-01-01 RX ADMIN — MAGNESIUM SULFATE 2000 MG: 2 INJECTION INTRAVENOUS at 13:27

## 2021-01-01 RX ADMIN — LORAZEPAM 0.5 MG: 0.5 TABLET ORAL at 08:31

## 2021-01-01 RX ADMIN — DEXTROSE MONOHYDRATE 50 ML: 25 INJECTION, SOLUTION INTRAVENOUS at 23:31

## 2021-01-01 RX ADMIN — TICAGRELOR 90 MG: 90 TABLET ORAL at 07:49

## 2021-01-01 RX ADMIN — LEVOTHYROXINE SODIUM 100 MCG: 0.1 TABLET ORAL at 06:31

## 2021-01-01 RX ADMIN — EPTIFIBATIDE 1 MCG/KG/MIN: 75 INJECTION INTRAVENOUS at 11:09

## 2021-01-01 RX ADMIN — PERFLUTREN 1.65 MG: 6.52 INJECTION, SUSPENSION INTRAVENOUS at 09:50

## 2021-01-01 RX ADMIN — LEVOTHYROXINE SODIUM 100 MCG: 0.1 TABLET ORAL at 06:21

## 2021-01-01 RX ADMIN — INSULIN GLARGINE 8 UNITS: 100 INJECTION, SOLUTION SUBCUTANEOUS at 09:32

## 2021-01-01 RX ADMIN — TICAGRELOR 90 MG: 90 TABLET ORAL at 10:29

## 2021-01-01 RX ADMIN — Medication 10 ML: at 20:48

## 2021-01-01 RX ADMIN — HEPARIN SODIUM: 10000 INJECTION INTRAVENOUS; SUBCUTANEOUS at 23:59

## 2021-01-01 RX ADMIN — INSULIN LISPRO 2 UNITS: 100 INJECTION, SOLUTION INTRAVENOUS; SUBCUTANEOUS at 10:21

## 2021-01-01 RX ADMIN — ROSUVASTATIN CALCIUM 40 MG: 40 TABLET, FILM COATED ORAL at 20:52

## 2021-01-01 RX ADMIN — NITROGLYCERIN 10 MCG/MIN: 20 INJECTION INTRAVENOUS at 11:44

## 2021-01-01 RX ADMIN — SODIUM CHLORIDE: 9 INJECTION, SOLUTION INTRAVENOUS at 19:31

## 2021-01-01 RX ADMIN — TICAGRELOR 90 MG: 90 TABLET ORAL at 09:31

## 2021-01-01 RX ADMIN — Medication 50 MCG: at 10:22

## 2021-01-01 RX ADMIN — EPTIFIBATIDE 1 MCG/KG/MIN: 75 INJECTION INTRAVENOUS at 03:07

## 2021-01-01 RX ADMIN — FAMOTIDINE 20 MG: 20 TABLET ORAL at 07:49

## 2021-01-01 RX ADMIN — NORTRIPTYLINE HYDROCHLORIDE 25 MG: 25 CAPSULE ORAL at 21:41

## 2021-01-01 RX ADMIN — Medication 50 MCG: at 09:36

## 2021-01-01 RX ADMIN — CEFDINIR 300 MG: 300 CAPSULE ORAL at 20:52

## 2021-01-01 RX ADMIN — ROSUVASTATIN CALCIUM 40 MG: 40 TABLET, FILM COATED ORAL at 22:16

## 2021-01-01 RX ADMIN — CEFTRIAXONE 1000 MG: 1 INJECTION, POWDER, FOR SOLUTION INTRAMUSCULAR; INTRAVENOUS at 22:28

## 2021-01-01 RX ADMIN — LORAZEPAM 0.5 MG: 0.5 TABLET ORAL at 09:36

## 2021-01-01 RX ADMIN — INSULIN GLARGINE 8 UNITS: 100 INJECTION, SOLUTION SUBCUTANEOUS at 10:21

## 2021-01-01 RX ADMIN — Medication 10 ML: at 10:41

## 2021-01-01 RX ADMIN — IOPAMIDOL 180 ML: 755 INJECTION, SOLUTION INTRAVENOUS at 12:47

## 2021-01-01 RX ADMIN — POLYETHYLENE GLYCOL 3350 17 G: 17 POWDER, FOR SOLUTION ORAL at 10:37

## 2021-01-01 RX ADMIN — Medication 1000 UNITS: at 09:02

## 2021-01-01 RX ADMIN — LEVOTHYROXINE SODIUM 100 MCG: 0.1 TABLET ORAL at 06:52

## 2021-01-01 RX ADMIN — Medication 50 MCG: at 09:32

## 2021-01-01 RX ADMIN — NORTRIPTYLINE HYDROCHLORIDE 25 MG: 25 CAPSULE ORAL at 22:12

## 2021-01-01 RX ADMIN — NORTRIPTYLINE HYDROCHLORIDE 25 MG: 25 CAPSULE ORAL at 21:30

## 2021-01-01 RX ADMIN — Medication 1000 UNITS: at 08:31

## 2021-01-01 RX ADMIN — EPTIFIBATIDE 1 MCG/KG/MIN: 0.75 INJECTION INTRAVENOUS at 18:03

## 2021-01-01 RX ADMIN — CEFDINIR 300 MG: 300 CAPSULE ORAL at 08:31

## 2021-01-01 RX ADMIN — SODIUM CHLORIDE 25 ML: 9 INJECTION, SOLUTION INTRAVENOUS at 12:08

## 2021-01-01 RX ADMIN — Medication 50 MCG: at 09:02

## 2021-01-01 RX ADMIN — METOPROLOL SUCCINATE 12.5 MG: 25 TABLET, EXTENDED RELEASE ORAL at 08:15

## 2021-01-01 RX ADMIN — ENOXAPARIN SODIUM 40 MG: 40 INJECTION SUBCUTANEOUS at 17:58

## 2021-01-01 RX ADMIN — TICAGRELOR 90 MG: 90 TABLET ORAL at 21:34

## 2021-01-01 RX ADMIN — HEPARIN SODIUM 5000 UNITS: 5000 INJECTION INTRAVENOUS; SUBCUTANEOUS at 05:11

## 2021-01-01 RX ADMIN — SODIUM ZIRCONIUM CYCLOSILICATE 5 G: 5 POWDER, FOR SUSPENSION ORAL at 13:46

## 2021-01-01 RX ADMIN — INSULIN LISPRO 1 UNITS: 100 INJECTION, SOLUTION INTRAVENOUS; SUBCUTANEOUS at 12:01

## 2021-01-01 RX ADMIN — DOCUSATE SODIUM 100 MG: 100 CAPSULE ORAL at 08:31

## 2021-01-01 RX ADMIN — SODIUM CHLORIDE: 9 INJECTION, SOLUTION INTRAVENOUS at 10:21

## 2021-01-01 RX ADMIN — ONDANSETRON 4 MG: 2 INJECTION INTRAMUSCULAR; INTRAVENOUS at 10:55

## 2021-01-01 RX ADMIN — IRON SUCROSE 200 MG: 20 INJECTION, SOLUTION INTRAVENOUS at 09:02

## 2021-01-01 RX ADMIN — SODIUM CHLORIDE: 9 INJECTION, SOLUTION INTRAVENOUS at 07:31

## 2021-01-01 RX ADMIN — Medication 50 MCG: at 08:47

## 2021-01-01 RX ADMIN — EPTIFIBATIDE 1 MCG/KG/MIN: 75 INJECTION INTRAVENOUS at 22:00

## 2021-01-01 RX ADMIN — POTASSIUM CHLORIDE 20 MEQ: 29.8 INJECTION, SOLUTION INTRAVENOUS at 09:40

## 2021-01-01 RX ADMIN — Medication 10 ML: at 22:14

## 2021-01-01 RX ADMIN — SPIRONOLACTONE 25 MG: 25 TABLET ORAL at 08:56

## 2021-01-01 RX ADMIN — EPTIFIBATIDE 1 MCG/KG/MIN: 0.75 INJECTION INTRAVENOUS at 06:35

## 2021-01-01 RX ADMIN — DOCUSATE SODIUM 100 MG: 100 CAPSULE ORAL at 09:36

## 2021-01-01 RX ADMIN — NITROGLYCERIN 25 MCG/MIN: 20 INJECTION INTRAVENOUS at 00:41

## 2021-01-01 RX ADMIN — CEFDINIR 300 MG: 300 CAPSULE ORAL at 21:40

## 2021-01-01 ASSESSMENT — PAIN - FUNCTIONAL ASSESSMENT
PAIN_FUNCTIONAL_ASSESSMENT: ACTIVITIES ARE NOT PREVENTED
PAIN_FUNCTIONAL_ASSESSMENT: ACTIVITIES ARE NOT PREVENTED
PAIN_FUNCTIONAL_ASSESSMENT: PREVENTS OR INTERFERES SOME ACTIVE ACTIVITIES AND ADLS

## 2021-01-01 ASSESSMENT — PAIN SCALES - GENERAL
PAINLEVEL_OUTOF10: 0
PAINLEVEL_OUTOF10: 8
PAINLEVEL_OUTOF10: 0
PAINLEVEL_OUTOF10: 7
PAINLEVEL_OUTOF10: 0
PAINLEVEL_OUTOF10: 0
PAINLEVEL_OUTOF10: 2
PAINLEVEL_OUTOF10: 0
PAINLEVEL_OUTOF10: 5
PAINLEVEL_OUTOF10: 0
PAINLEVEL_OUTOF10: 2
PAINLEVEL_OUTOF10: 4
PAINLEVEL_OUTOF10: 0
PAINLEVEL_OUTOF10: 5
PAINLEVEL_OUTOF10: 0
PAINLEVEL_OUTOF10: 1
PAINLEVEL_OUTOF10: 0
PAINLEVEL_OUTOF10: 2
PAINLEVEL_OUTOF10: 0
PAINLEVEL_OUTOF10: 4
PAINLEVEL_OUTOF10: 0
PAINLEVEL_OUTOF10: 3
PAINLEVEL_OUTOF10: 0
PAINLEVEL_OUTOF10: 8
PAINLEVEL_OUTOF10: 6
PAINLEVEL_OUTOF10: 0
PAINLEVEL_OUTOF10: 0

## 2021-01-01 ASSESSMENT — PAIN DESCRIPTION - DESCRIPTORS
DESCRIPTORS: ACHING;PRESSURE
DESCRIPTORS: ACHING
DESCRIPTORS: ACHING;SORE
DESCRIPTORS: ACHING
DESCRIPTORS: ACHING
DESCRIPTORS: PRESSURE
DESCRIPTORS: ACHING

## 2021-01-01 ASSESSMENT — ENCOUNTER SYMPTOMS
SHORTNESS OF BREATH: 0
EYES NEGATIVE: 1
RESPIRATORY NEGATIVE: 1
GASTROINTESTINAL NEGATIVE: 1
ALLERGIC/IMMUNOLOGIC NEGATIVE: 1

## 2021-01-01 ASSESSMENT — PAIN DESCRIPTION - ORIENTATION
ORIENTATION: MID
ORIENTATION: LOWER;MID
ORIENTATION: MID
ORIENTATION: MID

## 2021-01-01 ASSESSMENT — PAIN DESCRIPTION - PAIN TYPE
TYPE: ACUTE PAIN

## 2021-01-01 ASSESSMENT — PAIN DESCRIPTION - LOCATION
LOCATION: CHEST
LOCATION: BACK
LOCATION: NECK
LOCATION: BACK

## 2021-01-01 ASSESSMENT — PAIN DESCRIPTION - PROGRESSION
CLINICAL_PROGRESSION: NOT CHANGED
CLINICAL_PROGRESSION: GRADUALLY IMPROVING
CLINICAL_PROGRESSION: NOT CHANGED
CLINICAL_PROGRESSION: GRADUALLY WORSENING

## 2021-01-01 ASSESSMENT — PAIN DESCRIPTION - FREQUENCY
FREQUENCY: CONTINUOUS
FREQUENCY: INTERMITTENT
FREQUENCY: CONTINUOUS

## 2021-01-01 ASSESSMENT — PAIN DESCRIPTION - ONSET
ONSET: ON-GOING
ONSET: ON-GOING
ONSET: GRADUAL
ONSET: ON-GOING

## 2021-01-13 ENCOUNTER — OFFICE VISIT (OUTPATIENT)
Dept: ORTHOPEDIC SURGERY | Age: 76
End: 2021-01-13
Payer: MEDICARE

## 2021-01-13 VITALS — BODY MASS INDEX: 29.45 KG/M2 | HEIGHT: 60 IN | TEMPERATURE: 98.5 F | WEIGHT: 150 LBS

## 2021-01-13 DIAGNOSIS — S52.571A OTHER CLOSED INTRA-ARTICULAR FRACTURE OF DISTAL END OF RIGHT RADIUS, INITIAL ENCOUNTER: Primary | ICD-10-CM

## 2021-01-13 PROCEDURE — 1090F PRES/ABSN URINE INCON ASSESS: CPT | Performed by: ORTHOPAEDIC SURGERY

## 2021-01-13 PROCEDURE — G8417 CALC BMI ABV UP PARAM F/U: HCPCS | Performed by: ORTHOPAEDIC SURGERY

## 2021-01-13 PROCEDURE — 99213 OFFICE O/P EST LOW 20 MIN: CPT | Performed by: ORTHOPAEDIC SURGERY

## 2021-01-13 PROCEDURE — G8400 PT W/DXA NO RESULTS DOC: HCPCS | Performed by: ORTHOPAEDIC SURGERY

## 2021-01-13 PROCEDURE — 1123F ACP DISCUSS/DSCN MKR DOCD: CPT | Performed by: ORTHOPAEDIC SURGERY

## 2021-01-13 PROCEDURE — G8484 FLU IMMUNIZE NO ADMIN: HCPCS | Performed by: ORTHOPAEDIC SURGERY

## 2021-01-13 PROCEDURE — G8427 DOCREV CUR MEDS BY ELIG CLIN: HCPCS | Performed by: ORTHOPAEDIC SURGERY

## 2021-01-13 PROCEDURE — 4040F PNEUMOC VAC/ADMIN/RCVD: CPT | Performed by: ORTHOPAEDIC SURGERY

## 2021-01-13 PROCEDURE — 1036F TOBACCO NON-USER: CPT | Performed by: ORTHOPAEDIC SURGERY

## 2021-01-13 PROCEDURE — 3017F COLORECTAL CA SCREEN DOC REV: CPT | Performed by: ORTHOPAEDIC SURGERY

## 2021-01-13 NOTE — PROGRESS NOTES
DIAGNOSIS:  Right distal radius 3 parts intra articular comminuted fracture, status post ORIF. DATE OF SURGERY:  10/2/2020. HISTORY OF PRESENT ILLNESS:  Ms. Beatriz Umaña 77 y.o.  female right handed returns today  for 3 months postoperative visit. The patient denies any significant pain in the right wrist.  Rates pain a 0/10 VAS mild, aching, intermittent and are improving. Aggravating factors movement. Alleviating factors rest. No numbness or tingling sensation. No fever or Chills.     Past Medical History:   Diagnosis Date    Anxiety     CAD (coronary artery disease)     Cerebral artery occlusion with cerebral infarction ([facility] [address]     mini strokes    Depression     Graves disease     Hx of blood clots 2011     lungs after heart attach    Hyperlipidemia     Hypertension     IBS (irritable bowel syndrome)     w constipation    Kidney disease, chronic, stage III (moderate, EGFR 30-59 ml/min)     MI (myocardial infarction) (HCC)     Pneumonia     Prolonged emergence from general anesthesia     Renal failure     Sleep apnea     does not need to use cpap per MD due to weight loss    Thyroid disease     hyper thyroidism    Urinary incontinence        Past Surgical History:   Procedure Laterality Date    ARM SURGERY Left     CHOLECYSTECTOMY      COLONOSCOPY      DIAPHRAGMATIC HERNIA REPAIR      EYE SURGERY      contaract    HAND SURGERY Left     WRIST FRACTURE SURGERY Right 10/2/2020    OPEN REDUCTION INTERNAL FIXATION RIGHT WRIST performed by Aura Rodriguez MD at [address] [place] [address] History     Socioeconomic History    Marital status:      Spouse name: Not on file    Number of children: Not on file    Years of education: Not on file    Highest education level: Not on file   Occupational History    Not on file   Social Needs    Financial resource strain: Not on file    Food insecurity     Worry: Not on file     Inability: Not on file   [employer] needs Medical: Not on file     Non-medical: Not on file   Tobacco Use    Smoking status: Never Smoker    Smokeless tobacco: Never Used   Substance and Sexual Activity    Alcohol use: No    Drug use: No    Sexual activity: Not on file   Lifestyle    Physical activity     Days per week: Not on file     Minutes per session: Not on file    Stress: Not on file   Relationships    Social connections     Talks on phone: Not on file     Gets together: Not on file     Attends [employer] service: Not on file     Active member of club or organization: Not on file     Attends meetings of clubs or organizations: Not on file     Relationship status: Not on file    Intimate partner violence     Fear of current or ex partner: Not on file     Emotionally abused: Not on file     Physically abused: Not on file     Forced sexual activity: Not on file   Other Topics Concern    Not on file   Social History Narrative    Not on file       History reviewed. No pertinent family history.     Current Outpatient Medications on File Prior to Visit   Medication Sig Dispense Refill    aspirin EC 81 MG EC tablet Take 81 mg by mouth daily      amLODIPine (NORVASC) 2.5 MG tablet Take 1 tablet by mouth daily      famotidine (PEPCID) 40 MG tablet Take 1 tablet by mouth nightly      methIMAzole (TAPAZOLE) 5 MG tablet 2.5 mg four times a week      nitroGLYCERIN (NITROSTAT) 0.4 MG SL tablet as needed for Chest pain      atorvastatin (LIPITOR) 10 MG tablet Take 1 tablet by mouth nightly      carvedilol (COREG) 6.25 MG tablet Take 1.5 tablets by mouth daily      vitamin D3 (CHOLECALCIFEROL) 25 MCG (1000 UT) TABS tablet Take 1 tablet by mouth daily      conjugated estrogens (PREMARIN) 0.625 MG/GM vaginal cream Place vaginally three times a week      fluticasone (FLONASE) 50 MCG/ACT nasal spray 1 spray by Nasal route nightly as needed      nortriptyline (PAMELOR) 25 MG capsule Take 1 capsule by mouth nightly  pantoprazole sodium (PROTONIX) 40 MG PACK packet Take 40 mg by mouth every morning (before breakfast)      traMADol (ULTRAM) 50 MG tablet Take 50 mg by mouth every 6 hours as needed for Pain      LORazepam (ATIVAN) 0.5 MG tablet Take 0.5 mg by mouth daily Indications: 2 mg at nite prn      lisinopril (PRINIVIL;ZESTRIL) 2.5 MG tablet Take 2.5 mg by mouth daily      fexofenadine (ALLEGRA) 30 MG tablet Take 30 mg by mouth 2 times daily      promethazine (PHENERGAN) 12.5 MG tablet Take 12.5 mg by mouth every 6 hours as needed for Nausea      polyethyl glycol-propyl glycol 0.4-0.3 % (SYSTANE) 0.4-0.3 % ophthalmic solution 1 drop as needed for Dry Eyes (6 drops daily)      cycloSPORINE (RESTASIS) 0.05 % ophthalmic emulsion Place 2 drops into the left eye 2 times daily      loteprednol (ALREX) 0.2 % SUSP 1 drop 2 times daily      fluticasone (FLONASE) 50 MCG/ACT nasal spray 1 spray by Nasal route daily      solifenacin (VESICARE) 5 MG tablet Take 10 mg by mouth daily      vitamin D (CHOLECALCIFEROL) 1000 UNITS TABS tablet Take 1,000 Units by mouth daily      Multiple Vitamins-Minerals (THERAPEUTIC MULTIVITAMIN-MINERALS) tablet Take 1 tablet by mouth daily      polycarbophil (FIBERCON) 625 MG tablet Take 625 mg by mouth daily      calcium gluconate 500 MG tablet Take 500 mg by mouth daily      vitamin B-12 (CYANOCOBALAMIN) 100 MCG tablet Take 50 mcg by mouth daily      polyethylene glycol (GLYCOLAX) packet Take 17 g by mouth daily as needed for Constipation       No current facility-administered medications on file prior to visit. Pertinent items are noted in HPI  Review of systems reviewed from Patient History Form dated on 9/9/2020 and available in the patient's chart under the Media tab. No change noted.         PHYSICAL EXAMINATION: Ms. Jalil Rawls is a very pleasant 77 y.o.  female who presents today in no acute distress, awake, alert, and oriented. She is well dressed, nourished and  groomed. Patient with normal affect. Height is  5' (1.524 m), weight is 150 lb (68 kg), Body mass index is 29.29 kg/m². Resting respiratory rate is 16. The patient walks with no limp. The incision is completely healed . No signs of any erythema or drainage. She has no pain with the active or passive range of motion of the right wrist, full ROM. She  has intact sensation, distally, and she  is neurovascularly intact. IMAGING:  Xray 3 views right wrist taken today in the office showed anatomic alignment of right distal radius, plate and screws in good position, no loosening. IMPRESSION:  3 months out from right distal radius ORIF and doing very well. PLAN:  I have told the patient to work on aggressive ROM, as well as strengthening exercises. She can go back to normal activity with no restrictions. She will be seen PRN. I told the patient that it is not unusual to have some achy pain and swelling for up to a year after a fracture. As this patient has demonstrated risk factors for osteoporosis, such as age greater than [de-identified] years and evidence of a fracture, I have referred the patient back to the primary care physician for evaluation for osteoporosis, including consideration for DEXA scanning, if this is felt to be clinically indicated. The patient is advised to contact the primary care physician to follow-up for further evaluation.        Bala Silverio MD

## 2021-08-18 PROBLEM — I21.3 STEMI (ST ELEVATION MYOCARDIAL INFARCTION) (HCC): Status: ACTIVE | Noted: 2021-01-01

## 2021-08-18 NOTE — ED PROVIDER NOTES
[address] [place]      Pt Name: Aicha Richter  MRN: [id number]  Jaydagfbigg 1945  Date of evaluation: 8/18/2021  Provider: Crow Corley MD    CHIEF COMPLAINT     I had finished eating dinner and then was sitting at the table when I got sharp chest pain. I felt really tired. I took two NTG which helped a lot   HISTORY OF PRESENT ILLNESS  (Location/Symptom, Timing/Onset,Context/Setting, Quality, Duration, Modifying Factors, Severity). Note limiting factors. Chief Complaint   Patient presents with    Chest Pain     started after dinner, cath lab activated        Aicha Richter is a 77 y.o. female who presents to the emergency department secondary to concern for secondary to concern for chest pain. On arrival initial EKG here concerning for STEMI. Done at 18:37, seen by me at 18:41 and cath lab activated. She tells me she took 2 NTG at home which did not significantly help and then ASA 81mg daily. EMS reports they gave her more ASA to complete 324mg total.     On my exam she reports no shortness of breath but it was noted she was on 4L NC. On RA her O2 was 85%. While I am in the room after repeat EKG she does report her chest pain is back with radiation to right shoulder. She endorses no other blood thinners but also tells me her  is in charge of her medications and she does not know everything she is on. Past medical history noted below, significant for CAD, status post stents placed (x2) in 2011. She sees cardiology at [facility]. Denies smoking. Aside from what is stated above denies any other symptoms or modifying factors. Nursing Notes reviewed.     REVIEW OF SYSTEMS  (2-9 systems for level 4, 10 or more for level 5)   Review of Systems  As per HPI and otherwise limited 2/2 acuity of condition  PAST MEDICAL HISTORY     Past Medical History:   Diagnosis Date    Anxiety     CAD (coronary artery disease)     Cerebral artery occlusion with cerebral infarction ([facility] [address]     mini strokes    Depression     Graves disease     Hx of blood clots 2011     lungs after heart attach    Hyperlipidemia     Hypertension     IBS (irritable bowel syndrome)     w constipation    Kidney disease, chronic, stage III (moderate, EGFR 30-59 ml/min)     MI (myocardial infarction) (HCC)     Pneumonia     Prolonged emergence from general anesthesia     Renal failure     Sleep apnea     does not need to use cpap per MD due to weight loss    Thyroid disease     hyper thyroidism    Urinary incontinence        SURGICALHISTORY       Past Surgical History:   Procedure Laterality Date    ARM SURGERY Left     CHOLECYSTECTOMY      COLONOSCOPY      DIAPHRAGMATIC HERNIA REPAIR      EYE SURGERY      contaract    HAND SURGERY Left     WRIST FRACTURE SURGERY Right 10/2/2020    OPEN REDUCTION INTERNAL FIXATION RIGHT WRIST performed by Dieudonne Melo MD at [address]       Previous Medications    AMLODIPINE (NORVASC) 2.5 MG TABLET    Take 1 tablet by mouth daily    ASPIRIN EC 81 MG EC TABLET    Take 81 mg by mouth daily    ATORVASTATIN (LIPITOR) 10 MG TABLET    Take 1 tablet by mouth nightly    CARVEDILOL (COREG) 6.25 MG TABLET    Take 1.5 tablets by mouth daily    CONJUGATED ESTROGENS (PREMARIN) 0.625 MG/GM VAGINAL CREAM    Place vaginally three times a week    CYCLOSPORINE (RESTASIS) 0.05 % OPHTHALMIC EMULSION    Place 2 drops into the left eye 2 times daily    DESVENLAFAXINE SUCCINATE (PRISTIQ) 50 MG TB24 EXTENDED RELEASE TABLET    Take 25 mg by mouth daily    FAMOTIDINE (PEPCID) 40 MG TABLET    Take 1 tablet by mouth nightly    FEXOFENADINE (ALLEGRA) 30 MG TABLET    Take 30 mg by mouth 2 times daily    FLUTICASONE (FLONASE) 50 MCG/ACT NASAL SPRAY    1 spray by Nasal route nightly as needed    LEVOTHYROXINE (SYNTHROID) 100 MCG TABLET    Take 100 mcg by mouth Daily    LISINOPRIL (PRINIVIL;ZESTRIL) 2.5 MG TABLET    Take 2.5 mg by mouth daily    LORAZEPAM (ATIVAN) 0.5 MG TABLET    Take 0.5 mg by mouth daily. LOTEPREDNOL (ALREX) 0.2 % SUSP    1 drop 2 times daily    METHIMAZOLE (TAPAZOLE) 5 MG TABLET    2.5 mg four times a week    MULTIPLE VITAMINS-MINERALS (THERAPEUTIC MULTIVITAMIN-MINERALS) TABLET    Take 1 tablet by mouth daily    NITROGLYCERIN (NITROSTAT) 0.4 MG SL TABLET    as needed for Chest pain    NORTRIPTYLINE (PAMELOR) 25 MG CAPSULE    Take 1 capsule by mouth nightly    PANTOPRAZOLE SODIUM (PROTONIX) 40 MG PACK PACKET    Take 40 mg by mouth every morning (before breakfast)    POLYCARBOPHIL (FIBERCON) 625 MG TABLET    Take 625 mg by mouth daily    POLYETHYL GLYCOL-PROPYL GLYCOL 0.4-0.3 % (SYSTANE) 0.4-0.3 % OPHTHALMIC SOLUTION    1 drop as needed for Dry Eyes (6 drops daily)    POLYETHYLENE GLYCOL (GLYCOLAX) PACKET    Take 17 g by mouth daily as needed for Constipation    PROMETHAZINE (PHENERGAN) 12.5 MG TABLET    Take 12.5 mg by mouth every 6 hours as needed for Nausea    SOLIFENACIN (VESICARE) 5 MG TABLET    Take 10 mg by mouth daily As needed    VITAMIN B-12 (CYANOCOBALAMIN) 100 MCG TABLET    Take 50 mcg by mouth daily    VITAMIN D3 (CHOLECALCIFEROL) 25 MCG (1000 UT) TABS TABLET    Take 1 tablet by mouth daily      ALLERGIES     Augmentin [amoxicillin-pot clavulanate], Bactrim [sulfamethoxazole-trimethoprim], Carafate [sucralfate], Clindamycin/lincomycin, Hyoscyamine, Macrobid [nitrofurantoin], Oxybutynin, Pcn [penicillins], Pravastatin, Prevacid [lansoprazole], Prilosec [omeprazole], Sulfamethoxazole, and Trazodone and nefazodone  FAMILY HISTORY     No family history on file.   SOCIAL HISTORY       Social History     Socioeconomic History    Marital status:      Spouse name: Not on file    Number of children: Not on file    Years of education: Not on file    Highest education level: Not on file   Occupational History    Not on file   Tobacco Use    Smoking status: Never Smoker    Smokeless tobacco: Never Used   Vaping Use    Vaping Use: Never used   Substance and Sexual Activity    Alcohol use: No    Drug use: No    Sexual activity: Not on file   Other Topics Concern    Not on file   Social History Narrative    Not on file     Social Determinants of Health     Financial Resource Strain:     Difficulty of Paying Living Expenses:    Food Insecurity:     Worried About Running Out of Food in the Last Year:     [address] in the Last Year:    Transportation Needs:     Lack of Transportation (Medical):  Lack of Transportation (Non-Medical):    Physical Activity:     Days of Exercise per Week:     Minutes of Exercise per Session:    Stress:     Feeling of Stress :    Social Connections:     Frequency of Communication with Friends and Family:     Frequency of Social Gatherings with Friends and Family:     Attends [employer] Services:     Active Member of Clubs or Organizations:     Attends Club or Organization Meetings:     Marital Status:    Intimate Partner Violence:     Fear of Current or Ex-Partner:     Emotionally Abused:     Physically Abused:     Sexually Abused:      SCREENINGS         PHYSICAL EXAM  (up to 7 for level 4, 8 or more for level 5)   INITIAL VITALS: BP: 109/66,  , Pulse: 91, Resp: 23, SpO2: 99 %   Physical Exam  Vitals reviewed. Constitutional:       General: She is in acute distress. Appearance: She is not toxic-appearing or diaphoretic. HENT:      Head: Normocephalic and atraumatic. Right Ear: External ear normal.      Left Ear: External ear normal.      Nose: Nose normal.   Eyes:      General: No scleral icterus. Right eye: No discharge. Left eye: No discharge. Conjunctiva/sclera: Conjunctivae normal.      Comments: Wearing glasses   Neck:      Trachea: No tracheal deviation. Cardiovascular:      Rate and Rhythm: Normal rate. Pulses: Normal pulses. Pulmonary:      Effort: Pulmonary effort is normal. No respiratory distress.       Breath sounds: No wheezing. Abdominal:      General: There is no distension. Tenderness: There is no abdominal tenderness. There is no guarding or rebound. Musculoskeletal:      Cervical back: Normal range of motion. Skin:     General: Skin is warm and dry. Capillary Refill: Capillary refill takes 2 to 3 seconds. Neurological:      General: No focal deficit present. Mental Status: She is alert and oriented to person, place, and time. Psychiatric:         Mood and Affect: Mood is anxious (mild). Speech: Speech normal.         Behavior: Behavior is cooperative. DIAGNOSTIC RESULTS   EKG: interpreted by the Emergency Department Physician who either signs or Co-signs this chart in the absence of a cardiologist.  Indication: chest pain  Interpretation: ST elevation V1-V3 with slight depression noted in inferior leads concerning for STEMI. New findings when compared to prior EKG from 5.17.2016.      RADIOLOGY:   Interpretation per Radiologist below, if available at the time of this note:  XR CHEST PORTABLE    (Results Pending)     LABS:  Labs Reviewed   CBC WITH AUTO DIFFERENTIAL    Narrative:     Performed at:  [facility]  [address] [place], [place] [address]   Phone [phone]   PROTIME-INR    Narrative:     Performed at:  [facility]  [address] [place], [place] [address]   Phone [phone]   APTT    Narrative:     Performed at:  [facility]  [address] [place], [place] [address]   Phone [phone]   COMPREHENSIVE METABOLIC PANEL W/ REFLEX TO MG FOR LOW K   LIPASE   URINE RT REFLEX TO [facility]   TROPONIN   CBC   APTT   HEPARIN LEVEL/ANTI-XA   HEPARIN LEVEL/ANTI-XA       EMERGENCY DEPARTMENT COURSE and DIFFERENTIAL DIAGNOSIS/MDM:   Patient was given the following medications:  Orders Placed This Encounter   Medications    heparin (porcine) injection 60 Units/kg    heparin (porcine) injection 60 Units/kg    heparin (porcine) injection 30 Units/kg    heparin 25,000 unit in sodium chloride 0.45% 250 mL (premix) infusion     CONSULTS:  IP CONSULT TO CARDIOLOGY    INITIAL VITALS: BP: 109/66,  , Pulse: 91, Resp: 23, SpO2: 99 %   RECENT VITALS:  BP: 84/62, , Pulse: 81, Resp: 28, SpO2: 93 %     Fer Vallecillo is a 77 y.o. female who presented to the emergency department at 18:32 with a chief complaint of Chest Pain (started after dinner, cath lab activated)    An EKG was performed at 18:37 and seen by me at 18:41. The cath lab team was activated at 18:41. The patient was given no ASA as she had already had 324mg PO, heparin was ordered in addition to placement of IV, CXR. Cardiology was consulted, I spoke with Dr. Helen Hernandez who requested a repeat EKG which was done and still showed STEMI and at that time patient also reported return of CP with radiation to both shoulders but worse on the right. It was also noted her initial BP was 109/66 with repeats decreasing to 80s. A liter of IVF was started and she was placed in trendelenburg.  arrived at the bedside and he was updated on EKG findings and discussion with cath team. CXR was done at the bedside and initial viewing in the room shows it is abnormal.     Cath team called and ready for patient. She was transferred with our nursing staff directly there for further evaluation. At that time her CBC/PT/INR had resulted and were unremarkable. The remainder of labs and CXR results are still pending. CRITICAL CARE TIME   Due to the immediate potential for life-threatening deterioration due to STEMI, I spent 31 minutes providing critical care. There was a high probability of clinically significant/life threatening deterioration in the patient's condition which required my urgent intervention.  This time excludes time spent performing procedures but includes time spent on direct patient care, history retrieval, review of the chart, and discussions with patient, family, and consultant(s). FINAL IMPRESSION      1. ST elevation myocardial infarction (STEMI), unspecified artery ([address]    2. Requires oxygen therapy    3.  Other fatigue        DISPOSITION/PLAN   DISPOSITION  Admitted to cath lab         (Please note that portions of this note were completed with a voice recognition program. Efforts were made to edit the dictations but occasionally words are mis-transcribed.)    Kristine Collins MD (electronically signed)  Attending Emergency Physician       Kristine Collins MD  08/18/21 [id number]

## 2021-08-18 NOTE — ED NOTES
Bed: A-14  Expected date:   Expected time:   Means of arrival: [facility]  Comments:  76F Chest pain     Jakub Foss RN  08/18/21 [id number]

## 2021-08-18 NOTE — PROGRESS NOTES
Medication Reconciliation     List of medications patient is currently taking is complete. Source of information:   1. Conversation with patient spouse at bedside  2. EPIC records  3. [facility]        Notes regarding home medications:  1. Per pt spouse she did receive 2 nitroglycerin PTA to ED  2. When provided with a list spouse was able to identify some medications that were taken in the morning and nightly medications  3. Confirmed carvedilol dose with [facility]  4. Lorazepam is usually only used nightly to help pt sleep.   Denies other otc/herbal use  Vadim Gardiner, Pharmacy Intern 8/18/2021 7:31 PM

## 2021-08-19 NOTE — PROCEDURES
[address]           [address] [address]                            CARDIAC CATHETERIZATION    PATIENT NAME: Margot Garcia                   :        1945  MED REC NO:   [id number]                          ROOM:       1192  ACCOUNT NO:   [de-identified]                           ADMIT DATE: 2021  PROVIDER:     Yany Posey MD    DATE OF PROCEDURE:  2021    INDICATION FOR PROCEDURE:  Acute anterior myocardial infarction. The risks and benefits of the procedure were explained to the patient  and informed consent was obtained. She was brought to the cardiac  catheterization lab emergently for left heart catheterization. PROCEDURES PERFORMED:  1. Left heart catheterization. 2.  Percutaneous transluminal coronary angioplasty to the ostium of the left  anterior descending artery. 3.  Percutaneous transluminal coronary angioplasty to the ostium of the  circumflex artery. 4.  Insertion of pulmonary artery catheter via a right femoral venous  approach. 5.  Insertion of short-term external heart assist system into heart,  percutaneous approach for assistance with cardiac output using impeller  pump, continuous. The patient was placed on the cardiac catheterization table and prepped  and draped in sterile fashion. Anesthesia was provided in the right  groin with 1% lidocaine injected subcutaneously and deep. Using  ultrasound guidance, the right femoral vein was accessed and cannulated  and a 5-Portuguese sheath inserted using Seldinger technique. The right  femoral artery was accessed and cannulated and a 6-Portuguese sheath  inserted using Seldinger technique. Over the 0.035 J-wire, the JR-4 catheter was advanced to the ostium of  the right coronary artery and coronary angiography was performed to this  system. The catheter was removed over the wire.   Next, the EBU 3.5  6-Portuguese guide catheter was advanced to the Integrilin drip and refer her  for coronary artery bypass grafting likely the next morning once she has  recovered from a pulmonary edema. She was given 40 mg of IV Lasix. The  wire was removed and intraarterial nitroglycerin was given and repeat  angiography demonstrated FLACO 3 flow in the vessels. The 6-Nigerian guide  catheter was removed. Using ultrasound guidance, the right femoral venous sheath was exchanged  for an 8-Nigerian venous sheath. We then advanced a pulmonary artery  catheter up into the pulmonary capillary wedge position. We took oxygen  saturations along the way in the inferior vena cava, right atrium and  pulmonary artery. We recorded pressure tracings in the right atrium,  right ventricle, pulmonary artery and pulmonary capillary wedge  positions. All sheaths were sutured in place. The patient was stable at removal  from the cardiac catheterization lab to the CVU. Estimated blood loss <20mL    Moderate sedation was performed for the procedure. She had an ASA grade  of 4 and a Mallampati score of II. She received 1 mg of IV Versed, 25  mcg of fentanyl and 2 mg of IV of morphine. Total of 88 minutes of  sedation. Vital signs were monitored throughout the case and remained  stable. They were administered by an independent agent at my direction  and supervision. Vital signs stabilized after placement of the Impella  pump. FINDINGS:  1. Triple-vessel coronary artery disease. There is right coronary  artery is small but that appears dominant and has serial 80% lesions  throughout it with heavy calcification. In the left system, there is  distal left main disease of 80% with 99% subtotal occlusion of the left  anterior descending artery and FLACO 2 flow initially. There is a 99%  lesion in the ostium of the circumflex. These were ballooned to  residual 85% stenosis and FLACO 3 flow in the vessel. Two patent stents  in the circumflex artery.   2.  Severe left ventricular systolic dysfunction with anterior  hypokinesis and LV ejection fraction 25 to 30%. 3.  Cardiogenic shock requiring Impella left ventricular support device  placement. 4.  Right atrial pressure of 2 with a pulmonary capillary wedge pressure  of 12.  5.  Normal mixed venous oxygen saturations of 68% on the right atrium  and 71% on the pulmonary artery. 6.  At least moderate mitral regurgitation. 7.  Acute respiratory failure with pulmonary edema.         Olayinka Garcia MD    D: 08/18/2021 21:35:47       T: 08/18/2021 21:39:46     [email or web address]  Job#: [id number]     Doc#: [id number]    CC:

## 2021-08-19 NOTE — CONSULTS
Department of Cardiovascular & Thoracic Surgery  History and Physical / Consultation          PCP: Terry Kasper MD    Referring Physician: Don Lopez    DIAGNOSIS:  LM equivalent and RCA CAD, STEMI, ICM    CHIEF COMPLAINT:  Chest pain    History Obtained From:  patient, spouse, electronic medical record    HISTORY OF PRESENT ILLNESS:      The patient is a 77 y.o. female with significant past medical history of CAD and stents at [facility] who presents with substernal chest pain after dinner this evening. It improved with NTG in the ED. EKG c/w STEMI. LHC revealed EF 15-20%, likely some degree of MR, diffuse RCA disease and osteal LAD and CX lesions. Initially LAD flow slow, but improved after heparin for impella. CX and LAD POBA'd after discussion in anticipation of CHF optimization. Patient developed pulmonary edema initially at cath but stabilized with BiPAP and POBA. At time of exam she is pain free in CVU. Initially discussed with Dr. Don Lopez and personally reviewed LHC with him in the cath lab.       Past Medical History:        Diagnosis Date    Anxiety     CAD (coronary artery disease)     Cerebral artery occlusion with cerebral infarction ([address]     mini strokes    Depression     Graves disease     Hx of blood clots 2011     lungs after heart attach    Hyperlipidemia     Hypertension     IBS (irritable bowel syndrome)     w constipation    Kidney disease, chronic, stage III (moderate, EGFR 30-59 ml/min)     MI (myocardial infarction) (HCC)     Pneumonia     Prolonged emergence from general anesthesia     Renal failure     Sleep apnea     does not need to use cpap per MD due to weight loss    Thyroid disease     hyper thyroidism    Urinary incontinence        Past Surgical History:        Procedure Laterality Date    ARM SURGERY Left     CHOLECYSTECTOMY      COLONOSCOPY      DIAPHRAGMATIC HERNIA REPAIR      EYE SURGERY      contaract    HAND SURGERY Left     WRIST FRACTURE SURGERY Right 10/2/2020    OPEN REDUCTION INTERNAL FIXATION RIGHT [facility]        Medications:   Current Facility-Administered Medications   Medication Dose Route Frequency Provider Last Rate Last Admin    heparin 25,000 unit in sodium chloride 0.45% 250 mL (premix) infusion  0-3,000 Units/hr Intravenous Continuous Xavier Polk MD        heparin (porcine) injection 4,000 Units  4,000 Units Intravenous Once Xavier Polk MD        [START ON 8/19/2021] LORazepam (ATIVAN) tablet 0.5 mg  0.5 mg Oral Daily Xavier Polk MD        [facility] tablet 12.5 mg  12.5 mg Oral Q6H PRN Xavier Polk MD        [START ON 8/19/2021] vitamin B-12 (CYANOCOBALAMIN) tablet 50 mcg  50 mcg Oral Daily Xavier Polk MD        polyethylene glycol [facility]) packet 17 g  17 g Oral Daily PRN Xavier Polk MD        [START ON 8/19/2021] vitamin D3 (CHOLECALCIFEROL) tablet 1,000 Units  1 tablet Oral Daily Xavier Polk MD        conjugated estrogens (PREMARIN) vaginal cream   Vaginal Once per day on Mon Wed Fri Xavier Polk MD        nortriptyline (PAMELOR) capsule 25 mg  25 mg Oral Nightly Xavier Polk MD        [START ON 8/19/2021] levothyroxine (SYNTHROID) tablet 100 mcg  100 mcg Oral Daily Xavier Polk MD        [START ON 8/19/2021] sodium chloride flush 0.9 % injection 5-40 mL  5-40 mL Intravenous 2 times per day Xavier Polk MD        sodium chloride flush 0.9 % injection 5-40 mL  5-40 mL Intravenous PRN Xavier Polk MD        0.9 % sodium chloride infusion  25 mL Intravenous PRN Xavier Polk MD        acetaminophen (TYLENOL) tablet 650 mg  650 mg Oral Q4H PRN Xavier Polk MD        atropine injection 0.5 mg  0.5 mg Intravenous Once PRN Xavier Polk MD        morphine (PF) injection 2 mg  2 mg Intravenous Once PRN Xavier Polk MD  0.9 % sodium chloride bolus  250 mL Intravenous PRN Toña Espinal MD        ondansetron [facility]) injection 4 mg  4 mg Intravenous Q6H REIN Toña Espinal MD        hydrALAZINE (APRESOLINE) injection 10 mg  10 mg Intravenous Q1H KATHRYN Espinal MD        rosuvastatin (CRESTOR) tablet 40 mg  40 mg Oral Nightly Toña Espinal MD        [START ON 8/19/2021] aspirin chewable tablet 81 mg  81 mg Oral Daily Toña Espinal MD        eptifibatide (INTEGRILIN) 0.75 mg/mL infusion  1 mcg/kg/min Intravenous Continuous Toña Espinal MD        heparin (porcine) 25,000 Units in dextrose 5 % 500 mL infusion (FOR IMPELLA PURGE)   Intracatheter Continuous Toña Espinal MD        glucose (GLUTOSE) 40 % oral gel 15 g  15 g Oral PRN Toña Espinal MD        dextrose 50 % IV solution  12.5 g Intravenous KATHRYN Espinal MD        glucagon (rDNA) injection 1 mg  1 mg Intramuscular PRN Toña Espinal MD        dextrose 5 % solution  100 mL/hr Intravenous KATHRYN Espinal MD        [START ON 8/19/2021] insulin lispro (HUMALOG) injection vial 0-6 Units  0-6 Units Subcutaneous TID WC Toña Espinal MD        insulin lispro (HUMALOG) injection vial 0-3 Units  0-3 Units Subcutaneous Nightly Toña Espinal MD        perflutren lipid microspheres (DEFINITY) injection 1.65 mg  1.5 mL Intravenous ONCE PRN Toña Espinal MD        nitroGLYCERIN 50 mg in dextrose 5% 250 mL infusion  20 mcg/min Intravenous Continuous Toña Espinal MD           Allergies:  Augmentin [amoxicillin-pot clavulanate], Bactrim [sulfamethoxazole-trimethoprim], Carafate [sucralfate], Clindamycin/lincomycin, Hyoscyamine, Macrobid [nitrofurantoin], Oxybutynin, Pcn [penicillins], Pravastatin, Prevacid [lansoprazole], Prilosec [omeprazole], Sulfamethoxazole, and Trazodone and nefazodone    Social History:    TOBACCO:   reports that she has never smoked. She has never used smokeless tobacco.  ETOH:   reports no history of alcohol use. DRUGS:   reports no history of drug use. LIFESTYLE: was climbing ladders and painting bathroom prior to admission per her     MARITAL STATUS:      Family History:    No family history on file. REVIEW OF SYSTEMS:      Patient denies any issues except chest pain at time of exam.    PHYSICAL EXAM:    VITALS:  BP 84/62   Pulse 92   Resp 16   Wt 159 lb 13.3 oz (72.5 kg)   SpO2 100%   BMI 31.22 kg/m²     Eyes:  lids and lashes normal, pupils equal and round, extra ocular muscles intact, sclera clear, conjunctiva normal    Head/ENT:  Normocephalic, atraumatic, BiPAP mask covering mouth    Neck:  supple, symmetrical, trachea midline    Lungs:  no increased work of breathing, good air exchange, no retractions and clear to auscultation anteriorly (has impella in place), no palpable / percussible abnormalities    Cardiovascular:  regular rate and rhythm and systolic murmur: early systolic 2/6, blowing every other beat at apex    Pulses:  Right dorsalis pedis 1, Left dorsalis pedis 1, Right posterior tibial 1, Left Posterior tibial 1, Right Femoral sheath in place, Left Femoral impella in place, Right radial 2, and Left radial 2    Abdomen:  Soft, normal bowel sounds, non-tender, no hepatosplenomegaly, aorta likely normal and bruits absent    Musculoskeletal:  Back is straight and non-tender,  No CVAT, full ROM of upper and lower extremities.     Extremities:   No clubbing, or cyanosis, or edema     Skin: warm and normal turgor, no ulcers, infections, or rashes, no jaundice    Neurological: awake, alert and oriented x 3, motor 5/5 bilateral upper and lower extremities, sensation grossly intact    Psychiatric: Mood and affect appear appropriate    DATA:    EKG:  NSR at 92  CBC:   Lab Results   Component Value Date    WBC 9.1 08/18/2021    RBC 4.29 08/18/2021    HGB 13.7 08/18/2021    HCT 40.8 08/18/2021 MCV 95.1 08/18/2021    MCH 31.8 08/18/2021    MCHC 33.5 08/18/2021    RDW 15.4 08/18/2021     08/18/2021    MPV 8.0 08/18/2021     BMP:    Lab Results   Component Value Date     08/18/2021    K 5.4 08/18/2021     08/18/2021    CO2 23 08/18/2021    BUN 26 08/18/2021    LABALBU 3.7 08/18/2021    CREATININE 1.4 08/18/2021    CALCIUM 9.0 08/18/2021    GFRAA 44 08/18/2021    LABGLOM 37 08/18/2021    GLUCOSE 188 08/18/2021     Last 3 Troponin:    Lab Results   Component Value Date    TROPONINI <0.01 08/18/2021     ECHO/CITLALY: Pending  Other diagnostic test:  [facility] as per HPI    ASSESSMENT AND PLAN:    3V CAD, STEMI, ICM, likely MR (hopefully ischemic and can improve), HTN, HLD, Grave's Disease, azotemia on admission    I discussed plan of medical management and further assessment in anticipation of at least CABG in future when optimized with the patient and her . Continue impella and echo when available.     Electronically signed by Magdiel Chaudhari MD on 8/18/2021 at 9:45 PM

## 2021-08-19 NOTE — CONSULTS
artery disease)     s/p thrombectomy and ZAN to Circ 2011    Cerebral artery occlusion with cerebral infarction ([address]     mini strokes, told by PCP, pt doesn't recall any symptoms. many years ago.  Depression     Graves disease     ablation 4/16/21    Hiatal hernia     Hx of blood clots 2011    lungs after heart attack    Hyperlipidemia     Hypertension     IBS (irritable bowel syndrome)     w constipation and diarrhea. no bleeding    Kidney disease, chronic, stage III (moderate, EGFR 30-59 ml/min) ([facility])     MI (myocardial infarction) ([address] 08/18/2021    STEMI. PCI LAD, LCx    Pneumonia     aspiration pneumonitis 2007; 2019 bronchitis, outpt abx    Prolonged emergence from general anesthesia     Sleep apnea     does not need to use cpap per MD due to weight loss    Thyroid disease     hyper thyroidism    Urinary incontinence     UTI (urinary tract infection) 04/26/2021    Vocal cord dysfunction     Weight loss     lost 50 lbs. intentional. working out, intake reduction       PAST SURGICAL HISTORY:  Past Surgical History:   Procedure Laterality Date    ARM SURGERY Left     L shoulder replacement    CHOLECYSTECTOMY      COLONOSCOPY      EYE SURGERY      contaract    GASTRIC FUNDOPLICATION  8069    hiatal hernia repair    HAND SURGERY Left     WRIST CLOSED REDUCTION Left 05/06/2003    left wrist fracture    WRIST FRACTURE SURGERY Right 10/02/2020    OPEN REDUCTION INTERNAL FIXATION RIGHT WRIST performed by Heddy Sacks, MD at [facility]:  family history includes Breast Cancer in her sister; Coronary Art Dis in her father; Heart Attack in her paternal grandmother and sister; Heart Disease in her father; Hypothyroidism in her sister; [employer] in her father; Stroke in her father and sister; Stroke (age of onset: 61) in her mother. SOCIAL HISTORY:   reports that she has never smoked.  She has never used smokeless tobacco.    Scheduled Meds:   insulin glargine  8 Units Subcutaneous QAM    famotidine  20 mg Oral BID    insulin lispro  0-12 Units Subcutaneous TID WC    insulin lispro  0-6 Units Subcutaneous Nightly    LORazepam  0.5 mg Oral Daily    vitamin B-12  50 mcg Oral Daily    Vitamin D  1 tablet Oral Daily    [START ON 8/20/2021] conjugated estrogens   Vaginal Once per day on Mon Wed Fri    nortriptyline  25 mg Oral Nightly    levothyroxine  100 mcg Oral Daily    sodium chloride flush  5-40 mL Intravenous 2 times per day    rosuvastatin  40 mg Oral Nightly    aspirin  81 mg Oral Daily       Continuous Infusions:   sodium chloride      eptifibatide 1 mcg/kg/min (08/19/21 1109)    dextrose      nitroGLYCERIN 25 mcg/min (08/19/21 0654)    heparin (Impella Purge) solution      heparin (Porcine) 200 Units/hr (08/19/21 0930)       PRN Meds:  oxyCODONE, morphine, promethazine, polyethylene glycol, sodium chloride flush, sodium chloride, acetaminophen, sodium chloride, ondansetron, hydrALAZINE, glucose, dextrose, glucagon (rDNA), dextrose, perflutren lipid microspheres    ALLERGIES:  Patient is allergic to augmentin [amoxicillin-pot clavulanate], bactrim [sulfamethoxazole-trimethoprim], carafate [sucralfate], clindamycin/lincomycin, hyoscyamine, macrobid [nitrofurantoin], oxybutynin, pcn [penicillins], pravastatin, prevacid [lansoprazole], prilosec [omeprazole], sulfamethoxazole, and trazodone and nefazodone.     REVIEW OF SYSTEMS:  Constitutional: Negative for fever   HENT: Negative for sore throat  Eyes: Negative for redness   Respiratory: + for dyspnea, + cough  Cardiovascular: Negative for chest pain  Gastrointestinal: Negative for vomiting, diarrhea    Genitourinary: Negative for hematuria   Musculoskeletal: Negative for arthralgias   Skin: Negative for rash  Neurological: Negative for syncope  Hematological: Negative for adenopathy  Psychiatric/Behavorial: Negative for anxiety    Objective:   PHYSICAL EXAM:  Blood pressure 134/84, pulse 92, temperature 97.6 °F (36.4 °C), temperature source Oral, resp. rate 20, weight 146 lb 9.7 oz (66.5 kg), SpO2 97 %.'  Gen: No distress. Eyes: PERRL. No sclera icterus. No conjunctival injection. ENT: No discharge. Pharynx clear. External appearance of ears and nose normal.  Neck: Trachea midline. No obvious mass. Resp: No accessory muscle use. No crackles. No wheezes. No rhonchi. CV: Regular rate. Regular rhythm. No murmur or rub. No edema. GI: Non-tender. Non-distended. No hernia. Skin: Warm, dry, normal texture and turgor. No nodule on exposed extremities. Lymph: No cervical LAD. No supraclavicular LAD. M/S: No cyanosis. No clubbing. No joint deformity. Neuro: Moves all four extremities. Psych: Oriented x 3. No anxiety. Awake. Alert. Intact judgement and insight.       Data Reviewed:   LABS:  CBC:   Recent Labs     08/18/21 1856 08/19/21  0535   WBC 9.1 19.2*   HGB 13.7 11.7*   HCT 40.8 33.8*   MCV 95.1 94.1    141     BMP:   Recent Labs     08/18/21 1856 08/19/21  0630    133*   K 5.4* 4.6    101   CO2 23 19*   BUN 26* 34*   CREATININE 1.4* 1.3*     LIVER PROFILE:   Recent Labs     08/18/21 1856 08/19/21  0630   AST 19 494*   ALT 10 145*   LIPASE 23.0  --    BILITOT 0.5 2.3*   ALKPHOS 136* 273*     PT/INR:   Recent Labs     08/18/21 1856   PROTIME 12.3   INR 1.08     APTT:   Recent Labs     08/18/21 1856   APTT 30.0     UA:  Recent Labs     08/19/21  1029   COLORU RED*   PHUR 5.5   WBCUA *   RBCUA see below*   BACTERIA 3+*   CLARITYU CLOUDY*   SPECGRAV 1.015   LEUKOCYTESUR LARGE*   UROBILINOGEN 1.0   BILIRUBINUR MODERATE*   BLOODU LARGE*   GLUCOSEU Negative     Recent Labs     08/18/21 2020   PHART 7.286*   PFS9CGK 44.7   PO2ART 104.0       Vent Information  Skin Assessment: Clean, dry, & intact  FiO2 : (S) 70 %  SpO2: 97 %  SpO2/FiO2 ratio: 142.86  I Time/ I Time %: 0.9 s  Mask Type: Full face mask  Mask Size: (S) Small    Radiology Review:  Pertinent images / reports were reviewed as a part of this visit. CT Chest w/ contrast: No results found for this or any previous visit. CT Chest w/o contrast: No results found for this or any previous visit. CTPA: No results found for this or any previous visit. CXR PA/LAT: No results found for this or any previous visit. CXR portable: Results for orders placed during the hospital encounter of 08/18/21    XR CHEST PORTABLE    Narrative  EXAMINATION:  ONE XRAY VIEW OF THE CHEST    8/18/2021 9:22 pm    COMPARISON:  08/18/2021 at 6:59 p.m. HISTORY:  ORDERING SYSTEM PROVIDED HISTORY: acute CHF  TECHNOLOGIST PROVIDED HISTORY:  Reason for exam:->acute CHF  Reason for Exam: acute CHF  Acuity: Acute  Type of Exam: Initial    FINDINGS:  An Impella device is now present. Pulmonary edema is grossly unchanged. The  heart size is at the upper limits of normal.  There is no large pleural  effusion or definite evidence for pneumothorax. Impression  Grossly stable pulmonary edema. Status post Impella placement.

## 2021-08-19 NOTE — PROGRESS NOTES
Bedside completed and placed in soft chart. Patient did poorly on bedside, unable to follow commands during the test. Best 3/10 test taken.     Electronically signed by Arabella Mcrae on 8/19/2021 at 11:49 AM

## 2021-08-19 NOTE — ED NOTES
Clinical Pharmacy Note  Renal Dose Adjustment    Ned Rebolledo is receiving Merrem. This medication is renally eliminated. Based on the patient's Estimated Creatinine Clearance: 32 mL/min (A) (based on SCr of 1.3 mg/dL (H)). and urine output, the dose has been adjusted to 500 mg q8h per protocol. Pharmacy will continue to monitor and adjust dose as needed for changes in renal function.

## 2021-08-19 NOTE — H&P
[address]  1945    August 18, 2021    CC: Chest Pain    HPI:  The patient is 77 y.o. female with a past medical history significant for CAD with prior PCI to her Circumflex in 2011 who presented to [facility] from home with chest pain that began at dinner tonight. STEMI was activated. She was taken to the cath lab and had a subtotal occlusion of her ostial LAD and a 99% Circumflex artery. She was having stuttuering chest pain on presentation to the lab. Review of Systems:  Constitutional: No fatigue, weakness, night sweats or fever. HEENT: No new vision difficulties or ringing in the ears. Respiratory: No new SOB, PND, orthopnea or cough. Cardiovascular: See HPI   GI: No n/v, diarrhea, constipation, abdominal pain or changes in bowel habits. No melena, no hematochezia  : No urinary frequency, urgency, incontinence, hematuria or dysuria. Skin: No cyanosis or skin lesions. Musculoskeletal: No new muscle or joint pain. Neurological: No syncope or TIA-like symptoms.   Psychiatric: No anxiety, insomnia or depression     Past Medical History:   Diagnosis Date    Anxiety     CAD (coronary artery disease)     Cerebral artery occlusion with cerebral infarction ([address]     mini strokes    Depression     Graves disease     Hx of blood clots 2011     lungs after heart attach    Hyperlipidemia     Hypertension     IBS (irritable bowel syndrome)     w constipation    Kidney disease, chronic, stage III (moderate, EGFR 30-59 ml/min)     MI (myocardial infarction) (HCC)     Pneumonia     Prolonged emergence from general anesthesia     Renal failure     Sleep apnea     does not need to use cpap per MD due to weight loss    Thyroid disease     hyper thyroidism    Urinary incontinence      Past Surgical History:   Procedure Laterality Date    ARM SURGERY Left     CHOLECYSTECTOMY      COLONOSCOPY      DIAPHRAGMATIC HERNIA REPAIR      EYE SURGERY contaract    HAND SURGERY Left     WRIST FRACTURE SURGERY Right 10/2/2020    OPEN REDUCTION INTERNAL FIXATION RIGHT WRIST performed by Dieudonne Melo MD at [facility]     No family history on file.   Social History     Tobacco Use    Smoking status: Never Smoker    Smokeless tobacco: Never Used   Vaping Use    Vaping Use: Never used   Substance Use Topics    Alcohol use: No    Drug use: No       Allergies   Allergen Reactions    Augmentin [Amoxicillin-Pot Clavulanate]     Bactrim [Sulfamethoxazole-Trimethoprim]     Carafate [Sucralfate]     Clindamycin/Lincomycin Nausea Only     Thinks if she has a smaller dose she would be fine      Hyoscyamine     Macrobid [Nitrofurantoin]     Oxybutynin     Pcn [Penicillins]     Pravastatin     Prevacid [Lansoprazole]     Prilosec [Omeprazole]     Sulfamethoxazole     Trazodone And Nefazodone      Current Facility-Administered Medications   Medication Dose Route Frequency Provider Last Rate Last Admin    heparin 25,000 unit in sodium chloride 0.45% 250 mL (premix) infusion  0-3,000 Units/hr Intravenous Continuous Teodoro Hardin MD        heparin (porcine) injection 4,000 Units  4,000 Units Intravenous Once Teodoro Hardin MD       [facility] ON 8/19/2021] LORazepam (ATIVAN) tablet 0.5 mg  0.5 mg Oral Daily Douglas Hager MD        [START ON 8/19/2021] lisinopril (PRINIVIL;ZESTRIL) tablet 2.5 mg  2.5 mg Oral Daily Douglas Hager MD        [facility]) tablet 12.5 mg  12.5 mg Oral Q6H PRN Douglas Hager MD        [START ON 8/19/2021] vitamin B-12 (CYANOCOBALAMIN) tablet 50 mcg  50 mcg Oral Daily Douglas Hager MD        polyethylene glycol [facility]) packet 17 g  17 g Oral Daily PRN Douglas Hager MD        [START ON 8/19/2021] vitamin D3 (CHOLECALCIFEROL) tablet 1,000 Units  1 tablet Oral Daily Douglas Haegr MD        conjugated estrogens (PREMARIN) vaginal cream   Vaginal Once per day on Mon Wed Fri Will MD Risa        nortriptyline (PAMELOR) capsule 25 mg  25 mg Oral Nightly Will MD Risa        [START ON 8/19/2021] levothyroxine (SYNTHROID) tablet 100 mcg  100 mcg Oral Daily Will MD Risa        sodium chloride flush 0.9 % injection 5-40 mL  5-40 mL Intravenous 2 times per day Will MD Risa        sodium chloride flush 0.9 % injection 5-40 mL  5-40 mL Intravenous PRN Will MD Risa        0.9 % sodium chloride infusion  25 mL Intravenous PRN Will MD Risa        acetaminophen (TYLENOL) tablet 650 mg  650 mg Oral Q4H PRN Will MD Risa        atropine injection 0.5 mg  0.5 mg Intravenous Once PRN Will MD Risa        morphine (PF) injection 2 mg  2 mg Intravenous Once PRN Will MD Risa        0.9 % sodium chloride bolus  250 mL Intravenous PRN Will MD Risa        ondansetron [facility]) injection 4 mg  4 mg Intravenous Q6H PRN Will MD Risa        hydrALAZINE (APRESOLINE) injection 10 mg  10 mg Intravenous Q1H PRN Will MD Risa        rosuvastatin (CRESTOR) tablet 40 mg  40 mg Oral Nightly Will MD Risa        [START ON 8/19/2021] aspirin chewable tablet 81 mg  81 mg Oral Daily Will MD Risa        eptifibatide (INTEGRILIN) 0.75 mg/mL infusion  2 mcg/kg/min Intravenous Continuous Will MD Risa        heparin (porcine) 25,000 Units in dextrose 5 % 500 mL infusion (FOR IMPELLA PURGE)   Intracatheter Continuous Will MD Risa        glucose (GLUTOSE) 40 % oral gel 15 g  15 g Oral PRN Will MD Risa        dextrose 50 % IV solution  12.5 g Intravenous PRN Will MD Risa        glucagon (rDNA) injection 1 mg  1 mg Intramuscular PRN Will MD Risa        dextrose 5 % solution  100 mL/hr Intravenous PRN Will MD Risa        [START ON 8/19/2021] insulin lispro (HUMALOG) injection vial 0-6 Units  0-6 Units Subcutaneous TID WC Xavier Polk MD        insulin lispro (HUMALOG) injection vial 0-3 Units  0-3 Units Subcutaneous Nightly Xavier Polk MD        perflutren lipid microspheres (DEFINITY) injection 1.65 mg  1.5 mL Intravenous ONCE PRN Xavier Polk MD        nitroGLYCERIN 50 mg in dextrose 5% 250 mL infusion  20 mcg/min Intravenous Continuous Xavier Polk MD           Physical Exam:   BP 84/62   Pulse 92   Resp 24   Wt 159 lb 13.3 oz (72.5 kg)   SpO2 93%   BMI 31.22 kg/m²   No intake or output data in the 24 hours ending 08/18/21 2121  Wt Readings from Last 2 Encounters:   08/18/21 159 lb 13.3 oz (72.5 kg)   01/13/21 150 lb (68 kg)     Constitutional: She is oriented to person, place, and time. She appears well-developed and well-nourished. In no acute distress. Head: Normocephalic and atraumatic. Neck: Neck supple. No JVD present. Carotid bruit is not present. No mass and no thyromegaly present. No lymphadenopathy present. Cardiovascular: Normal rate, regular rhythm, normal heart sounds and intact distal pulses. Exam reveals no gallop and no friction rub. No murmur heard. Pulmonary/Chest: Effort normal and breath sounds normal. No respiratory distress. She has no wheezes, rhonchi or rales. Abdominal: Soft, non-tender. Bowel sounds and aorta are normal. She exhibits no organomegaly, mass or bruit. Extremities: No edema, cyanosis, or clubbing. Pulses are 2+ radial/carotid/dorsalis pedis and posterior tibial bilaterally. Neurological: She is alert and oriented to person, place, and time. She has normal reflexes. No cranial nerve deficit. Coordination normal.   Skin: Skin is warm and dry. There is no rash or diaphoresis. Psychiatric: She has a normal mood and affect.  Her speech is normal and behavior is normal.     EKG Interpretation: Sinus rhythm with acute anterior ischemia    Lab Review:   No results found for: TRIG, HDL, LDLCALC, LDLDIRECT, LABVLDL  Lab Results   Component Value Date     08/18/2021    K 5.4 08/18/2021    BUN 26 08/18/2021    CREATININE 1.4 08/18/2021     Recent Labs     08/18/21  1856   WBC 9.1   HGB 13.7   HCT 40.8          Assessment:  1. Acute Anterior Myocardial Infarction  2. Cardiogenic Shock  3. Acute Kidney Injury    Plan:  Jacquie Dave had subtotal occlusion of her LAD which was opened with PTCA resulting in FLACO 3 flow. She also underwent PTCA to her 99% ostial Circumflex resulting in 85% residual stenosis. She had an Impella placed for cardiogenic shock. Discussed with CVTS. Plan for surgery as pt improves. Continue Impella support with Integrilin.

## 2021-08-19 NOTE — PROGRESS NOTES
Progress Note    CAD -  STEMI, S/P POBA LAD, LCx. Impella. Vital Signs:                                                 /71   Pulse 90   Temp 98.3 °F (36.8 °C) (Oral)   Resp 25   Wt 146 lb 9.7 oz (66.5 kg)   SpO2 95%   BMI 28.63 kg/m²  O2 Flow Rate (L/min): (S) 13 L/min (decreased to 11lpm )   NSR  PAP: 39/19  PAP (Mean): 28 mmHg  Admission Weight: 159 lb 13.3 oz (72.5 kg)      Drips: hep, ntg, integ    I/O:    Intake/Output Summary (Last 24 hours) at 8/19/2021 0834  Last data filed at 8/19/2021 0700  Gross per 24 hour   Intake 484.62 ml   Output 600 ml   Net -115.38 ml     CV: reg  Pulm: decreased bilat  Abd: soft  Ext: warm, no edema. L fem Impella    Data Review:  CBC:   Recent Labs     08/18/21 1856 08/19/21  0535   WBC 9.1 19.2*   HGB 13.7 11.7*   HCT 40.8 33.8*   MCV 95.1 94.1    141     BMP:   Recent Labs     08/18/21 1856 08/19/21  0630    133*   K 5.4* 4.6    101   CO2 23 19*   BUN 26* 34*   CREATININE 1.4* 1.3*   CALCIUM 9.0 8.4     Cardiac Enzymes:   Recent Labs     08/18/21 1856 08/19/21  0630   TROPONINI <0.01 8.67*     PT/INR:   Recent Labs     08/18/21 1856   PROTIME 12.3   INR 1.08     APTT:   Recent Labs     08/18/21 1856   APTT 30.0   Endo - Cirilo  Pulm - Reny  Cards - Jeannette Shires  Heme - Firdaus  Urol - Rivera    cxr 8/18/21   An Impella device is now present.  Pulmonary edema is grossly unchanged.  The   heart size is at the upper limits of normal.  There is no large pleural   effusion or definite evidence for pneumothorax. Cath 7/23/11  1. Occluded mid LCX with obstructive 2 vessel CAD with 70% prox LCX, 100% mid LCX and 50% & 80% mid-distal RCA with diffuse 40% proximal and mid RCA  2. Successful PPCI of LCX with Xience 2.5 x 15 mm (mid) and Xience 2.5 x 12 mm (prox) with mechanical thrombectomy, IC Reopro  3. DTBT 70 minutes  4. LVEF  with anterolateral AK and inferolateral HK  5.  Bradycardia and hypotension requiring IV atropine, IV dopamine and IV fluids    8/18/21  1. Triple-vessel coronary artery disease. There is right coronary  artery is small but that appears dominant and has serial 80% lesions throughout it with heavy calcification. In the left system, there is distal left main disease of 80% with 99% subtotal occlusion of the left anterior descending artery and FLACO 2 flow initially. There is a 99% lesion in the ostium of the circumflex. These were ballooned to residual 85% stenosis and FLACO 3 flow in the vessel. Two patent stents in the circumflex artery. 2.  Severe left ventricular systolic dysfunction with anterior  hypokinesis and LV ejection fraction 25 to 30%. 3.  Cardiogenic shock requiring Impella left ventricular support device placement. 4.  Right atrial pressure of 2 with a pulmonary capillary wedge pressure of 12.  5.  Normal mixed venous oxygen saturations of 68% on the right atrium and 71% on the pulmonary artery. 6.  At least moderate mitral regurgitation. 7.  Acute respiratory failure with pulmonary edema. Echo 2/7/19 - Left ventricle: The cavity size was normal. Wall thickness was normal. Systolic function was mildly reduced. The calculated ejection fraction was in the range of 41% to 46%. Severe hypokinesis of the entireinferior myocardium. Doppler parameters are consistent with abnormal left ventricular relaxation (grade 1 diastolic dysfunction). - Aortic valve: Thickening, consistent with sclerosis. Mean     gradient (S): 5mm Hg.   - Mitral valve: There was mild regurgitation.   - Left atrium: The atrium was mildly dilated. - Inferior vena cava: The vessel was normal in size; the     respirophasic diameter changes were in the normal range (>= 50%); findings are consistent with normal central venous pressure. CT chest 2/6/19   1. Bandlike abnormalities in the right lower lobe and lingula, suggestive of atelectasis or scar. 2. Otherwise no significant abnormality within the lung parenchyma.    3. Triple vessel coronary disease. 4. Mild cardiomegaly. 5. Nonspecific mildly enlarged precarinal lymph node. Esophagram 2018 no reflux  MRI head: 10/24/18 Moderate chronic small vessel ischemic change in mild diffuse atrophy suspected. Has received Covid vaccine    Assessment/Plan:  CV - CAD, STEMI, PCI culprit LAD/LCx.    - ASA, statin  pulm - cxr c/w flash pulm edema. Hx of ILD. Renal - Cr slightly elevated. Baseline 1.3 7/2021, nl prior. GI - elevated LFTs. Follow. Heme - anemia     Discussed with pt - reviewed all history, proposed cabg, risks/benefits/poss complications reviewed. Obtain preop studies. Allow recovery from acute event, follow Cr/LFTs/hgb. Tentatively plan for cabg 8/23 unless otherwise clinically indicated.       Reymundo Dancer, MD  8/19/2021  8:34 AM

## 2021-08-19 NOTE — PROGRESS NOTES
@0700-Bedside handoff report received from B. Murry Goodell, RN. Pt resting in bed with eyes closed. Impella @ 85cm, intact and in place to L femoral.  Ralls @ 65cm and venous sheath intact and in place to R femoral.  Hematuria noted, Dr. Michael Benitez made aware previously on night shift. @1-This RN notified of critical Troponin 8.67.    @0735-Dr. Michael Benitez notified of critical Troponin. No new orders at this time. @0800-Initial shift assessment complete. Pt A&O x4.  C/O mid-sternal chest pain. Rates 3/10. NTG gtt increased. Pt boosted in bed and repositioned. @0830-Ricky Golden at bedside to evaluate pt. See orders. @0845-Pt c/o feeling nauseated and an upset stomach. 4 mg PRN Zofran admin. See eMAR.    @0915-Impella Performance level decreased to P-6 per Dr. Michael Benitez. @0940-Impella device alarming position in Aorta. Performance level decreased to P-2. Dr. Michael Benitez on unit, notified immediately. ECHO called for STAT diagnostic to be completed. @0950-Impella device catheter advanced from 85 cm to 92 cm by Dr. Michael Benitez at bedside. Placement verified by bedside ECHO. Wedge pressure noted to be 11.    @1020-Scheduled medications admin. See eMAR. Pt and  verbalize that pt no longer takes Tapazole. Order discontinued. @1145-Ricky Golden contacted in regards to NPO status. Order discontinued and diet orders placed. @1300-Reassessment complete, see flow sheet. Vital signs stable. Pt c/o mid-sternal aching chest pain. Rates 8/10 on pain scale. 2 mg PRN Morphine administered and NTG gtt titrated appropriately. See eMAR. @1500-Ricky Golden called this RN. RN provided updates and questions answered. @1600-Reassessment complete, no changes. VSS. Pt denies chest pain. EKG completed. Turned and repositioned. @1629-Pt suddenly became nauseated and gagging. 4 mg PRN Zofran administered. See eMAR. @1800-Intake/output noted. Neff bag emptied and IV pumps cleared.   Pt resting in bed with eyes closed. No needs or complaints. Will continue to monitor. @1900-Shift handoff given to MARCIO Alvarenga RN to assume care. Pt resting in bed with eyes closed. End of shift checks completed at bedside. Pt left in stable condition.     Electronically signed by Sada Smith RN on 8/19/2021 at 7:08 PM

## 2021-08-19 NOTE — PROGRESS NOTES
Clinical Pharmacy Note  Heparin Dosing - Impella Device    Patient receiving heparin purge solution via Impella device. Consult received from Dr. Jocelyne Weldon to titrate systemic heparin to maintain -180. Shift ACT Results and Heparin Adjustments:      Date   Time POC ACT  Result Heparin  Bolus   (units) Systemic Heparin Infusion Rate (100 units/mL)   8/19 0830 153  Inc to 200 units/hr   8/19 1200 155  Inc to 250 units/hr              Pharmacy will continue to adjust out of range ACT's. RN only calling ACT's out of range.     Mirian Fitzpatrick, [address] [place] [address], [address] [place] [address] 8/19/2021 3:32 PM

## 2021-08-19 NOTE — PROGRESS NOTES
[facility]   Daily Progress Note      Admit Date:  8/18/2021      Subjective:   Ms. Ad Bedoya  is a 77 y.o. female with a past medical history significant for CAD with prior PCI to her Circumflex in 2011 who presented to [facility] from home with chest pain that began at dinner tonight. STEMI was activated. She was taken to the cath lab and had a subtotal occlusion of her ostial LAD and a 99% Circumflex artery. I placed an Impella and performed PTCA to the ostia of the LAD and Circ restoring FLACO 3 flow. Interval History:  Sharon Hernandez has some complaints of diffuse pain this morning. She denies any shortness of breath. But is requiring 10 liters of oxygen. Sinus rhythm on telemetry. Impella at P7 and delivering ~3.5L/min of CO.       Objective:     /71   Pulse 90   Temp 98.3 °F (36.8 °C) (Oral)   Resp 25   Wt 146 lb 9.7 oz (66.5 kg)   SpO2 95%   BMI 28.63 kg/m²      Intake/Output Summary (Last 24 hours) at 8/19/2021 3872  Last data filed at 8/19/2021 0700  Gross per 24 hour   Intake 484.62 ml   Output 600 ml   Net -115.38 ml       Physical Exam:  General:  Awake, alert, NAD  Skin:  Warm and dry  Neck:  JVD<8, no carotid bruits  Chest:  Bilaterally diminished, no wheezes/rhonchi/rales  Cardiovascular:  RRR, normal S1/S2, no M/R/G  Abdomen:  Soft, nontender, +bowel sounds  Extremities:  No edema  Pulses: 2+ bilat carotid    2+ bilat radial    2+ bilat femoral    Impella device in left groin with hematoma        Medications:    LORazepam  0.5 mg Oral Daily    vitamin B-12  50 mcg Oral Daily    Vitamin D  1 tablet Oral Daily    [START ON 8/20/2021] conjugated estrogens   Vaginal Once per day on Mon Wed Fri    nortriptyline  25 mg Oral Nightly    levothyroxine  100 mcg Oral Daily    sodium chloride flush  5-40 mL Intravenous 2 times per day    rosuvastatin  40 mg Oral Nightly    aspirin  81 mg Oral Daily    insulin lispro  0-6 Units Subcutaneous TID WC    insulin lispro  0-3 Units Subcutaneous Nightly      sodium chloride      eptifibatide 1.0662 mcg/kg/min (08/19/21 0654)    dextrose      nitroGLYCERIN 25 mcg/min (08/19/21 0654)    heparin (Impella Purge) solution      heparin (Porcine) 150 Units/hr (08/19/21 0654)       Lab Data:  CBC:   Recent Labs     08/18/21 1856 08/19/21  0535   WBC 9.1 19.2*   HGB 13.7 11.7*    141     BMP:    Recent Labs     08/18/21 1856 08/19/21  0630    133*   K 5.4* 4.6   CO2 23 19*   BUN 26* 34*   CREATININE 1.4* 1.3*     LIVR:   Recent Labs     08/18/21 1856 08/19/21  0630   AST 19 494*   ALT 10 145*     PT/INR:   Recent Labs     08/18/21 1856 08/19/21  0630   PROT 9.1* 7.6   INR 1.08  --      APTT:   Recent Labs     08/18/21 1856   APTT 30.0     BNP:  No results for input(s): BNP in the last 72 hours. CARDIAC ENZYMES:  Recent Labs     08/18/21 1856 08/19/21  0630   TROPONINI <0.01 8.67*     FASTING LIPID PANEL:  Lab Results   Component Value Date    CHOL 126 08/19/2021    HDL 58 08/19/2021    TRIG 52 08/19/2021       Assessment:  1. Acute Anterior Myocardial Infarction  2. Cardiogenic Shock  3. Acute Kidney Injury  4. Acute Respiratory Failure with Hypoxia  5. Generalized Anxiety Disorder  6. Hematuria    Plan:   Brandan Thomas is doing better today. She appears to have some shock liver. We will trend this. Continue the Integrilin drip for her PTCA and coronary lesions. Impella at P7 but some hematuria. We will wean this down to P6 to try and minimize this. CVTS evaluating. The patient may have some significant baseline lung disease increasing her risk for CABG. We will evaluate this and take that into consideration. She will likely require some further diuresis but her right atrial pressure is 7cmH2O. The Impella was repositioned at the bedside when the waveforms flattened and the device was largely in the ascending aorta. We will consult Pulmonary for her hypoxia if it does not improve. Her PCWP was only 11mmHg.     Her hematuria is improving but we will send a urinalysis. Per Dr. Natacha Zamorano note:   \"status post anterior lateral myocardial infarction on 07/23/2011, treated with thrombectomy and drug eluting stent to the circumflex:  Last stress echo 9-29-17 was negative for ischemia. \"    I did call Dr. Frida Gaspar and discussed the patient's condition with him.        Critical Care Time: 35 minutes            Signed:  Christoph Barnes MD

## 2021-08-20 NOTE — CARE COORDINATION
Chart Reviewed. Met with patient and spouse at bedside to introduce  role, complete ACP and to initiate discussion regarding DC planning. ACP done. See separate note. INITIAL CASE MANAGEMENT ASSESSMENT    Living Situation:    Patient, spouse and son and his family live in house with two steps entry. First Floor set up. There are 6 people living in the house. Patient was active at home prior to admit, likes to work out at [employer], drives, totally independent. ADLs:   Totally independent. Works out at [employer]. DME:   [employer],  walker, wheelchair, 3 crutches: All borrowed DME. PT/OT Recs:   TO be determined. Active Services:   None. Informed them of possible post acute services such as home care, SNF or ARU. Informed them Therapy will be ordered to assist them in dc planning recommendations. Information given regarding recommendation for 24 hour care x 7 in the home if home is the option. Spouse reports he will be that person to be with her. He is retired. Transportation:   family     Medications:    [facility] [address]    PCP:   Dr Delores Tellez      HD/PD:   neither    PLAN/COMMENTS:   LSW following for DC needs. To have CABG early next week.     Giovany Eddy [facility]     Case Management   [phone]    8/20/2021  3:26 PM

## 2021-08-20 NOTE — PROGRESS NOTES
Clinical Pharmacy Note  Heparin Dosing - Impella Device    Patient receiving heparin purge solution via Impella device. Consult received from Dr. Raf Acosta to titrate systemic heparin to maintain -180. Shift ACT Results and Heparin Adjustments:      Date   Time POC ACT  Result Heparin  Bolus   (units) Systemic Heparin Infusion Rate (100 units/mL)   08-20-21 0700 154  Increase to 300 units/hr                     Pharmacy will continue to monitor and adjust based on ACT results.   Anne Andrade, [facility]  8/20/2021  2:56 PM

## 2021-08-20 NOTE — PROGRESS NOTES
Reopro  3. DTBT 70 minutes  4. LVEF  with anterolateral AK and inferolateral HK  5. Bradycardia and hypotension requiring IV atropine, IV dopamine and IV fluids    8/18/21  1. Triple-vessel coronary artery disease. There is right coronary  artery is small but that appears dominant and has serial 80% lesions throughout it with heavy calcification. In the left system, there is distal left main disease of 80% with 99% subtotal occlusion of the left anterior descending artery and FLACO 2 flow initially. There is a 99% lesion in the ostium of the circumflex. These were ballooned to residual 85% stenosis and FLACO 3 flow in the vessel. Two patent stents in the circumflex artery. 2.  Severe left ventricular systolic dysfunction with anterior  hypokinesis and LV ejection fraction 25 to 30%. 3.  Cardiogenic shock requiring Impella left ventricular support device placement. 4.  Right atrial pressure of 2 with a pulmonary capillary wedge pressure of 12.  5.  Normal mixed venous oxygen saturations of 68% on the right atrium and 71% on the pulmonary artery. 6.  At least moderate mitral regurgitation. 7.  Acute respiratory failure with pulmonary edema. Echo 2/7/19 - Left ventricle: The cavity size was normal. Wall thickness was normal. Systolic function was mildly reduced. The calculated ejection fraction was in the range of 41% to 46%. Severe hypokinesis of the entireinferior myocardium. Doppler parameters are consistent with abnormal left ventricular relaxation (grade 1 diastolic dysfunction). - Aortic valve: Thickening, consistent with sclerosis. Mean     gradient (S): 5mm Hg.   - Mitral valve: There was mild regurgitation.   - Left atrium: The atrium was mildly dilated. - Inferior vena cava: The vessel was normal in size; the     respirophasic diameter changes were in the normal range (>= 50%); findings are consistent with normal central venous pressure. CT chest 2/6/19   1.  Bandlike abnormalities in

## 2021-08-20 NOTE — ACP (ADVANCE CARE PLANNING)
Advance Care Planning     Advance Care Planning Activator (Inpatient)  Conversation Note      Date of ACP Conversation: 8/20/2021     Conversation Conducted with: Mackenzie Hammond and spouse Mynor Sue    ACP Activator: [id number] Decision Maker:     Current Designated Health Care Decision Maker:   Mynor Rocher, Spouse    Click here to complete Healthcare Decision Makers including section of the Healthcare Decision Maker Relationship (ie \"Primary\")      Care Preferences    Ventilation: \"If you were in your present state of health and suddenly became very ill and were unable to breathe on your own, what would your preference be about the use of a ventilator (breathing machine) if it were available to you? \"      Would the patient desire the use of ventilator (breathing machine)?:   Not addressed    \"If your health worsens and it becomes clear that your chance of recovery is unlikely, what would your preference be about the use of a ventilator (breathing machine) if it were available to you? \"     Would the patient desire the use of ventilator (breathing machine)?:   Not addressed      Resuscitation  \"CPR works best to restart the heart when there is a sudden event, like a heart attack, in someone who is otherwise healthy. Unfortunately, CPR does not typically restart the heart for people who have serious health conditions or who are very sick. \"    \"In the event your heart stopped as a result of an underlying serious health condition, would you want attempts to be made to restart your heart (answer \"yes\" for attempt to resuscitate) or would you prefer a natural death (answer \"no\" for do not attempt to resuscitate)? \"   Not addressed       [] Yes   [] No   Educated Patient / Sonido Forts regarding differences between Advance Directives and portable DNR orders.     Length of ACP Conversation in minutes:    3 minutes    Conversation Outcomes:  [x] ACP discussion completed  [] Existing advance directive reviewed with patient; no changes to patient's previously recorded wishes  [] New Advance Directive completed  [] Portable Do Not Rescitate prepared for Provider review and signature  [] POLST/POST/MOLST/MOST prepared for Provider review and signature      Follow-up plan:    [] Schedule follow-up conversation to continue planning  [] Referred individual to Provider for additional questions/concerns   [] Advised patient/agent/surrogate to review completed ACP document and update if needed with changes in condition, patient preferences or care setting    [] This note routed to one or more involved healthcare providers         Patient reports she has an Advanced Directive and spouse will bring in a copy. She names spouse as the decision maker if she cannot make decisions. Spouse will bring in copy of Adv Directive from home.     [place], [place]     Case Management   [phone]    8/20/2021  3:21 PM

## 2021-08-20 NOTE — PROGRESS NOTES
@0700-Shift handoff received from MARCIO Martinez RN. End of shift checks completed at bedside. NTG, Integrilin and Heparin gtts infusing. Impella intact and in place @ 92 cm. Clements intact and in place @ 65 cm. Bilateral doppler DP and PT pulses. All lines and tubes assessed for kinks/occlusions. @0800-Shift assessment completed by Ekta Avendaño RN. See flow sheet. ; Madison Paula, DIALL[facility] notified. Systemic Heparin gtt increased to 300 units/hr. See eMAR. Blood glucose 221. Caty Jones at bedside to evaluate pt. Impella performance level increased from P-6 to P-7. Verbal order to hold Lasix at this time. CVP 5 and Wedge Pressure 8.    @1030-Arterial sheath site without hematoma or oozing. Arterial sheath removed per policy without difficulty. Integrity of sheath inspected upon removal and no abnormalities noted. Manual pressure held X 20 minutes. Dry, sterile tegaderm applied. Pressure dressing applied. Patient tolerated well. Vital signs, groin checks, and pedal pulses will be completed per protocol (every 15 minutes X 4, every 30 minutes X 2, and every hour X 2 per protocol). @1200-Reassessment complete by Ekta Avendaño RN. See flow sheet. Dr. Yosef Pemberton notified of congestion and rhonchi. Instructed this RN to admin scheduled Lasix from this AM.  RN verbalizes understanding. @1420-Pt O2 sat dropped into 70's. Deep breathing instructed. Pt states it hurts to take deep breaths. NFNC increased to 10 LPM.  Placed on NRM. O2 rebounded within 2 minutes to 100%. @1500-Pt remains on 15 LPM NRM. States feels much better. @1525-Pt placed on HFNC at 5 LPM.    @1600-Reassessment complete. HFNC decreased to 4LPM.    @1900-Shift handoff given to MAURILIO Stewart RN to assume care. End of shift checks completed at bedside. Pt left in stable condition.     Electronically signed by Win Austin RN on 8/20/2021 at 7:20 PM

## 2021-08-20 NOTE — PROGRESS NOTES
Late Entry    At 2210 the Impella rep was at bedside and noticed a hematoma forming on the Left groin at the impella site. This was a new finding. The impella rep held pressure for about 10 min until the hematoma was gone. At 31 75 62 me and another nurse went into the room to turn the pt. At this time we found that the impella site was now bleeding. Once again pressure was held to try and stop the bleeding. At this time also the pt became nauseated, so Zofran was given. A call was placed to Dr. Damian Villalobos to update him on the changes. At this time I also told him that her urine was starting to get a like blood tinged. No new orders was taken except he stated it was okay to use a fem stop to help with bleeding. I repeated this order and he confirmed it. At this time a fem stop was applied to the L groin to help with bleeding.

## 2021-08-20 NOTE — PROGRESS NOTES
Late Entry    Dr. Nilda Roca called back to check on the status of the bleeding. I told him we applied a fem stop and at this time the bleeding had stopped. At this time he wanted me to turn the impella to P7. I repeated this to him and he confirmed it.  The impella setting was changed to P7

## 2021-08-20 NOTE — PROGRESS NOTES
Clinical Pharmacy Note  Heparin Dosing - Impella Device    Patient receiving heparin purge solution via Impella device. Consult received from Dr. Chintan Wray to titrate systemic heparin to maintain -180. Shift ACT Results and Heparin Adjustments:      Date   Time POC ACT  Result Heparin  Bolus   (units) Systemic Heparin Infusion Rate (100 units/mL)   08/19 2200 177  250 units/hr   08/20 0000 170  250 units/hr    0200 171  250 units/hr    0400 172  250 units/hr    0600 173  250 units/hr       Pharmacy will continue to monitor and adjust based on ACT results.     Dominic Salgado, [address] [facility]  8/20/2021 7:13 AM

## 2021-08-20 NOTE — PROGRESS NOTES
Clinical Pharmacy Note  Heparin Dosing - Impella Device    Patient receiving heparin purge solution via Impella device. Consult received from Dr. Valentino Dukes to titrate systemic heparin to maintain -180. Shift ACT Results and Heparin Adjustments:      Date   Time POC ACT  Result Heparin  Bolus   (units) Systemic Heparin Infusion Rate (100 units/mL)   08-19-21 1300 168  250 units/hr    1400 164  250    1500 166  250    1600 163  250    1700 161  250    1800 165  250    2000 166  250              Pharmacy will continue to monitor and adjust based on ACT results.   Jose C Martin [facility]  8/20/2021  6:28 AM

## 2021-08-20 NOTE — PROGRESS NOTES
Daily    Vitamin D  1 tablet Oral Daily    conjugated estrogens   Vaginal Once per day on Mon Wed Fri    nortriptyline  25 mg Oral Nightly    levothyroxine  100 mcg Oral Daily    sodium chloride flush  5-40 mL Intravenous 2 times per day    rosuvastatin  40 mg Oral Nightly    aspirin  81 mg Oral Daily      sodium chloride 5 mL/hr at 08/20/21 0627    eptifibatide 1 mcg/kg/min (08/20/21 0627)    dextrose      nitroGLYCERIN 20 mcg/min (08/20/21 0627)    heparin (Impella Purge) solution      heparin (Porcine) 250 Units/hr (08/20/21 0627)       Lab Data:  CBC:   Recent Labs     08/18/21 1856 08/19/21  0535 08/20/21  0415   WBC 9.1 19.2* 17.9*   HGB 13.7 11.7* 10.0*    141 115*     BMP:    Recent Labs     08/18/21 1856 08/19/21  0630 08/20/21  0415    133* 134*   K 5.4* 4.6 3.6   CO2 23 19* 25   BUN 26* 34* 31*   CREATININE 1.4* 1.3* 1.5*     LIVR:   Recent Labs     08/18/21 1856 08/19/21  0630 08/20/21  0415   AST 19 494* 236*   ALT 10 145* 105*     PT/INR:   Recent Labs     08/18/21 1856 08/19/21  0630 08/20/21  0415   PROT 9.1* 7.6 7.2   INR 1.08  --   --      APTT:   Recent Labs     08/18/21 1856   APTT 30.0     BNP:  No results for input(s): BNP in the last 72 hours. CARDIAC ENZYMES:  Recent Labs     08/18/21 1856 08/19/21  0630   TROPONINI <0.01 8.67*     FASTING LIPID PANEL:  Lab Results   Component Value Date    CHOL 126 08/19/2021    HDL 58 08/19/2021    TRIG 52 08/19/2021     Echo 8/19/21:  Dilated LV with impella device. Septum, anterior, anterolateral and apex is   thin and akinetic. EF < 25 %   Mild mitral regurgitation. The left atrium is normal in size. The right ventricle is normal in size and function. Mild tricuspid regurgitation      Assessment:  1. Acute Anterior Myocardial Infarction  2. Cardiogenic Shock  3. Acute Kidney Injury  4. Acute Respiratory Failure with Hypoxia  5. Generalized Anxiety Disorder  6. Hematuria    Plan:   Nora Zamorano is improved today.  Her LFT's are improving. Continue the Integrilin drip for her PTCA and coronary lesions. Impella at P6 but increase to P7 to unload the ventricle. CVTS evaluating. She will likely require some further diuresis but her right atrial pressure is low as is her PCWP. Hold diuretics for now. Ho0ld ACEI/ARB/aldactone therapy for now with her acute kidney injury. Her hematuria has improved. Her LV function is quite reduced but this may recover further with diuresis and afterload reduction. Continue low dose nitro via drip. Appreciate Dr. Tone Diehl from Pulmonary for consultaton. Her PCWP was only 9mmHg. Per Dr. J Carlos Pearl note:   \"status post anterior lateral myocardial infarction on 07/23/2011, treated with thrombectomy and drug eluting stent to the circumflex:  Last stress echo 9-29-17 was negative for ischemia. \"      Critical Care Time: 35 minutes            Signed:  Colby Marshall MD

## 2021-08-20 NOTE — PLAN OF CARE
Problem: Pain:  Description: Pain management should include both nonpharmacologic and pharmacologic interventions. Goal: Pain level will decrease  Description: Pain level will decrease  Outcome: Ongoing  Note: Pt alert and oriented. Pt able to communicate present pain and use the pain scale appropriately. Nonpharmacological pain reducers and pain medication offered as needed. Will cont to monitor. Goal: Control of acute pain  Description: Control of acute pain  Outcome: Ongoing  Goal: Control of chronic pain  Description: Control of chronic pain  Outcome: Ongoing     Problem: Skin Integrity:  Goal: Will show no infection signs and symptoms  Description: Will show no infection signs and symptoms  Outcome: Ongoing  Goal: Absence of new skin breakdown  Description: Absence of new skin breakdown  Outcome: Ongoing  Note: Skin assessment complete, see flowsheet. Pt turned in bed q2h and as needed. Pt encouraged to change positions frequently. Sacral border in place, peeled back and skin WNL. Allkare applied to heels and elevated off bed. Pt checked for incontinence and orlin care completed frequently. Moisture barrier/ROZINA wipes applied to orlin area. Education given on importance of skin care, frequent turns, and moisture protection, pt verbalized understanding. Will continue to monitor. Problem: Preoperative Routine:  Description: WHO Universal Protocol. Consider the implementation of a low glycemic index diet prior to surgery. Consider the implementation of an infection control program.Consider the preoperative use of intra-aortic balloon pump counterpulsation. Goal: Will comply with preparation for surgery or procedure  Description: Will comply with preparation for surgery or procedure  Outcome: Ongoing  Note: RN and team continue to provide pre-op CABG education to pt and .        Problem: Urinary Elimination:  Goal: Signs and symptoms of infection will decrease  Description: Signs and symptoms of infection will decrease  Outcome: Ongoing  Goal: Complications related to the disease process, condition or treatment will be avoided or minimized  Description: Complications related to the disease process, condition or treatment will be avoided or minimized  Outcome: Ongoing  Note: No S/S of infection noted from lozano catheter. Lozano care performed Q shift with Chlorhexidine wipes and PRN. Will monitor. Problem: Fluid Volume - Imbalance:  Goal: Will show no signs and symptoms of excessive bleeding  Description: Will show no signs and symptoms of excessive bleeding  Outcome: Ongoing  Goal: Absence of imbalanced fluid volume signs and symptoms  Description: Absence of imbalanced fluid volume signs and symptoms  Outcome: Ongoing     Problem: Anxiety:  Goal: Level of anxiety will decrease  Description: Level of anxiety will decrease  Outcome: Ongoing     Problem: Cardiac Output - Decreased:  Goal: Cardiac output within specified parameters  Description: Cardiac output within specified parameters  Outcome: Ongoing     Problem: Serum Glucose Level - Abnormal:  Goal: Ability to maintain appropriate glucose levels will improve  Description: Ability to maintain appropriate glucose levels will improve  Outcome: Ongoing  Note: Pt on cardiac carb control diet. Blood glucose levels checked AC/HS. Insulin admin per order parameters. AM Lantus admin per order. Will cont to monitor.       Problem: Tissue Perfusion - Cardiopulmonary, Altered:  Goal: Circulatory function within specified parameters  Description: Circulatory function within specified parameters  Outcome: Ongoing  Goal: Absence of angina  Description: Absence of angina  Outcome: Ongoing  Goal: Hemodynamic stability will improve  Description: Hemodynamic stability will improve  Outcome: Ongoing     Problem: Tissue Perfusion - Peripheral, Altered:  Goal: Absence of hematoma at arterial access site  Description: Absence of hematoma at arterial access site  Outcome: Ongoing  Note: Right and left groin puncture site free of hematoma, oozing, or bleeding. Dressing C/D/I. Bilateral lower extremities noted to have doppler pulses with cool,dry skin. Pt denies any numbness, tingling, or pain. Impella device and swan remain intact and in place. Will monitor. Goal: Circulatory function of lower extremities is within specified parameters  Description: Circulatory function of lower extremities is within specified parameters  Outcome: Ongoing     Problem: Tissue Perfusion - Renal, Altered:  Goal: Electrolytes within specified parameters  Description: Electrolytes within specified parameters  Outcome: Ongoing  Goal: Urine creatinine clearance will be within specified parameters  Description: Urine creatinine clearance will be within specified parameters  Outcome: Ongoing  Goal: Serum creatinine will be within specified parameters  Description: Serum creatinine will be within specified parameters  Outcome: Ongoing  Goal: Ability to achieve a balanced intake and output will improve  Description: Ability to achieve a balanced intake and output will improve  Outcome: Ongoing     Problem: OXYGENATION/RESPIRATORY FUNCTION  Goal: Patient will maintain patent airway  Outcome: Ongoing  Goal: Patient will achieve/maintain normal respiratory rate/effort  Description: Respiratory rate and effort will be within normal limits for the patient  Outcome: Ongoing  Note: Respiratory rate and effort will be within normal limits for the patient. Pt respiratory rate, effort and breathing pattern assessed every shift and with vitals. Activity tolerance is assessed and monitored throughout shift and when out of bed. Oxygen needs are assessed and adjusted accordingly. Problem: HEMODYNAMIC STATUS  Goal: Patient has stable vital signs and fluid balance  Outcome: Ongoing  Note: Pulse rate and rhythm, peripheral pulses, and capillary refill assessed every shift with assessment.  General color and body temperature monitored throughout shift and with vitals. Assess for edema with head to toe assessment. Administer treatments and medications as ordered. Monitor patient's weight. Problem: FLUID AND ELECTROLYTE IMBALANCE  Goal: Fluid and electrolyte balance are achieved/maintained  Outcome: Ongoing     Problem: ACTIVITY INTOLERANCE/IMPAIRED MOBILITY  Goal: Mobility/activity is maintained at optimum level for patient  Outcome: Not Met This Shift  Note: Patient remains on bedrest due to Impella device in place to left femoral.  Will continue to assess daily.      Electronically signed by Trevon Marin RN on 8/20/2021 at 11:31 AM

## 2021-08-20 NOTE — PROGRESS NOTES
Pulmonary Progress Note    Date of Admission: 8/18/2021   LOS: 2 days       CC:  Hypoxemia    Subjective:    Return to P7 based on an echocardiogram.  Lasix. Weaned to 3 L nasal cannula. Continues to have some cough with blood unchanged from yesterday. ROS:   No nausea  No Vomiting  No chest pain      Assessment:     CAD  STEMI  EF 20%  Cardiogenic shock   Pulmonary edema   CKD stage 3  DM  hypothyroidism  Hx ILd from Aspiration - Seen Dr. Ct Null  Hx Asthma vs VCD  Hx PE 8/7/2011  Hx GAURAV  GERD  Hx Raynaud's    Plan:        Hospital Day: 2     Acute hypoxemia  *Possible pulmonary edema with pneumonia. Follow procalcitonin. Continue meropenem. Continue to monitor white count.     Cardiogenic shock  * impella P  *Per cardiology     Hemoptysis  *Episode hemoptysis. Not clear whether this is nosebleed versus pulmonary hemorrhage. At this point, appears to be mild. Decrease O2 flow and monitor closely  *Continue anticoagulation for now. *Unchanged from yesterday.        CAD  * Per cardiology      Transaminitis  *Improving yesterday. Interstitial lung disease  *Atelectasis versus scarring 2019. *Unable to find prior pulmonary function test  *        Nutrition  - ADULT DIET; Regular; 4 carb choices (60 gm/meal); Low Fat/Low Chol/High Fiber/2 gm Na; 2000 ml  Adult Oral Nutrition Supplement; Standard High Calorie/High Protein Oral Supplement  l         Access  Arterial     Arterial Sheath 08/18/21 Left (Active)   Continued need for line? Yes 08/20/21 0800   Site Assessment Clean;Dry; Intact 08/20/21 0800   Line Status Other (Comment) 08/20/21 0800   Dressing Status Clean;Dry; Intact 08/20/21 0800   Dressing Change Due 08/26/21 08/20/21 0800   Color/Movement/Sensation Capillary refill less than 3 sec; Cool fingers/toes 08/20/21 0800   Number of days: 1     PICC          CVC                      Data:        PHYSICAL EXAM:   Blood pressure 105/69, pulse 95, temperature 97.8 °F (36.6 °C), temperature source Oral, resp. rate 18, height 5' (1.524 m), weight 145 lb 8.1 oz (66 kg), SpO2 93 %.'  Body mass index is 28.42 kg/m². Gen: No distress. ENT:   Resp: No accessory muscle use. No crackles. No wheezes. No rhonchi. CV: Regular rate. Regular rhythm. No murmur or rub. No edema. Skin: Warm, dry, normal texture and turgor. No nodule on exposed extremities. M/S: No cyanosis. No clubbing. No joint deformity. Psych: Oriented x 3. No anxiety. Awake. Alert. Intact judgement and insight. Good Mood / Affect.   Memory appears in tact       Medications:    Scheduled Meds:   insulin glargine  8 Units Subcutaneous QAM    insulin lispro  0-12 Units Subcutaneous TID WC    insulin lispro  0-6 Units Subcutaneous Nightly    famotidine  20 mg Oral Daily    furosemide  60 mg Intravenous Daily    meropenem  500 mg Intravenous Q8H    LORazepam  0.5 mg Oral Daily    vitamin B-12  50 mcg Oral Daily    Vitamin D  1 tablet Oral Daily    conjugated estrogens   Vaginal Once per day on Mon Wed Fri    nortriptyline  25 mg Oral Nightly    levothyroxine  100 mcg Oral Daily    sodium chloride flush  5-40 mL Intravenous 2 times per day    rosuvastatin  40 mg Oral Nightly    aspirin  81 mg Oral Daily       Continuous Infusions:   sodium chloride 25 mL (08/20/21 1010)    eptifibatide 1 mcg/kg/min (08/20/21 0627)    dextrose      nitroGLYCERIN 20 mcg/min (08/20/21 0627)    heparin (Impella Purge) solution      heparin (Porcine) 250 Units/hr (08/20/21 0627)       PRN Meds:  oxyCODONE, morphine, promethazine, polyethylene glycol, sodium chloride flush, sodium chloride, acetaminophen, sodium chloride, ondansetron, hydrALAZINE, glucose, dextrose, glucagon (rDNA), dextrose, perflutren lipid microspheres    Labs reviewed:  CBC:   Recent Labs     08/18/21  1856 08/19/21  0535 08/20/21  0415   WBC 9.1 19.2* 17.9*   HGB 13.7 11.7* 10.0*   HCT 40.8 33.8* 29.9*   MCV 95.1 94.1 93.0    141 115*     BMP:   Recent Labs     08/18/21  7841 08/19/21  0630 08/20/21  0415    133* 134*   K 5.4* 4.6 3.6    101 96*   CO2 23 19* 25   PHOS  --  4.9 5.0*   BUN 26* 34* 31*   CREATININE 1.4* 1.3* 1.5*     LIVER PROFILE:   Recent Labs     08/18/21  1856 08/19/21  0630 08/20/21  0415   AST 19 494* 236*   ALT 10 145* 105*   LIPASE 23.0  --   --    BILIDIR  --   --  0.4*   BILITOT 0.5 2.3* 1.2*   ALKPHOS 136* 273* 208*     PT/INR:   Recent Labs     08/18/21 1856   PROTIME 12.3   INR 1.08     APTT:   Recent Labs     08/18/21 1856   APTT 30.0     UA:  Recent Labs     08/19/21  1029   COLORU RED*   PHUR 5.5   WBCUA *   RBCUA see below*   BACTERIA 3+*   CLARITYU CLOUDY*   SPECGRAV 1.015   LEUKOCYTESUR LARGE*   UROBILINOGEN 1.0   BILIRUBINUR MODERATE*   BLOODU LARGE*   GLUCOSEU Negative     No results for input(s): PH, PCO2, PO2 in the last 72 hours. Cx:      Films: This note was transcribed using [id number]. Please disregard any translational errors.       Vasyl Arredondo [facility] and [facility] [phone]

## 2021-08-21 NOTE — PROGRESS NOTES
Clinical Pharmacy Note  Renal Dose Adjustment    Marita Gonsalez is receiving Meropenem 500 mg IVPB Q8H. This medication is renally eliminated. Based on the patient's Estimated Creatinine Clearance: 24 mL/min (A) (based on SCr of 1.7 mg/dL (H)). and urine output, the dose has been adjusted to Meropenem 500 mg IVPB Q12H per protocol. Pharmacy will continue to monitor and adjust dose as needed for changes in renal function.       Mallorie Joseph RPH, PharmD, BCPS  8/21/2021 7:26 AM

## 2021-08-21 NOTE — PROGRESS NOTES
Order for double lumen PICC line per Dr. Alyssa Lim MD Pre procedure and timeout done with pt's RN. Okay with nephrology order Per Dr. Lisa Garcia. Right arm only. Successful insertion of a double lumen PICC line into pt's right basilic vein. No issues gaining access or advancing guidewire/introducer/PICC line. PICC tip terminates in the SVC according to [employer] 3CG tip confirmation system. PICC was seen dropping into SVC with tip tracking technology and discernable peaked p waves were noted without negative deflection. A printout will be in pt's chart. PICC is cut at 37cm and out externally 1 cm. Both lumens flush without resistance and draw back brisk blood return. PICC site CDI with hemostasis maintained and a biopatch applied to site. Pt instructed to stay in bed and keep arm flat and still for 30 minutes  to promote hemostasis.      Linad TANG given handoff report

## 2021-08-21 NOTE — CONSULTS
[facility]  Consult Note           Reason for Consult:  JESSICA/CKD stage 3  Requesting Physician:  Dr. Jenaro Martinez      History Obtained From:  patient, electronic medical record    History of Present Ilness:  78 y/o WF with h/o CAD, CKD stage 3 baseline Cr ~ 1.2-1.4 mg follows with Dr Nataly Ingram, presented on 8/18/21 with CP and diagnosed with STEMI Taken to cath lab and had PTCA of LAD, also had Impella placed for cardiogenic shock  Developed hematuria that has since resolved  Cr noted to have increased from 1.4 to 1.7 mg  Received Lasix initially but started on IVF's and Albumin as PCWP noted to be low UOP has picked up since  Urine CX growing E coli        Past Medical History:        Diagnosis Date    Anemia     Anxiety     Asthma     CAD (coronary artery disease)     s/p thrombectomy and ZAN to Circ 2011    Cerebral artery occlusion with cerebral infarction ([facility] [address]     mini strokes, told by PCP, pt doesn't recall any symptoms. many years ago.  Depression     Graves disease     ablation 4/16/21    Hiatal hernia     Hx of blood clots 2011    lungs after heart attack    Hyperlipidemia     Hypertension     IBS (irritable bowel syndrome)     w constipation and diarrhea. no bleeding    Kidney disease, chronic, stage III (moderate, EGFR 30-59 ml/min) ([facility])     MI (myocardial infarction) ([address] 08/18/2021    STEMI. PCI LAD, LCx    Pneumonia     aspiration pneumonitis 2007; 2019 bronchitis, outpt abx    Prolonged emergence from general anesthesia     Sleep apnea     does not need to use cpap per MD due to weight loss    Thyroid disease     hyper thyroidism    Urinary incontinence     UTI (urinary tract infection) 04/26/2021    Vocal cord dysfunction     Weight loss     lost 50 lbs.  intentional. working out, intake reduction       Past Surgical History:        Procedure Laterality Date    ARM SURGERY Left     L shoulder replacement    CHOLECYSTECTOMY      COLONOSCOPY      EYE SURGERY      contaract    GASTRIC FUNDOPLICATION  4125    hiatal hernia repair    HAND SURGERY Left     WRIST CLOSED REDUCTION Left 05/06/2003    left wrist fracture    WRIST FRACTURE SURGERY Right 10/02/2020    OPEN REDUCTION INTERNAL FIXATION RIGHT WRIST performed by Jaky Wright MD at [address] Medications:    No current facility-administered medications on file prior to encounter. Current Outpatient Medications on File Prior to Encounter   Medication Sig Dispense Refill    desvenlafaxine succinate (PRISTIQ) 50 MG TB24 extended release tablet Take 25 mg by mouth daily      levothyroxine (SYNTHROID) 100 MCG tablet Take 100 mcg by mouth Daily      glipiZIDE (GLUCOTROL) 5 MG tablet Take 5 mg by mouth once daily      aspirin EC 81 MG EC tablet Take 81 mg by mouth daily      amLODIPine (NORVASC) 2.5 MG tablet Take 1 tablet by mouth daily      famotidine (PEPCID) 40 MG tablet Take 1 tablet by mouth nightly      nitroGLYCERIN (NITROSTAT) 0.4 MG SL tablet as needed for Chest pain      atorvastatin (LIPITOR) 10 MG tablet Take 1 tablet by mouth nightly      carvedilol (COREG) 6.25 MG tablet Take by mouth Take 1 tablet by mouth in the morning and take 1.5 tablets by mouth nightly      vitamin D3 (CHOLECALCIFEROL) 25 MCG (1000 UT) TABS tablet Take 1 tablet by mouth daily      conjugated estrogens (PREMARIN) 0.625 MG/GM vaginal cream Place vaginally three times a week      fluticasone (FLONASE) 50 MCG/ACT nasal spray 1 spray by Nasal route nightly as needed      nortriptyline (PAMELOR) 25 MG capsule Take 1 capsule by mouth nightly      pantoprazole sodium (PROTONIX) 40 MG PACK packet Take 40 mg by mouth every morning (before breakfast)      LORazepam (ATIVAN) 0.5 MG tablet Take 0.5 mg by mouth daily.        lisinopril (PRINIVIL;ZESTRIL) 2.5 MG tablet Take 2.5 mg by mouth daily      fexofenadine (ALLEGRA) 30 MG tablet Take 30 mg by mouth 2 times daily      promethazine (PHENERGAN) 12.5 MG tablet Take 12.5 mg by mouth every 6 hours as needed for Nausea      polyethyl glycol-propyl glycol 0.4-0.3 % (SYSTANE) 0.4-0.3 % ophthalmic solution 1 drop as needed for Dry Eyes (6 drops daily)      cycloSPORINE (RESTASIS) 0.05 % ophthalmic emulsion Place 2 drops into the left eye 2 times daily      loteprednol (ALREX) 0.2 % SUSP 1 drop 2 times daily To eyes      solifenacin (VESICARE) 5 MG tablet Take 10 mg by mouth daily As needed      Multiple Vitamins-Minerals (THERAPEUTIC MULTIVITAMIN-MINERALS) tablet Take 1 tablet by mouth daily      polycarbophil (FIBERCON) 625 MG tablet Take 625 mg by mouth daily      vitamin B-12 (CYANOCOBALAMIN) 100 MCG tablet Take 50 mcg by mouth daily      polyethylene glycol (GLYCOLAX) packet Take 17 g by mouth daily as needed for Constipation         Allergies:  Augmentin [amoxicillin-pot clavulanate], Bactrim [sulfamethoxazole-trimethoprim], Carafate [sucralfate], Clindamycin/lincomycin, Hyoscyamine, Macrobid [nitrofurantoin], Oxybutynin, Pcn [penicillins], Pravastatin, Prevacid [lansoprazole], Prilosec [omeprazole], Sulfamethoxazole, and Trazodone and nefazodone    Social History:    Social History     Socioeconomic History    Marital status:      Spouse name: Not on file    Number of children: Not on file    Years of education: Not on file    Highest education level: Not on file   Occupational History    Not on file   Tobacco Use    Smoking status: Never Smoker    Smokeless tobacco: Never Used   Vaping Use    Vaping Use: Never used   Substance and Sexual Activity    Alcohol use: No     Comment: rare glass of wine    Drug use: No    Sexual activity: Not on file   Other Topics Concern    Not on file   Social History Narrative    Not on file     Social Determinants of Health     Financial Resource Strain:     Difficulty of Paying Living Expenses:    Food Insecurity:     Worried About Running Out of Food in the Last Year:     Ran Out of Food in the Last Year:    Transportation Needs:     Lack of Transportation (Medical):  Lack of Transportation (Non-Medical):    Physical Activity:     Days of Exercise per Week:     Minutes of Exercise per Session:    Stress:     Feeling of Stress :    Social Connections:     Frequency of Communication with Friends and Family:     Frequency of Social Gatherings with Friends and Family:     Attends Druze Services:     Active Member of Clubs or Organizations:     Attends Club or Organization Meetings:     Marital Status:    Intimate Partner Violence:     Fear of Current or Ex-Partner:     Emotionally Abused:     Physically Abused:     Sexually Abused:        Family History:   Family History   Problem Relation Age of Onset    Stroke Mother 61    Coronary Art Dis Father     Stroke Father     Lung Cancer Father     Heart Disease Father     Stroke Sister     Hypothyroidism Sister     Breast Cancer Sister     Heart Attack Paternal Grandmother         middle aged   [facility] Heart Attack Sister         in her 62s       Review of Systems:   Pertinent positives stated above in HPI. All other systems were reviewed and were negative.     Physical exam:   Constitutional:  VITALS:  BP (!) 97/59   Pulse 94   Temp 99.5 °F (37.5 °C) (Oral)   Resp 23   Ht 5' (1.524 m)   Wt 145 lb 8.1 oz (66 kg)   SpO2 97%   BMI 28.42 kg/m²   Gen: alert, awake, nad VM in place   Skin: no rash, turgor wnl  Heent:  eomi, mmm L nostril has scant blood  Neck: no bruits or jvd noted, thyroid normal  Cardiovascular:  S1, S2 without m/r/g  Respiratory: diminished BS bilaterally   Abdomen:  +bs, soft, nt, nd, no hepatosplenomegaly lozano in place Impella device in place  Ext: trace lower extremity edema  Psychiatric: mood and affect appropriate; judgement and insight intact  Musculoskeletal:  Rom, muscular strength intact; digits, nails normal    Data/  CBC:   Lab Results   Component Value Date    WBC 12.7 08/21/2021    RBC 2.86 08/21/2021    HGB 9.0 08/21/2021    HCT 26.5 08/21/2021    MCV 92.7 08/21/2021    MCH 31.5 08/21/2021    MCHC 34.0 08/21/2021    RDW 14.5 08/21/2021    PLT 84 08/21/2021    MPV 8.8 08/21/2021     BMP:    Lab Results   Component Value Date     08/21/2021    K 3.3 08/21/2021    K 4.6 08/19/2021    CL 92 08/21/2021    CO2 27 08/21/2021    BUN 35 08/21/2021    LABALBU 3.1 08/21/2021    CREATININE 1.7 08/21/2021    CALCIUM 7.9 08/21/2021    GFRAA 35 08/21/2021    LABGLOM 29 08/21/2021    GLUCOSE 165 08/21/2021         Assessment/  1-JESSICA/CKD suspect in the setting of Cardiogenic shock and IV dye exposure Also has UTI UOP improved  2-Ac Anterior MI s/p PTCA LAD  3-Cardiogenic shock s/p Impella  4 Hematuria resolved  5 UTI E coli on merrem  6 h/p HTN off of ACE-I/ARB due to JESSICA previously on it  7 Hypokalemia  8 Anemia blood loss  9 Shock liver improving    Plan/  1-Decrease IVF rate as PCWP now 16  2-Lasix per cardiology  3-Check Fe studies  4 Continue merrem  5 K+ replaced Mg++ OK  6 Monitor renal function closely Expect to stabilize with improvement in hemodynamics    Thank you for the consultation. Please do not hesitate to call with questions.     Lorna Cortés MD, Acey Gosselin

## 2021-08-21 NOTE — PROGRESS NOTES
Progress Note    CAD -  STEMI, S/P POBA LAD, LCx. Impella. Vital Signs:                                                 /67   Pulse 109   Temp 99.2 °F (37.3 °C) (Axillary)   Resp 16   Ht 5' (1.524 m)   Wt 145 lb 8.1 oz (66 kg)   SpO2 92%   BMI 28.42 kg/m²  O2 Flow Rate (L/min): 3 L/min   NSR  CVP (Mean): 5 mmHg  PAP: 28/13  PAP (Mean): 17 mmHg  Admission Weight: 159 lb 13.3 oz (72.5 kg)      Drips: hep, ntg, integ    I/O:      Intake/Output Summary (Last 24 hours) at 8/21/2021 0707  Last data filed at 8/21/2021 0645  Gross per 24 hour   Intake 1894.96 ml   Output 2691 ml   Net -796.04 ml     CV: reg  Pulm: decreased bilat  Abd: soft  Ext: warm, no edema. L fem Impella    Data Review:  CBC:   Recent Labs     08/19/21  0535 08/20/21  0415 08/21/21  0410   WBC 19.2* 17.9* 12.7*   HGB 11.7* 10.0* 9.0*   HCT 33.8* 29.9* 26.5*   MCV 94.1 93.0 92.7    115* 84*     BMP:   Recent Labs     08/19/21  0630 08/20/21  0415 08/21/21  0410   * 134* 131*   K 4.6 3.6 3.3*    96* 92*   CO2 19* 25 27   PHOS 4.9 5.0* 3.1   BUN 34* 31* 35*   CREATININE 1.3* 1.5* 1.7*   CALCIUM 8.4 7.9* 7.9*   MG 1.80 2.20 2.00     Cardiac Enzymes:   Recent Labs     08/18/21  1856 08/19/21  0630   TROPONINI <0.01 8.67*     PT/INR:   Recent Labs     08/18/21 1856   PROTIME 12.3   INR 1.08     APTT:   Recent Labs     08/18/21 1856   APTT 30.0   Endo - Cirilo  Pulm - Reny  Cards - Maldonado Opal  Heme - Firdaus  Urol - Rivera    cxr 8/18/21   An Impella device is now present.  Pulmonary edema is grossly unchanged.  The   heart size is at the upper limits of normal.  There is no large pleural   effusion or definite evidence for pneumothorax. Cath 7/23/11  1. Occluded mid LCX with obstructive 2 vessel CAD with 70% prox LCX, 100% mid LCX and 50% & 80% mid-distal RCA with diffuse 40% proximal and mid RCA  2.  Successful PPCI of LCX with Xience 2.5 x 15 mm (mid) and Xience 2.5 x 12 mm (prox) with mechanical thrombectomy, in the right lower lobe and lingula, suggestive of atelectasis or scar. 2. Otherwise no significant abnormality within the lung parenchyma. 3. Triple vessel coronary disease. 4. Mild cardiomegaly. 5. Nonspecific mildly enlarged precarinal lymph node. Esophagram 2018 no reflux  MRI head: 10/24/18 Moderate chronic small vessel ischemic change in mild diffuse atrophy suspected.      Has received Covid vaccine    Assessment/Plan:  Plan for echo evaluation on Monday possible surgery Tuesday

## 2021-08-21 NOTE — PROGRESS NOTES
Patient with persistent minor nose bleed from left nare; manual pressure held for several minutes to achieve hemostasis. O2 delivery switched from nasal cannula to venturi mask 3LPM/28% as patient is nasally congested and mouth breathing. Will monitor. Small amounts of dark red hemoptysis & epistaxis from left nare continue throughout the night. Left groin Impella site and right groin sheath site with PA Catheter stable. Hgb 9 this am on CBC.

## 2021-08-21 NOTE — PROGRESS NOTES
@0700-Shift handoff received from MAURILIO Alvarez RN. Impella intact and in place to L femoral, measured @ 92 cm. Bruising and small hematoma at insertion site remains, unchanged from previous assessments. Gilbert Bucy in place to R femoral, measured @ 65 cm. NTG, Heparin and Integrilin infusion through side port of venous sheath. CVP and PAP being transduced. No bleeding, oozing, or drainage noted from bilateral groin puncture sites. L nare noted to be bleeding intermittently. Pt on venturi-mask to prevent irritation from nasal probes. @0800-Shift assessment complete. Pt A&O x4. Denies chest pain and SOB. @18-DrJuliaette Servando on unit to round on pt. RN provided extensive history and updates. All questions answered. See orders. Wedge pressure-5.    @0911-Scheduled AM medications admin. Lasix held due to pt being hypovolemic at this time. Will continue to monitor. @0950-DrDeannie Lesch at bedside for AM rounds. See orders. @1110-Nephrology consult called. Dr. Trina Domínguez covering. Waiting for response. @1200-Reassessment complete. @1330-No call received from Nephrology. Another page sent. Waiting for response. @1450-No call from Nephrology. This RN placed a call to Nephrology. RN spoke with answering service. Questions answered and informed her that this RN needs a return call. @1500-Aaron Llamas, Nephrology at bedside. RN provided updates and questions answered. Gave this RN okay to place PICC line. Consent obtained and questionnaire completed. @1510-DrJuliaette Servando at bedside to evaluate pt. Wedge pressure up to 17. Instructed this RN to give scheduled Lasix that was held this AM.  RN verbalizes understanding. @1600-Reassessment complete. Pt continues to deny chest pain and SOB. Labs drawn and sent. No needs or complaints verbalized. Will continue to monitor. @1715-PICC line being placed at bedside. @1900-Shift handoff JACLYN Fay RN to assume care.   End of shift checks completed at bedside. Vital signs stable. Pt left in stable condition.     Electronically signed by Jaciel Kwan RN on 8/21/2021 at 7:10 PM

## 2021-08-21 NOTE — PROGRESS NOTES
[facility]   Daily Progress Note      Admit Date:  8/18/2021    CC: \"  Ms. Kal Gong  is a 77 y. o. female with a past medical history significant for CAD with prior PCI to her Circumflex in 2011 who presented to [facility] from home with chest pain that began at dinner tonight. STEMI was activated. She was taken to the cath lab and had a subtotal occlusion of her ostial LAD and a 99% Circumflex artery. I placed an Impella and performed PTCA to the ostia of the LAD and Circ restoring FLACO 3 flow. Subjective: Interval history(8/21/21)  Patient continues to stay on Impala at P7. She complains of feeling thirsty and dry in her mouth. Blood pressure remains low normal.  Surrency-Conchita reading shows low filling pressure with pulmonary capillary wedge pressure at 5 mmHg. Rhythm shows sinus tachycardia with few PACs.     Objective:   /65   Pulse 98   Temp 98.9 °F (37.2 °C) (Oral)   Resp 22   Ht 5' (1.524 m)   Wt 145 lb 8.1 oz (66 kg)   SpO2 95%   BMI 28.42 kg/m²     Intake/Output Summary (Last 24 hours) at 8/21/2021 0913  Last data filed at 8/21/2021 0800  Gross per 24 hour   Intake 1834.96 ml   Output 2666 ml   Net -831.04 ml     Wt Readings from Last 3 Encounters:   08/20/21 145 lb 8.1 oz (66 kg)   01/13/21 150 lb (68 kg)   11/25/20 150 lb (68 kg)     Telemetry:S tach    Physical Exam:  General:  NAD, Awake, alert and oriented X4  Skin:  Warm and dry  Neck:  Supple, no JVP appreciated, no bruit  Chest:  Clear to auscultation, no wheezes/rhonchi/rales  Cardiovascular: Tachycardia  Abdomen:  Soft, nontender, +bowel sounds  Extremities:  No LE edema groin sites showed no worsening hematoma                      Impella device is seen in left groin  Cardiac Diagnosis:  coronary artery disease, history of prior MI and CHF    Medications:    meropenem  500 mg Intravenous Q12H    albumin human  12.5 g Intravenous Once    lidocaine 1 % injection  5 mL Intradermal Once    furosemide  40 mg Intravenous Daily    insulin glargine  8 Units Subcutaneous QAM    insulin lispro  0-12 Units Subcutaneous TID WC    insulin lispro  0-6 Units Subcutaneous Nightly    famotidine  20 mg Oral Daily    LORazepam  0.5 mg Oral Daily    vitamin B-12  50 mcg Oral Daily    Vitamin D  1 tablet Oral Daily    conjugated estrogens   Vaginal Once per day on Mon Wed Fri    nortriptyline  25 mg Oral Nightly    levothyroxine  100 mcg Oral Daily    sodium chloride flush  5-40 mL Intravenous 2 times per day    rosuvastatin  40 mg Oral Nightly    aspirin  81 mg Oral Daily      sodium chloride      eptifibatide 1 mcg/kg/min (08/21/21 0600)    sodium chloride Stopped (08/20/21 1915)    dextrose      nitroGLYCERIN 10 mcg/min (08/21/21 0600)    heparin (Impella Purge) solution      heparin (Porcine) 300 Units/hr (08/21/21 0600)     potassium chloride, oxyCODONE, morphine, promethazine, polyethylene glycol, sodium chloride flush, sodium chloride, acetaminophen, sodium chloride, ondansetron, hydrALAZINE, glucose, dextrose, glucagon (rDNA), dextrose, perflutren lipid microspheres    Lab Data:  CBC:   Recent Labs     08/19/21  0535 08/20/21  0415 08/21/21  0410   WBC 19.2* 17.9* 12.7*   HGB 11.7* 10.0* 9.0*    115* 84*     BMP:    Recent Labs     08/19/21  0630 08/20/21  0415 08/21/21  0410   * 134* 131*   K 4.6 3.6 3.3*   CO2 19* 25 27   BUN 34* 31* 35*   CREATININE 1.3* 1.5* 1.7*     LIVR:   Recent Labs     08/19/21  0630 08/20/21  0415 08/21/21  0410   * 236* 125*   * 105* 66*     INR:    Recent Labs     08/18/21  1856   INR 1.08     APTT:   Recent Labs     08/18/21  1856   APTT 30.0     BNP:  No results for input(s): BNP in the last 72 hours. Imaging:Dilated LV with impella device. Septum, anterior, anterolateral and apex is   thin and akinetic. EF < 25 %   Mild mitral regurgitation. The left atrium is normal in size. The right ventricle is normal in size and function.    Mild tricuspid regurgitation      Assessment:  1. Acute Anterior Myocardial Infarction/ Cardiogenic Shock  -Patient remains hemodynamically stable on Impella device  -Echocardiogram from 8/19/2021 shows LVEF less than 25%  -Eugene-Conchita numbers from 8/21/2021 indicates low filling pressure  -We will cautiously give her albumin and saline and give her Lasix only if her pulmonary capillary wedge pressure rises above 10 mmHg    CAD  -Currently denies having any chest tightness or pressure  -Coronary angiography personally reviewed has shown significant three-vessel disease  -Being considered for CABG next week if her overall status improves. -Currently on heparin Integrilin and baby aspirin  -Not on beta-blocker therapy due to hypotension and not on statin therapy due to abnormal liver functions  3. Acute Kidney Injury  -Renal function slightly worse today due to volume depletion  -We will cautiously give her albumin and saline follow her closely  4. Acute Respiratory Failure with Hypoxia  -Due to cardiogenic shock and pulmonary edema  -Currently oxygen level stable on 3 L of oxygen  -Pulmonary is on board. Urinary tract infection  -Urine shows E. coli and she is currently on antibiotics      5. Generalized Anxiety Disorder  -Stable  6. Hematuria  -No gross hematuria at the present time.     Anemia  -Hemoglobin is steadily drifting down  -We will obtain iron studies and consider IV iron    Thrombocytopenia  -Probably related to Impella device but certainly HIT cannot be excluded      Complexity of medical decision making-very high    Critical care time spent is 35 minutes                 Electronically signed by Leonardo Hudson MD on 8/21/2021 at 9:13 AM

## 2021-08-21 NOTE — PROGRESS NOTES
Pulmonary Progress Note    Date of Admission: 8/18/2021   LOS: 3 days       CC:  Hypoxemia    Subjective:      P7   On venturi mask. ROS:   No nausea  No Vomiting  No chest pain      Assessment:     CAD  STEMI  EF 20%  Cardiogenic shock   Pulmonary edema   CKD stage 3  DM  hypothyroidism  Hx ILd from Aspiration - Seen Dr. Mikael Choe  Hx Asthma vs VCD  Hx PE 8/7/2011  Hx GAURAV  GERD  Hx Raynaud's    Plan:        Hospital Day: 3     Acute hypoxemia with UTI  *E. coli pansensitive. Multiple antibiotic allergies. Continue Merrem for now. Discussed with pharmacy about possible options. Concern for Levaquin with cardiac issues. *Leukocytosis improving. Procalcitonin improving.       Cardiogenic shock  * impella P7  *Per cardiology     Hemoptysis  *decreasing. Darker. * nose bleed from left nare. Thrombocytopenia  *Decreased to 86. HIT panel being ordered. *Worsening anemia. Monitor closely. No signs of bleeding. CKD  * mild increase. Nephrology consulted.         CAD  * Per cardiology      Transaminitis  *Improving yesterday. Interstitial lung disease  *Atelectasis versus scarring 2019. *Unable to find prior pulmonary function test  *        Nutrition  - ADULT DIET; Regular; 4 carb choices (60 gm/meal); Low Fat/Low Chol/High Fiber/2 gm Na; 2000 ml  Adult Oral Nutrition Supplement; Diabetic Oral Supplement  l         Access  Arterial     Arterial Sheath 08/18/21 Left (Active)   Continued need for line? Yes 08/20/21 0800   Site Assessment Clean;Dry; Intact 08/20/21 0800   Line Status Other (Comment) 08/20/21 0800   Dressing Status Clean;Dry; Intact 08/20/21 0800   Dressing Change Due 08/26/21 08/20/21 0800   Color/Movement/Sensation Capillary refill less than 3 sec; Cool fingers/toes 08/20/21 0800   Number of days: 1     PICC          CVC                      Data:        PHYSICAL EXAM:   Blood pressure 91/74, pulse 104, temperature 98.9 °F (37.2 °C), temperature source Oral, resp.  rate 21, height 5' (1.524 m), weight 145 lb 8.1 oz (66 kg), SpO2 (!) 89 %.'  Body mass index is 28.42 kg/m². Gen: No distress. ENT:   Resp: No accessory muscle use. No crackles. No wheezes. No rhonchi. CV: Regular rate. Regular rhythm. No murmur or rub. No edema. Skin: Warm, dry, normal texture and turgor. No nodule on exposed extremities. M/S: No cyanosis. No clubbing. No joint deformity. Psych: Oriented x 3. No anxiety. Awake. Alert. Intact judgement and insight. Good Mood / Affect.   Memory appears in tact       Medications:    Scheduled Meds:   meropenem  500 mg Intravenous Q12H    albumin human  12.5 g Intravenous Once    lidocaine 1 % injection  5 mL Intradermal Once    furosemide  40 mg Intravenous Daily    insulin glargine  8 Units Subcutaneous QAM    insulin lispro  0-12 Units Subcutaneous TID WC    insulin lispro  0-6 Units Subcutaneous Nightly    famotidine  20 mg Oral Daily    LORazepam  0.5 mg Oral Daily    vitamin B-12  50 mcg Oral Daily    Vitamin D  1 tablet Oral Daily    conjugated estrogens   Vaginal Once per day on Mon Wed Fri    nortriptyline  25 mg Oral Nightly    levothyroxine  100 mcg Oral Daily    sodium chloride flush  5-40 mL Intravenous 2 times per day    rosuvastatin  40 mg Oral Nightly    aspirin  81 mg Oral Daily       Continuous Infusions:   sodium chloride      eptifibatide 1 mcg/kg/min (08/21/21 0600)    sodium chloride Stopped (08/20/21 1915)    dextrose      nitroGLYCERIN 10 mcg/min (08/21/21 0600)    heparin (Impella Purge) solution      heparin (Porcine) 300 Units/hr (08/21/21 0600)       PRN Meds:  potassium chloride, oxyCODONE, morphine, promethazine, polyethylene glycol, sodium chloride flush, sodium chloride, acetaminophen, sodium chloride, ondansetron, hydrALAZINE, glucose, dextrose, glucagon (rDNA), dextrose, perflutren lipid microspheres    Labs reviewed:  CBC:   Recent Labs     08/19/21  0535 08/20/21  0415 08/21/21  0410   WBC 19.2* 17.9* 12.7*   HGB 11.7* 10.0* 9.0*   HCT 33.8* 29.9* 26.5*   MCV 94.1 93.0 92.7    115* 84*     BMP:   Recent Labs     08/19/21  0630 08/20/21  0415 08/21/21  0410   * 134* 131*   K 4.6 3.6 3.3*    96* 92*   CO2 19* 25 27   PHOS 4.9 5.0* 3.1   BUN 34* 31* 35*   CREATININE 1.3* 1.5* 1.7*     LIVER PROFILE:   Recent Labs     08/18/21  1856 08/18/21  1856 08/19/21  0630 08/20/21  0415 08/21/21  0410   AST 19   < > 494* 236* 125*   ALT 10   < > 145* 105* 66*   LIPASE 23.0  --   --   --   --    BILIDIR  --   --   --  0.4* 0.3   BILITOT 0.5   < > 2.3* 1.2* 1.1*   ALKPHOS 136*   < > 273* 208* 161*    < > = values in this interval not displayed. PT/INR:   Recent Labs     08/18/21 1856   PROTIME 12.3   INR 1.08     APTT:   Recent Labs     08/18/21 1856   APTT 30.0     UA:  Recent Labs     08/19/21  1029   COLORU RED*   PHUR 5.5   WBCUA *   RBCUA see below*   BACTERIA 3+*   CLARITYU CLOUDY*   SPECGRAV 1.015   LEUKOCYTESUR LARGE*   UROBILINOGEN 1.0   BILIRUBINUR MODERATE*   BLOODU LARGE*   GLUCOSEU Negative     No results for input(s): PH, PCO2, PO2 in the last 72 hours. Cx:      Films: This note was transcribed using [id number] [employer]. Please disregard any translational errors.       Vasyl Arredondo [facility] [phone]

## 2021-08-21 NOTE — PLAN OF CARE
Problem: Pain:  Goal: Pain level will decrease  Description: Pain level will decrease  Outcome: Met This Shift  Note: Patient reported 0/10 pain with each assessment overnight. Problem: Urinary Elimination:  Goal: Signs and symptoms of infection will decrease  Description: Signs and symptoms of infection will decrease  Outcome: Ongoing     Problem: Fluid Volume - Imbalance:  Goal: Will show no signs and symptoms of excessive bleeding  Description: Will show no signs and symptoms of excessive bleeding  Outcome: Ongoing  Note: Small amounts of hemoptysis & epistaxis continues throughout the night     Problem: Anxiety:  Goal: Level of anxiety will decrease  Description: Level of anxiety will decrease  Outcome: Ongoing  Note: Patient expressed concern & fear of pended cardiac surgery.      Problem: Tissue Perfusion - Cardiopulmonary, Altered:  Goal: Absence of angina  Description: Absence of angina  Outcome: Met This Shift  Goal: Hemodynamic stability will improve  Description: Hemodynamic stability will improve  Outcome: Met This Shift     Problem: Tissue Perfusion - Peripheral, Altered:  Goal: Absence of hematoma at arterial access site  Description: Absence of hematoma at arterial access site  Outcome: Ongoing     Problem: Falls - Risk of:  Goal: Will remain free from falls  Description: Will remain free from falls  Outcome: Met This Shift  Goal: Absence of physical injury  Description: Absence of physical injury  Outcome: Met This Shift

## 2021-08-22 NOTE — PROGRESS NOTES
Pulmonary Progress Note    Date of Admission: 8/18/2021   LOS: 4 days       CC:  Hypoxemia    Subjective:    impella and lasix. ROS:    sleeping      Assessment:     CAD  STEMI  EF 20%  Cardiogenic shock   Pulmonary edema   CKD stage 3  DM  hypothyroidism  Hx ILd from Aspiration - Seen Dr. Ceferino Rubio  Hx Asthma vs VCD  Hx PE 8/7/2011  Hx GAURAV  GERD  Hx Raynaud's    Plan:        Hospital Day: 4     Acute hypoxemia with UTI  *E. coli pansensitive. Multiple antibiotic allergies. Continue Merrem for now. Discussed with pharmacy about possible options. Concern for Levaquin with cardiac issues. *Leukocytosis improving. Procalcitonin improving.           Cardiogenic shock  *Per cardiology     Hemoptysis  *montior    Thrombocytopenia  *Decreased to 64. HIT panel negative. *Worsening anemia. * impella? H/O consulted. CKD  * mild increase. Nephrology consulted.         CAD  * Per cardiology      Transaminitis  *Improving yesterday. Interstitial lung disease  *Atelectasis versus scarring 2019. *Unable to find prior pulmonary function test  *        Nutrition  - ADULT DIET; Regular; 4 carb choices (60 gm/meal); Low Fat/Low Chol/High Fiber/2 gm Na; 2000 ml  Adult Oral Nutrition Supplement; Diabetic Oral Supplement  l         Access  Arterial     Arterial Sheath 08/18/21 Left (Active)   Continued need for line? Yes 08/20/21 0800   Site Assessment Clean;Dry; Intact 08/20/21 0800   Line Status Other (Comment) 08/20/21 0800   Dressing Status Clean;Dry; Intact 08/20/21 0800   Dressing Change Due 08/26/21 08/20/21 0800   Color/Movement/Sensation Capillary refill less than 3 sec; Cool fingers/toes 08/20/21 0800   Number of days: 1     PICC          CVC                      Data:        PHYSICAL EXAM:   Blood pressure 104/67, pulse 103, temperature 99.4 °F (37.4 °C), temperature source Oral, resp. rate 24, height 5' (1.524 m), weight 145 lb 8.1 oz (66 kg), SpO2 100 %.'  Body mass index is 28.42 kg/m².    Gen: No distress. ENT:   Resp: No accessory muscle use. No crackles. No wheezes. No rhonchi. CV: Regular rate. Regular rhythm. No murmur or rub. No edema. Skin: Warm, dry, normal texture and turgor. No nodule on exposed extremities. M/S: No cyanosis. No clubbing. No joint deformity.   Psych: sleeping     Medications:    Scheduled Meds:   albumin human  12.5 g Intravenous Once    iron sucrose  200 mg Intravenous Once    meropenem  500 mg Intravenous Q12H    lidocaine 1 % injection  5 mL Intradermal Once    furosemide  40 mg Intravenous Daily    docusate sodium  100 mg Oral Daily    insulin glargine  8 Units Subcutaneous QAM    insulin lispro  0-12 Units Subcutaneous TID WC    insulin lispro  0-6 Units Subcutaneous Nightly    famotidine  20 mg Oral Daily    LORazepam  0.5 mg Oral Daily    vitamin B-12  50 mcg Oral Daily    Vitamin D  1 tablet Oral Daily    conjugated estrogens   Vaginal Once per day on Mon Wed Fri    nortriptyline  25 mg Oral Nightly    levothyroxine  100 mcg Oral Daily    sodium chloride flush  5-40 mL Intravenous 2 times per day    rosuvastatin  40 mg Oral Nightly    aspirin  81 mg Oral Daily       Continuous Infusions:   sodium chloride 25 mL/hr at 08/22/21 0635    eptifibatide 1 mcg/kg/min (08/22/21 0635)    sodium chloride Stopped (08/21/21 1317)    dextrose      nitroGLYCERIN 10 mcg/min (08/22/21 0635)    heparin (Impella Purge) solution      heparin (Porcine) 450 Units/hr (08/22/21 0635)       PRN Meds:  oxyCODONE, morphine, promethazine, polyethylene glycol, sodium chloride flush, sodium chloride, acetaminophen, sodium chloride, ondansetron, hydrALAZINE, glucose, dextrose, glucagon (rDNA), dextrose, perflutren lipid microspheres    Labs reviewed:  CBC:   Recent Labs     08/20/21 0415 08/21/21  0410 08/22/21  0530   WBC 17.9* 12.7* 10.2   HGB 10.0* 9.0* 8.5*   HCT 29.9* 26.5* 24.3*   MCV 93.0 92.7 92.4   * 84* 64*     BMP:   Recent Labs     08/20/21 0415 08/21/21  0410 08/21/21  1610   * 131* 133*   K 3.6 3.3* 4.0   CL 96* 92* 97*   CO2 25 27 26   PHOS 5.0* 3.1  --    BUN 31* 35* 33*   CREATININE 1.5* 1.7* 1.6*     LIVER PROFILE:   Recent Labs     08/20/21  0415 08/21/21  0410 08/22/21  0530   * 125*  --    * 66*  --    BILIDIR 0.4* 0.3 <0.2   BILITOT 1.2* 1.1*  --    ALKPHOS 208* 161*  --      PT/INR:   No results for input(s): PROTIME, INR in the last 72 hours. APTT:   No results for input(s): APTT in the last 72 hours. UA:  Recent Labs     08/19/21  1029   COLORU RED*   PHUR 5.5   WBCUA *   RBCUA see below*   BACTERIA 3+*   CLARITYU CLOUDY*   SPECGRAV 1.015   LEUKOCYTESUR LARGE*   UROBILINOGEN 1.0   BILIRUBINUR MODERATE*   BLOODU LARGE*   GLUCOSEU Negative     No results for input(s): PH, PCO2, PO2 in the last 72 hours. Cx:      Films: This note was transcribed using [id number] [employer]. Please disregard any translational errors.       Vasyl Arredondo [facility] [phone]

## 2021-08-22 NOTE — PROGRESS NOTES
@0700-Bedside shift handoff completed with Elin Mercedes RN. NTG, Heparin, Integrilin and IVF infusing, see eMAR. Rates verified. Impella in place to L femoral @ 92 cm. Functioning without issue. SWAN in place to R femoral @ 65 cm. Bilateral groins soft. No s/s of bleeding. Doppler bilat DP and PT pulses. Skin warm and dry. Libra Coker, Pulmonology at bedside for AM rounds. RN provided updates and questions answered. No new orders at this time. @0742-Call received from Dr. Tatyana Vergara. RN provided updates, questions answered and POC discussed. @2972-WB RN discussing POC with Dr. Bonnie Sawant, Hematology/Oncology. Informed this RN that he will discuss directly with Dr. Tatyana Vergara. @0800-. Blood glucose 170. Initial assessment complete. Bath performed with CHG wipes. Lozano care completed with green lozano wipes. All linens changed. Pt turned and repositioned. Abrasion noted on R buttock, bleeding. Mepilex border placed. Preventative sacral border applied. Petechiae noted on R lower back and buttock, will monitor closely. @0900-Scheduled AM medications administered. Lasix held d/t pt being hypovolemic.    @1000-. Labs drawn and sent.    @1030-Dr. January Jorge, Oncology at bedside to evaluate pt. See orders. @1150-Call received from Dr. Mariam Thompson, Cardiology. RN provided updates and questions answered. Verbal order to decrease Impella to P-6 and hold 20 mg IV Lasix. RN verbalizes understanding. No further orders at this time. @1200-. Blood glucose 195. Reassessment complete. No changes. VSS. Pt denies chest pain and SOB. Remains on room air.      @1217-Nicole, [facility] notified of . Instructed this RN to decrease systemic Heparin gtt to 350 units/hr. See eMAR.    @1230-Candie, Impella rep at bedside. RN provided updates and questions answered. @1307-. Sandra Funk, [facility] notified. Systemic Heparin gtt decreased to 300 units/hr.   See eMAR.    @1315- Gay Oviedo, Nephrology at bedside to evaluate pt. No new orders. @1400-. No changes to Heparin gtt. @1500-. Robb Serrato, [facility] notified. Systemic Heparin gtt decreased to 250 units/hr. See eMAR.    @1518-Call received from Pat Ramirez, [facility]. Discussed Integrilin gtt, Plt count, and high risk of bleeding. Informed this RN to draw Plt count @ 1800 and call Dr. Jenaro Martinez with results if less than prior result of 64. See orders. @1600-. Blood glucose 158. No changes to Heparin gtt. Reassessment complete. VSS. Pt continues to deny chest pain and SOB. SR on monitor. Remains on room air. No needs or complaints verbalized. Will continue to monitor. @1700-. No changes to Heparin gtt. @1800-. No changes to Heparin gtt. Labs drawn and sent. Intake/ouput noted. IV pumps cleared and lozano bag emptied. @1825-Basia Olson called and notified of plt count 62. Instructed this RN to notify Dr. Nilda Roca. RN verbalizes understanding. @1829-Carmel Flores notified of plt count 62. Waiting for response. @1900-Shift handoff given to MAURILIO Posey RN to assume care. End of shift checks completed at bedside. Pt left in stable condition.       Electronically signed by Laila Avery RN on 8/22/2021 at 7:13 PM

## 2021-08-22 NOTE — PROGRESS NOTES
Brief Consult Note    Spoke w/ nursing and Dr Jen Zapata regarding anemia and thrombocytopenia    This is a lady who suffered an MI with a left main coronary artery lesion that will require CABG. This is planned for Tuesday. She underwent PTCA of the LAD and circumflex. She has received Brillinta and is receiving ASA, heparin, Integrelin and is being treated with an Impella. Over the past 3 days, Hgb and plts are trending down. A HIT panel was negative. Recent Labs     08/22/21  0530 08/21/21  0410 08/20/21  0415   WBC 10.2 12.7* 17.9*   NEUTROABS 6.8 9.3* 15.9*   LYMPHOPCT 19.1 14.6 3.0   RBC 2.63* 2.86* 3.22*   HGB 8.5* 9.0* 10.0*   HCT 24.3* 26.5* 29.9*   MCV 92.4 92.7 93.0   MCH 32.3 31.5 30.9   MCHC 34.9 34.0 33.3   RDW 14.8 14.5 14.9   PLT 64* 84* 115*     Will check a fibrinogen level to help R/O DIC. With a normal MCV, I doubt there is significant hemolysis. With the Impella and aneurysm, I suspect there is enough RBC destruction to decrease the haptoglobin. Nonetheless, will check a haptoglobin. I suspect there is blood loss. I doubt she is a candidate for endoscopy. Since Pepcid can decrease the platelets, would suggest stopping it and switching to a PPI. (She has Omeprazole and Lansoprazole listed as two of her many drug allergies). A chest CT did not suggest a hematoma. Nursing indicates there has been no bruising/ecchymosis in the LE, flanks or back; no bleeding from vascular access sites. An abdomen CT can be considered if there is concern for a retroperitoneal hematoma. I discussed her case with Dr Jen Zapata over the phone. The usual recommendation would be to D/C anticoagulants and antiplatelet drugs to stop their contribution to bleeding and bleeding risk. Obviously, this is not feasible. In anticipation of by-pass surgery, recommend transfusion support to get the plts 80 or greater and the Hgb 10.     Full consult to follow    Angelo Vanegas MD

## 2021-08-22 NOTE — PROGRESS NOTES
Clinical Pharmacy Note  Heparin Dosing - Impella Device    Patient receiving heparin purge solution via Impella device. Consult received from Dr. Tona Aden to titrate systemic heparin to maintain . Shift ACT Results and Heparin Adjustments:      Date   Time POC ACT  Result Heparin  Bolus   (units) Systemic Heparin Infusion Rate (100 units/mL)   8/21 2000 153  400 u/h                     Pharmacy will continue to monitor and adjust based on ACT results.

## 2021-08-22 NOTE — CONSULTS
[facility]  Consult Note      Requesting Physician: Amos Douglass MD    CHIEF COMPLAINT:  Acute Coronary Syndrome    HISTORY OF PRESENT ILLNESS:  Ms. Shashank Mcdaniel  is a 77 y.o. female we are seeing in consultation for anemia and thrombocytopenia in the face of a recent MI with anterior and lateral wall hypokinesis as well as the septum and apex and critical coronary artery disease in the circumflex and LAD S/P PTCA to the circ and LAD. She is planned to undergo CABG surgery Tuesday. She has received Brillinta and is receiving ASA, heparin, Integrelin and is being treated with an Impella. Over the past 3 days, Hgb and plts are trending down. A HIT panel was negative. Fibrinogen is normal. She has no personal or family history of bleeding problems. She has had multiple previous surgical procedures without bleeding issues. Hem/Onc is consulted for recommendations. Past Medical History:        Diagnosis Date    Anemia     Anxiety     Asthma     CAD (coronary artery disease)     s/p thrombectomy and ZAN to Circ 2011    Cerebral artery occlusion with cerebral infarction ([facility] [address]     mini strokes, told by PCP, pt doesn't recall any symptoms. many years ago.  Depression     Graves disease     ablation 4/16/21    Hiatal hernia     Hx of blood clots 2011    lungs after heart attack    Hyperlipidemia     Hypertension     IBS (irritable bowel syndrome)     w constipation and diarrhea. no bleeding    Kidney disease, chronic, stage III (moderate, EGFR 30-59 ml/min) (HCC)     MI (myocardial infarction) ([facility] [address] 08/18/2021    STEMI.  PCI LAD, LCx    Pneumonia     aspiration pneumonitis 2007; 2019 bronchitis, outpt abx    Prolonged emergence from general anesthesia     Sleep apnea     does not need to use cpap per MD due to weight loss    Thyroid disease     hyper thyroidism    Urinary incontinence     UTI (urinary tract infection) 04/26/2021    Vocal cord dysfunction     Weight loss     lost 50 lbs.  intentional. working out, intake reduction       Past Surgical History:        Procedure Laterality Date    ARM SURGERY Left     L shoulder replacement    CHOLECYSTECTOMY      COLONOSCOPY      EYE SURGERY      contaract    GASTRIC FUNDOPLICATION  2690    hiatal hernia repair    HAND SURGERY Left     WRIST CLOSED REDUCTION Left 05/06/2003    left wrist fracture    WRIST FRACTURE SURGERY Right 10/02/2020    OPEN REDUCTION INTERNAL FIXATION RIGHT WRIST performed by Sharon Choe MD at [place] [address]       Current Medications:  Current Facility-Administered Medications: [START ON 8/23/2021] furosemide (LASIX) injection 20 mg, 20 mg, Intravenous, Daily  meropenem (MERREM) 500 mg IVPB (mini-bag), 500 mg, Intravenous, Q12H  lidocaine PF 1 % injection 5 mL, 5 mL, Intradermal, Once  0.9 % sodium chloride infusion, , Intravenous, Continuous  docusate sodium (COLACE) capsule 100 mg, 100 mg, Oral, Daily  eptifibatide (INTEGRILIN) 0.75 mg/mL infusion, 1 mcg/kg/min, Intravenous, Continuous  oxyCODONE (ROXICODONE) immediate release tablet 5 mg, 5 mg, Oral, Q4H PRN  insulin glargine (LANTUS) injection vial 8 Units, 8 Units, Subcutaneous, QAM  morphine (PF) injection 2 mg, 2 mg, Intravenous, Q4H PRN  insulin lispro (HUMALOG) injection vial 0-12 Units, 0-12 Units, Subcutaneous, TID WC  insulin lispro (HUMALOG) injection vial 0-6 Units, 0-6 Units, Subcutaneous, Nightly  famotidine (PEPCID) tablet 20 mg, 20 mg, Oral, Daily  LORazepam (ATIVAN) tablet 0.5 mg, 0.5 mg, Oral, Daily  promethazine (PHENERGAN) tablet 12.5 mg, 12.5 mg, Oral, Q6H PRN  vitamin B-12 (CYANOCOBALAMIN) tablet 50 mcg, 50 mcg, Oral, Daily  polyethylene glycol (GLYCOLAX) packet 17 g, 17 g, Oral, Daily PRN  Vitamin D (CHOLECALCIFEROL) tablet 1,000 Units, 1 tablet, Oral, Daily  conjugated estrogens (PREMARIN) vaginal cream, , Vaginal, Once per day on Mon Wed Fri  nortriptyline (PAMELOR) capsule 25 mg, 25 mg, Oral, Nightly  levothyroxine (SYNTHROID) tablet 100 mcg, 100 mcg, Oral, Daily  sodium chloride flush 0.9 % injection 5-40 mL, 5-40 mL, Intravenous, 2 times per day  sodium chloride flush 0.9 % injection 5-40 mL, 5-40 mL, Intravenous, PRN  0.9 % sodium chloride infusion, 25 mL, Intravenous, PRN  acetaminophen (TYLENOL) tablet 650 mg, 650 mg, Oral, Q4H PRN  0.9 % sodium chloride bolus, 250 mL, Intravenous, PRN  ondansetron (ZOFRAN) injection 4 mg, 4 mg, Intravenous, Q6H PRN  hydrALAZINE (APRESOLINE) injection 10 mg, 10 mg, Intravenous, Q1H PRN  rosuvastatin (CRESTOR) tablet 40 mg, 40 mg, Oral, Nightly  aspirin chewable tablet 81 mg, 81 mg, Oral, Daily  glucose (GLUTOSE) 40 % oral gel 15 g, 15 g, Oral, PRN  dextrose 50 % IV solution, 12.5 g, Intravenous, PRN  glucagon (rDNA) injection 1 mg, 1 mg, Intramuscular, PRN  dextrose 5 % solution, 100 mL/hr, Intravenous, PRN  perflutren lipid microspheres (DEFINITY) injection 1.65 mg, 1.5 mL, Intravenous, ONCE PRN  nitroGLYCERIN 50 mg in dextrose 5% 250 mL infusion, 20 mcg/min, Intravenous, Continuous  heparin (porcine) 12,500 Units in dextrose 5 % 500 mL infusion (FOR IMPELLA PURGE), , Intracatheter, Continuous  heparin 25,000 unit in sodium chloride 0.45% 250 mL (premix) infusion, 0-3,000 Units/hr, Intravenous, Continuous    Allergies:  Augmentin [amoxicillin-pot clavulanate], Bactrim [sulfamethoxazole-trimethoprim], Carafate [sucralfate], Clindamycin/lincomycin, Hyoscyamine, Macrobid [nitrofurantoin], Oxybutynin, Pcn [penicillins], Pravastatin, Prevacid [lansoprazole], Prilosec [omeprazole], Sulfamethoxazole, and Trazodone and nefazodone    Social History:  Social History     Socioeconomic History    Marital status:      Spouse name: Not on file    Number of children: Not on file    Years of education: Not on file    Highest education level: Not on file   Occupational History    Not on file   Tobacco Use    Smoking status: Never Smoker    Smokeless tobacco: Never Used   Vaping Use    Vaping Use: Never used Substance and Sexual Activity    Alcohol use: No     Comment: rare glass of wine    Drug use: No    Sexual activity: Not on file   Other Topics Concern    Not on file   Social History Narrative    Not on file     Social Determinants of Health     Financial Resource Strain:     Difficulty of Paying Living Expenses:    Food Insecurity:     Worried About Running Out of Food in the Last Year:     [address] in the Last Year:    Transportation Needs:     Lack of Transportation (Medical):  Lack of Transportation (Non-Medical):    Physical Activity:     Days of Exercise per Week:     Minutes of Exercise per Session:    Stress:     Feeling of Stress :    Social Connections:     Frequency of Communication with Friends and Family:     Frequency of Social Gatherings with Friends and Family:     Attends [employer] Services:     Active Member of Clubs or Organizations:     Attends Club or Organization Meetings:     Marital Status:    Intimate Partner Violence:     Fear of Current or Ex-Partner:     Emotionally Abused:     Physically Abused:     Sexually Abused:        Family History:         Problem Relation Age of Onset    Stroke Mother 61    Coronary Art Dis Father     Stroke Father     Lung Cancer Father     Heart Disease Father     Stroke Sister     Hypothyroidism Sister     Breast Cancer Sister     Heart Attack Paternal Grandmother         middle aged   Claire Mcdaniel Heart Attack Sister         in her 62s       REVIEW OF SYSTEMS:    Review of Systems   Constitutional: Negative. Negative for fever. HENT: Positive for nosebleeds. Eyes: Negative. Respiratory: Negative. Negative for shortness of breath. Cardiovascular: Negative. Negative for chest pain. Gastrointestinal: Negative. Endocrine: Negative. Genitourinary: Negative. Musculoskeletal: Negative. Skin: Negative. Allergic/Immunologic: Negative. Neurological: Negative. Hematological: Negative. PHYSICAL EXAM:      Vitals:  BP 99/67   Pulse 98   Temp 98.5 °F (36.9 °C) (Oral)   Resp 21   Ht 5' (1.524 m)   Wt 144 lb 2.9 oz (65.4 kg) Comment: Bed rezeroed with lift pad  SpO2 95%   BMI 28.16 kg/m²       Intake/Output Summary (Last 24 hours) at 8/22/2021 1028  Last data filed at 8/22/2021 1000  Gross per 24 hour   Intake 2049.65 ml   Output 3815 ml   Net -1765.35 ml       Physical Exam  Constitutional:       General: She is not in acute distress. Appearance: She is ill-appearing. HENT:      Nose:      Comments: Dried blood left naris     Mouth/Throat:      Comments: Edentulous. No mucosal bleeding  Eyes:      General: No scleral icterus. Cardiovascular:      Rate and Rhythm: Normal rate and regular rhythm. Heart sounds: No murmur heard. No gallop. Pulmonary:      Effort: Pulmonary effort is normal.      Breath sounds: Normal breath sounds. Abdominal:      Palpations: Abdomen is soft. Tenderness: There is no abdominal tenderness. Musculoskeletal:         General: No swelling. Lymphadenopathy:      Cervical: No cervical adenopathy. Skin:     General: Skin is warm and dry. Findings: No bruising or rash. Neurological:      General: No focal deficit present. Mental Status: She is alert and oriented to person, place, and time.          DATA:  Recent Labs     08/22/21  0530 08/21/21  0410 08/20/21  0415   WBC 10.2 12.7* 17.9*   NEUTROABS 6.8 9.3* 15.9*   LYMPHOPCT 19.1 14.6 3.0   RBC 2.63* 2.86* 3.22*   HGB 8.5* 9.0* 10.0*   HCT 24.3* 26.5* 29.9*   MCV 92.4 92.7 93.0   MCH 32.3 31.5 30.9   MCHC 34.9 34.0 33.3   RDW 14.8 14.5 14.9   PLT 64* 84* 115*       Lab Results   Component Value Date     (L) 08/22/2021    K 3.5 08/22/2021    CL 95 (L) 08/22/2021    CO2 24 08/22/2021    GLUCOSE 148 (H) 08/22/2021    BUN 36 (H) 08/22/2021    CREATININE 1.6 (H) 08/22/2021    LABGLOM 31 (A) 08/22/2021    GFRAA 38 (A) 08/22/2021    CALCIUM 8.4 08/22/2021    PROT 7.4 08/22/2021 regurgitation. Signature   ------------------------------------------------------------------  Electronically signed by Margot Kendall MD (Interpreting  physician) on 08/19/2021 at 01:54 PM  ------------------------------------------------------------------   Findings   Left Ventricle  Ejection fraction is visually estimated to be <25 %. Basalar anterior myocardium only movement  Grade II diastolic dysfunction with elevated LV filling pressures. Mitral Valve  Mitral annular calcification is present. Mild mitral regurgitation. No evidence of mitral stenosis. Left Atrium  The left atrium is normal in size. Aortic Valve  The aortic valve is not well visualized/interrogated due to the impella  device. Aorta  The aortic root is normal in size. Right Ventricle  The right ventricle is normal in size and function. Tricuspid Valve  Tricuspid valve is structurally normal.  Mild tricuspid regurgitation. No evidence of tricuspid stenosis. Right Atrium  The right atrial size is normal.   Pulmonic Valve  The pulmonic valve is not well visualized. There is no evidence of pulmonic valve regurgitation or stenosis. Pericardial Effusion  No pericardial effusion noted. Pleural Effusion  No pleural effusion. Miscellaneous  IVC size is normal (<2.1cm) and collapses > 50% with respiration consistent  with normal RA pressure (3mmHg). Estimated pulmonary artery systolic pressure is normal at 26 mmHg assuming a  right atrial pressure of 3 mmHg.   M-Mode/2D Measurements (cm)   LV Diastolic Dimension: 4.45 cm LV Systolic Dimension: 4 cm  LV Septum Diastolic: 2.94 cm  LV PW Diastolic: 9.72 cm        AO Root Dimension: 2.6 cm  RV Diastolic Dimension: 5.52 cm                                  LA Area: 18.6 cm2                                  LA volume/Index: 57.5 ml /35 ml/m2  Doppler Measurements                                  MV Peak E-Wave: 84.8 cm/s                                 MV Peak A-Wave: 128 cm/s MV E/A Ratio: 0.66  TR Velocity:240 cm/s           MV P1/2t: 80 msec  TR Gradient:23.04 mmHg  Estimated RAP:3 mmHg  E' Septal Velocity: 3.59 cm/s  E' Lateral Velocity: 3.15 cm/s MV Deceleration Time: 274 msec  PV Peak Velocity: 80.8 cm/s    MV Area (PHT): 2.75 cm2  PV Peak Gradient: 2.61 mmHg  Aorta   Aortic Root: 2.6 cm      CT CHEST WO CONTRAST    Result Date: 8/19/2021  EXAMINATION: CT OF THE CHEST WITHOUT CONTRAST 8/19/2021 12:41 pm TECHNIQUE: CT of the chest was performed without the administration of intravenous contrast. Multiplanar reformatted images are provided for review. Dose modulation, iterative reconstruction, and/or weight based adjustment of the mA/kV was utilized to reduce the radiation dose to as low as reasonably achievable. COMPARISON: None. HISTORY: ORDERING SYSTEM PROVIDED HISTORY: Chest pain, dyspnea TECHNOLOGIST PROVIDED HISTORY: Reason for exam:->Chest pain, dyspnea Reason for Exam: Chest pain, dyspnea Acuity: Acute Type of Exam: Initial FINDINGS: Mediastinum: 1. There is no significant hilar or mediastinal adenopathy. There is identification of lymph nodes greater pretracheal and precarinal largest seen to the right of midline, 13 mm short axis. There is some limitation without intravenous contrast. 2. No significant cardiac enlargement or pericardial effusion. Severe 3 vessel coronary artery calcifications present. In inferior approach swan catheter extends into the distal aspect of the right main pulmonary artery. In addition, a inferior approach aortic catheter extends into the left ventricular chamber representing and Impella device. There is some metallic and motion artifact noted. No pericardial effusion. 3. 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 There is dense calcification along the margins.   This is better seen on sagittal projection and projects inferiorly to the lower margin 12 mm, and extends approximately 16 mm from proximal to distal. 4. Pulmonary vessels are unremarkable on noncontrast imaging. Lungs/pleura: 1. There is multifocal airspace disease. This extends outward from the rod bilaterally but in a patchy and slightly asymmetric distribution. There is pleural disease present but greater than fluid density. This could represent pleural thickening. Complex fluid also considered. 2. There is a somewhat nodular density, pleural based right lower lobe posteriorly measuring 10 mm series 3, image 39. 3. Upper Abdomen: Unremarkable. Adrenal glands normal. Soft Tissues/Bones: Unremarkable. Multifocal bilateral airspace disease, more typical of inflammatory disease or multifocal pneumonia specifically. Viral pneumonia could also have this appearance. [place] catheter and Impella device as above. Significant 3 vessel coronary artery calcification. Chronic appearing focal aneurysm projecting inferior from the aortic arch as measured above. Evaluation is somewhat limited without intravenous contrast. Pleural disease more likely resulting from complex pleural fluid given the density. 10 mm nodular density could be inflammatory, right lower lobe posteriorly abutting the pleural margin. This would need follow-up evaluation after regression of the airspace disease for confirmation and follow-up. XR CHEST PORTABLE    Result Date: 8/21/2021  EXAMINATION: ONE XRAY VIEW OF THE CHEST 8/21/2021 9:58 am COMPARISON: 04/18/2021 HISTORY: ORDERING SYSTEM PROVIDED HISTORY: Assess pulmonary edema TECHNOLOGIST PROVIDED HISTORY: Reason for exam:->Assess pulmonary edema Reason for Exam: Assess pulmonary edema Acuity: Chronic Type of Exam: Subsequent/Follow-up FINDINGS: [place]ita catheter tip projects over the distal right main pulmonary artery. Impella device remains centered over the left heart border. The heart is enlarged with interval improvement in central pulmonary venous congestion.  Mild interstitial prominence is again noted with interval resolution of superimposed alveolar disease. There is no pneumothorax or effusion. Interval improvement in CHF/pulmonary edema. XR CHEST PORTABLE    Result Date: 8/18/2021  EXAMINATION: ONE XRAY VIEW OF THE CHEST 8/18/2021 9:22 pm COMPARISON: 08/18/2021 at 6:59 p.m. HISTORY: ORDERING SYSTEM PROVIDED HISTORY: acute CHF TECHNOLOGIST PROVIDED HISTORY: Reason for exam:->acute CHF Reason for Exam: acute CHF Acuity: Acute Type of Exam: Initial FINDINGS: An Impella device is now present. Pulmonary edema is grossly unchanged. The heart size is at the upper limits of normal.  There is no large pleural effusion or definite evidence for pneumothorax. Grossly stable pulmonary edema. Status post Impella placement. XR CHEST PORTABLE    Result Date: 8/18/2021  EXAMINATION: ONE XRAY VIEW OF THE CHEST 8/18/2021 7:02 pm COMPARISON: Chest x-ray 08/19/2009. HISTORY: ORDERING SYSTEM PROVIDED HISTORY: SOB TECHNOLOGIST PROVIDED HISTORY: Reason for exam:->SOB Reason for Exam: Chest Pain, sob Acuity: Acute Type of Exam: Initial FINDINGS: Cardiomediastinal silhouette is enlarged and partially obscured by perihilar opacities. No pleural effusion appreciated on this projection. .  External pacing pads noted. Perihilar opacities. Pulmonary edema versus multifocal pneumonia.      VL DUP CAROTID BILATERAL    Result Date: 8/19/2021  Carotid Duplex Study  Demographics   Patient Name      Lake Olszewski   Date of Study     08/19/2021        Gender             Female   Patient Number    [id number]        Date of Birth      1945   Visit Number      [id number]         Age                77 year(s)   Accession Number  [id number]        Room Number        6218   Corporate ID      [id number]           Sonographer        Jorge Laughlin RVT   Ordering          Darcella Spring  Interpreting       Constance Sarkar [address]  Physician         APRN              Physician          Luis Alexander !Dist CCA !50.5 !20. 9!60   !0.59! +---------+-----+----+-----+----+ ! Prox ICA !63.6 !27  !60   !0.58! +---------+-----+----+-----+----+ ! Mid ICA  !77.4 !39. 6!60   !0.49! +---------+-----+----+-----+----+ ! Dist ICA !72.6 !33  !60   !0.55! +---------+-----+----+-----+----+ ! Prox ECA !24   !4.9 ! 37   !0.8 ! +---------+-----+----+-----+----+ ! Vertebral!48.8 !20. 9!60   !0.57! +---------+-----+----+-----+----+   - There is antegrade vertebral flow noted on the right side. - Additional Measurements:ICAPSV/CCAPSV 1.3. ICAEDV/CCAEDV 1.82. Carotid Left Measurements +---------+----+----+-----+----+ ! Location ! PSV ! EDV ! Angle! RI  ! +---------+----+----+-----+----+ ! Prox CCA !61.9!23. 5!60   !0.62! +---------+----+----+-----+----+ ! Mid CCA  !59. 2! 20  !60   !0.66! +---------+----+----+-----+----+ ! Dist CCA !59. 2!22. 6!60   !0.62! +---------+----+----+-----+----+ ! Prox ICA !53. 1!29. 6!60   !0.44! +---------+----+----+-----+----+ ! Mid ICA  !51.8!23. 9!49   !0.54! +---------+----+----+-----+----+ ! Dist ICA !55. 1!28. 5!49   !0.48! +---------+----+----+-----+----+ ! Prox ECA !54. 7!6.9 ! 60   !0.88! +---------+----+----+-----+----+ ! Larinda Balloon. 2!17. 1! 60   !0.48! +---------+----+----+-----+----+   - There is antegrade vertebral flow noted on the left side. - Additional Measurements:ICAPSV/CCAPSV 0.93. ICAEDV/CCAEDV 1.26.     VL PRE OP VEIN MAPPING    Result Date: 8/19/2021  Lower Extremities Vein Mapping  Demographics   Patient Name      Tres Medina   Date of Study     08/19/2021        Gender             Female   Patient Number    [id number]        Date of Birth      1945   Visit Number      [id number]         Age                77 year(s)   Accession Number  [id number]        Room Number        4277   Corporate ID      [id number]           Sonographer        Shlomo Luis RVT   Ordering          Clay Esters  Paloma Sarkar [address] S[address]  Physician         APRN              Physician          Paresh Mcrae MD ROMINA  Procedure Type of Study:   Veins:Lower Extremity Vein Mapping, VASC PRE-OP VEIN MAPPING. Vascular Sonographer Report  Indications for Study:Pre-op evaluation. Impressions Right Impression Right Greater Saphenous Vein Diameters: Zone 1 was not visualized due to bandages. Zone 2: 0.28 cm Zone 3: 0.37 cm Zone 4: 0.29 cm Zone 5: 0.25 cm Zone 6: 0.24 cm Zone 7: 0.20 cm Zone 8: 0.17 cm The right greater saphenous vein was visualized between zone 2 and 8 and demonstrates total compressibility. The right greater saphenous veins were visualized between zone 2 and 8. They are not sizeable and suitable for bypass conduit, Please see diameter measurements. Left Impression Left Greater Saphenous Vein Diameters: Zone 1 was not visualized due to bandages. Zone 2: 0.41 cm Zone 3: 0.32 cm Zone 4: 0.26 cm Zone 5: 0.33 cm Zone 6: 0.32 cm Zone 7: 0.22 cm Zone 8: 0.18 cm The left greater saphenous vein was visualized between zone 2 and 8 and demonstrates total compressibility. The left greater saphenous vein was visualized between zone 2 and 8. Several sections are not sizeable and suitable for bypass conduit. Please see diameter measurements. Conclusions   Summary   No evidence of thrombophlebitis involving the great saphenous veins  bilaterally  Diameters listed  Left great saphenous vein in the thigh appears to be slightly larger in  diameter than the right great saphenous vein   Signature   ------------------------------------------------------------------  Electronically signed by Sherrie Roy MD  (Interpreting physician) on 08/19/2021 at 11:25 PM  ------------------------------------------------------------------  Patient Status:Routine. Study [place] [id number] - Vascular Lab. Technical Quality:Limited visualization due to dressings.  Risk Factors   - The patient's risk factor(s) include: dyslipidemia, arterial hypertension     and prior MI . LE Vein Mapping        Problem List  Patient Active Problem List   Diagnosis    Closed fracture of right distal radius    STEMI ([facility] elevation myocardial infarction) ([facility] [address]    Cardiogenic shock ([address]    Acute respiratory failure with hypoxia and hypercapnia (HCC)    Acute kidney injury ([facility] [address]       IMPRESSION/RECOMMENDATIONS:  Anemia & throbocytopenia  - Fibrinogen is normal, so she is not in DIC  - With a normal MCV, I doubt there is significant hemolysis. With the Impella and aneurysm, I suspect there is enough RBC destruction to decrease the haptoglobin - and indeed the haptoglobin is low c/w low level hemolysis. - There must be a component blood loss. I doubt she is a candidate for endoscopy. Since Pepcid can decrease the platelets, would suggest stopping it and switching to a PPI. (She has Omeprazole and Lansoprazole listed as two of her many drug allergies). - A chest CT did not suggest a hematoma. Physical exam shows no bruising/ecchymosis in the lower extremities, flanks or back; no bleeding from vascular access sites. There is a stable ecchymosis around the Impella catheter site. An abdomen CT can be considered if there is concern for a retroperitoneal hematoma. - Agree with Venofer, although I doubt this will have a significant impact on her anemia in the time frame of 2 days with ongoing blood loss while therapeutically anticoagulated. - I discussed her case with Dr Danielito William over the phone. The usual recommendation would be to D/C anticoagulants and antiplatelet drugs to stop their contribution to bleeding and bleeding risk. Obviously, this is not feasible. In anticipation of by-pass surgery, recommend preoperative transfusion support to get the plts 80 or greater and the Hgb 10. D/W nursing; Dr Danielito William  Discussed with patient and . Thank you for the consultation.     Sultana Lofton MD  8/22/2021  10:28 AM

## 2021-08-22 NOTE — PROGRESS NOTES
Clinical Pharmacy Note  Heparin Dosing - Impella Device    Patient receiving heparin purge solution via Impella device. Consult received from Dr. Don Lopez to titrate systemic heparin to maintain -180. Shift ACT Results and Heparin Adjustments:      Date   Time POC ACT  Result Heparin  Bolus   (units) Systemic Heparin Infusion Rate (100 units/mL)     8/22/21   0800   173    450 units/hr      1000   165    450 units/hr      1200    195    350 units/hr      1300   191    300 units/hr      1400   164    300 units/hr       Pharmacy will continue to monitor and adjust based on ACT results.   Myranda Isabel [facility]  8/22/2021  1:52 PM

## 2021-08-22 NOTE — PROGRESS NOTES
Progress Note    CAD -  STEMI, S/P POBA LAD, LCx. Impella. Vital Signs:                                                 /60   Pulse 93   Temp 98.5 °F (36.9 °C) (Oral)   Resp 22   Ht 5' (1.524 m)   Wt 144 lb 2.9 oz (65.4 kg) Comment: Bed rezeroed with lift pad  SpO2 94%   BMI 28.16 kg/m²  O2 Flow Rate (L/min): 3 L/min   NSR  CVP (Mean): 2 mmHg  PAP: 23/6  PAP (Mean): 13 mmHg  Admission Weight: 159 lb 13.3 oz (72.5 kg)      Drips: hep, ntg, integ    I/O:      Intake/Output Summary (Last 24 hours) at 8/22/2021 1117  Last data filed at 8/22/2021 1100  Gross per 24 hour   Intake 2049.65 ml   Output 3905 ml   Net -1855.35 ml     CV: reg  Pulm: decreased bilat  Abd: soft  Ext: warm, no edema. L fem Impella    Data Review:  CBC:   Recent Labs     08/20/21  0415 08/21/21  0410 08/22/21  0530   WBC 17.9* 12.7* 10.2   HGB 10.0* 9.0* 8.5*   HCT 29.9* 26.5* 24.3*   MCV 93.0 92.7 92.4   * 84* 64*     BMP:   Recent Labs     08/20/21  0415 08/20/21  0415 08/21/21  0410 08/21/21  1610 08/22/21  0530   *   < > 131* 133* 133*   K 3.6   < > 3.3* 4.0 3.5   CL 96*   < > 92* 97* 95*   CO2 25   < > 27 26 24   PHOS 5.0*  --  3.1  --  2.7   BUN 31*   < > 35* 33* 36*   CREATININE 1.5*   < > 1.7* 1.6* 1.6*   CALCIUM 7.9*   < > 7.9* 8.4 8.4   MG 2.20   < > 2.00 2.00 2.00    < > = values in this interval not displayed. Cardiac Enzymes:   No results for input(s): CKTOTAL, CKMB, CKMBINDEX, TROPONINI in the last 72 hours. PT/INR:   No results for input(s): PROTIME, INR in the last 72 hours. APTT:   No results for input(s): APTT in the last 72 hours. Endo - Cirilo  Pulm - Reny  Cards - Sharifa  Heme - Firdaus  Urol - Rivera    cxr 8/18/21   An Impella device is now present.  Pulmonary edema is grossly unchanged.  The   heart size is at the upper limits of normal.  There is no large pleural   effusion or definite evidence for pneumothorax. Cath 7/23/11  1.  Occluded mid LCX with obstructive 2 vessel CAD with 70% prox LCX, 100% mid LCX and 50% & 80% mid-distal RCA with diffuse 40% proximal and mid RCA  2. Successful PPCI of LCX with Xience 2.5 x 15 mm (mid) and Xience 2.5 x 12 mm (prox) with mechanical thrombectomy, IC Reopro  3. DTBT 70 minutes  4. LVEF  with anterolateral AK and inferolateral HK  5. Bradycardia and hypotension requiring IV atropine, IV dopamine and IV fluids    8/18/21  1. Triple-vessel coronary artery disease. There is right coronary  artery is small but that appears dominant and has serial 80% lesions throughout it with heavy calcification. In the left system, there is distal left main disease of 80% with 99% subtotal occlusion of the left anterior descending artery and FLACO 2 flow initially. There is a 99% lesion in the ostium of the circumflex. These were ballooned to residual 85% stenosis and FLACO 3 flow in the vessel. Two patent stents in the circumflex artery. 2.  Severe left ventricular systolic dysfunction with anterior  hypokinesis and LV ejection fraction 25 to 30%. 3.  Cardiogenic shock requiring Impella left ventricular support device placement. 4.  Right atrial pressure of 2 with a pulmonary capillary wedge pressure of 12.  5.  Normal mixed venous oxygen saturations of 68% on the right atrium and 71% on the pulmonary artery. 6.  At least moderate mitral regurgitation. 7.  Acute respiratory failure with pulmonary edema. Echo 2/7/19 - Left ventricle: The cavity size was normal. Wall thickness was normal. Systolic function was mildly reduced. The calculated ejection fraction was in the range of 41% to 46%. Severe hypokinesis of the entireinferior myocardium. Doppler parameters are consistent with abnormal left ventricular relaxation (grade 1 diastolic dysfunction). - Aortic valve: Thickening, consistent with sclerosis. Mean     gradient (S): 5mm Hg.   - Mitral valve: There was mild regurgitation.   - Left atrium: The atrium was mildly dilated.    - Inferior vena cava: The vessel was normal in size; the     respirophasic diameter changes were in the normal range (>= 50%); findings are consistent with normal central venous pressure. CT chest 2/6/19   1. Bandlike abnormalities in the right lower lobe and lingula, suggestive of atelectasis or scar. 2. Otherwise no significant abnormality within the lung parenchyma. 3. Triple vessel coronary disease. 4. Mild cardiomegaly. 5. Nonspecific mildly enlarged precarinal lymph node. Esophagram 2018 no reflux  MRI head: 10/24/18 Moderate chronic small vessel ischemic change in mild diffuse atrophy suspected.      Has received Covid vaccine    Assessment/Plan:  Plan for echo evaluation on Monday possible surgery Tuesday

## 2021-08-22 NOTE — PROGRESS NOTES
[facility]   Daily Progress Note      Admit Date:  8/18/2021    CC: \"  Ms. Ivan Navarrete  is a 77 y. o. female with a past medical history significant for CAD with prior PCI to her Circumflex in 2011 who presented to [facility] from home with chest pain that began at dinner tonight. STEMI was activated. She was taken to the cath lab and had a subtotal occlusion of her ostial LAD and a 99% Circumflex artery. I placed an Impella and performed PTCA to the ostia of the LAD and Circ restoring FLACO 3 flow. Subjective: Interval history(8/21/21)  Patient continues to stay on Impala at P7. She complains of feeling thirsty and dry in her mouth. Blood pressure remains low normal.  [place] reading shows low filling pressure with pulmonary capillary wedge pressure at 5 mmHg. Rhythm shows sinus tachycardia with few PACs. Interval history(8/22/2021)  Patient had uneventful last 24 hours. She tolerated Brilinta bolus followed by IV Lasix with excellent urine output. Her vitals remained stable. Pulmonary capillary wedge pressure this morning is 8 mmHg. Continues to have left next nostril nosebleed. Her hemoglobin and platelets are steadily dropping.   HIT panel is still pending    Objective:   BP 94/66   Pulse 93   Temp 99.4 °F (37.4 °C) (Oral)   Resp 21   Ht 5' (1.524 m)   Wt 145 lb 1 oz (65.8 kg)   SpO2 97%   BMI 28.33 kg/m²       Intake/Output Summary (Last 24 hours) at 8/22/2021 0703  Last data filed at 8/22/2021 8713  Gross per 24 hour   Intake 2049.65 ml   Output 3780 ml   Net -1730.35 ml     Wt Readings from Last 3 Encounters:   08/22/21 145 lb 1 oz (65.8 kg)   01/13/21 150 lb (68 kg)   11/25/20 150 lb (68 kg)     Telemetry:S tach    Physical Exam:  General:  NAD, Awake, alert and oriented X4  HEENT-continues to have left nosebleed  Skin:  Warm and dry  Neck:  Supple, no JVP appreciated, no bruit  Chest:  Clear to auscultation, no wheezes/rhonchi/rales  Cardiovascular: Tachycardia  Abdomen: Soft, nontender, +bowel sounds  Extremities:  No LE edema groin sites showed no worsening hematoma                      Impella device is seen in left groin  Cardiac Diagnosis:  coronary artery disease, history of prior MI and CHF    Medications:    meropenem  500 mg Intravenous Q12H    lidocaine 1 % injection  5 mL Intradermal Once    furosemide  40 mg Intravenous Daily    docusate sodium  100 mg Oral Daily    insulin glargine  8 Units Subcutaneous QAM    insulin lispro  0-12 Units Subcutaneous TID WC    insulin lispro  0-6 Units Subcutaneous Nightly    famotidine  20 mg Oral Daily    LORazepam  0.5 mg Oral Daily    vitamin B-12  50 mcg Oral Daily    Vitamin D  1 tablet Oral Daily    conjugated estrogens   Vaginal Once per day on Mon Wed Fri    nortriptyline  25 mg Oral Nightly    levothyroxine  100 mcg Oral Daily    sodium chloride flush  5-40 mL Intravenous 2 times per day    rosuvastatin  40 mg Oral Nightly    aspirin  81 mg Oral Daily      sodium chloride 25 mL/hr at 08/22/21 0635    eptifibatide 1 mcg/kg/min (08/22/21 0635)    sodium chloride Stopped (08/21/21 1317)    dextrose      nitroGLYCERIN 10 mcg/min (08/22/21 0635)    heparin (Impella Purge) solution      heparin (Porcine) 450 Units/hr (08/22/21 4212)     oxyCODONE, morphine, promethazine, polyethylene glycol, sodium chloride flush, sodium chloride, acetaminophen, sodium chloride, ondansetron, hydrALAZINE, glucose, dextrose, glucagon (rDNA), dextrose, perflutren lipid microspheres    Lab Data:  CBC:   Recent Labs     08/20/21  0415 08/21/21  0410 08/22/21  0530   WBC 17.9* 12.7* 10.2   HGB 10.0* 9.0* 8.5*   * 84* 64*     BMP:    Recent Labs     08/20/21  0415 08/21/21  0410 08/21/21  1610   * 131* 133*   K 3.6 3.3* 4.0   CO2 25 27 26   BUN 31* 35* 33*   CREATININE 1.5* 1.7* 1.6*     LIVR:   Recent Labs     08/20/21  0415 08/21/21  0410   * 125*   * 66*     INR:    No results for input(s): INR in the last 72 hours. APTT:   No results for input(s): APTT in the last 72 hours. BNP:  No results for input(s): BNP in the last 72 hours. Imaging:Dilated LV with impella device. Septum, anterior, anterolateral and apex is   thin and akinetic. EF < 25 %   Mild mitral regurgitation. The left atrium is normal in size. The right ventricle is normal in size and function. Mild tricuspid regurgitation      Assessment:  1. Acute Anterior Myocardial Infarction/ Cardiogenic Shock  -Patient remains hemodynamically stable on Impella device  -Echocardiogram from 8/19/2021 shows LVEF less than 25%  -[place] numbers from 8/22/2021 indicates low filling pressure. -PCW is 8 mmHg  -We will cautiously give her albumin aand give her Lasix only if her pulmonary capillary wedge pressure rises above 10 mmHg. -We will obtain a limited echo on 8/23/2021 to accurately reassess her LV function    CAD  -Currently denies having any chest tightness or pressure  -Coronary angiography personally reviewed has shown significant three-vessel disease  -Being considered for CABG next week if her overall status improves. -Currently on heparin Integrilin and baby aspirin  -Not on beta-blocker therapy due to hypotension and not on statin therapy due to abnormal liver functions  3. Acute Kidney Injury  -Renal function slightly better today   -We will cautiously give her albumin and saline follow her closely  - Nephrology on board  4. Acute Respiratory Failure with Hypoxia  -Due to cardiogenic shock and pulmonary edema  -Currently oxygen level stable on 3 L of oxygen  -Pulmonary is on board.  -Reduce Lasix to 20 mg IV daily  Urinary tract infection  -Urine shows E. coli and she is currently on antibiotics    . Generalized Anxiety Disorder  -Stable   Hematuria  -No gross hematuria at the present time.     Anemia  -Hemoglobin is steadily drifting down  - iron studies sent and will give her  IV iron  -We will have hematology evaluate the patient due to anemia and  thrombocytopenia. Thrombocytopenia  -Probably related to Impella device but certainly HIT cannot be excluded. Abnormal LFTs  -Due to shock liver and they are steadily improving  Complexity of medical decision making-very high    Protein calorie malnutrition  -Patient has been encouraged to bring high protein supplements and she has done a reasonable job yesterday.     Critical care time spent is 35 minutes                 Electronically signed by Gordo Solis MD on 8/22/2021 at 7:03 AM

## 2021-08-22 NOTE — PROGRESS NOTES
[facility]  Nephrology   Progress Note        We are following the patient for JESSICA/CKD   78 y/o WF with h/o CAD, CKD stage 3 baseline Cr ~ 1.2-1.4 mg follows with Dr Cristobal Roldan, presented on 21 with CP and diagnosed with STEMI Taken to cath lab and had PTCA of LAD, also had Impella placed for cardiogenic shock  Developed hematuria that has since resolved  Cr noted to have increased from 1.4 to 1.7 mg  Received Lasix initially but started on IVF's and Albumin as PCWP noted to be low UOP has picked up since  Urine CX growing E coli    SUBJECTIVE:  Remains on Impella  Excellent UOP after Lasix yesterday  Cr stable at 1.6 mg  Off of O2     Family at bedside    OBJECTIVE:     PHYSICAL:    TEMPERATURE:  Current - Temp: 99.4 °F (37.4 °C);  Max - Temp  Av.2 °F (37.3 °C)  Min: 98.5 °F (36.9 °C)  Max: 99.4 °F (37.4 °C)  RESPIRATIONS RANGE: Resp  Av.9  Min: 15  Max: 32  PULSE RANGE: Pulse  Av.3  Min: 90  Max: 110  BLOOD PRESSURE RANGE:  Systolic (92TBX), QNT:986 , Min:93 , GSS:699   ; Diastolic (07WIW), FYW:85, Min:55, Max:77    PULSE OXIMETRY RANGE: SpO2  Av.1 %  Min: 93 %  Max: 100 %  24HR INTAKE/OUTPUT:    Intake/Output Summary (Last 24 hours) at 2021 1332  Last data filed at 2021 1300  Gross per 24 hour   Intake 2547.72 ml   Output 3980 ml   Net -1432.28 ml       CONSTITUTIONAL:  awake, alert, cooperative, no apparent distress, and appears stated age  HEENT:  Lids and lashes normal, pupils equal, round and reactive to light, extra ocular muscles intact, sclera clear, conjunctiva normal  NECK:  Supple, symmetrical, trachea midline, no adenopathy, thyroid symmetric, not enlarged and no tenderness, skin normal  LUNGS:  No increased work of breathing, good air exchange, clear to auscultation bilaterally  CARDIOVASCULAR:  Normal apical impulse, regular rate and rhythm, normal S1 and S2,impella device in place  ABDOMEN:  No scars, normal bowel sounds, soft, non-distended, non-tender, no masses palpated, no hepatosplenomegally  NEUROLOGIC:  alert, oriented, normal speech, no focal findings or movement disorder noted  SKIN: no bruising or bleeding  EXTREMITIES: trace to 1+ bilateral pedal edema    Medications     sodium chloride 25 mL/hr at 08/22/21 1215    eptifibatide 1 mcg/kg/min (08/22/21 1215)    sodium chloride 25 mL (08/22/21 1208)    dextrose      nitroGLYCERIN 10 mcg/min (08/22/21 1215)    heparin (Impella Purge) solution      heparin (Porcine) 300 Units/hr (08/22/21 1311)        Data      CBC:   Recent Labs     08/20/21  0415 08/21/21  0410 08/22/21  0530   WBC 17.9* 12.7* 10.2   RBC 3.22* 2.86* 2.63*   HGB 10.0* 9.0* 8.5*   HCT 29.9* 26.5* 24.3*   * 84* 64*     BMP:    Recent Labs     08/21/21  0410 08/21/21  1610 08/22/21  0530   * 133* 133*   K 3.3* 4.0 3.5   CL 92* 97* 95*   CO2 27 26 24   BUN 35* 33* 36*   CREATININE 1.7* 1.6* 1.6*   CALCIUM 7.9* 8.4 8.4   GLUCOSE 165* 162* 148*     Phosphorus:    Recent Labs     08/20/21  0415 08/21/21  0410 08/22/21  0530   PHOS 5.0* 3.1 2.7     Magnesium:    Recent Labs     08/21/21  0410 08/21/21  1610 08/22/21  0530   MG 2.00 2.00 2.00         ASSESSMENT     Patient Active Problem List   Diagnosis    Closed fracture of right distal radius    [facility]I ([facility] elevation myocardial infarction) ([facility] [address]    Cardiogenic shock ([facility] [address]    Acute respiratory failure with hypoxia and hypercapnia (HCC)    Acute kidney injury ([facility] [address]       PLAN    1-JESSICA/CKD suspect in the setting of Cardiogenic shock and IV dye exposure Also has UTI UOP 3700 ml/24 Cr stable at 1.6 mg Monitor PCWP low and received IV Albumin   2-Ac Anterior MI s/p PTCA LAD  3-Cardiogenic shock on  Impella  4 Hematuria resolved  5 UTI E coli on merrem  6 h/p HTN off of ACE-I/ARB due to JESSICA previously on it  7 Hypokalemia corrected stable  8 Anemia HGB trending down, PLT down to 64 K Heme consulted  9 Shock liver improving    Aretha MD Samantha, Iota Peer

## 2021-08-22 NOTE — PROGRESS NOTES
1900- Report received from [facility]. Patient resting in bed, legs straight patient placed slightly in reverse trendelenburg. Denies pain at this time. 2000- Patient assessed. Temp 99.2, will continue to monitor. , Pharmacist Marcos notified. Heparin gtt increased to 400 units/hr. 2100- , Pharmacist Casey Sandoval notified. Heparin gtt increased to 450 units/hr. 2200- , no change. 2300- , no change. 0000- , no change. Pharmacist Casey Sandoval changes ACT to Q2. Patient reassessed. Patient's nose bleed is slow, but continuous. Will continue to monitor. 0200- , no change. 0400- , no change. 0600- , no changes. Pharmacist Casey Sandoval tells this RN to only notify Pharmacist if the ACT is outside 160-180 range on the Q2 checks. 2450- Dr. Jasmin Caruso rounds on patient. See new orders. 0700- Report given to [facility]. Patient stable and denies pain at time of handoff.

## 2021-08-23 NOTE — PROGRESS NOTES
[P.O.:1200; I.V.:688.3; IV Piggyback:698.6]  Out: 2270 [Drains:2270]  I/O this shift: In: 98.5 [I.V.:98.5]  Out: 100 [Drains:100]    /66   Pulse 92   Temp 98.7 °F (37.1 °C) (Oral)   Resp 27   Ht 5' (1.524 m)   Wt 139 lb 15.9 oz (63.5 kg)   SpO2 95%   BMI 27.34 kg/m²     General Appearance:    No acute distress   Head:    Normocephalic, without obvious abnormality, atraumatic   Eyes:    PERRL, conjunctiva/corneas clear, EOM's intact, fundi     benign, both eyes        Ears:    Normal TM's and external ear canals, both ears   Nose:   Nares normal, septum midline, mucosa normal, no drainage    or sinus tenderness   Throat:   Lips, mucosa, and tongue normal; teeth and gums normal   Neck:   Supple, symmetrical, trachea midline, no adenopathy;        thyroid:  No enlargement/tenderness/nodules; no carotid    bruit or JVD   Back:     Symmetric, no curvature, ROM normal, no CVA tenderness   Lungs:     Clear to auscultation bilaterally, respirations unlabored   Chest wall:    No tenderness or deformity   Heart:    Regular rate and rhythm, S1 and S2 normal, no murmur, rub   or gallop   Abdomen:     Soft, non-tender, bowel sounds active all four quadrants,     no masses, no organomegaly           Extremities:   Extremities normal, atraumatic, no cyanosis or edema   Pulses:   2+ and symmetric all extremities   Skin:   Skin color, texture, turgor normal, no rashes or lesions   Lymph nodes:   Cervical, supraclavicular, and axillary nodes normal   Neurologic:   Stable       Data Review  CBC:   Lab Results   Component Value Date    WBC 9.6 08/23/2021    RBC 2.49 08/23/2021       Assessment:     Active Problems:    STEMI (ST elevation myocardial infarction) (HCC)    Cardiogenic shock (HCC)    Acute respiratory failure with hypoxia and hypercapnia (HCC)    Acute kidney injury ([facility] [address]    Hypoxia    Acute cystitis without hematuria    Hemoptysis  Resolved Problems:    * No resolved hospital problems. *      Plan:     1.

## 2021-08-23 NOTE — PROGRESS NOTES
Clinical Pharmacy Note  Heparin Dosing - Impella Device    Patient receiving heparin purge solution via Impella device. Consult received from Dr. Jos Noel to titrate systemic heparin to maintain -180. Shift ACT Results and Heparin Adjustments:      Date   Time POC ACT  Result Heparin  Bolus   (units) Systemic Heparin Infusion Rate (100 units/mL)     8/23/21   0900   156    350 units/hr      1000   159    400  units/hr      1100   162    400  units/hr                              Pharmacy will continue to monitor and adjust based on ACT results.   YOEL Streeter [facility]  8/23/2021  1:27 PM

## 2021-08-23 NOTE — CARE COORDINATION
Chart Reviewed. Spoke with bedside RN. Determination is being made regarding next steps CABG vs other skilled intervention. LSW following for DC needs.   [facility]     Case Management   [phone]    8/23/2021  11:50 AM

## 2021-08-23 NOTE — PROGRESS NOTES
Damian Villalobos, Cardiology at bedside to discuss plan of care. Pt agreeable to high risk PCI tomorrow with Dr. Damian Villalobos.

## 2021-08-23 NOTE — PROGRESS NOTES
[facility]  Nephrology   Progress Note        We are following the patient for JESSICA/CKD   78 y/o WF with h/o CAD, CKD stage 3 baseline Cr ~ 1.2-1.4 mg follows with Dr Corby Tijerina, presented on 8/18/21 with CP and diagnosed with STEMI Taken to cath lab and had PTCA of LAD, also had Impella placed for cardiogenic shock  Developed hematuria that has since resolved  Cr noted to have increased from 1.4 to 1.7 mg  Received Lasix initially but started on IVF's and Albumin as PCWP noted to be low UOP has picked up since  Urine CX growing E coli    SUBJECTIVE:    HPI:  Breathing comfortably. On bedpan. Constipated. Renal function stable. ROS:  In bed. [address]:  medications reviewed. OBJECTIVE:     PHYSICAL:    /64   Pulse 95   Temp 98.5 °F (36.9 °C) (Oral)   Resp 14   Ht 5' (1.524 m)   Wt 139 lb 15.9 oz (63.5 kg)   SpO2 95%   BMI 27.34 kg/m²   24HR INTAKE/OUTPUT:      Intake/Output Summary (Last 24 hours) at 8/23/2021 1037  Last data filed at 8/23/2021 1000  Gross per 24 hour   Intake 2496.49 ml   Output 2775 ml   Net -278.51 ml       CONSTITUTIONAL:  awake, alert, cooperative, no apparent distress, and appears stated age  LUNGS:  No increased work of breathing, good air exchange, clear to auscultation bilaterally  CARDIOVASCULAR:  Normal apical impulse, regular rate and rhythm, normal S1 and S2,impella device in place  ABDOMEN:  No scars, normal bowel sounds, soft, non-distended, non-tender, no masses palpated, no hepatosplenomegally  SKIN: no bruising or bleeding  EXTREMITIES: trace to 1+ bilateral pedal edema         Data      CBC:   Recent Labs     08/21/21  0410 08/21/21  0410 08/22/21  0530 08/22/21  1802 08/23/21  0420   WBC 12.7*  --  10.2  --  9.6   RBC 2.86*  --  2.63*  --  2.49*   HGB 9.0*  --  8.5*  --  8.2*   HCT 26.5*  --  24.3*  --  23.3*   PLT 84*   < > 64* 62* 58*    < > = values in this interval not displayed.      BMP:    Recent Labs     08/21/21  1611 08/22/21  0530 08/23/21  0420   * 133* 136   K 4.0 3.5 3.7   CL 97* 95* 100   CO2 26 24 23   BUN 33* 36* 32*   CREATININE 1.6* 1.6* 1.5*   CALCIUM 8.4 8.4 8.6   GLUCOSE 162* 148* 139*     Phosphorus:    Recent Labs     08/21/21  0410 08/22/21  0530 08/23/21  0420   PHOS 3.1 2.7 3.0     Magnesium:    Recent Labs     08/21/21  1610 08/22/21  0530 08/23/21  0420   MG 2.00 2.00 2.10         ASSESSMENT/PLAN    1-JESSICA/CKD suspect in the setting of Cardiogenic shock and IV dye exposure Also has UTI UOP 3700 ml/24    - Cr stable at 1.6 mg   - Monitor PCWP low and received IV Albumin     2-Ac Anterior MI s/p PTCA LAD   - CT surgery considering    3-Cardiogenic shock on  Impella    4 Hematuria resolved    5 UTI E coli on merrem    6 h/o HTN   - off of ACE-I/ARB due to JESSICA previously on it    7 Hypokalemia corrected stable    8 anemia   - HGB trending down, PLT down    - per Heme    9 Shock liver improving

## 2021-08-23 NOTE — PROGRESS NOTES
@0000 ACT & Impella site check it was noted that dressing on the left sheath had a small amount of fresh blood soaked through part of the dressing. Dressing reinforced. @0200 ACT & Impella check the whole dressing was wet with fresh blood. Dressing removed to reveal what appeared to be ooze from the sheath tract as no new hematoma was noted at insertion site. Manual pressure was held for 10 minutes which slowed oozing to a trickle, a D-STAT was then activated with saline and applied to the site, covered with folded 4x4 gauze and the sheath angle was established with a curlex bandage and secured with tegaderm and Curad cloth tape. Will monitor for hemostasis. @0400 Left femoral sheath started bleeding once again, dressing removed, fresh gauze applied over site, femo-stop applied over gauze without air in bulb. Hemostasis achieved. LLE foot pulses palpable. Will monitor.

## 2021-08-23 NOTE — PROGRESS NOTES
Pulmonary Progress Note    CC:  Follow up hypoxia, UTI    Subjective:  Room air   No real SOB  Getting bedside ECHO at this time  Bleeding from nose and subsequently coughing it up        Intake/Output Summary (Last 24 hours) at 8/23/2021 0713  Last data filed at 8/23/2021 8600  Gross per 24 hour   Intake 2718.55 ml   Output 2695 ml   Net 23.55 ml         PHYSICAL EXAM:  Blood pressure 109/60, pulse 87, temperature 98.8 °F (37.1 °C), temperature source Oral, resp. rate 20, height 5' (1.524 m), weight 144 lb 2.9 oz (65.4 kg), SpO2 93 %.'  Gen: Pale  Eyes: PERRL. No sclera icterus. No conjunctival injection. ENT: No discharge. Pharynx clear. Dried blood from the left nare  Neck: Trachea midline. No obvious mass. Resp: No crackles. No wheezes. No rhonchi. No dullness on percussion. CV: Regular rate. Regular rhythm. No murmur or rub. GI: Non-tender. Non-distended. No hernia. Skin: Warm, dry, normal texture and turgor. No nodule on exposed extremities. Lymph: No cervical LAD. No supraclavicular LAD. M/S: No cyanosis. No clubbing. No joint deformity. Neuro: Moves all four extremities. CN 2-12 tested, no defect noted.   Ext:   + edema    Medications:    Scheduled Meds:   furosemide  20 mg Intravenous Daily    cefTRIAXone (ROCEPHIN) IV  1,000 mg Intravenous Q24H    lidocaine 1 % injection  5 mL Intradermal Once    docusate sodium  100 mg Oral Daily    insulin glargine  8 Units Subcutaneous QAM    insulin lispro  0-12 Units Subcutaneous TID WC    insulin lispro  0-6 Units Subcutaneous Nightly    famotidine  20 mg Oral Daily    LORazepam  0.5 mg Oral Daily    vitamin B-12  50 mcg Oral Daily    Vitamin D  1 tablet Oral Daily    conjugated estrogens   Vaginal Once per day on Mon Wed Fri    nortriptyline  25 mg Oral Nightly    levothyroxine  100 mcg Oral Daily    sodium chloride flush  5-40 mL Intravenous 2 times per day    rosuvastatin  40 mg Oral Nightly    aspirin  81 mg Oral Daily Continuous Infusions:   sodium chloride Stopped (08/22/21 2228)    eptifibatide Stopped (08/22/21 2259)    sodium chloride Stopped (08/22/21 1259)    dextrose      nitroGLYCERIN 10 mcg/min (08/23/21 0642)    heparin (Impella Purge) solution      heparin (Porcine) 300 Units/hr (08/23/21 0642)       PRN Meds:  oxyCODONE, morphine, promethazine, polyethylene glycol, sodium chloride flush, sodium chloride, acetaminophen, sodium chloride, ondansetron, hydrALAZINE, glucose, dextrose, glucagon (rDNA), dextrose, perflutren lipid microspheres    Labs:  CBC:   Recent Labs     08/21/21  0410 08/21/21  0410 08/22/21  0530 08/22/21  1802 08/23/21 0420   WBC 12.7*  --  10.2  --  9.6   HGB 9.0*  --  8.5*  --  8.2*   HCT 26.5*  --  24.3*  --  23.3*   MCV 92.7  --  92.4  --  93.6   PLT 84*   < > 64* 62* 58*    < > = values in this interval not displayed. BMP:   Recent Labs     08/21/21  0410 08/21/21  0410 08/21/21  1610 08/22/21 0530 08/23/21  0420   *   < > 133* 133* 136   K 3.3*   < > 4.0 3.5 3.7   CL 92*   < > 97* 95* 100   CO2 27   < > 26 24 23   PHOS 3.1  --   --  2.7 3.0   BUN 35*   < > 33* 36* 32*   CREATININE 1.7*   < > 1.6* 1.6* 1.5*    < > = values in this interval not displayed. LIVER PROFILE:   Recent Labs     08/21/21  0410 08/22/21  0530 08/23/21  0420   * 77* 49*   ALT 66* 40 28   BILIDIR 0.3 <0.2 <0.2   BILITOT 1.1* 1.0 0.8   ALKPHOS 161* 143* 121     PT/INR:   Recent Labs     08/23/21  0420   PROTIME 12.7   INR 1.12     APTT:   Recent Labs     08/23/21  0420   APTT 34.8     UA:No results for input(s): NITRITE, COLORU, PHUR, LABCAST, WBCUA, RBCUA, MUCUS, TRICHOMONAS, YEAST, BACTERIA, CLARITYU, SPECGRAV, LEUKOCYTESUR, UROBILINOGEN, BILIRUBINUR, BLOODU, GLUCOSEU, AMORPHOUS in the last 72 hours. Invalid input(s): Sheryle Myrtle  No results for input(s): PH, PCO2, PO2 in the last 72 hours.         Films:  Chest imaging reports were reviewed and imaging was reviewed by me and showed no new films    ABG:  Nothing recent     Cultures:  Urine:  E. Coli     I reviewed the labs and images listed above    Assessment/Plan:    Hypoxia, resolved  o Pulmonary toilet  o Supply oxygen if saturations drop below 90%   Acute Cystitis   o DC Rocephin  o Omnicef for 6 days    Cardiogenic Shock   o Per cardiology    Hemoptysis/Epistaxis  o Monitor.   I think her hemoptysis is from epistaxis     Severe CAD  o Cardiology/CVTS following    Will follow peripherally         DO Lucian Nair [facility]

## 2021-08-23 NOTE — PROGRESS NOTES
[address]   Daily Progress Note      Admit Date:  8/18/2021      Subjective:   Ms. Nuno Otto  is a 77 y.o. female with a past medical history significant for CAD with prior PCI to her Circumflex in 2011 who presented to [facility] from home with chest pain that began at dinner tonight. STEMI was activated. She was taken to the cath lab and had a subtotal occlusion of her ostial LAD and a 99% Circumflex artery. I placed an Impella and performed PTCA to the ostia of the LAD and Circ restoring FLACO 3 flow. Interval History:  Reginaldo Penaloza remains in sinus rhythm on telemetry. She denies any chest pain or shortness of breath. Impella at P6 and delivering ~3.0L/min of CO. Her PCWP and RA pressure are low.       Objective:     BP (!) 102/58   Pulse 97   Temp 98.7 °F (37.1 °C) (Oral)   Resp 30   Ht 5' (1.524 m)   Wt 139 lb 15.9 oz (63.5 kg)   SpO2 95%   BMI 27.34 kg/m²      Intake/Output Summary (Last 24 hours) at 8/23/2021 1733  Last data filed at 8/23/2021 1700  Gross per 24 hour   Intake 2565.32 ml   Output 2195 ml   Net 370.32 ml       Physical Exam:  General:  Awake, alert, NAD  Skin:  Warm and dry  Neck:  JVD<8, no carotid bruits  Chest:  Bilaterally diminished, no wheezes/rhonchi/rales  Cardiovascular:  RRR, normal S1/S2, no M/R/G  Abdomen:  Soft, nontender, +bowel sounds  Extremities:  No edema  Pulses: 2+ bilat carotid    2+ bilat radial    2+ bilat femoral    Impella device in left groin with hematoma        Medications:    cefdinir  300 mg Oral 2 times per day    lidocaine 1 % injection  5 mL Intradermal Once    docusate sodium  100 mg Oral Daily    insulin glargine  8 Units Subcutaneous QAM    insulin lispro  0-12 Units Subcutaneous TID WC    insulin lispro  0-6 Units Subcutaneous Nightly    famotidine  20 mg Oral Daily    LORazepam  0.5 mg Oral Daily    vitamin B-12  50 mcg Oral Daily    Vitamin D  1 tablet Oral Daily    conjugated estrogens   Vaginal Once per day on Mon Wed Fri  nortriptyline  25 mg Oral Nightly    levothyroxine  100 mcg Oral Daily    sodium chloride flush  5-40 mL Intravenous 2 times per day    rosuvastatin  40 mg Oral Nightly    aspirin  81 mg Oral Daily      sodium chloride 50 mL/hr at 08/23/21 1621    eptifibatide Stopped (08/22/21 2259)    sodium chloride Stopped (08/23/21 1204)    dextrose      nitroGLYCERIN 15 mcg/min (08/23/21 1621)    heparin (Impella Purge) solution      heparin (Porcine) 450 Units/hr (08/23/21 1621)       Lab Data:  CBC:   Recent Labs     08/21/21  0410 08/21/21  0410 08/22/21  0530 08/22/21  1802 08/23/21  0420   WBC 12.7*  --  10.2  --  9.6   HGB 9.0*  --  8.5*  --  8.2*   PLT 84*   < > 64* 62* 58*    < > = values in this interval not displayed. BMP:    Recent Labs     08/21/21  1610 08/22/21  0530 08/23/21  0420   * 133* 136   K 4.0 3.5 3.7   CO2 26 24 23   BUN 33* 36* 32*   CREATININE 1.6* 1.6* 1.5*     LIVR:   Recent Labs     08/21/21  0410 08/22/21  0530 08/23/21  0420   * 77* 49*   ALT 66* 40 28     PT/INR:   Recent Labs     08/21/21  0410 08/22/21  0530 08/23/21  0420   PROT 7.3 7.4 7.1   INR  --   --  1.12     APTT:   Recent Labs     08/23/21  0420   APTT 34.8   FASTING LIPID PANEL:  Lab Results   Component Value Date    CHOL 126 08/19/2021    HDL 58 08/19/2021    TRIG 52 08/19/2021     Echo 8/19/21:  Dilated LV with impella device. Septum, anterior, anterolateral and apex is   thin and akinetic. EF < 25 %   Mild mitral regurgitation. The left atrium is normal in size. The right ventricle is normal in size and function. Mild tricuspid regurgitation    Limited Echo 8/23/21:   Ejection fraction is visually estimated to be 30%. Regional wall motion abnormalities appear present. An Impella device catheter appears present in the left ventricle, measuring   3.57 cm from the device inlet to the aortic annulus. Assessment:  1. Acute Anterior Myocardial Infarction  2. Cardiogenic Shock  3.  Acute on

## 2021-08-23 NOTE — PROGRESS NOTES
Progress Note    CAD -  8/18/21 STEMI, S/P POBA LAD, LCx. Impella. Vital Signs:                                                 /66   Pulse 91   Temp 98.8 °F (37.1 °C) (Oral)   Resp 22   Ht 5' (1.524 m)   Wt 144 lb 2.9 oz (65.4 kg) Comment: Bed rezeroed with lift pad  SpO2 94%   BMI 28.16 kg/m²  O2 Flow Rate (L/min): 3 L/min   NSR  CVP (Mean): 5 mmHg  PAP: 21/9  PAP (Mean): 13 mmHg  Admission Weight: 159 lb 13.3 oz (72.5 kg)      Drips: hep, ntg    I/O:      Intake/Output Summary (Last 24 hours) at 8/23/2021 0800  Last data filed at 8/23/2021 3219  Gross per 24 hour   Intake 2718.55 ml   Output 2605 ml   Net 113.55 ml     CV: reg  Pulm: decreased bilat  Abd: soft  Ext: warm, no edema. L fem Impella    Data Review:  CBC:   Recent Labs     08/21/21  0410 08/21/21  0410 08/22/21  0530 08/22/21  1802 08/23/21  0420   WBC 12.7*  --  10.2  --  9.6   HGB 9.0*  --  8.5*  --  8.2*   HCT 26.5*  --  24.3*  --  23.3*   MCV 92.7  --  92.4  --  93.6   PLT 84*   < > 64* 62* 58*    < > = values in this interval not displayed. BMP:   Recent Labs     08/21/21  0410 08/21/21  0410 08/21/21  1610 08/22/21  0530 08/23/21  0420   *   < > 133* 133* 136   K 3.3*   < > 4.0 3.5 3.7   CL 92*   < > 97* 95* 100   CO2 27   < > 26 24 23   PHOS 3.1  --   --  2.7 3.0   BUN 35*   < > 33* 36* 32*   CREATININE 1.7*   < > 1.6* 1.6* 1.5*   CALCIUM 7.9*   < > 8.4 8.4 8.6   MG 2.00   < > 2.00 2.00 2.10    < > = values in this interval not displayed. Cardiac Enzymes:   No results for input(s): CKTOTAL, CKMB, CKMBINDEX, TROPONINI in the last 72 hours.   PT/INR:   Recent Labs     08/23/21  0420   PROTIME 12.7   INR 1.12     APTT:   Recent Labs     08/23/21  0420   APTT 34.8   Endo - Cirilo  Pulm - Reny  Cards - Jesika Harpin  Heme - Firdaus  Urol - Rivera    cxr 8/18/21   An Impella device is now present.  Pulmonary edema is grossly unchanged.  The   heart size is at the upper limits of normal.  There is no large pleural effusion or definite evidence for pneumothorax. Cath 7/23/11  1. Occluded mid LCX with obstructive 2 vessel CAD with 70% prox LCX, 100% mid LCX and 50% & 80% mid-distal RCA with diffuse 40% proximal and mid RCA  2. Successful PPCI of LCX with Xience 2.5 x 15 mm (mid) and Xience 2.5 x 12 mm (prox) with mechanical thrombectomy, IC Reopro  3. DTBT 70 minutes  4. LVEF  with anterolateral AK and inferolateral HK  5. Bradycardia and hypotension requiring IV atropine, IV dopamine and IV fluids    8/18/21  1. Triple-vessel coronary artery disease. There is right coronary  artery is small but that appears dominant and has serial 80% lesions throughout it with heavy calcification. In the left system, there is distal left main disease of 80% with 99% subtotal occlusion of the left anterior descending artery and FLACO 2 flow initially. There is a 99% lesion in the ostium of the circumflex. These were ballooned to residual 85% stenosis and FLACO 3 flow in the vessel. Two patent stents in the circumflex artery. 2.  Severe left ventricular systolic dysfunction with anterior  hypokinesis and LV ejection fraction 25 to 30%. 3.  Cardiogenic shock requiring Impella left ventricular support device placement. 4.  Right atrial pressure of 2 with a pulmonary capillary wedge pressure of 12.  5.  Normal mixed venous oxygen saturations of 68% on the right atrium and 71% on the pulmonary artery. 6.  At least moderate mitral regurgitation. 7.  Acute respiratory failure with pulmonary edema. Echo 2/7/19 - Left ventricle: The cavity size was normal. Wall thickness was normal. Systolic function was mildly reduced. The calculated ejection fraction was in the range of 41% to 46%. Severe hypokinesis of the entireinferior myocardium. Doppler parameters are consistent with abnormal left ventricular relaxation (grade 1 diastolic dysfunction). - Aortic valve: Thickening, consistent with sclerosis. Mean     gradient (S): 5mm Hg. - Mitral valve: There was mild regurgitation.   - Left atrium: The atrium was mildly dilated. - Inferior vena cava: The vessel was normal in size; the     respirophasic diameter changes were in the normal range (>= 50%); findings are consistent with normal central venous pressure. CT chest 2/6/19   1. Bandlike abnormalities in the right lower lobe and lingula, suggestive of atelectasis or scar. 2. Otherwise no significant abnormality within the lung parenchyma. 3. Triple vessel coronary disease. 4. Mild cardiomegaly. 5. Nonspecific mildly enlarged precarinal lymph node. 8/19/21     Esophagram 2018 no reflux  MRI head: 10/24/18 Moderate chronic small vessel ischemic change in mild diffuse atrophy suspected. Has received Covid vaccine    Carotid duplex 8/19 0-15% bilat  Vein mapping 8/19 conduit not suitable for bypass    Assessment/Plan:  CV - CAD, STEMI, PCI culprit LAD/LCx. echo 8/18 EF <25, mild MR. Echo pending, will review, discuss with cards and make final decision regarding surgical candidacy.   - ASA, statin  pulm - cxr c/w flash pulm edema. Hx of ILD. Pulm input noted. Renal - Cr further elevated. Baseline 1.3 7/2021. Good u/o   - 8/19 E. Coli UTI. abx  GI - LFTs normalizing. Heme - anemia    - thrombocytopenia. HIT neg 8/21. Would stop pepcid. vasc - no suitable LE conduit      Lauren Kapoor MD  8/23/2021  8:00 AM     STS  Risk of Mortality:  21.225%  Renal Failure:  15.774%  Permanent Stroke:  4.063%  Prolonged Ventilation:  46.405%  DSW Infection:  0.202%  Reoperation:  5.027%  Morbidity or Mortality:  54.632%  Short Length of Stay:  7.889%  Long Length of Stay:  29.994%    Echo reviewed. Discussed with Dr. Helen Hernandez, then pt and .   Given high surgical risk, will cancel cabg and defer to PCI    Lauren Kapoor MD  8/23/2021  12:02 PM

## 2021-08-24 NOTE — PROCEDURES
[address] [address]                            CARDIAC CATHETERIZATION    PATIENT NAME: Citlaly Rodrigez                   :        1945  MED REC NO:   [id number]                          ROOM:       7529  ACCOUNT NO:   [de-identified]                           ADMIT DATE: 2021  PROVIDER:     Lisa Phelps MD    DATE OF PROCEDURE:  2021    [facility] Cardiology Catheterization    INDICATIONS FOR PROCEDURE:  Acute myocardial infarction. The risks and benefits of the procedure were explained to the patient. Informed consent was obtained. She had an Impella left ventricular  support device in the left femoral artery existing. She was placed on  the cardiac catheterization table and prepped and draped in sterile  fashion. Anesthesia was provided in the right groin with 1% lidocaine  injected subcutaneously and deep. Using ultrasound guidance, the right  femoral artery was accessed and cannulated and an 8-Kitt[place] sheath  inserted using Seldinger technique. Over the 0.035 J-wire, the 8-Kittitian XB 3.5 guide catheter was advanced  to the ostium of the left main coronary artery. Using a long Runthrough  0.014 coronary wire, the LAD was wired. This was then exchanged out  with a Turnpike catheter for a Rotablator floppy wire. The Turnpike  catheter was removed. We next gave the patient IV unfractionated  heparin. ACTs were checked throughout the case to maintain an ACT  greater than 250 seconds. The patient was given double bolus IV  Integrin and placed on a drip. Next, Rotablator atherectomy was  performed throughout the left anterior descending artery with a 1.5-mm  jung. A total of three runs were performed and the Rotablator device  was removed. The wire was removed then and the circumflex artery was  wired.   We then performed a Rotablator atherectomy throughout the  circumflex here and into the proximal circumflex stents with a 1.5-mm  jung. We then removed the device after three runs. We then placed a  short Runthrough wire back down the left anterior descending artery. We  then performed Shockwave balloon angioplasty with a 3-mm balloon, 3 x 12  mm balloon. Four runs of therapy were delivered in the proximal to  ostial left anterior descending artery and into the left main. We then  removed this and placed on the circumflex wire and did four runs here  throughout the stented segment and back into the left main. Repeat  angiography was performed. This time, the Impella was running at P9  delivering 3.8 liters of cardiac output. We then elected to stent in  the proximal left anterior descending artery with a 3 mm x 23 mm Abbott  Xience drug-eluting stent. This was placed and dilated up to 3.2 mm in  diameter. We then overlapped this with a 3.5 x 16 mm [place]  drug-eluting stent from the left anterior descending artery into the  left main coronary artery. We deployed this to approximately 3.55 mm in  diameter. We then pulled back the circumflex wire and wired through the  stent struts back into the circumflex. I ballooned this with a 2 mm x  15 mm noncompliant balloon to open the stent struts and then placed a  3.25 x 18 mm Teresa drug-eluting stent into the circumflex just  overlapping into the left main coronary artery. We deployed this to  3.25 mm in diameter. I then removed the stent balloon and used kissing  balloons with a 3.5-mm noncompliant in the ostial left anterior  descending artery into the left main, 3.25 in the circumflex. We  performed two kissing balloon angioplasty runs here. We then removed  these and repeated angiography. This demonstrated FLACO 3 flow in the  vessels with 0% residual stenosis. Of note, prior I performed IVUS runs  in the left anterior descending artery and the circumflex prior to  delivering the stents.   There was residual stenosis in the distal aspect  of the previously placed circumflex stent so I used a 3 mm [place]  cutting balloon here prior to stent placement. The wire was removed and final angiography was performed. The patient  was stable. She did start to have some frothy hemoptysis and was placed  on BiPAP ventilation with improvement. We did give intraarterial  nitroglycerin prior to the last coronary angiography. The patient will  be loaded with 180 mg of oral Brilinta over in the recovery area. We removed the guide catheter. I then performed a selective right  femoral arteriogram through the right femoral arterial sheath port. We  used an DEIIS catheter and had Terumo Advantage Glidewire to cross over  the bifurcation and placed the [address] down and the  left superficial femoral artery. I then placed a 7 mm x 80 mm balloon  just before the bifurcation ready to place into the left common femoral  artery. We put the Impella device on P2 and then pulled it back into the aorta. I then pulled it down and removed it with the sheath. The 7-mm balloon  was then advanced down to the common femoral artery while pressure was  being held and inflated for seven minutes. Eventually, manual  hemostasis was achieved here and the balloon was deflated. The wire was removed. There were no complications from the procedure and estimated blood loss  was less than 50 mL. Moderate sedation was performed for the procedure. The patient received  a total of 6 mg of IV Versed and 250 mcg of fentanyl. She had total  sedation time of 180 minutes. Vital signs were monitored throughout the  sedation period and were managed appropriately. Sedation was  administered by an independent agent at my direction and supervision and  I was present the entire time. There were no complications from the  sedation. She had an ASA grade of IV and a Mallampati score of II.     FINDINGS:  1.  95% ostial to proximal left anterior descending artery stenosis. There is 90% ostial circumflex disease. These were heavily calcified  vessels. They extended into the left main to the disease. This  underwent rotational atherectomy with a 1.5-mm Rotablator jung followed  by Shockwave balloon angioplasty with a 3-mm balloon. These then  underwent bifurcation stenting with a 3 mm stent in the LAD overlapped  by 3.5 mm [place] drug-eluting stent from the LAD into the left main  coronary artery. We then did the T-stenting with some overlap from the  proximal circumflex into the left main. We then performed kissing  balloon angioplasty resulting in 0% residual stenosis and excellent  flow. 2.  Successful removal of the Impella device from the left femoral  artery access site with balloon tamponade to control hemostasis. We  also removed the pulmonary artery catheter that was present. Lelo Pinto MD    D: 08/24/2021 17:48:21       T: 08/24/2021 17:52:23     [email or web address]  Job#: [id number]     Doc#: [id number]    CC:   Thee Presley

## 2021-08-24 NOTE — PROGRESS NOTES
Hematology Note    Pt down in cath lab. Pt received 2 units prbcs  Pt received 1 unit platelets--keep > 50. Will follow.     Melina Tello MD

## 2021-08-24 NOTE — ANESTHESIA PRE-OP
Brief Pre-Op Note/Sedation Assessment      [facility]  1945  [id number]  9:03 AM    Planned Procedure: Cardiac Catheterization Procedure  Post Procedure Plan: Return to same level of care  Consent: I have discussed with the patient and/or the patient representative the indication, alternatives, and the possible risks and/or complications of the planned procedure and the anesthesia methods. The patient and/or patient representative appear to understand and agree to proceed. Chief Complaint:   Chest Pain/Pressure      Indications for Cath Procedure:  1. Presentation:  Worsening Angina  2. Anginal Classification within 2 weeks:  CCS IV - Inability to perform any activity without angina or angina at rest, i.e., severe limitation  3. Angina Symptoms Assessment:  Typical Chest Pain  4. Heart Failure Class within last 2 weeks:  Yes:  Heart Failure Type: Systolic Severity:  Class IV - Symptoms of HF at rest  5. Cardiovascular Instability:  No    Prior Ischemic Workup/Eval:  1. Pre-Procedural Medications: Yes: Aspirin and STATIN  2. Stress Test Completed? No    Does Patient need surgery? Cath Valve Surgery:  No    Pre-Procedure Medical History:  Vital Signs:  /68   Pulse 94   Temp 98.4 °F (36.9 °C) (Oral)   Resp 21   Ht 5' (1.524 m)   Wt 141 lb 12.1 oz (64.3 kg)   SpO2 94%   BMI 27.68 kg/m²     Allergies:     Allergies   Allergen Reactions    Augmentin [Amoxicillin-Pot Clavulanate]     Bactrim [Sulfamethoxazole-Trimethoprim]     Carafate [Sucralfate]     Clindamycin/Lincomycin Nausea Only     Thinks if she has a smaller dose she would be fine      Hyoscyamine     Macrobid [Nitrofurantoin]     Oxybutynin     Pcn [Penicillins]     Pravastatin     Prevacid [Lansoprazole]     Prilosec [Omeprazole]     Sulfamethoxazole     Trazodone And Nefazodone      Medications:    Current Facility-Administered Medications   Medication Dose Route Frequency Provider Last Rate Last Admin    0.9 % sodium chloride infusion   Intravenous PRN Dominic Allen MD        sodium chloride flush 0.9 % injection 5-40 mL  5-40 mL Intravenous 2 times per day Dominic Allen MD        sodium chloride flush 0.9 % injection 5-40 mL  5-40 mL Intravenous PRN Dominic Allen MD        0.9 % sodium chloride infusion  25 mL Intravenous PRN Dominic Allen MD        cefdinir (OMNICEF) capsule 300 mg  300 mg Oral 2 times per day Fortino Medeiros DO   300 mg at 08/23/21 2052    sodium chloride flush 0.9 % injection 5-40 mL  5-40 mL Intravenous PRN Dominic Allen MD        lidocaine PF 1 % injection 5 mL  5 mL Intradermal Once Elizabeth Martinez MD        0.9 % sodium chloride infusion   Intravenous Continuous Dominic Allen MD 50 mL/hr at 08/24/21 0750 Rate Verify at 08/24/21 0750    docusate sodium (COLACE) capsule 100 mg  100 mg Oral Daily Addis Sneed MD   100 mg at 08/23/21 0936    eptifibatide (INTEGRILIN) 0.75 mg/mL infusion  1 mcg/kg/min Intravenous Continuous Dominic Allen MD   Stopped at 08/22/21 2259    oxyCODONE (ROXICODONE) immediate release tablet 5 mg  5 mg Oral Q4H PRN Dominic Allen MD   5 mg at 08/23/21 2317    insulin glargine (LANTUS) injection vial 8 Units  8 Units Subcutaneous QAM Dominic Allen MD   8 Units at 08/23/21 0945    morphine (PF) injection 2 mg  2 mg Intravenous Q4H PRN Dominic Allen MD   2 mg at 08/20/21 0431    insulin lispro (HUMALOG) injection vial 0-12 Units  0-12 Units Subcutaneous TID WC Dominic Allen MD   2 Units at 08/23/21 1721    insulin lispro (HUMALOG) injection vial 0-6 Units  0-6 Units Subcutaneous Nightly Dominic Allen MD   1 Units at 08/23/21 2053    famotidine (PEPCID) tablet 20 mg  20 mg Oral Daily Dominic Allen MD   20 mg at 08/23/21 0655    LORazepam (ATIVAN) tablet 0.5 mg  0.5 mg Oral Daily Dominic Allen MD   0.5 mg at 08/23/21 7376    promethazine (PHENERGAN) tablet 12.5 mg  12.5 mg Oral Q6H PRN Heraclio Miguel MD   12.5 mg at 08/22/21 0430    vitamin B-12 (CYANOCOBALAMIN) tablet 50 mcg  50 mcg Oral Daily Heraclio Miguel MD   50 mcg at 08/23/21 8990    polyethylene glycol (GLYCOLAX) packet 17 g  17 g Oral Daily PRN Heraclio Miguel MD        Vitamin D (CHOLECALCIFEROL) tablet 1,000 Units  1 tablet Oral Daily Heraclio Miguel MD   1,000 Units at 08/23/21 3628    conjugated estrogens (PREMARIN) vaginal cream   Vaginal Once per day on Mon Wed Fri Heraclio Miguel MD        nortriptyline (PAMELOR) capsule 25 mg  25 mg Oral Nightly Heraclio Miguel MD   25 mg at 08/23/21 2052    levothyroxine (SYNTHROID) tablet 100 mcg  100 mcg Oral Daily Heraclio Miguel MD   100 mcg at 08/23/21 9854    sodium chloride flush 0.9 % injection 5-40 mL  5-40 mL Intravenous 2 times per day Heraclio Miguel MD   10 mL at 08/23/21 2053    0.9 % sodium chloride infusion  25 mL Intravenous PRN Heraclio Miguel MD 5 mL/hr at 08/24/21 0750 Rate Verify at 08/24/21 0750    acetaminophen (TYLENOL) tablet 650 mg  650 mg Oral Q4H PRN Heraclio Miguel MD        0.9 % sodium chloride bolus  250 mL Intravenous PRN Heraclio Miguel MD        ondansetron [facility]) injection 4 mg  4 mg Intravenous Q6H PRN Heraclio Miguel MD   4 mg at 08/19/21 1629    hydrALAZINE (APRESOLINE) injection 10 mg  10 mg Intravenous Q1H PRN Heraclio Miguel MD        rosuvastatin (CRESTOR) tablet 40 mg  40 mg Oral Nightly Heraclio Miguel MD   40 mg at 08/23/21 2052    aspirin chewable tablet 81 mg  81 mg Oral Daily Heraclio Miguel MD   81 mg at 08/23/21 0936    glucose (GLUTOSE) 40 % oral gel 15 g  15 g Oral PRN Heraclio Miguel MD        dextrose 50 % IV solution  12.5 g Intravenous PRN Heraclio Miguel MD        glucagon (rDNA) injection 1 mg  1 mg Intramuscular PRN Eligah Saliva Burgess Leo MD        dextrose 5 % solution  100 mL/hr Intravenous PRN Meño Hester MD        perflutren lipid microspheres (DEFINITY) injection 1.65 mg  1.5 mL Intravenous ONCE PRN Meño Hester MD        nitroGLYCERIN 50 mg in dextrose 5% 250 mL infusion  20 mcg/min Intravenous Continuous Meño Hester MD 3 mL/hr at 08/24/21 0750 10 mcg/min at 08/24/21 0750    heparin (porcine) 12,500 Units in dextrose 5 % 500 mL infusion (FOR IMPELLA PURGE)   Intracatheter Continuous Meño Hester MD   New Bag at 08/23/21 1820    heparin 25,000 unit in sodium chloride 0.45% 250 mL (premix) infusion  0-3,000 Units/hr Intravenous Continuous Meño Hester MD 2 mL/hr at 08/24/21 0750 200 Units/hr at 08/24/21 0750       Past Medical History:    Past Medical History:   Diagnosis Date    Anemia     Anxiety     Asthma     CAD (coronary artery disease)     s/p thrombectomy and ZAN to Circ 2011    Cerebral artery occlusion with cerebral infarction ([facility] [address]     mini strokes, told by PCP, pt doesn't recall any symptoms. many years ago.  Depression     Graves disease     ablation 4/16/21    Hiatal hernia     Hx of blood clots 2011    lungs after heart attack    Hyperlipidemia     Hypertension     IBS (irritable bowel syndrome)     w constipation and diarrhea. no bleeding    Kidney disease, chronic, stage III (moderate, EGFR 30-59 ml/min) (HCC)     MI (myocardial infarction) ([address] 08/18/2021    STEMI. PCI LAD, LCx    Pneumonia     aspiration pneumonitis 2007; 2019 bronchitis, outpt abx    Prolonged emergence from general anesthesia     Sleep apnea     does not need to use cpap per MD due to weight loss    Thyroid disease     hyper thyroidism    Urinary incontinence     UTI (urinary tract infection) 04/26/2021    Vocal cord dysfunction     Weight loss     lost 50 lbs.  intentional. working out, intake reduction       Surgical History:    Past Surgical History:   Procedure Laterality Date    ARM SURGERY Left     L shoulder replacement    CHOLECYSTECTOMY      COLONOSCOPY      EYE SURGERY      contaract    GASTRIC FUNDOPLICATION  7201    hiatal hernia repair    HAND SURGERY Left     WRIST CLOSED REDUCTION Left 05/06/2003    left wrist fracture    WRIST FRACTURE SURGERY Right 10/02/2020    OPEN REDUCTION INTERNAL FIXATION RIGHT WRIST performed by Margaret Washington MD at [facility]:  Pre-Sedation Documentation and Exam:  I have personally completed a history, physical exam & review of systems for this patient (see notes). Prior History of Anesthesia Complications:   none    Modified Mallampati:  II (soft palate, uvula, fauces visible)    ASA Classification:  Class 4 - A patient with an incapacitating systemic disease that is a constant threat to life    Swathi Scale: Activity:  2 - Able to move 4 extremities voluntarily on command  Respiration:  2 - Able to breathe deeply and cough freely  Circulation:  2 - BP+/- 20mmHg of normal  Consciousness:  2 - Fully awake  Oxygen Saturation (color):  2 - Able to maintain oxygen saturation >92% on room air    Sedation/Anesthesia Plan:  Guard the patient's safety and welfare. Minimize physical discomfort and pain. Minimize negative psychological responses to treatment by providing sedation and analgesia and maximize the potential amnesia. Patient to meet pre-procedure discharge plan.     Medication Planned:  midazolam intravenously and fentanyl intravenously    Patient is an appropriate candidate for plan of sedation:   yes      Electronically signed by Davy Rollins MD on 8/24/2021 at 9:03 AM

## 2021-08-24 NOTE — PROGRESS NOTES
Increased settings sats were 87% on 50% increased to 100%.     Electronically signed by Adin Vaughan on 8/24/2021 at 12:41 PM

## 2021-08-24 NOTE — CARE COORDINATION
Chart Reviewed. CABG is not an option at this time. Following for DC needs.   Chico Lundberg [facility]     Case Management   [phone]    8/24/2021  11:18 AM

## 2021-08-24 NOTE — PROGRESS NOTES
[facility]  Nephrology   Progress Note        We are following the patient for JESSICA/CKD   76 y/o WF with h/o CAD, CKD stage 3 baseline Cr ~ 1.2-1.4 mg follows with Dr Reynaldo Eaton, presented on 8/18/21 with CP and diagnosed with STEMI Taken to cath lab and had PTCA of LAD, also had Impella placed for cardiogenic shock  Developed hematuria that has since resolved  Cr noted to have increased from 1.4 to 1.7 mg  Received Lasix initially but started on IVF's and Albumin as PCWP noted to be low UOP has picked up since  Urine CX growing E coli    SUBJECTIVE:    HPI:  Breathing comfortably. On bedpan. Constipated. Renal function stable. ROS:  In bed. [address]:  medications reviewed. OBJECTIVE:     PHYSICAL:    /63   Pulse 93   Temp 98.4 °F (36.9 °C) (Oral)   Resp 20   Ht 5' (1.524 m)   Wt 141 lb 12.1 oz (64.3 kg)   SpO2 94%   BMI 27.68 kg/m²   24HR INTAKE/OUTPUT:      Intake/Output Summary (Last 24 hours) at 8/24/2021 1231  Last data filed at 8/24/2021 9004  Gross per 24 hour   Intake 2266.88 ml   Output 1640 ml   Net 626.88 ml       CONSTITUTIONAL:  awake, alert, cooperative, no apparent distress, and appears stated age  LUNGS:  No increased work of breathing, good air exchange, clear to auscultation bilaterally  CARDIOVASCULAR:  Normal apical impulse, regular rate and rhythm, normal S1 and S2,impella device in place  ABDOMEN:  No scars, normal bowel sounds, soft, non-distended, non-tender, no masses palpated, no hepatosplenomegally  SKIN: no bruising or bleeding  EXTREMITIES: trace to 1+ bilateral pedal edema         Data      CBC:   Recent Labs     08/22/21  0530 08/22/21  0530 08/22/21  1802 08/23/21  0420 08/24/21  0412   WBC 10.2  --   --  9.6 10.6   RBC 2.63*  --   --  2.49* 2.07*   HGB 8.5*  --   --  8.2* 6.7*   HCT 24.3*  --   --  23.3* 19.5*   PLT 64*   < > 62* 58* 49*    < > = values in this interval not displayed.      BMP:    Recent Labs     08/22/21  0530 08/23/21  0420 08/24/21  0412   * 136 135*   K 3.5 3.7 3.6   CL 95* 100 104   CO2 24 23 19*   BUN 36* 32* 23*   CREATININE 1.6* 1.5* 1.3*   CALCIUM 8.4 8.6 8.0*   GLUCOSE 148* 139* 142*     Phosphorus:    Recent Labs     08/22/21  0530 08/23/21  0420 08/24/21  0412   PHOS 2.7 3.0 3.3     Magnesium:    Recent Labs     08/22/21  0530 08/23/21  0420 08/24/21  0412   MG 2.00 2.10 1.90         ASSESSMENT/PLAN    1-JESSICA/CKD suspect in the setting of Cardiogenic shock and IV dye exposure Also has UTI UOP 3700 ml/24    - Cr   - Monitor PCWP low and received IV Albumin     2-Ac Anterior MI s/p PTCA LAD   - going to cath lab today    3-Cardiogenic shock on  Impella    4 Hematuria resolved    5 UTI E coli on merrem    6 h/o HTN   - off of ACE-I/ARB due to JESSICA previously on it    7 Hypokalemia corrected stable    8 anemia   - HGB trending down, PLT down    - per Heme    9 Shock liver    - resolved

## 2021-08-24 NOTE — PROGRESS NOTES
Clinical Pharmacy Note  Heparin Dosing - Impella Device    Patient receiving heparin purge solution via Impella device. Consult received from Dr. Jocelyne Weldon to titrate systemic heparin to maintain -180. Shift ACT Results and Heparin Adjustments:      Date   Time POC ACT  Result Heparin  Bolus   (units) Systemic Heparin Infusion Rate (100 units/mL)   8/23/21 2100 185  300 units/hr    2200 178  300 units/hr    2300 185  250 units/hr   8/24/21 0000 175  250 units/hr    0100 180  250 units/hr    0200 185  200 units/hr    0300 181  200 units/hr    0400 180  200 units/hr    0500 175  200 units/hr    0600 179  200 units/hr       Pharmacy will continue to monitor and adjust based on ACT results.     Nav Ogden, [facility]  8/24/2021 6:42 AM

## 2021-08-24 NOTE — PROGRESS NOTES
0700: Handoff with Daquan TANG. Pt resting in bed quietly. 0730: Shift assessment complete and noted in chart. Denies pain. Very anxious about procedure later this morning. Doppler pulses in lower extremities. [place] in place and at the 65 cm marking. Dressing clean dry and intact. Impella at 91 in the left groin. Dressing saturated with old and new blood. 0745: Spoke with Dr. Juan Hui. Reviewed lab results. No CBC resulted. Call made to lab specimen sent down at 0412 need to be run STAT.   0800: no results. Call made again. 3185. Lab called with critical results. 9616: Dr. Juan Hui notified of critical hbg, hct and platelets. See new blood transfusion orders. 0840: first unit of PRBC started. No reaction noted. Increased rate to infuse faster for procedure. 0930: Radha Skyla RN from cath lab to place new PIV so venous sheath is not being relied on. 5777: platelets and 2 unit of PRBC started. Cath lab notified  4329: cath lab taking pt for procedure. 1300: pt back from cath lab. Now on bipap. Impella and swan removed. Venous and arterial sheath remain in R groin. A-line transduced. L groin clean dry and intact. Bruising noted. Pt requesting bipap to be removed. Pt placed on 4L nasal cannula and coughing up thick frothy sputum. Pulse ox not reading o2 sat. Placed back on bipap  1400: attempted to take off bipap again and placed on 4L nasal cannula. Oxygen sat reading 60-70's pt RR low 20's pt resting comfortably no signs of cyanosis. Did place back on bipap for longer. Did send down ABG and repeat CBC. 1505: pt taken off bipap. Chest xray ordered. 1515: Brilinta given at this time. Orders to stop intergrilin 4 hours after the brilinta is given. 1700 reassessment unchanged. Did speak with Dr. Juan Hui okay to pull venous and arterial sheath now that oxygen has stabled out and BP stable. Is aware brilinta was given at [phone] and okay for gtt to be stopped to be able to remove sheaths.    1815: Venous sheath removed at this time. 1820: arterial sheath removed at this time. Pressure held. Tolerated well. Dry dressing placed. Doppler pulses.    1900: Handoff with Lisa TANG

## 2021-08-24 NOTE — CONSULTS
[address] [place] [address]  CARDIOPULMONARY PHASE I CONSULT        NAME:  Shantal Albert [facility] RECORD NUMBER:  [id number]  AGE: 77 y.o. GENDER: female  : 1945  TODAY'S DATE:  2021    Subjective:     VISIT TYPE;     ADMITTING PHYSICIAN:  Lilian Ervin MD     PAST MEDICAL HISTORY        Diagnosis Date    Anemia     Anxiety     Asthma     CAD (coronary artery disease)     s/p thrombectomy and ZAN to Circ     Cerebral artery occlusion with cerebral infarction ([facility] [address]     mini strokes, told by PCP, pt doesn't recall any symptoms. many years ago.  Depression     Graves disease     ablation 21    Hiatal hernia     Hx of blood clots     lungs after heart attack    Hyperlipidemia     Hypertension     IBS (irritable bowel syndrome)     w constipation and diarrhea. no bleeding    Kidney disease, chronic, stage III (moderate, EGFR 30-59 ml/min) ([facility])     MI (myocardial infarction) ([facility] [address] 2021    STEMI. PCI LAD, LCx    Pneumonia     aspiration pneumonitis ;  bronchitis, outpt abx    Prolonged emergence from general anesthesia     Sleep apnea     does not need to use cpap per MD due to weight loss    Thyroid disease     hyper thyroidism    Urinary incontinence     UTI (urinary tract infection) 2021    Vocal cord dysfunction     Weight loss     lost 50 lbs.  intentional. working out, intake reduction       SOCIAL HISTORY    Social History     Tobacco Use    Smoking status: Never Smoker    Smokeless tobacco: Never Used   Vaping Use    Vaping Use: Never used   Substance Use Topics    Alcohol use: No     Comment: rare glass of wine    Drug use: No       ALLERGIES    Allergies   Allergen Reactions    Augmentin [Amoxicillin-Pot Clavulanate]     Bactrim [Sulfamethoxazole-Trimethoprim]     Carafate [Sucralfate]     Clindamycin/Lincomycin Nausea Only     Thinks if she has a smaller dose she would be fine      Hyoscyamine     Macrobid TO CARDIAC REHAB    Patient has a CARDIOLOGY CONSULT: Yes        EDUCATION STATUS: Patient   []  Provided both written and verbal education on Cardiopulmonary Rehabilitation. []  Provided instructions for smoking cessation programs. []  Provided education of CAD risk factors. []  Provided recommendations on activity and exercise. []  Provided education on medications. []  Provided education on coronary anatomy and coronary interventions. [x]  Other:    CURRENT DIET: ADULT DIET; Regular; No Added Salt (3-4 gm)    EDUCATIONAL PACKETS PROVIDED- PRINTED FROM [employer]. Titles and material given:      Managing Heart Disease and Preventing Stroke  []  Heart Owner's Manual  []  Living Well with Heart Failure  [x]  Other:     PATIENT/CAREGIVER TEACHING:   Level of patient/caregiver understanding able to:   [] Verbalize understanding   [] Demonstrate understanding       [] Teach back        [x] Needs reinforcement     []  Other:       TEACHING TIME:   2 minutes       Plan:       DISCHARGE PLAN:  Placement for patient upon discharge:   Hospice Care:  NO  Code Status: Full Code  Discharge appointment scheduled:     RECOMMENDATIONS:    []  Patient/Family instructed to call Cardiopulmonary Rehab ([phone]) upon discharge schedule the initial evaluation  [x]  Encourage to call Cardiopulmonary Rehabilitation with any questions. []  Referral to alternate Cardiopulmonary Rehabilitation facility.    [x]  Other:    [] Appointment scheduled for   [] Chooses to not schedule at this time      Advised to call dept for further information         Electronically signed by Jonas Gamino RN,MS on 8/24/2021 at 3:21 PM

## 2021-08-24 NOTE — PROGRESS NOTES
Started bipap in Cath lab. 12/6 10 50%    Electronically signed by [employer]  on 8/24/2021 at 12:37 PM

## 2021-08-25 NOTE — PROGRESS NOTES
Occupational Therapy   Occupational Therapy Initial Assessment  Date: 2021   Patient Name: Estela Nguyen  MRN: [id number]     : 1945    Date of Service: 2021    Discharge Recommendations:  Patient would benefit from continued therapy after discharge (At this time, pt will benefit from skilled therapy (3-5 vs 5-7) at discharge prior to returning home to maximize independence with ADLs and mobility. Will continue to assess pending pt progress. Pt is hopeful to return home with family support.)    OT Equipment Recommendations  Other: will continue to assess    Estela Nurse scored a 15/24 on the -[facility] ADL Inpatient form. Assessment   Performance deficits / Impairments: Decreased functional mobility ; Decreased safe awareness;Decreased balance;Decreased ADL status; Decreased cognition;Decreased high-level IADLs;Decreased endurance;Decreased strength  Assessment: 76 y/o female admitted 2021 with STEMI. PTA pt lives at home with spouse and was independent with ADLs and functional mobility without AD. Today, pt required min A to stand from chair to RW 2x. Pt with very limited endurance and unable to ambulate this date d/t weakness/fatigue. Pt with functional UE ROM for self care and anticipate will require up to max A for ADLs based on balance and endurance. Pt is functioning below baseline and benefit from skilled therapy. At this time, pt will benefit from skilled therapy at discharge prior to returning home to maximize independence with ADLs and mobility. Will continue to assess pending pt progress. Pt is hopeful to return home with family support.   Prognosis: Good  Decision Making: Medium Complexity  OT Education: OT Role;Transfer Training;Plan of Care  REQUIRES OT FOLLOW UP: Yes  Activity Tolerance  Activity Tolerance: Patient limited by fatigue  Activity Tolerance: very limited endurance  Safety Devices  Safety Devices in place: Yes  Type of devices: Nurse notified;Gait belt;Call light within reach; Left in chair           Patient Diagnosis(es): The primary encounter diagnosis was ST elevation myocardial infarction (STEMI), unspecified artery ([facility] [address] Diagnoses of Requires oxygen therapy and Other fatigue were also pertinent to this visit. has a past medical history of Anemia, Anxiety, Asthma, CAD (coronary artery disease), Cerebral artery occlusion with cerebral infarction ([address] Depression, Graves disease, Hiatal hernia, Hx of blood clots, Hyperlipidemia, Hypertension, IBS (irritable bowel syndrome), Kidney disease, chronic, stage III (moderate, EGFR 30-59 ml/min) ([address] MI (myocardial infarction) ([facility] [address] Pneumonia, Prolonged emergence from general anesthesia, Sleep apnea, Thyroid disease, Urinary incontinence, UTI (urinary tract infection), Vocal cord dysfunction, and Weight loss. has a past surgical history that includes Hand surgery (Left); Arm Surgery (Left); eye surgery; Colonoscopy; Cholecystectomy; Wrist fracture surgery (Right, 10/02/2020); Gastric fundoplication (7063); and Wrist Closed Reduction (Left, 05/06/2003). Restrictions  Restrictions/Precautions  Restrictions/Precautions: Fall Risk    Subjective   General  Chart Reviewed: Yes  Patient assessed for rehabilitation services?: Yes  Additional Pertinent Hx: 76 y/o female admitted 8/18/2021 with STEMI and taken to cath lab for [facility]. LHC revealed mutlivessel disease with balloon to LAD and circ with impella placed after decompensation with pulmonary edema . CVTS consulted however not a candidate for CABG. Pt s/p PCI with impella explanted 8/24/2021. Family / Caregiver Present: Yes (spouse)  Referring Practitioner: Dr. Burgess Bailey  Diagnosis: MI s/p PCI  Subjective  Subjective: Pt seen bedside and agreeable to therapy. Pt expressed extreme fatigue after 1 brief stand from chair  General Comment  Comments: Per RN ok for therapy.   Vital Signs  Pulse: 98  Resp: 16  Oxygen Therapy  SpO2: 97 %  Pulse Oximeter Device Mode: Continuous  Pulse Oximeter Device Location: Finger  O2 Device: Nasal cannula  Skin Assessment: Clean, dry, & intact  O2 Flow Rate (L/min): 2 L/min     Social/Functional History  Social/Functional History  Lives With: Spouse, Daughter (Daughter, MARU, and family live with pt and )  Type of Home: House (with basement)  Home Layout: One level, Laundry in basement, Able to Live on Main level with bedroom/bathroom  Home Access: Stairs to enter with rails  Entrance Stairs - Number of Steps: 2  Entrance Stairs - Rails: Left  Home Equipment: Rolling walker  ADL Assistance: Independent  Homemaking Assistance: Independent  Homemaking Responsibilities: Yes (daughter and family assist with most meals)  Ambulation Assistance: Independent  Transfer Assistance: Independent  Active : Yes       Objective   Vision Exceptions: Wears glasses for reading  Hearing: Within functional limits      Orientation  Overall Orientation Status: Within Functional Limits  Observation/Palpation  Posture: Good  Observation: Impella site healing well on L, hematoma noted but appears appropriate at this time     Balance  Sitting Balance: Stand by assistance  Standing Balance: Minimal assistance  Standing Balance  Time: very brief (less then 20 seconds) for 2 stands with RW support  Functional Mobility  Functional Mobility Comments: declined ambulation 2/2 fatigue.  Pt attempted to take 2 steps in place before having to sit down d/t fatigue     ADL  Toileting: Dependent/Total (lozano)  Additional Comments: Anticipate pt will require max A for LB bathing/dressing, min A for UB bathing/dressing and grooming based on balance and endurance observed        Bed mobility  Comment: pt in chair at start/end of session  Transfers  Sit to stand: Minimal assistance  Stand to sit: Minimal assistance  Transfer Comments: stood 2x from chair to RW- very limited standing endurance     Cognition  Overall Cognitive Status: [facility]  Perception  Overall Perceptual Status: WFL     Sensation  Overall Sensation Status: WFL        LUE AROM (degrees)  LUE AROM : WFL  Left Hand AROM (degrees)  Left Hand AROM: WFL  RUE AROM (degrees)  RUE AROM : WFL  Right Hand AROM (degrees)  Right Hand AROM: WFL          Plan   Plan  Times per week: 3-5  Current Treatment Recommendations: Strengthening, Endurance Training, Gait Training, Balance Training, Functional Mobility Training, Self-Care / ADL    AM-PAC Score  AM-PAC Inpatient Daily Activity Raw Score: 15 (08/25/21 1412)  AM-PAC Inpatient ADL T-Scale Score : 34.69 (08/25/21 1412)  ADL Inpatient CMS 0-100% Score: 56.46 (08/25/21 1412)  ADL Inpatient CMS G-Code Modifier : CK (08/25/21 1412)    Goals  Short term goals  Time Frame for Short term goals: Prior to DC: Short term goal 1: Pt will complete ADL transfers with supervision  Short term goal 2: Pt will complete functional mobility with supervision  Short term goal 3: Pt will tolerate standing > 5 min for functional task with supervision  Short term goal 4: Pt will complete toileting with supervision  Short term goal 5: Pt will complete LB dressing with supervision  Patient Goals   Patient goals : to get stronger and return home       Therapy Time   Individual Concurrent Group Co-treatment   Time In 1130         Time Out 1200         Minutes 30         Timed Code Treatment Minutes: 15 Minutes     This note to serve as OT d/c summary if pt is d/c-ed prior to next therapy session.     Jose Luis Rojas OTR/L

## 2021-08-25 NOTE — PROGRESS NOTES
0700: Report received from CLYDE Davidson. End of shift checks completed at bedside. 7954: Dr. Alysia Conn, hematology/oncology, at bedside. No new orders at this time. 0800: Initial assessment complete. VSS. Pt complains of mild chest pressure. Neff intact, draining clear yellow urine. Right upper arm PICC infusing IV fluids per eMar.    0815Tomasa June, Physical Therapy at bedside. Pt up to chair at this time. 0900: Dr. Mariam Thompson, Cardiology at bedside. Wean oxygen as tolerated. Lasix ordered, wean milrinone later today. 1200: Reassessment complete. No changes to prior. 1214: Dr. Mariam Thompson at bedside. Verbal order to decrease milrinone to 0.15 mch/kg/min. Will continue to wean oxygen as tolerated. 1215Winthrop Osbaldo from wound care at bedside. Silicone border applied to left groin skin tear. 1409: Milrinone off at this time. Pt on 2L NC O2 sat 95%.

## 2021-08-25 NOTE — PROGRESS NOTES
[address]   Daily Progress Note      Admit Date:  8/18/2021      Subjective:   Ms. Deon Matias  is a 77 y.o. female with a past medical history significant for CAD with prior PCI to her Circumflex in 2011 who presented to [facility] from home with chest pain that began at dinner tonight. STEMI was activated. She was taken to the cath lab and had a subtotal occlusion of her ostial LAD and a 99% Circumflex artery. I placed an Impella and performed PTCA to the ostia of the LAD and Circ restoring FLACO 3 flow. Interval History:  Sukhwinder Myers remains in sinus rhythm on telemetry. She denies any chest pain or shortness of breath. She is on 8 liters of oxygen.     Objective:     BP (!) 113/49   Pulse 99   Temp 97.9 °F (36.6 °C) (Oral)   Resp 16   Ht 5' (1.524 m)   Wt 146 lb 13.2 oz (66.6 kg)   SpO2 100%   BMI 28.68 kg/m²      Intake/Output Summary (Last 24 hours) at 8/25/2021 0858  Last data filed at 8/24/2021 1950  Gross per 24 hour   Intake 1467.58 ml   Output 2325 ml   Net -857.42 ml       Physical Exam:  General:  Awake, alert, NAD  Skin:  Warm and dry  Neck:  JVD<8, no carotid bruits  Chest:  Bilaterally diminished, no wheezes/rhonchi/rales  Cardiovascular:  RRR, normal S1/S2, no M/R/G  Abdomen:  Soft, nontender, +bowel sounds  Extremities:  No edema  Pulses: 2+ bilat carotid    2+ bilat radial    2+ bilat femoral    Impella device in left groin with hematoma        Medications:    sodium chloride flush  5-40 mL IntraVENous 2 times per day    ticagrelor  90 mg Oral BID    cefdinir  300 mg Oral 2 times per day    lidocaine 1 % injection  5 mL Intradermal Once    docusate sodium  100 mg Oral Daily    insulin glargine  8 Units Subcutaneous QAM    insulin lispro  0-12 Units Subcutaneous TID WC    insulin lispro  0-6 Units Subcutaneous Nightly    famotidine  20 mg Oral Daily    LORazepam  0.5 mg Oral Daily    vitamin B-12  50 mcg Oral Daily    Vitamin D  1 tablet Oral Daily    conjugated estrogens   Vaginal Once per day on Mon Wed Fri    nortriptyline  25 mg Oral Nightly    levothyroxine  100 mcg Oral Daily    rosuvastatin  40 mg Oral Nightly    aspirin  81 mg Oral Daily      sodium chloride      sodium chloride      sodium chloride      milrinone 0.2 mcg/kg/min (08/24/21 1730)    sodium chloride Stopped (08/24/21 0816)    dextrose      nitroGLYCERIN Stopped (08/24/21 0954)       Lab Data:  CBC:   Recent Labs     08/24/21  0412 08/24/21  1450 08/25/21  0440   WBC 10.6 15.7* 16.7*   HGB 6.7* 10.0* 8.4*   PLT 49* 149 147     BMP:    Recent Labs     08/23/21  0420 08/24/21  0412 08/25/21  0440    135* 133*   K 3.7 3.6 3.8   CO2 23 19* 24   BUN 32* 23* 24*   CREATININE 1.5* 1.3* 1.2     LIVR:   Recent Labs     08/23/21  0420 08/24/21  0412 08/25/21  0440   AST 49* 34 44*   ALT 28 20 19     PT/INR:   Recent Labs     08/23/21  0420 08/24/21  0412 08/25/21  0440   PROT 7.1 6.1* 6.3*   INR 1.12  --   --      APTT:   Recent Labs     08/23/21  0420   APTT 34.8   FASTING LIPID PANEL:  Lab Results   Component Value Date    CHOL 126 08/19/2021    HDL 58 08/19/2021    TRIG 52 08/19/2021     Echo 8/19/21:  Dilated LV with impella device. Septum, anterior, anterolateral and apex is   thin and akinetic. EF < 25 %   Mild mitral regurgitation. The left atrium is normal in size. The right ventricle is normal in size and function. Mild tricuspid regurgitation    Limited Echo 8/23/21:   Ejection fraction is visually estimated to be 30%. Regional wall motion abnormalities appear present. An Impella device catheter appears present in the left ventricle, measuring   3.57 cm from the device inlet to the aortic annulus. Assessment:  1. Acute Anterior Myocardial Infarction  2. Cardiogenic Shock  3. Acute on chronic Kidney Injury  4. Acute Respiratory Failure with Hypoxia  5. Generalized Anxiety Disorder  6. Anemia, unspecified  7. Thrombocytopenia    Plan:   Kimo Rincon is doing better today.  Continue to wean the oxygen. We will give her another dose of IV lasix 40mg once today. Ho0d ACEI/ARB/aldactone therapy for now with her acute kidney injury. Her LV function has improved a bit by her limited echo. We will wean off the milrinone drip today. Her hemoglobin is stable as is her platelet count.            Signed:  Evan Boykin MD

## 2021-08-25 NOTE — PROGRESS NOTES
[facility]  Nephrology   Progress Note        We are following the patient for JESSICA/CKD   78 y/o WF with h/o CAD, CKD stage 3 baseline Cr ~ 1.2-1.4 mg follows with Dr Lynne Knight, presented on 8/18/21 with CP and diagnosed with STEMI Taken to cath lab and had PTCA of LAD, also had Impella placed for cardiogenic shock  Developed hematuria that has since resolved  Cr noted to have increased from 1.4 to 1.7 mg  Received Lasix initially but started on IVF's and Albumin as PCWP noted to be low UOP has picked up since  Urine CX growing E coli    SUBJECTIVE:    HPI:  Breathing comfortably. Renal function slowly improving. ROS:  In bed. [address]:  medications reviewed.     OBJECTIVE:     PHYSICAL:    BP (!) 107/55   Pulse 98   Temp 98.5 °F (36.9 °C) (Oral)   Resp 16   Ht 5' (1.524 m)   Wt 146 lb 13.2 oz (66.6 kg)   SpO2 97%   BMI 28.68 kg/m²   24HR INTAKE/OUTPUT:      Intake/Output Summary (Last 24 hours) at 8/25/2021 1304  Last data filed at 8/25/2021 1246  Gross per 24 hour   Intake 745.75 ml   Output 3875 ml   Net -3129.25 ml       CONSTITUTIONAL:  awake, alert, cooperative, no apparent distress, and appears stated age  LUNGS:  No increased work of breathing, good air exchange, clear to auscultation bilaterally  CARDIOVASCULAR:  Normal apical impulse, regular rate and rhythm, normal S1 and S2,impella device in place  ABDOMEN:  No scars, normal bowel sounds, soft, non-distended, non-tender, no masses palpated, no hepatosplenomegally  SKIN: no bruising or bleeding  EXTREMITIES: trace to 1+ bilateral pedal edema         Data      CBC:   Recent Labs     08/24/21  0412 08/24/21  1450 08/25/21  0440   WBC 10.6 15.7* 16.7*   RBC 2.07* 3.27* 2.80*   HGB 6.7* 10.0* 8.4*   HCT 19.5* 28.8* 24.9*   PLT 49* 149 147     BMP:    Recent Labs     08/23/21  0420 08/24/21  0412 08/25/21  0440    135* 133*   K 3.7 3.6 3.8    104 98*   CO2 23 19* 24   BUN 32* 23* 24*   CREATININE 1.5* 1.3* 1.2 CALCIUM 8.6 8.0* 7.9*   GLUCOSE 139* 142* 137*     Phosphorus:    Recent Labs     08/23/21  0420 08/24/21  0412 08/25/21  0440   PHOS 3.0 3.3 3.7     Magnesium:    Recent Labs     08/23/21  0420 08/24/21  0412 08/25/21  0440   MG 2.10 1.90 2.00         ASSESSMENT/PLAN    1-JESSICA/CKD suspect in the setting of Cardiogenic shock and IV dye exposure Also has UTI UOP 3700 ml/24    - Cr slowly improving   - Monitor PCWP low and received IV Albumin     2-Ac Anterior MI s/p PTCA LAD   - s/p ZAN in LAD   - off impella    3-Cardiogenic shock on  Impella    4 Hematuria resolved    5 UTI E coli on merrem    6 h/o HTN   - off of ACE-I/ARB due to JESSICA previously on it    7 Hypokalemia corrected stable    8 anemia   - HGB trending down, PLT down    - per Heme    9 Shock liver    - resolved

## 2021-08-25 NOTE — PROGRESS NOTES
Hematology Oncology Daily Progress Note    Admit Date: 8/18/2021  Hospital day several    Subjective:     Patient has complaints of mild chest discomfort--denies sob. Medication side effects: none    Scheduled Meds:   sodium chloride flush  5-40 mL IntraVENous 2 times per day    ticagrelor  90 mg Oral BID    cefdinir  300 mg Oral 2 times per day    lidocaine 1 % injection  5 mL Intradermal Once    docusate sodium  100 mg Oral Daily    insulin glargine  8 Units Subcutaneous QAM    insulin lispro  0-12 Units Subcutaneous TID WC    insulin lispro  0-6 Units Subcutaneous Nightly    famotidine  20 mg Oral Daily    LORazepam  0.5 mg Oral Daily    vitamin B-12  50 mcg Oral Daily    Vitamin D  1 tablet Oral Daily    conjugated estrogens   Vaginal Once per day on Mon Wed Fri    nortriptyline  25 mg Oral Nightly    levothyroxine  100 mcg Oral Daily    rosuvastatin  40 mg Oral Nightly    aspirin  81 mg Oral Daily     Continuous Infusions:   sodium chloride      sodium chloride      sodium chloride      milrinone 0.2 mcg/kg/min (08/24/21 1730)    sodium chloride Stopped (08/24/21 0816)    dextrose      nitroGLYCERIN Stopped (08/24/21 0954)     PRN Meds:sodium chloride, sodium chloride, sodium chloride flush, sodium chloride, acetaminophen, sodium chloride, ondansetron, oxyCODONE, morphine, promethazine, polyethylene glycol, sodium chloride, hydrALAZINE, glucose, dextrose, glucagon (rDNA), dextrose, perflutren lipid microspheres    Review of Systems  Pertinent items are noted in HPI. REVIEW OF SYSTEMS:         · Constitutional: Denies fever, sweats, weight loss     · Eyes: No visual changes or diplopia. No scleral icterus. · ENT: No Headaches, hearing loss or vertigo. No mouth sores or sore throat. · Cardiovascular: No chest pain, dyspnea on exertion, palpitations or loss of consciousness. · Respiratory: No cough or wheezing, no sputum production. No hemoptysis. .    · Gastrointestinal: No abdominal carotid    bruit or JVD   Back:     Symmetric, no curvature, ROM normal, no CVA tenderness   Lungs:     Clear to auscultation bilaterally, respirations unlabored   Chest wall:    No tenderness or deformity   Heart:    Regular rate and rhythm, S1 and S2 normal, no murmur, rub   or gallop   Abdomen:     Soft, non-tender, bowel sounds active all four quadrants,     no masses, no organomegaly           Extremities:   Extremities normal, atraumatic, no cyanosis or edema   Pulses:   2+ and symmetric all extremities   Skin:   Skin color, texture, turgor normal, no rashes or lesions   Lymph nodes:   Cervical, supraclavicular, and axillary nodes normal   Neurologic:   Stable         Data Review  CBC:   Lab Results   Component Value Date    WBC 16.7 08/25/2021    RBC 2.80 08/25/2021       Assessment:     Active Problems:    STEMI (ST elevation myocardial infarction) (HCC)    Cardiogenic shock (HCC)    Acute respiratory failure with hypoxia and hypercapnia (HCC)    Acute kidney injury (HCC)    Hypoxia    Acute cystitis without hematuria    Hemoptysis    Thrombocytopenia (HCC)    Anemia    MARSHA (generalized anxiety disorder)  Resolved Problems:    * No resolved hospital problems. *      Plan:     1. Thrombocytopenia. This has resolved. She did receive platelets yesterday. This is likely secondary to the Impella device. 2.  Anemia. This is likely multifactorial and due to her procedures as well as her acute kidney injury. Transfuse as needed. 3.  Coronary artery disease. Per cardiology and cardiothoracic surgery.         Electronically signed by Michael Burroughs MD on 8/25/2021 at 8:13 AM

## 2021-08-25 NOTE — PROGRESS NOTES
Physical Therapy    Facility/Department: [facility]  CVICU  Initial Assessment    This note serves as patient discharge summary if pt discharges prior to next PT visit    NAME: Denise Su  : 1945  MRN: [id number]    Date of Service: 2021    Discharge Recommendations:   Denise Su scored a 15/24 on the AM-PAC short mobility form. PT Equipment Recommendations  Other: Will monitor pt needs while in CVICU    Assessment     PT Education: Goals;PT Role;Plan of Care;General Safety  REQUIRES PT FOLLOW UP: Yes  Activity Tolerance  Activity Tolerance: Patient Tolerated treatment well;Patient limited by fatigue       Patient Diagnosis(es): The primary encounter diagnosis was ST elevation myocardial infarction (STEMI), unspecified artery ([address] Diagnoses of Requires oxygen therapy and Other fatigue were also pertinent to this visit. has a past medical history of Anemia, Anxiety, Asthma, CAD (coronary artery disease), Cerebral artery occlusion with cerebral infarction ([address] Depression, Graves disease, Hiatal hernia, Hx of blood clots, Hyperlipidemia, Hypertension, IBS (irritable bowel syndrome), Kidney disease, chronic, stage III (moderate, EGFR 30-59 ml/min) ([address] MI (myocardial infarction) ([address] Pneumonia, Prolonged emergence from general anesthesia, Sleep apnea, Thyroid disease, Urinary incontinence, UTI (urinary tract infection), Vocal cord dysfunction, and Weight loss. has a past surgical history that includes Hand surgery (Left); Arm Surgery (Left); eye surgery; Colonoscopy; Cholecystectomy; Wrist fracture surgery (Right, 10/02/2020); Gastric fundoplication (4328); and Wrist Closed Reduction (Left, 2003).     Restrictions     Vision/Hearing  Hearing: Within functional limits       Subjective  General  Chart Reviewed: Yes  Patient assessed for rehabilitation services?: Yes  Additional Pertinent Hx: Pt is a 77 y.o. F with significant past medical history of CAD and stents at [facility] who presents with substernal chest pain. Improved with NTG in the ED. EKG c/w STEMI. LHC revealed EF 15-20%, likely some degree of MR, diffuse RCA disease and osteal LAD and CX lesions. Impella placed 8/18 and removed 8/24 with STENTs placed. Patient developed pulmonary edema initially at cath but stabilized with BiPAP and POBA. Pt has sig PMHx of CAD, HTN, MI with previous STENTs, Kidney disease and Renal Failure. Response To Previous Treatment: Not applicable  Family / Caregiver Present: No  Referring Practitioner: Arturo Martinez MD  Referral Date : 08/24/21  Follows Commands: Within Functional Limits  Subjective  Subjective: Pt in bed, awake. She denies pain at this time, agreeable to PT Eval/Rx.    Pain Screening  Patient Currently in Pain: Yes     Orientation  Orientation  Overall Orientation Status: Impaired  Orientation Level: Oriented to person;Oriented to place;Oriented to situation;Disoriented to time     Social/Functional History  Social/Functional History  Lives With: Spouse, Daughter (Daughter, MARU, and family live with pt and )  Type of Home: House (with basement)  Home Layout: One level, Laundry in basement, Able to Live on Main level with bedroom/bathroom  Home Access: Stairs to enter with rails  Entrance Stairs - Number of Steps: 2  Entrance Stairs - Rails: Left  Home Equipment: Rolling walker  ADL Assistance: Independent  Homemaking Assistance: Independent  Homemaking Responsibilities: Yes (daughter and family assist with most meals)  Ambulation Assistance: Independent  Transfer Assistance: Independent  Active : Yes     Cognition   Cognition  Overall Cognitive Status: WFL    Objective     Observation/Palpation  Posture: Good  Observation: Impella site healing well L Groin region, hematoma noted but appears appropriate at this time       Strength RLE  Comment: grossly 3+/5 assessed in bed, pt able to stand to RW and ambulate a few steps  Strength LLE  Comment: grossly 3+/5 assessed in bed, pt able to stand to RW and ambulate a few steps     Sensation  Overall Sensation Status: WFL  Bed mobility  Supine to Sit: Minimal assistance  Sit to Supine: Minimal assistance  Transfers  Sit to Stand: Minimal Assistance (cueing for hand placement and safety)  Stand to sit: Minimal Assistance (cueing for hand placement and safety)  Ambulation  Ambulation?: Yes  WB Status: WBAT  More Ambulation?: No  Ambulation 1  Surface: level tile  Device: Rolling Walker  Assistance: Minimal assistance  Quality of Gait: discontinous step through gait, short step length/height, slow nieves  Gait Deviations: Slow Nieves;Decreased step length;Decreased step height;Shuffles  Distance: 5' in pt room  Comments: bed to BS chair, BS chair + 5 steps forward/backward  Stairs/Curb  Stairs?: No  Balance  Posture: Good  Sitting - Static: Fair  Sitting - Dynamic: Fair (hand placement on bed)  Standing - Static: Fair (@ RW)  Standing - Dynamic: Fair (@ RW)      Plan   Plan  Times per week: 3-5x  Current Treatment Recommendations: Functional Mobility Training  Safety Devices  Type of devices: All fall risk precautions in place, Call light within reach, Chair alarm in place, Patient at risk for falls, Gait belt, Left in chair, Nurse notified    AM-PAC Score  AM-PAC Inpatient Mobility Raw Score : 15 (08/25/21 0954)  AM-PAC Inpatient T-Scale Score : 39.45 (08/25/21 0954)  Mobility Inpatient CMS 0-100% Score: 57.7 (08/25/21 0954)  Mobility Inpatient CMS G-Code Modifier : CK (08/25/21 0954)    Goals  Short term goals  Time Frame for Short term goals: upon d/c from ICU  Short term goal 1: Bed Mobility  Short term goal 2: Transfers with assist device SBA   Short term goal 3: Ambulation with assist device SBA 50'  Short term goal 4: Stair Negotiation as approp.      Therapy Time   Individual Concurrent Group Co-treatment   Time In 2111         Time Out 0840         Minutes 50            Ev 15; TA 25; Gt 10    Susan Moon  I attest that I was present for and made a skilled & mindful clinical judgement during the evaluation and/or treatment of this patient on 8/25/2021  Cosign: Aida Wooten, PT

## 2021-08-25 NOTE — PROGRESS NOTES
Comprehensive Nutrition Assessment    Type and Reason for Visit:  Initial (LOS)    Nutrition Recommendations/Plan:   Continue MEENAKSHI diet. Resume Glucerna BID. Nutrition Assessment:  LOS. Pt with PMH of CKD3 presented with chest pain and NSTEMI. Pt s/p LHC and impella implant on 8/18. Pt had developed hypoxemia which has now resolved. Noted pt with varied PO intakes ~50% or less. Pt did drink ONS well but was D/C'd when pt went NPO. Will resume ONS. Malnutrition Assessment:  Malnutrition Status: At risk for malnutrition     Context:  Acute Illness     Findings of the 6 clinical characteristics of malnutrition:  Energy Intake:  Mild decrease in energy intake   Weight Loss:  No significant weight loss     Body Fat Loss:  No significant body fat loss     Muscle Mass Loss:  No significant muscle mass loss    Fluid Accumulation:  No significant fluid accumulation     Strength:  Not Performed    Estimated Daily Nutrient Needs:  Energy (kcal):  2409-8430 kcal( 20-25 kcal/kg 66 kg CBW)  Protein (g):  55-91 gm (1.2-2 gm/kg IBW)  Fluid (ml/day):  per provider    Nutrition Related Findings:  BM 8/23; Glu 168      Wounds:  None       Current Nutrition Therapies:    ADULT DIET; Regular; No Added Salt (3-4 gm)    Anthropometric Measures:  · Height: 5' (152.4 cm)  · Current Body Weight: 146 lb (66.2 kg)   · Admission Body Weight: 159 lb (72.1 kg)    · Ideal Body Weight: 100 lbs; % Ideal Body Weight 146 %   · BMI: 28.5  · BMI Categories: Overweight (BMI 25.0-29. 9)       Nutrition Diagnosis:   · Inadequate oral intake related to inadequate protein-energy intake as evidenced by intake 26-50%      Nutrition Interventions:   Food and/or Nutrient Delivery:  Continue Current Diet, Start Oral Nutrition Supplement  Nutrition Education/Counseling:  No recommendation at this time   Coordination of Nutrition Care:  Continue to monitor while inpatient    Goals:  consume >50% of meals and ONS during admission       Nutrition Monitoring and Evaluation:   Food/Nutrient Intake Outcomes:  Food and Nutrient Intake, Supplement Intake  Physical Signs/Symptoms Outcomes:  Biochemical Data, Weight, Skin, Nutrition Focused Physical Findings     Discharge Planning:     Too soon to determine     Electronically signed by Piero Suarez RD, LD on 8/25/21 at 10:51 AM EDT    Contact: [phone]

## 2021-08-25 NOTE — CARE COORDINATION
[address] [id number] Continence Nurse  Consult Note       NAME:  Paz Laureano  MEDICAL RECORD NUMBER:  [id number]  AGE: 77 y.o. GENDER: female  : 1945  TODAY'S DATE:  2021    Subjective   Reason for WOCN Evaluation and Assessment: Skin tear      Paz Laureano is a 77 y.o. female referred by:   [] Physician  [x] Nursing  [] Other:     Wound Identification:  Wound Type: skin tear  Contributing Factors: edema    Wound History: Impella in L groin. Current Wound Care Treatment:  Dry dressing    Patient Goal of Care:  [x] Wound Healing  [] Odor Control  [] Palliative Care  [] Pain Control   [] Other:         PAST MEDICAL HISTORY        Diagnosis Date    Anemia     Anxiety     Asthma     CAD (coronary artery disease)     s/p thrombectomy and ZAN to Circ     Cerebral artery occlusion with [facility] infarction [facility]     mini strokes, told by PCP, pt doesn't recall any symptoms. many years ago.  Depression     Graves disease     ablation 21    Hiatal hernia     Hx of blood clots     lungs after heart attack    Hyperlipidemia     Hypertension     IBS (irritable bowel syndrome)     w constipation and diarrhea. no bleeding    Kidney disease, chronic, stage III (moderate, EGFR 30-59 ml/min) ([facility])     MI (myocardial infarction) ([facility] 2021    STEMI. PCI LAD, LCx    Pneumonia     aspiration pneumonitis ;  bronchitis, outpt abx    Prolonged emergence from general anesthesia     Sleep apnea     does not need to use cpap per MD due to weight loss    Thyroid disease     hyper thyroidism    Urinary incontinence     UTI (urinary tract infection) 2021    Vocal cord dysfunction     Weight loss     lost 50 lbs.  intentional. working out, intake reduction       PAST SURGICAL HISTORY    Past Surgical History:   Procedure Laterality Date    ARM SURGERY Left     L shoulder replacement    CHOLECYSTECTOMY      COLONOSCOPY      EYE SURGERY      contaract    GASTRIC FUNDOPLICATION  0092    hiatal hernia repair    HAND SURGERY Left     WRIST CLOSED REDUCTION Left 05/06/2003    left wrist fracture    WRIST FRACTURE SURGERY Right 10/02/2020    OPEN REDUCTION INTERNAL FIXATION RIGHT WRIST performed by Ajith Allan MD at [address] History   Problem Relation Age of Onset   Rivas Hampton Stroke Mother 61    Coronary Art Dis Father     Stroke Father     Lung Cancer Father     Heart Disease Father     Stroke Sister     Hypothyroidism Sister     Breast Cancer Sister     Heart Attack Paternal Grandmother         middle aged   Rivas Hampton Heart Attack Sister         in her 62s       SOCIAL HISTORY    Social History     Tobacco Use    Smoking status: Never Smoker    Smokeless tobacco: Never Used   Vaping Use    Vaping Use: Never used   Substance Use Topics    Alcohol use: No     Comment: rare glass of wine    Drug use: No       ALLERGIES    Allergies   Allergen Reactions    Augmentin [Amoxicillin-Pot Clavulanate]     Bactrim [Sulfamethoxazole-Trimethoprim]     Carafate [Sucralfate]     Clindamycin/Lincomycin Nausea Only     Thinks if she has a smaller dose she would be fine      Hyoscyamine     Macrobid [Nitrofurantoin]     Oxybutynin     Pcn [Penicillins]     Pravastatin     Prevacid [Lansoprazole]     Prilosec [Omeprazole]     Sulfamethoxazole     Trazodone And Nefazodone        MEDICATIONS    No current facility-administered medications on file prior to encounter.      Current Outpatient Medications on File Prior to Encounter   Medication Sig Dispense Refill    desvenlafaxine succinate (PRISTIQ) 50 MG TB24 extended release tablet Take 25 mg by mouth daily      levothyroxine (SYNTHROID) 100 MCG tablet Take 100 mcg by mouth Daily      glipiZIDE (GLUCOTROL) 5 MG tablet Take 5 mg by mouth once daily      aspirin EC 81 MG EC tablet Take 81 mg by mouth daily      amLODIPine (NORVASC) 2.5 MG tablet Take 1 tablet by mouth daily      famotidine (PEPCID) 40 MG tablet Take 1 tablet by mouth nightly      nitroGLYCERIN (NITROSTAT) 0.4 MG SL tablet as needed for Chest pain      atorvastatin (LIPITOR) 10 MG tablet Take 1 tablet by mouth nightly      carvedilol (COREG) 6.25 MG tablet Take by mouth Take 1 tablet by mouth in the morning and take 1.5 tablets by mouth nightly      vitamin D3 (CHOLECALCIFEROL) 25 MCG (1000 UT) TABS tablet Take 1 tablet by mouth daily      conjugated estrogens (PREMARIN) 0.625 MG/GM vaginal cream Place vaginally three times a week      fluticasone (FLONASE) 50 MCG/ACT nasal spray 1 spray by Nasal route nightly as needed      nortriptyline (PAMELOR) 25 MG capsule Take 1 capsule by mouth nightly      pantoprazole sodium (PROTONIX) 40 MG PACK packet Take 40 mg by mouth every morning (before breakfast)      LORazepam (ATIVAN) 0.5 MG tablet Take 0.5 mg by mouth daily.        lisinopril (PRINIVIL;ZESTRIL) 2.5 MG tablet Take 2.5 mg by mouth daily      fexofenadine (ALLEGRA) 30 MG tablet Take 30 mg by mouth 2 times daily      promethazine (PHENERGAN) 12.5 MG tablet Take 12.5 mg by mouth every 6 hours as needed for Nausea      polyethyl glycol-propyl glycol 0.4-0.3 % (SYSTANE) 0.4-0.3 % ophthalmic solution 1 drop as needed for Dry Eyes (6 drops daily)      cycloSPORINE (RESTASIS) 0.05 % ophthalmic emulsion Place 2 drops into the left eye 2 times daily      loteprednol (ALREX) 0.2 % SUSP 1 drop 2 times daily To eyes      solifenacin (VESICARE) 5 MG tablet Take 10 mg by mouth daily As needed      Multiple Vitamins-Minerals (THERAPEUTIC MULTIVITAMIN-MINERALS) tablet Take 1 tablet by mouth daily      polycarbophil (FIBERCON) 625 MG tablet Take 625 mg by mouth daily      vitamin B-12 (CYANOCOBALAMIN) 100 MCG tablet Take 50 mcg by mouth daily      polyethylene glycol (GLYCOLAX) packet Take 17 g by mouth daily as needed for Constipation         Objective    BP (!) 107/55   Pulse 99   Temp 98.5 °F (36.9 °C) (Oral)   Resp 20   Ht 5' (1.524 m)   Wt 146 lb 13.2 oz (66.6 kg)   SpO2 100%   BMI 28.68 kg/m²     LABS:  WBC:    Lab Results   Component Value Date    WBC 16.7 08/25/2021     H/H:    Lab Results   Component Value Date    HGB 8.4 08/25/2021    HCT 24.9 08/25/2021     PTT:    Lab Results   Component Value Date    APTT 34.8 08/23/2021   [APTT}  PT/INR:    Lab Results   Component Value Date    PROTIME 12.7 08/23/2021    INR 1.12 08/23/2021     HgBA1c:    Lab Results   Component Value Date    LABA1C 5.8 08/19/2021       Assessment   Ry Risk Score: Ry Scale Score: 16    Patient Active Problem List   Diagnosis Code    Closed fracture of right distal radius S52.501A    STEMI (ST elevation myocardial infarction) ([facility] I21.3    Cardiogenic shock ([facility] R57.0    Acute respiratory failure with hypoxia and hypercapnia (HCC) J96.01, J96.02    Acute kidney injury ([facility] N17.9    Hypoxia R09.02    Acute cystitis without hematuria N30.00    Hemoptysis R04.2    Thrombocytopenia (HCC) D69.6    Anemia D64.9    MARSHA (generalized anxiety disorder) F41.1       Measurements:  Wound 08/25/21 Femoral Anterior;Left;Proximal (Active)   Wound Image   08/25/21 1241   Wound Etiology Skin Tear 08/25/21 1241   Dressing Status New dressing applied 08/25/21 1241   Dressing/Treatment Silicone border 00/10/86 1241   Dressing Change Due 08/28/21 08/25/21 1241   Wound Length (cm) 4 cm 08/25/21 1241   Wound Width (cm) 0.5 cm 08/25/21 1241   Wound Surface Area (cm^2) 2 cm^2 08/25/21 1241   Wound Assessment [place]/red;Superficial 08/25/21 1241   Drainage Amount None 08/25/21 1241   Melody-wound Assessment Ecchymosis;Fragile 08/25/21 1241   Wound Thickness Description not for Pressure Injury Partial thickness 08/25/21 1241   Number of days: 0         Pt sitting up in chair. L groin site bruised with superficial skin loss in abdominal crease. Recommend silicone border. Keep area dry. Response to treatment:  Well tolerated by patient.        Plan   Plan of Care:

## 2021-08-26 NOTE — PROGRESS NOTES
Physical Therapy  Facility/Department: [facility] 4L CVICU  Daily Treatment Note  NAME: Minnie Tapia  : 1945  MRN: [id number]    Date of Service: 2021    Discharge Recommendations:  Continue to assess pending progress   PT Equipment Recommendations  Other: Will monitor for potential equipt needs. Assessment   Body structures, Functions, Activity limitations: Decreased functional mobility ; Decreased safe awareness;Decreased strength;Decreased endurance  Assessment: Pt is a 77 y.o. F with significant past medical history of CAD and stents. Pt presents in Ed on 2021 with substernal chest pain. Improved with NTG in the ED. EKG c/w STEMI. LHC revealed EF 15-20%. Impella placed  and removed . Patient developed pulmonary edema initially at cath but stabilized with BiPAP and POBA. Pt has sig PMHx of CAD, HTN, MI with previous STENTs, Kidney disease and renal failure. PTA pt living with , daughter, MARU and 2 kids in a 1 story home with a basement, 2 steps to enter. Indep with all functional mobility, ADLs, active in the community. Pt edward oob, only few steps with Walker at bedside. Pt cont hopeful for d/c home. Will monitor pt's progress. Prognosis: Fair;Good  Decision Making: Medium Complexity  Patient Education: Role of PT, POC, Need to call for assist, Safe use of Walker. REQUIRES PT FOLLOW UP: Yes  Activity Tolerance  Activity Tolerance: Patient Tolerated treatment well;Patient limited by endurance  Activity Tolerance: Pt having some nausea/dry heaves (nursing aware). BP following oob activities 117/97 (102). Patient Diagnosis(es): The primary encounter diagnosis was ST elevation myocardial infarction (STEMI), unspecified artery ([address] Diagnoses of Requires oxygen therapy and Other fatigue were also pertinent to this visit.      has a past medical history of Anemia, Anxiety, Asthma, CAD (coronary artery disease), Cerebral artery occlusion with cerebral infarction ([address] Depression, Graves disease, Hiatal hernia, Hx of blood clots, Hyperlipidemia, Hypertension, IBS (irritable bowel syndrome), Kidney disease, chronic, stage III (moderate, EGFR 30-59 ml/min) ([address] MI (myocardial infarction) ([address] Pneumonia, Prolonged emergence from general anesthesia, Sleep apnea, Thyroid disease, Urinary incontinence, UTI (urinary tract infection), Vocal cord dysfunction, and Weight loss. has a past surgical history that includes Hand surgery (Left); Arm Surgery (Left); eye surgery; Colonoscopy; Cholecystectomy; Wrist fracture surgery (Right, 10/02/2020); Gastric fundoplication (0317); and Wrist Closed Reduction (Left, 05/06/2003). Restrictions  Restrictions/Precautions  Restrictions/Precautions: Fall Risk  Subjective   General  Chart Reviewed: Yes  Additional Pertinent Hx: 76 y/o female admit 8/18/2021  with significant past medical history of CAD and stents at [facility] who presents with substernal chest pain. Improved with NTG in the ED. EKG c/w STEMI. LHC revealed EF 15-20%, likely some degree of MR, diffuse RCA disease and osteal LAD and CX lesions. Impella placed 8/18 and removed 8/24 with STENTs placed. Patient developed pulmonary edema initially at cath but stabilized with BiPAP and POBA. Pt has sig PMHx of CAD, HTN, MI with previous STENTs, Kidney disease and renal failure. Response To Previous Treatment: Patient with no complaints from previous session. Family / Caregiver Present: No  Referring Practitioner: Dr. Raf Acosta  Subjective  Subjective: Pt agreeable to PT Rx. Reports nausea today. Orientation  Orientation  Overall Orientation Status: Impaired  Orientation Level: Oriented to person;Oriented to place;Oriented to situation;Disoriented to time  Cognition   Cognition  Overall Cognitive Status: WFL  Objective   Bed mobility  Supine to Sit: Minimal assistance (HOB elevated.   Slow.)  Transfers  Sit to Stand: Contact guard assistance (With Walker.)  Stand to sit: Contact guard assistance (With Charna Shyla.)  Comment: Cues for safe hand placement. Ambulation 1  Surface: level tile  Device: No Device  Distance: Pt amb bed to chair 4-5 steps, additional 5 steps forward/back with Charna Shyla Min assist.  Decrease step length/clearance. Very slow; limited tolerance. Fatigued following, pt defer any further amb activities. Exercises  Hip Flexion: 5x2 B LEs. Knee Long Arc Quad: 5x2 B LEs. Ankle Pumps: 10x B LEs. AM-PAC Score  AM-PAC Inpatient Mobility Raw Score : 17 (08/26/21 1109)  AM-PAC Inpatient T-Scale Score : 42.13 (08/26/21 1109)  Mobility Inpatient CMS 0-100% Score: 50.57 (08/26/21 1109)  Mobility Inpatient CMS G-Code Modifier : CK (08/26/21 1109)          Goals  Short term goals  Time Frame for Short term goals: Upon d/c acute care setting. Short term goal 1: Bed Mob SBA. Short term goal 2: Transfers with/without assist device SBA. Short term goal 3: Amb with/without assist device 48' SBA/CGA. Short term goal 4: 2 stairs with CGA  Patient Goals   Patient goals : Go home with family. Plan    Plan  Times per week: 3-5x week while in acute care setting.   Current Treatment Recommendations: Functional Mobility Training, Transfer Training, Gait Training, Safety Education & Training, Patient/Caregiver Education & Training  Safety Devices  Type of devices: Call light within reach, Chair alarm in place, Left in chair, Nurse notified     Therapy Time   Individual Concurrent Group Co-treatment   Time In 0850         Time Out 0920         Minutes [address], PT

## 2021-08-26 NOTE — PROGRESS NOTES
[facility]   Daily Progress Note      Admit Date:  8/18/2021      Subjective:   Ms. Deon Matias  is a 77 y.o. female with a past medical history significant for CAD with prior PCI to her Circumflex in 2011 who presented to [facility] from home with chest pain that began at dinner tonight. STEMI was activated. She was taken to the cath lab and had a subtotal occlusion of her ostial LAD and a 99% Circumflex artery. I placed an Impella and performed PTCA to the ostia of the LAD and Circ restoring FLACO 3 flow. Interval History:  Sukhwinder Myers remains in sinus rhythm on telemetry. She has had no further chest pain or dyspnea. She is currently saturating well on 2 liters of oxygen.     Objective:     BP (!) 96/49   Pulse 94   Temp 97.7 °F (36.5 °C) (Oral)   Resp 25   Ht 5' (1.524 m)   Wt 146 lb 6.2 oz (66.4 kg)   SpO2 93%   BMI 28.59 kg/m²      Intake/Output Summary (Last 24 hours) at 8/26/2021 1611  Last data filed at 8/26/2021 1500  Gross per 24 hour   Intake 840 ml   Output 1775 ml   Net -935 ml       Physical Exam:  General:  Awake, alert, NAD  Skin:  Warm and dry  Neck:  JVD<8, no carotid bruits  Chest:  Bilaterally diminished, no wheezes/rhonchi/rales  Cardiovascular:  RRR, normal S1/S2, no M/R/G  Abdomen:  Soft, nontender, +bowel sounds  Extremities:  No edema  Pulses: 2+ bilat carotid    2+ bilat radial    2+ bilat femoral    Impella device in left groin with hematoma        Medications:    potassium bicarb-citric acid  40 mEq Oral Daily    spironolactone  25 mg Oral Daily    furosemide  20 mg IntraVENous Daily    enoxaparin  40 mg Subcutaneous Daily    sodium chloride flush  5-40 mL IntraVENous 2 times per day    ticagrelor  90 mg Oral BID    cefdinir  300 mg Oral 2 times per day    lidocaine 1 % injection  5 mL Intradermal Once    docusate sodium  100 mg Oral Daily    insulin glargine  8 Units Subcutaneous QAM    insulin lispro  0-12 Units Subcutaneous TID WC    insulin lispro  0-6 Units Subcutaneous Nightly    famotidine  20 mg Oral Daily    LORazepam  0.5 mg Oral Daily    vitamin B-12  50 mcg Oral Daily    Vitamin D  1 tablet Oral Daily    conjugated estrogens   Vaginal Once per day on Mon Wed Fri    nortriptyline  25 mg Oral Nightly    levothyroxine  100 mcg Oral Daily    rosuvastatin  40 mg Oral Nightly    aspirin  81 mg Oral Daily      sodium chloride      sodium chloride      sodium chloride Stopped (08/26/21 1535)    dextrose         Lab Data:  CBC:   Recent Labs     08/24/21  1450 08/25/21  0440 08/26/21  0436   WBC 15.7* 16.7* 12.9*   HGB 10.0* 8.4* 9.7*    147 158     BMP:    Recent Labs     08/24/21  0412 08/25/21  0440 08/26/21  0436   * 133* 133*   K 3.6 3.8 3.3*   CO2 19* 24 25   BUN 23* 24* 23*   CREATININE 1.3* 1.2 1.3*     LIVR:   Recent Labs     08/24/21  0412 08/25/21  0440 08/26/21  0436   AST 34 44* 38*   ALT 20 19 19     PT/INR:   Recent Labs     08/24/21  0412 08/25/21  0440 08/26/21  0436   PROT 6.1* 6.3* 7.1     APTT:   No results for input(s): APTT in the last 72 hours. FASTING LIPID PANEL:  Lab Results   Component Value Date    CHOL 126 08/19/2021    HDL 58 08/19/2021    TRIG 52 08/19/2021     Echo 8/19/21:  Dilated LV with impella device. Septum, anterior, anterolateral and apex is   thin and akinetic. EF < 25 %   Mild mitral regurgitation. The left atrium is normal in size. The right ventricle is normal in size and function. Mild tricuspid regurgitation    Limited Echo 8/23/21:   Ejection fraction is visually estimated to be 30%. Regional wall motion abnormalities appear present. An Impella device catheter appears present in the left ventricle, measuring   3.57 cm from the device inlet to the aortic annulus. Assessment:  1. Acute Anterior Myocardial Infarction  2. Acute Systolic CHF, class 2  3. Acute on chronic Kidney Injury, stage 3  4. Generalized Anxiety Disorder  5. Anemia, unspecified    Plan:   Ezio Connell is stable today. Continue to wean the oxygen. We will give her another dose of IV lasix at just 20mg once today. Hold ACEI/ARB therapy given her hypotension Start low dose aldactone therapy at 25mg daily. She is getting PT/OT and will need SNF placement for recovery. We will obtain a limited echo tomorrow with Definity for LV function. Her hemoglobin is stable at 9.7 and her platelets are improvedas is her platelet count.            Signed:  Sheila Tatum MD

## 2021-08-26 NOTE — PROGRESS NOTES
[facility]  Nephrology   Progress Note        We are following the patient for JESSICA/CKD   78 y/o WF with h/o CAD, CKD stage 3 baseline Cr ~ 1.2-1.4 mg follows with Dr Rama Us, presented on 8/18/21 with CP and diagnosed with STEMI Taken to cath lab and had PTCA of LAD, also had Impella placed for cardiogenic shock  Developed hematuria that has since resolved  Cr noted to have increased from 1.4 to 1.7 mg  Received Lasix initially but started on IVF's and Albumin as PCWP noted to be low UOP has picked up since  Urine CX growing E coli    SUBJECTIVE:    HPI:  Breathing comfortably. Renal function stable  ROS:  In bed. [address]:  medications reviewed.     OBJECTIVE:     PHYSICAL:    BP (!) 90/42   Pulse 103   Temp 98.3 °F (36.8 °C) (Oral)   Resp 27   Ht 5' (1.524 m)   Wt 146 lb 6.2 oz (66.4 kg)   SpO2 93%   BMI 28.59 kg/m²   24HR INTAKE/OUTPUT:      Intake/Output Summary (Last 24 hours) at 8/26/2021 1434  Last data filed at 8/26/2021 1400  Gross per 24 hour   Intake 843.96 ml   Output 1625 ml   Net -781.04 ml       CONSTITUTIONAL:  awake, alert, cooperative, no apparent distress, and appears stated age  LUNGS:  No increased work of breathing, good air exchange, clear to auscultation bilaterally  CARDIOVASCULAR:  Normal apical impulse, regular rate and rhythm, normal S1 and S2,impella device in place  ABDOMEN:  No scars, normal bowel sounds, soft, non-distended, non-tender, no masses palpated, no hepatosplenomegally  SKIN: no bruising or bleeding  EXTREMITIES: trace to 1+ bilateral pedal edema         Data      CBC:   Recent Labs     08/24/21  1450 08/25/21  0440 08/26/21  0436   WBC 15.7* 16.7* 12.9*   RBC 3.27* 2.80* 3.11*   HGB 10.0* 8.4* 9.7*   HCT 28.8* 24.9* 28.2*    147 158     BMP:    Recent Labs     08/24/21  0412 08/25/21  0440 08/26/21  0436   * 133* 133*   K 3.6 3.8 3.3*    98* 94*   CO2 19* 24 25   BUN 23* 24* 23*   CREATININE 1.3* 1.2 1.3*   CALCIUM 8.0* 7. 9* 8.3   GLUCOSE 142* 137* 114*     Phosphorus:    Recent Labs     08/24/21  0412 08/25/21  0440 08/26/21  0436   PHOS 3.3 3.7 3.6     Magnesium:    Recent Labs     08/24/21  0412 08/25/21  0440 08/26/21  0436   MG 1.90 2.00 2.00         ASSESSMENT/PLAN    1-JESSICA/CKD suspect in the setting of Cardiogenic shock and IV dye exposure Also has UTI UOP 3700 ml/24    - Cr stable    2-Ac Anterior MI s/p PTCA LAD   - s/p ZAN in LAD   - off impella    3-Cardiogenic shock   - off impella   - better    4 Hematuria resolved    5 UTI E coli   - off Abx    6 h/o HTN   - off of ACE-I/ARB due to JESSICA previously on it, and if aldactone is being started, I would not resume it    7 Hypokalemia   - aldactone started today by cardiology    8 anemia   - monitoring Hgb, Plt better   - per Heme    9 Shock liver    - resolved

## 2021-08-26 NOTE — PROGRESS NOTES
Late entry due to patient care:     1200: Assuming care with Mikey Justin, updates on patient received. Reassessment complete by this RN. 1230: Dr. Lopez Carter rounding on patient. Orders added per Dr. Lopez Carter, see orders. 1800: Handoff report given to [employer]. Patient up in chair. Call light within reach, instructed to call out if needs assistance. VSS. Biju Welch Electronically signed by Reyna Botello RN on 8/26/2021 at 7:20 PM

## 2021-08-26 NOTE — PLAN OF CARE
Problem: Pain:  Description: Pain management should include both nonpharmacologic and pharmacologic interventions.   Goal: Pain level will decrease  Description: Pain level will decrease  Outcome: Ongoing     Problem: Skin Integrity:  Goal: Will show no infection signs and symptoms  Description: Will show no infection signs and symptoms  Outcome: Ongoing     Problem: Fluid Volume - Imbalance:  Goal: Will show no signs and symptoms of excessive bleeding  Description: Will show no signs and symptoms of excessive bleeding  Outcome: Ongoing  Goal: Absence of imbalanced fluid volume signs and symptoms  Description: Absence of imbalanced fluid volume signs and symptoms  Outcome: Ongoing     Problem: Anxiety:  Goal: Level of anxiety will decrease  Description: Level of anxiety will decrease  Outcome: Ongoing     Problem: Tissue Perfusion - Cardiopulmonary, Altered:  Goal: Absence of angina  Description: Absence of angina  Outcome: Ongoing

## 2021-08-27 NOTE — PROGRESS NOTES
Late entry due to patient care:    @0700-Shift handoff received from MARCIO Acuña RN. End of shift checks completed. @0800-Pt x2 staff assist up to chair with walker. Contact guard assist.      @0830-Pt transported via stretcher to X-Ray. Tolerated well. Returned to CVU. Assisted back to chair. Denies needs or complaints. Call light in reach of pt. Heels elevated while up in chair. @1000-Pt up in chair. Verbalizes feeling mid-sternal chest pain/heartburn. Ashen grey in color. Hypotensive. Tachycardiac. Pt states \"I don't feel well. \"  Assisted back to bed. Tolerated fairly well. 12-lead EKG performed. Labs drawn and sent.      @1030-Pt states after being in bed for approximately 30 minutes that she is starting to feel better. SBP>100 mmHg.    @1045-Bedside ECHO in process. @1130-DrSheilla Pel, Cardiology at bedside to evaluate pt. See orders. @3566-746 mcg Digoxin admin per order. See eMAR.    @6504-79 mg Lasix Injection admin per order. See eMAR. @1400-DrSheilla Pel on unit. RN provided updates and questions answered. No new orders. Will continue to monitor. @1700-Pt resting in bed with eyes closed. No s/s of distress or pain. Will continue to monitor. @1900-Shift handoff given to KATIE Dunham RN to assume care. Pt left in stable condition.     Electronically signed by Farhana Tenorio RN on 8/27/2021 at 7:15 PM

## 2021-08-27 NOTE — PROGRESS NOTES
Hematology Oncology Daily Progress Note    Admit Date: 8/18/2021  Hospital day several    Subjective:     Patient has complaints of improving ALY/gen weakness--denies sob/cp. Medication side effects: none    Scheduled Meds:   [START ON 8/28/2021] spironolactone  50 mg Oral Daily    [START ON 8/28/2021] digoxin  125 mcg Oral Daily    potassium bicarb-citric acid  40 mEq Oral Daily    metoprolol succinate  12.5 mg Oral Daily    enoxaparin  40 mg Subcutaneous Daily    sodium chloride flush  5-40 mL IntraVENous 2 times per day    ticagrelor  90 mg Oral BID    cefdinir  300 mg Oral 2 times per day    lidocaine 1 % injection  5 mL Intradermal Once    docusate sodium  100 mg Oral Daily    insulin glargine  8 Units Subcutaneous QAM    insulin lispro  0-12 Units Subcutaneous TID WC    insulin lispro  0-6 Units Subcutaneous Nightly    famotidine  20 mg Oral Daily    LORazepam  0.5 mg Oral Daily    vitamin B-12  50 mcg Oral Daily    Vitamin D  1 tablet Oral Daily    conjugated estrogens   Vaginal Once per day on Mon Wed Fri    nortriptyline  25 mg Oral Nightly    levothyroxine  100 mcg Oral Daily    rosuvastatin  40 mg Oral Nightly    aspirin  81 mg Oral Daily     Continuous Infusions:   sodium chloride      sodium chloride      sodium chloride Stopped (08/26/21 1535)    dextrose       PRN Meds:perflutren lipid microspheres, sodium chloride, sodium chloride, sodium chloride flush, sodium chloride, acetaminophen, sodium chloride, ondansetron, oxyCODONE, morphine, promethazine, polyethylene glycol, sodium chloride, hydrALAZINE, glucose, dextrose, glucagon (rDNA), dextrose, perflutren lipid microspheres    Review of Systems  Pertinent items are noted in HPI. REVIEW OF SYSTEMS:         · Constitutional: Denies fever, sweats, weight loss     · Eyes: No visual changes or diplopia. No scleral icterus. · ENT: No Headaches, hearing loss or vertigo. No mouth sores or sore throat.   · Cardiovascular: No chest pain, dyspnea on exertion, palpitations or loss of consciousness. · Respiratory: No cough or wheezing, no sputum production. No hemoptysis. .    · Gastrointestinal: No abdominal pain, appetite loss, blood in stools. No change in bowel habits. · Genitourinary: No dysuria, trouble voiding, or hematuria. · Musculoskeletal:  Generalized weakness. No joint complaints. · Integumentary: No rash or pruritis. · Neurological: No headache, diplopia. No change in gait, balance, or coordination. No paresthesias. · Endocrine: No temperature intolerance. No excessive thirst, fluid intake, or urination. · Hematologic/Lymphatic: No abnormal bruising or ecchymoses, blood clots or swollen lymph nodes. · Allergic/Immunologic: No nasal congestion or hives. ·     Objective:     Patient Vitals for the past 8 hrs:   BP Temp Temp src Pulse Resp SpO2   08/27/21 1605 (!) 101/47 98.3 °F (36.8 °C) Oral 87 28 92 %   08/27/21 1600 -- 98.3 °F (36.8 °C) Oral -- -- --   08/27/21 1504 -- -- -- 98 20 92 %   08/27/21 1500 103/67 -- -- 99 (!) 32 93 %   08/27/21 1400 (!) 105/51 -- -- 84 29 95 %   08/27/21 1300 101/76 -- -- 95 23 --   08/27/21 1200 102/63 98.7 °F (37.1 °C) Oral 99 25 93 %   08/27/21 1100 (!) 108/54 -- -- 93 17 --   08/27/21 1030 (!) 108/59 -- -- 101 28 91 %   08/27/21 1000 (!) 83/49 -- -- 110 19 --   08/27/21 0900 (!) 92/47 -- -- 97 24 (!) 77 %     I/O last 3 completed shifts:   In: 923.6 [P.O.:860; I.V.:13.6; IV Piggyback:50]  Out: 12 [Drains:1510]  I/O this shift:  In: -   Out: 240 [Drains:240]    BP (!) 101/47   Pulse 87   Temp 98.3 °F (36.8 °C) (Oral)   Resp 28   Ht 5' (1.524 m)   Wt 145 lb 15.1 oz (66.2 kg)   SpO2 92%   BMI 28.50 kg/m²     General Appearance:    Alert, cooperative, no distress, appears stated age   Head:    Normocephalic, without obvious abnormality, atraumatic   Eyes:    PERRL, conjunctiva/corneas clear, EOM's intact, fundi     benign, both eyes        Ears:    Normal TM's and external ear canals, both ears   Nose:   Nares normal, septum midline, mucosa normal, no drainage    or sinus tenderness   Throat:   Lips, mucosa, and tongue normal; teeth and gums normal   Neck:   Supple, symmetrical, trachea midline, no adenopathy;        thyroid:  No enlargement/tenderness/nodules; no carotid    bruit or JVD   Back:     Symmetric, no curvature, ROM normal, no CVA tenderness   Lungs:     Clear to auscultation bilaterally, respirations unlabored   Chest wall:    No tenderness or deformity   Heart:    Regular rate and rhythm, S1 and S2 normal, no murmur, rub   or gallop   Abdomen:     Soft, non-tender, bowel sounds active all four quadrants,     no masses, no organomegaly           Extremities:   Extremities normal, atraumatic, no cyanosis or edema   Pulses:   2+ and symmetric all extremities   Skin:   Skin color, texture, turgor normal, no rashes or lesions   Lymph nodes:   Cervical, supraclavicular, and axillary nodes normal   Neurologic:   Stable         Data Review  CBC:   Lab Results   Component Value Date    WBC 12.4 08/27/2021    RBC 2.93 08/27/2021       Assessment:     Active Problems:    STEMI (ST elevation myocardial infarction) (HCC)    Cardiogenic shock (HCC)    Acute respiratory failure with hypoxia and hypercapnia (HCC)    Acute kidney injury (HCC)    Hypoxia    Acute cystitis without hematuria    Hemoptysis    Thrombocytopenia (HCC)    Anemia    MARSHA (generalized anxiety disorder)  Resolved Problems:    * No resolved hospital problems. *      Plan:     1. Thrombocytopenia. This is likely due to the Impella device itself since it resolved once it was removed. 2.  Anemia. This is multifactorial and partially due to her multiple recent procedures as well as her chronic kidney disease. I will defer on giving EPO for now due to her recent cardiac events. 3.  Coronary disease. I will defer to cardiology and cardiothoracic surgery.         Electronically signed by Toshia Frazier MD on 8/27/2021 at 4:22 PM

## 2021-08-27 NOTE — PROCEDURES
Bedside Spirometry. Interpret results with caution since patient may be undergoing active treatment for underlying lung disease    Spirometry  1. Unable to interpret. Flow-Volume Loop  2.  The flow-volume loop is normal.        Overall  Incomplete exhalation makes spirometry uninterpretable       FVC:  0.95 L at 41%  FEV1:  0.82 L at 46%  FEV1/FVC:  86

## 2021-08-27 NOTE — PLAN OF CARE
Problem: Pain:  Goal: Pain level will decrease  Description: Pain level will decrease  Outcome: Met This Shift    Problem: Serum Glucose Level - Abnormal:  Goal: Ability to maintain appropriate glucose levels will improve  Description: Ability to maintain appropriate glucose levels will improve  Outcome: Ongoing    Problem: Tissue Perfusion - Cardiopulmonary, Altered:  Goal: Hemodynamic stability will improve  Description: Hemodynamic stability will improve  Outcome: Ongoing     Problem: OXYGENATION/RESPIRATORY FUNCTION  Goal: Patient will achieve/maintain normal respiratory rate/effort  Description: Respiratory rate and effort will be within normal limits for the patient  Outcome: Ongoing     Problem: Nutrition  Goal: Optimal nutrition therapy  Outcome: Ongoing      Problem: Anxiety:  Goal: Level of anxiety will decrease  Description: Level of anxiety will decrease  Outcome: Ongoing

## 2021-08-27 NOTE — PROGRESS NOTES
Occupational Therapy  Facility/Department: [facility] CV[facility]  Daily Treatment Note  NAME: Denise Su  : 1945  MRN: [id number]    Date of Service: 2021    Discharge Recommendations:  Patient would benefit from continued therapy after discharge, 3-5 sessions per week  OT Equipment Recommendations  Other: defer to [facility] scored a 15/24 on the AM-PAC ADL Inpatient form. Current research shows that an AM-PAC score of 17 or less is typically not associated with a discharge to the patient's home setting. Based on the patient's AM-PAC score and their current ADL deficits, it is recommended that the patient have 3-5 sessions per week of Occupational Therapy at d/c to increase the patient's independence. Please see assessment section for further patient specific details. If patient discharges prior to next session this note will serve as a discharge summary. Please see below for the latest assessment towards goals. Assessment   Performance deficits / Impairments: Decreased functional mobility ; Decreased safe awareness;Decreased balance;Decreased ADL status; Decreased cognition;Decreased high-level IADLs;Decreased endurance;Decreased strength  Assessment: 78 y/o female admitted 2021 with STEMI. PTA pt lives at home with spouse and was independent with ADLs and functional mobility without AD. Today, pt progressed functional mobility and ambulated short distance in room with RW and min A + CGA of another for safety. Pt with very limited endurance. Pt with functional UE ROM for self care and anticipate will require up to max A for ADLs based on balance and endurance. Pt is functioning below baseline and benefit from skilled therapy. At this time, pt will benefit from skilled therapy at discharge prior to returning home to maximize independence with ADLs and mobility. Will continue to assess pending pt progress. Pt is hopeful to return home with family support.   Prognosis: Good  OT Education: OT Role;Transfer Training;Plan of Care  REQUIRES OT FOLLOW UP: Yes  Activity Tolerance  Activity Tolerance: Patient limited by fatigue  Activity Tolerance: very limited endurance. BP supine prior to OOB: 107/49 (62). BP seated in chair: 94/49 (61). Ambulated around bed and sitting EOB BP: 93/70. Ambulated back around bed to chair BP: 81/50 (56). Safety Devices  Safety Devices in place: Yes  Type of devices: Nurse notified;Gait belt;Call light within reach; Left in chair         Patient Diagnosis(es): The primary encounter diagnosis was ST elevation myocardial infarction (STEMI), unspecified artery ([address] Diagnoses of Requires oxygen therapy and Other fatigue were also pertinent to this visit. has a past medical history of Anemia, Anxiety, Asthma, CAD (coronary artery disease), Cerebral artery occlusion with cerebral infarction ([address] Depression, Graves disease, Hiatal hernia, Hx of blood clots, Hyperlipidemia, Hypertension, IBS (irritable bowel syndrome), Kidney disease, chronic, stage III (moderate, EGFR 30-59 ml/min) ([address] MI (myocardial infarction) ([address] Pneumonia, Prolonged emergence from general anesthesia, Sleep apnea, Thyroid disease, Urinary incontinence, UTI (urinary tract infection), Vocal cord dysfunction, and Weight loss. has a past surgical history that includes Hand surgery (Left); Arm Surgery (Left); eye surgery; Colonoscopy; Cholecystectomy; Wrist fracture surgery (Right, 10/02/2020); Gastric fundoplication (0680); and Wrist Closed Reduction (Left, 05/06/2003). Restrictions  Restrictions/Precautions  Restrictions/Precautions: Fall Risk  Position Activity Restriction  Other position/activity restrictions: 2L O2 via NC     Subjective   General  Chart Reviewed: Yes  Patient assessed for rehabilitation services?: Yes  Additional Pertinent Hx: 76 y/o female admitted 8/18/2021 with STEMI and taken to cath lab for [facility].  C revealed mutlivessel disease with balloon to LAD and circ with impella placed after decompensation with pulmonary edema . CVTS consulted however not a candidate for CABG. Pt s/p PCI with impella explanted 8/24/2021. Family / Caregiver Present: No  Referring Practitioner: Dr. Salazar Martinez  Diagnosis: MI s/p PCI  Subjective  Subjective: Per RN ok for therapy. Pt seen bedside and agreeable to therapy. Pt expressed extreme fatigue with minimal activity. General Comment  Comments: BP supine prior to OOB: 107/49 (62). BP seated in chair: 94/49 (61). Ambulated around bed and sitting EOB BP: 93/70. Ambulated back around bed to chair BP: 81/50 (56).       Objective    ADL  Additional Comments: Anticipate pt will require max A for LB bathing/dressing, min A for UB bathing/dressing and grooming based on balance and endurance observed        Balance  Sitting Balance: Contact guard assistance (EOB in prep for transfers)  Standing Balance: Minimal assistance  Standing Balance  Time: very brief prior to ambulation  Functional Mobility  Functional Mobility Comments: few steps bed > chair, chair > far side of bed (seated rest break) then ambulated back around bed to chair with RW and min A + CGA another for safety     Bed mobility  Supine to Sit: Minimal assistance (HOB elevated)  Sit to Supine:  (pt in chair at end of session)     Transfers  Sit to stand: Minimal assistance  Stand to sit: Minimal assistance  Transfer Comments: to/from RW         Cognition  Overall Cognitive Status: Exceptions  Safety Judgement: Decreased awareness of need for assistance;Decreased awareness of need for safety  Problem Solving: Assistance required to correct errors made;Assistance required to implement solutions;Assistance required to generate solutions        Plan   Plan  Times per week: 3-5  Current Treatment Recommendations: Strengthening, Endurance Training, Gait Training, Balance Training, Functional Mobility Training, Self-Care / ADL    AM-PAC Score  AM-PAC Inpatient Daily Activity Raw Score: 15 (08/27/21 0812)  AM-PAC Inpatient ADL T-Scale Score : 34.69 (08/27/21 0351)  ADL Inpatient CMS 0-100% Score: 56.46 (08/27/21 1564)  ADL Inpatient CMS G-Code Modifier : CK (08/27/21 1441)    Goals  Short term goals  Time Frame for Short term goals: Prior to DC: goals ongoing  Short term goal 1: Pt will complete ADL transfers with supervision  Short term goal 2: Pt will complete functional mobility with supervision  Short term goal 3: Pt will tolerate standing > 5 min for functional task with supervision  Short term goal 4: Pt will complete toileting with supervision  Short term goal 5: Pt will complete LB dressing with supervision  Patient Goals   Patient goals : to get stronger and return home       Therapy Time   Individual Concurrent Group Co-treatment   Time In 0720         Time Out 0745         Minutes 25         Timed Code Treatment Minutes: 25 Minutes     This note to serve as OT d/c summary if pt is d/c-ed prior to next therapy session.     Jose Luis Rojas OTR/L

## 2021-08-27 NOTE — PROGRESS NOTES
Physical Therapy    Facility/Department: [facility] 3Y CVICU  Treatment Note    NAME: Jessika Zuniga  : 1945  MRN: [id number]    Date of Service: 2021    Discharge Recommendations:  Continue to assess pending progress, 3-5 sessions per week   PT Equipment Recommendations  Other: Will monitor for potential equipt needs. Assessment   Body structures, Functions, Activity limitations: Decreased functional mobility ; Decreased safe awareness;Decreased strength;Decreased endurance  Assessment: Pt is a 77 y.o. F with significant past medical history of CAD and stents. Pt presents in Ed on 2021 with substernal chest pain. Improved with NTG in the ED. EKG c/w STEMI. LHC revealed EF 15-20%. Impella placed  and removed . Patient developed pulmonary edema initially at cath but stabilized with BiPAP and POBA. Pt has sig PMHx of CAD, HTN, MI with previous STENTs, Kidney disease and renal failure. PTA pt living with , daughter, MARU and 2 kids in a 1 story home with a basement, 2 steps to enter. Indep with all functional mobility, ADLs, active in the community. Pt currently edward oob, amb only short distances (15') with Walker and assist x 1. Very limited endurance/edward to activity. At this time concern for return home, will need to ensure adequate assist/support for d/c; otherwise, need to consider cont PT Services (3-5) upon d/c. Will monitor pt's progress. Prognosis: Fair;Good  Decision Making: Medium Complexity  Patient Education: Role of PT, POC, Need to call for assist, Safe use of Walker. REQUIRES PT FOLLOW UP: Yes  Activity Tolerance  Activity Tolerance: Patient limited by endurance  Activity Tolerance: BP supine prior to OOB: 107/49 (62). BP seated in chair: 94/49 (61). Ambulated around bed and sitting EOB BP: 93/70. Ambulated back around bed to chair BP: 81/50 (56).        Patient Diagnosis(es): The primary encounter diagnosis was ST elevation myocardial infarction (STEMI), unspecified artery ([address] Diagnoses of Requires oxygen therapy and Other fatigue were also pertinent to this visit. has a past medical history of Anemia, Anxiety, Asthma, CAD (coronary artery disease), Cerebral artery occlusion with cerebral infarction ([address] Depression, Graves disease, Hiatal hernia, Hx of blood clots, Hyperlipidemia, Hypertension, IBS (irritable bowel syndrome), Kidney disease, chronic, stage III (moderate, EGFR 30-59 ml/min) ([address] MI (myocardial infarction) ([address] Pneumonia, Prolonged emergence from general anesthesia, Sleep apnea, Thyroid disease, Urinary incontinence, UTI (urinary tract infection), Vocal cord dysfunction, and Weight loss. has a past surgical history that includes Hand surgery (Left); Arm Surgery (Left); eye surgery; Colonoscopy; Cholecystectomy; Wrist fracture surgery (Right, 10/02/2020); Gastric fundoplication (7275); and Wrist Closed Reduction (Left, 05/06/2003). Restrictions  Restrictions/Precautions  Restrictions/Precautions: Fall Risk  Position Activity Restriction  Other position/activity restrictions: O2 2L via NC  Vision/Hearing        Subjective  General  Chart Reviewed: Yes  Patient assessed for rehabilitation services?: Yes  Additional Pertinent Hx: 78 y/o female admit 8/18/2021  with significant past medical history of CAD and stents at [facility] who presents with substernal chest pain. Improved with NTG in the ED. EKG c/w STEMI. LHC revealed EF 15-20%, likely some degree of MR, diffuse RCA disease and osteal LAD and CX lesions. Impella placed 8/18 and removed 8/24 with STENTs placed. Patient developed pulmonary edema initially at cath but stabilized with BiPAP and POBA. Pt has sig PMHx of CAD, HTN, MI with previous STENTs, Kidney disease and renal failure. Response To Previous Treatment: Patient with no complaints from previous session.   Family / Caregiver Present: No  Referring Practitioner: Dr. Berta Neal  Subjective  Subjective: Pt agreeable to PT Rx. setting. Current Treatment Recommendations: Functional Mobility Training, Transfer Training, Gait Training, Safety Education & Training, Patient/Caregiver Education & Training  Safety Devices  Type of devices: Call light within reach, Chair alarm in place, Left in chair, Nurse notified      AM-PAC Score  AM-PAC Inpatient Mobility Raw Score : 16 (08/27/21 0839)  AM-PAC Inpatient T-Scale Score : 40.78 (08/27/21 0839)  Mobility Inpatient CMS 0-100% Score: 54.16 (08/27/21 0839)  Mobility Inpatient CMS G-Code Modifier : CK (08/27/21 0839)          Goals  Short term goals  Time Frame for Short term goals: Upon d/c acute care setting. Short term goal 1: Bed Mob SBA. Short term goal 2: Transfers with/without assist device SBA. Short term goal 3: Amb with/without assist device 48' SBA/CGA. Short term goal 4: 2 stairs with CGA  Patient Goals   Patient goals : Go home with family.        Therapy Time   Individual Concurrent Group Co-treatment   Time In 0         Time Out 0745         Minutes [address] [facility],

## 2021-08-27 NOTE — PROGRESS NOTES
[facility]  Nephrology   Progress Note        We are following the patient for JESSICA/CKD   78 y/o WF with h/o CAD, CKD stage 3 baseline Cr ~ 1.2-1.4 mg follows with Dr Lynne Knight, presented on 8/18/21 with CP and diagnosed with STEMI Taken to cath lab and had PTCA of LAD, also had Impella placed for cardiogenic shock  Developed hematuria that has since resolved  Cr noted to have increased from 1.4 to 1.7 mg  Received Lasix initially but started on IVF's and Albumin as PCWP noted to be low UOP has picked up since  Urine CX growing E coli    SUBJECTIVE:    HPI:  Breathing comfortably. Chest discomfort this AM.  Renal function stable  ROS:  In bed. [address]:  medications reviewed.     OBJECTIVE:     PHYSICAL:    BP (!) 105/51   Pulse 84   Temp 98.7 °F (37.1 °C) (Oral)   Resp 29   Ht 5' (1.524 m)   Wt 145 lb 15.1 oz (66.2 kg)   SpO2 95%   BMI 28.50 kg/m²   24HR INTAKE/OUTPUT:      Intake/Output Summary (Last 24 hours) at 8/27/2021 1441  Last data filed at 8/27/2021 1400  Gross per 24 hour   Intake 583.62 ml   Output 1510 ml   Net -926.38 ml       CONSTITUTIONAL:  awake, alert, cooperative, no apparent distress, and appears stated age  LUNGS:  No increased work of breathing, good air exchange, clear to auscultation bilaterally  CARDIOVASCULAR:  Normal apical impulse, regular rate and rhythm, normal S1 and S2,impella device in place  ABDOMEN:  No scars, normal bowel sounds, soft, non-distended, non-tender, no masses palpated, no hepatosplenomegally  SKIN: no bruising or bleeding  EXTREMITIES: trace to 1+ bilateral pedal edema         Data      CBC:   Recent Labs     08/26/21  0436 08/27/21  0519 08/27/21  1115   WBC 12.9* 12.1* 12.4*   RBC 3.11* 2.87* 2.93*   HGB 9.7* 9.1* 9.1*   HCT 28.2* 25.8* 26.1*    190 201     BMP:    Recent Labs     08/26/21  0436 08/27/21  0519 08/27/21  1115   * 130* 127*   K 3.3* 3.7 4.2   CL 94* 92* 89*   CO2 25 27 26   BUN 23* 26* 25*   CREATININE 1.3* 1. 3* 1.3*   CALCIUM 8.3 8.3 8.1*   GLUCOSE 114* 123* 245*     Phosphorus:    Recent Labs     08/25/21  0440 08/26/21  0436 08/27/21  0519   PHOS 3.7 3.6 2.8     Magnesium:    Recent Labs     08/25/21  0440 08/26/21  0436 08/27/21  0519   MG 2.00 2.00 2.30         ASSESSMENT/PLAN    1-JESSICA/CKD suspect in the setting of Cardiogenic shock and IV dye exposure Also has UTI UOP 3700 ml/24    - baseline Cr 1.2.-1.4   - Cr stable    2-Ac Anterior MI s/p PTCA LAD   - s/p ZAN in LAD   - off impella    3-Cardiogenic shock   - off impella   - better    4 Hematuria resolved    5 UTI E coli   - off Abx    6 h/o HTN   - off of ACE-I/ARB due to JESSICA previously on it, and if aldactone is being started, I would not resume it    7 Hypokalemia   - aldactone started today by cardiology    8 anemia   - monitoring Hgb, Plt better   - per Heme    9 Shock liver    - resolved

## 2021-08-27 NOTE — PROGRESS NOTES
[facility]   Daily Progress Note      Admit Date:  8/18/2021      Subjective:   Ms. Jasmeet Ramsey  is a 77 y.o. female with a past medical history significant for CAD with prior PCI to her Circumflex in 2011 who presented to [facility] from home with chest pain that began at dinner tonight. STEMI was activated. She was taken to the cath lab and had a subtotal occlusion of her ostial LAD and a 99% Circumflex artery. I placed an Impella and performed PTCA to the ostia of the LAD and Circ restoring FLACO 3 flow. Interval History:  Lena Green remains in sinus tachycardia on telemetry. She has had no further chest pain or dyspnea. She is currently saturating well on 1 liters of oxygen.     Objective:     BP (!) 114/58   Pulse 96   Temp 98.3 °F (36.8 °C) (Oral)   Resp 19   Ht 5' (1.524 m)   Wt 145 lb 15.1 oz (66.2 kg)   SpO2 92%   BMI 28.50 kg/m²      Intake/Output Summary (Last 24 hours) at 8/27/2021 1811  Last data filed at 8/27/2021 1700  Gross per 24 hour   Intake 1043.62 ml   Output 1470 ml   Net -426.38 ml       Physical Exam:  General:  Awake, alert, NAD  Skin:  Warm and dry  Neck:  JVD<8, no carotid bruits  Chest:  Bilaterally diminished, no wheezes/rhonchi/rales  Cardiovascular:  Regular and tachy, normal S1/S2, no M/R/G  Abdomen:  Soft, nontender, +bowel sounds  Extremities:  No edema  Pulses: 2+ bilat carotid    2+ bilat radial    2+ bilat femoral        Medications:    [START ON 8/28/2021] spironolactone  50 mg Oral Daily    [START ON 8/28/2021] digoxin  125 mcg Oral Daily    potassium bicarb-citric acid  40 mEq Oral Daily    metoprolol succinate  12.5 mg Oral Daily    enoxaparin  40 mg Subcutaneous Daily    sodium chloride flush  5-40 mL IntraVENous 2 times per day    ticagrelor  90 mg Oral BID    cefdinir  300 mg Oral 2 times per day    lidocaine 1 % injection  5 mL Intradermal Once    docusate sodium  100 mg Oral Daily    insulin glargine  8 Units Subcutaneous QAM    insulin lispro  0-12 Units Subcutaneous TID     insulin lispro  0-6 Units Subcutaneous Nightly    famotidine  20 mg Oral Daily    LORazepam  0.5 mg Oral Daily    vitamin B-12  50 mcg Oral Daily    Vitamin D  1 tablet Oral Daily    conjugated estrogens   Vaginal Once per day on Mon Wed Fri    nortriptyline  25 mg Oral Nightly    levothyroxine  100 mcg Oral Daily    rosuvastatin  40 mg Oral Nightly    aspirin  81 mg Oral Daily      sodium chloride      sodium chloride      sodium chloride Stopped (08/26/21 1535)    dextrose         Lab Data:  CBC:   Recent Labs     08/26/21  0436 08/27/21  0519 08/27/21  1115   WBC 12.9* 12.1* 12.4*   HGB 9.7* 9.1* 9.1*    190 201     BMP:    Recent Labs     08/26/21  0436 08/27/21  0519 08/27/21  1115   * 130* 127*   K 3.3* 3.7 4.2   CO2 25 27 26   BUN 23* 26* 25*   CREATININE 1.3* 1.3* 1.3*     LIVR:   Recent Labs     08/25/21  0440 08/26/21  0436 08/27/21  0519   AST 44* 38* 29   ALT 19 19 16     PT/INR:   Recent Labs     08/25/21  0440 08/26/21  0436 08/27/21  0519   PROT 6.3* 7.1 7.2     APTT:   FASTING LIPID PANEL:  Lab Results   Component Value Date    CHOL 126 08/19/2021    HDL 58 08/19/2021    TRIG 52 08/19/2021     Echo 8/19/21:  Dilated LV with impella device. Septum, anterior, anterolateral and apex is   thin and akinetic. EF < 25 %   Mild mitral regurgitation. The left atrium is normal in size. The right ventricle is normal in size and function. Mild tricuspid regurgitation    Limited Echo 8/23/21:   Ejection fraction is visually estimated to be 30%. Regional wall motion abnormalities appear present. An Impella device catheter appears present in the left ventricle, measuring   3.57 cm from the device inlet to the aortic annulus. Limited Echo 8/27/21:  Overall left ventricular systolic function appears severely reduced with   global hypokinesis and akinesis of the jeramy and inferoseptum.    Ejection fraction is visually estimated to be 30%.   Normal right ventricular size and function. There are no valvular structural abnormalities appreciated. Chest X-ray 8/27/21:  Improved aeration of the lungs, likely due to decreasing pulmonary edema    Assessment:  1. Acute Anterior Myocardial Infarction  2. Acute Systolic CHF, class 2  3. Acute on chronic Kidney Injury, stage 3  4. Generalized Anxiety Disorder  5. Anemia, unspecified    Plan:   Suha Drake is doing well today. Her heart rate is up and she is still requiring 1 liter of oxygen. Her LVEF is 30-35% by her limited echo. Given her hypotension and tachycardia I will goive her a dose of IV digoxin and start 125mcg daily tomorrow. We will give her another dose of IV lasix at just 20mg today. Hold ACEI/ARB therapy given her hypotension I will increase her aldactone therapy to 50mg daily for tomorrow. She is getting PT/OT and will need SNF placement for recovery. Her hemoglobin is stable at 9.1 and her platelets are improved.            Signed:  Colby Marshall MD

## 2021-08-28 NOTE — PLAN OF CARE
Problem: Pain:  Goal: Pain level will decrease  Description: Pain level will decrease  8/28/2021 3065 by Kwasi Griggs RN  Outcome: Ongoing     Problem: Skin Integrity:  Goal: Will show no infection signs and symptoms  Description: Will show no infection signs and symptoms  8/28/2021 0923 by Kwasi Griggs RN  Outcome: Ongoing     Problem: Tissue Perfusion - Cardiopulmonary, Altered:  Goal: Circulatory function within specified parameters  Description: Circulatory function within specified parameters  8/28/2021 0923 by Kwasi Griggs RN  Outcome: Ongoing     Problem: OXYGENATION/RESPIRATORY FUNCTION  Goal: Patient will maintain patent airway  8/28/2021 0923 by Kwasi Griggs RN  Outcome: Ongoing     Problem: ACTIVITY INTOLERANCE/IMPAIRED MOBILITY  Goal: Mobility/activity is maintained at optimum level for patient  Outcome: Ongoing     Problem: Falls - Risk of:  Goal: Will remain free from falls  Description: Will remain free from falls  Outcome: Ongoing

## 2021-08-28 NOTE — PLAN OF CARE
Problem: Pain:  Goal: Pain level will decrease  Description: Pain level will decrease  8/28/2021 0022 by Clarke Morales RN  Outcome: Ongoing  8/27/2021 1731 by Hailey Ruelas RN  Outcome: Met This Shift     Problem: Skin Integrity:  Goal: Will show no infection signs and symptoms  Description: Will show no infection signs and symptoms  Outcome: Ongoing     Problem: Preoperative Routine:  Goal: Will comply with preparation for surgery or procedure  Description: Will comply with preparation for surgery or procedure  Outcome: Ongoing     Problem: Urinary Elimination:  Goal: Signs and symptoms of infection will decrease  Description: Signs and symptoms of infection will decrease  Outcome: Ongoing     Problem: Fluid Volume - Imbalance:  Goal: Will show no signs and symptoms of excessive bleeding  Description: Will show no signs and symptoms of excessive bleeding  Outcome: Ongoing     Problem: Anxiety:  Goal: Level of anxiety will decrease  Description: Level of anxiety will decrease  8/27/2021 1731 by Hailey Ruelas RN  Outcome: Ongoing     Problem: Serum Glucose Level - Abnormal:  Goal: Ability to maintain appropriate glucose levels will improve  Description: Ability to maintain appropriate glucose levels will improve  8/27/2021 1731 by Hailey Ruelas RN  Outcome: Ongoing     Problem: Tissue Perfusion - Cardiopulmonary, Altered:  Goal: Circulatory function within specified parameters  Description: Circulatory function within specified parameters  Outcome: Ongoing  Goal: Hemodynamic stability will improve  Description: Hemodynamic stability will improve  8/27/2021 1731 by Hailey Ruelas RN  Outcome: Ongoing     Problem: Tissue Perfusion - Peripheral, Altered:  Goal: Absence of hematoma at arterial access site  Description: Absence of hematoma at arterial access site  Outcome: Ongoing

## 2021-08-28 NOTE — PROGRESS NOTES
[facility]  Nephrology   Progress Note        We are following the patient for JESSICA/CKD   78 y/o WF with h/o CAD, CKD stage 3 baseline Cr ~ 1.2-1.4 mg follows with Dr Nataly Ingram, presented on 8/18/21 with CP and diagnosed with STEMI Taken to cath lab and had PTCA of LAD, also had Impella placed for cardiogenic shock  Developed hematuria that has since resolved  Cr noted to have increased from 1.4 to 1.7 mg  Received Lasix initially but started on IVF's and Albumin as PCWP noted to be low UOP has picked up since  Urine CX growing E coli    SUBJECTIVE:  -pt seen and examined  -PMSHx and meds reviewed  -No family at bedside    Transferred out of CVICU  BP variable      ROS:  Neg chest pain/SOB  PMFSH:  medications reviewed.     OBJECTIVE:     PHYSICAL:    /68   Pulse 88   Temp 98.2 °F (36.8 °C) (Oral)   Resp 16   Ht 5' (1.524 m)   Wt 141 lb 8.6 oz (64.2 kg)   SpO2 (!) 88%   BMI 27.64 kg/m²   24HR INTAKE/OUTPUT:      Intake/Output Summary (Last 24 hours) at 8/28/2021 0719  Last data filed at 8/27/2021 2100  Gross per 24 hour   Intake 1100 ml   Output 1115 ml   Net -15 ml       CONSTITUTIONAL:  awake, alert, cooperative, no apparent distress, and appears stated age  LUNGS:  No increased work of breathing, good air exchange, clear to auscultation bilaterally  CARDIOVASCULAR:  Normal apical impulse, regular rate and rhythm, normal S1 and S2,impella device in place  ABDOMEN:  No scars, normal bowel sounds, soft, non-distended, non-tender, no masses palpated, no hepatosplenomegally  SKIN: no bruising or bleeding  EXTREMITIES: trace to 1+ bilateral pedal edema         Data      CBC:   Recent Labs     08/27/21 0519 08/27/21  1115 08/28/21  0440   WBC 12.1* 12.4* 10.5   RBC 2.87* 2.93* 2.88*   HGB 9.1* 9.1* 9.2*   HCT 25.8* 26.1* 25.9*    201 193     BMP:    Recent Labs     08/27/21 0519 08/27/21  1115 08/28/21  0440   * 127* 129*   K 3.7 4.2 3.6   CL 92* 89* 91*   CO2 27 26

## 2021-08-28 NOTE — PROGRESS NOTES
[address]  Cardiology Consult    Jayden Mosquera  1945    August 28, 2021      CC: Mild shortness of breath      Subjective:     History of Present Illness:    Jayden Mosquera is a 77 y.o. patient with a PMH significant for severe coronary disease, congestive heart failure, cardiomyopathy presented with complaints of shortness of breath. Status post multivessel PCI high risk with Impella support. Slowly making improvements  No chest pain palpitation diaphoresis       Past Medical History:   has a past medical history of Anemia, Anxiety, Asthma, CAD (coronary artery disease), Cerebral artery occlusion with cerebral infarction ([address] Depression, Graves disease, Hiatal hernia, Hx of blood clots, Hyperlipidemia, Hypertension, IBS (irritable bowel syndrome), Kidney disease, chronic, stage III (moderate, EGFR 30-59 ml/min) ([address] MI (myocardial infarction) ([address] Pneumonia, Prolonged emergence from general anesthesia, Sleep apnea, Thyroid disease, Urinary incontinence, UTI (urinary tract infection), Vocal cord dysfunction, and Weight loss. Surgical History:   has a past surgical history that includes Hand surgery (Left); Arm Surgery (Left); eye surgery; Colonoscopy; Cholecystectomy; Wrist fracture surgery (Right, 10/02/2020); Gastric fundoplication (5205); and Wrist Closed Reduction (Left, 05/06/2003). Social History:   reports that she has never smoked. She has never used smokeless tobacco. She reports that she does not drink alcohol and does not use drugs. Family History:  family history includes Breast Cancer in her sister; Coronary Art Dis in her father; Heart Attack in her paternal grandmother and sister; Heart Disease in her father; Hypothyroidism in her sister; Myrtie Dawson in her father; Stroke in her father and sister; Stroke (age of onset: 61) in her mother.     Home Medications:  Were reviewed and are listed in nursing record and/or below  Prior to Admission medications Provider, MD   promethazine (PHENERGAN) 12.5 MG tablet Take 12.5 mg by mouth every 6 hours as needed for Nausea   Yes Historical Provider, MD   polyethyl glycol-propyl glycol 0.4-0.3 % (SYSTANE) 0.4-0.3 % ophthalmic solution 1 drop as needed for Dry Eyes (6 drops daily)   Yes Historical Provider, MD   cycloSPORINE (RESTASIS) 0.05 % ophthalmic emulsion Place 2 drops into the left eye 2 times daily   Yes Historical Provider, MD   loteprednol (ALREX) 0.2 % SUSP 1 drop 2 times daily To eyes   Yes Historical Provider, MD   solifenacin (VESICARE) 5 MG tablet Take 10 mg by mouth daily As needed   Yes Historical Provider, MD   Multiple Vitamins-Minerals (THERAPEUTIC MULTIVITAMIN-MINERALS) tablet Take 1 tablet by mouth daily   Yes Historical Provider, MD   polycarbophil (FIBERCON) 625 MG tablet Take 625 mg by mouth daily    Historical Provider, MD   vitamin B-12 (CYANOCOBALAMIN) 100 MCG tablet Take 50 mcg by mouth daily    Historical Provider, MD   polyethylene glycol (GLYCOLAX) packet Take 17 g by mouth daily as needed for Constipation    Historical Provider, MD        CURRENT Medications:  spironolactone (ALDACTONE) tablet 50 mg, Daily  digoxin (LANOXIN) tablet 125 mcg, Daily  potassium bicarb-citric acid (EFFER-K) effervescent tablet 40 mEq, Daily  perflutren lipid microspheres (DEFINITY) injection 1.65 mg, ONCE PRN  metoprolol succinate (TOPROL XL) extended release tablet 12.5 mg, Daily  enoxaparin (LOVENOX) injection 40 mg, Daily  0.9 % sodium chloride infusion, PRN  0.9 % sodium chloride infusion, PRN  sodium chloride flush 0.9 % injection 5-40 mL, 2 times per day  sodium chloride flush 0.9 % injection 5-40 mL, PRN  0.9 % sodium chloride infusion, PRN  acetaminophen (TYLENOL) tablet 650 mg, Q4H PRN  0.9 % sodium chloride bolus, PRN  ondansetron (ZOFRAN) injection 4 mg, Q6H PRN  ticagrelor (BRILINTA) tablet 90 mg, BID  cefdinir (OMNICEF) capsule 300 mg, 2 times per day  lidocaine PF 1 % injection 5 mL, Once  docusate sodium (COLACE) capsule 100 mg, Daily  oxyCODONE (ROXICODONE) immediate release tablet 5 mg, Q4H PRN  insulin glargine (LANTUS) injection vial 8 Units, QAM  morphine (PF) injection 2 mg, Q4H PRN  insulin lispro (HUMALOG) injection vial 0-12 Units, TID WC  insulin lispro (HUMALOG) injection vial 0-6 Units, Nightly  famotidine (PEPCID) tablet 20 mg, Daily  LORazepam (ATIVAN) tablet 0.5 mg, Daily  promethazine (PHENERGAN) tablet 12.5 mg, Q6H PRN  vitamin B-12 (CYANOCOBALAMIN) tablet 50 mcg, Daily  polyethylene glycol (GLYCOLAX) packet 17 g, Daily PRN  Vitamin D (CHOLECALCIFEROL) tablet 1,000 Units, Daily  conjugated estrogens (PREMARIN) vaginal cream, Once per day on Mon Wed Fri  nortriptyline (PAMELOR) capsule 25 mg, Nightly  levothyroxine (SYNTHROID) tablet 100 mcg, Daily  0.9 % sodium chloride bolus, PRN  hydrALAZINE (APRESOLINE) injection 10 mg, Q1H PRN  rosuvastatin (CRESTOR) tablet 40 mg, Nightly  aspirin chewable tablet 81 mg, Daily  glucose (GLUTOSE) 40 % oral gel 15 g, PRN  dextrose 50 % IV solution, PRN  glucagon (rDNA) injection 1 mg, PRN  dextrose 5 % solution, PRN  perflutren lipid microspheres (DEFINITY) injection 1.65 mg, ONCE PRN        Allergies:  Augmentin [amoxicillin-pot clavulanate], Bactrim [sulfamethoxazole-trimethoprim], Carafate [sucralfate], Clindamycin/lincomycin, Hyoscyamine, Macrobid [nitrofurantoin], Oxybutynin, Pcn [penicillins], Pravastatin, Prevacid [lansoprazole], Prilosec [omeprazole], Sulfamethoxazole, and Trazodone and nefazodone           Review of Systems: SEE HPI   · Constitutional: no unanticipated weight loss. There's been no change in energy level, sleep pattern, or activity level. No fevers, chills. · Eyes: No visual changes or diplopia. No scleral icterus. · ENT: No Headaches, hearing loss or vertigo. No mouth sores or sore throat. · Cardiovascular: No Chest pain, tightness or discomfort.  No Shortness of breath.    No Dyspnea on exertion, Orthopnea, Paroxysmal nocturnal dyspnea or breathlessness at rest.   No Palpitations.  No Syncope ('blackouts', 'faints', 'collapse') or dizziness. · Respiratory: No cough or wheezing, no sputum production. No hematemesis. · Gastrointestinal: No abdominal pain, appetite loss, blood in stools. No change in bowel or bladder habits. · Genitourinary: No dysuria, trouble voiding, or hematuria. · Musculoskeletal:  No gait disturbance, no joint complaints. · Integumentary: No rash or pruritis. · Neurological: No headache, diplopia, change in muscle strength, numbness or tingling. · Psychiatric: No anxiety or depression. · Endocrine: No temperature intolerance. No excessive thirst, fluid intake, or urination. No tremor. · Hematologic/Lymphatic: No abnormal bruising or bleeding, blood clots or swollen lymph nodes. · Allergic/Immunologic: No nasal congestion or hives. Objective:     PHYSICAL EXAM:      Vitals:    08/28/21 0815   BP: (!) 115/58   Pulse: 96   Resp:    Temp:    SpO2:     Weight: 141 lb 8.6 oz (64.2 kg)       General Appearance:  Alert, cooperative, no distress, appears stated age. Head:  Normocephalic, without obvious abnormality, atraumatic. Eyes:  Pupils equal and round. No scleral icterus. Mouth: Moist mucosa, no pharyngeal erythema. Nose: Nares normal. No drainage or sinus tenderness. Neck: Supple, symmetrical, trachea midline. No adenopathy. No tenderness/mass/nodules. No carotid bruit or elevated JVD. Lungs:   Respiratory Effort: Normal   Auscultation: Clear to auscultation bilaterally, respirations unlabored. No wheeze, rales   Chest Wall:  No tenderness or deformity. Cardiovascular:    Pulses  Palpation: normal   Ascultation: Regular rate, S1/ S2 normal. No murmur, rub, or gallop. 2+ radial and pedal pulses, symmetric  Carotid  Femoral   Abdomen and Gastrointestinal:   Soft, non-tender, bowel sounds active.   Liver and Spleen  Masses   Musculoskeletal: No muscle wasting  Back  Gait Extremities: Extremities normal, atraumatic. No cyanosis or edema. No cyanosis clubbing       Skin: Inspection and palpation performed, no rashes or lesions. Pysch: Normal mood and affect. Alert and oriented to time place person   Neurologic: Normal gross motor and sensory exam.       Labs     All labs have been reviewed    Lab Results   Component Value Date    WBC 10.5 08/28/2021    RBC 2.88 08/28/2021    HGB 9.2 08/28/2021    HCT 25.9 08/28/2021    MCV 90.0 08/28/2021    RDW 17.4 08/28/2021     08/28/2021     Lab Results   Component Value Date     08/28/2021    K 3.6 08/28/2021    K 4.6 08/19/2021    CL 91 08/28/2021    CO2 26 08/28/2021    BUN 25 08/28/2021    CREATININE 1.2 08/28/2021    GFRAA 53 08/28/2021    AGRATIO 0.7 08/19/2021    LABGLOM 44 08/28/2021    GLUCOSE 119 08/28/2021    PROT 7.3 08/28/2021    CALCIUM 8.2 08/28/2021    BILITOT 1.7 08/28/2021    ALKPHOS 86 08/28/2021    AST 23 08/28/2021    ALT 13 08/28/2021     No results found for: PTINR  Lab Results   Component Value Date    LABA1C 5.8 08/19/2021     Lab Results   Component Value Date    TROPONINI 1.14 ([facility]) 08/27/2021       Cardiac, Vascular and Imaging Data all Personally Reviewed in Detail by Myself      EKG: Sinus tachycardiaNonspecific ST and T wave abnormalityAbnormal ECGWhen compared with ECG of 25-AUG-2021 14:09,Nonspecific T wave abnormality now evident in Inferior leadsNonspecific T wave abnormality, worse in Anterolateral leadsConfirmed by FRANCISCO JAVIER CLAROS    Echocardiogram: Overall left ventricular systolic function appears severely reduced with   global hypokinesis and akinesis of the jeramy and inferoseptum. Ejection fraction is visually estimated to be 30%. Normal right ventricular size and function. There are no valvular structural abnormalities appreciated. Cath:FINDINGS:  1.  95% ostial to proximal left anterior descending artery stenosis. There is 90% ostial circumflex disease.   These were heavily calcified  vessels. They extended into the left main to the disease. This  underwent rotational atherectomy with a 1.5-mm Rotablator jung followed  by Shockwave balloon angioplasty with a 3-mm balloon. These then  underwent bifurcation stenting with a 3 mm stent in the LAD overlapped  by 3.5 mm [place] drug-eluting stent from the LAD into the left main  coronary artery. We then did the T-stenting with some overlap from the  proximal circumflex into the left main. We then performed kissing  balloon angioplasty resulting in 0% residual stenosis and excellent  flow. 2.  Successful removal of the Impella device from the left femoral  artery access site with balloon tamponade to control hemostasis. We  also removed the pulmonary artery catheter that was present. Assessment and Plan     -Acute anterior wall myocardial infarction. Involvement of left anterior descending artery circumflex left main and severe right coronary disease. Had a high risk percutaneous core intervention with Impella support and atherectomy. Multiple stents were placed. Kindly look at the Cath Lab report for details of the stent placement  Continue dual antiplatelet therapy. Continue physical therapy. Aggressive risk factor modification. Acute on chronic systolic heart failure. Continue careful titration of medications holding ACE ARB for now secondary to hypotension. Reduce spironolactone to 25 mg daily. Follow renal function    Acute on chronic kidney disease continue to watch renal function. Thank you for allowing us to participate in the care of [place]ita [place]. Please do not hesitate to contact me if you have any questions.     Nayana Izaguirre MD, MPH    [place] [address] [address]  Ph: [phone]  Fax: [phone]    8/28/2021 8:37 AM

## 2021-08-29 NOTE — PLAN OF CARE
Problem: Pain:  Goal: Pain level will decrease  Description: Pain level will decrease  8/29/2021 1019 by Karan Hendricks RN  Outcome: Ongoing  8/29/2021 0135 by Cris Anderson RN  Outcome: Ongoing  8/29/2021 0135 by Cris Anderson RN  Outcome: Met This Shift  Goal: Control of acute pain  Description: Control of acute pain  8/29/2021 1019 by Karan Hendricks RN  Outcome: Ongoing  8/29/2021 0135 by Cris Anderson RN  Outcome: Ongoing  8/29/2021 0135 by Cris Anderson RN  Outcome: Met This Shift  Goal: Control of chronic pain  Description: Control of chronic pain  8/29/2021 1019 by Karan Hendricks RN  Outcome: Ongoing  8/29/2021 0135 by Cris Anderson RN  Outcome: Ongoing  8/29/2021 0135 by Cris Anderson RN  Outcome: Met This Shift     Problem: Skin Integrity:  Goal: Will show no infection signs and symptoms  Description: Will show no infection signs and symptoms  8/29/2021 1019 by Karan Hendricks RN  Outcome: Ongoing  8/29/2021 0135 by Cris Anderson RN  Outcome: Ongoing  8/29/2021 0135 by Cris Anderson RN  Outcome: Met This Shift  Goal: Absence of new skin breakdown  Description: Absence of new skin breakdown  8/29/2021 1019 by Karan Hendricks RN  Outcome: Ongoing  8/29/2021 0135 by Cris Anderson RN  Outcome: Ongoing  8/29/2021 0135 by Cris Anderson RN  Outcome: Met This Shift     Problem: Preoperative Routine:  Goal: Will comply with preparation for surgery or procedure  Description: Will comply with preparation for surgery or procedure  8/29/2021 1019 by Karan Hendricks RN  Outcome: Ongoing  8/29/2021 0135 by Cris Anderson RN  Outcome: Ongoing  8/29/2021 0135 by Cris Anderson RN  Outcome: Met This Shift     Problem: Urinary Elimination:  Goal: Signs and symptoms of infection will decrease  Description: Signs and symptoms of infection will decrease  8/29/2021 1019 by Karan Hendricks RN  Outcome: Ongoing  8/29/2021 0135 by Valetta [place], RN  Outcome: Ongoing  8/29/2021 0135 by Cris Anderson RN  Outcome: Met This Shift  Goal: Complications related to the disease process, condition or treatment will be avoided or minimized  Description: Complications related to the disease process, condition or treatment will be avoided or minimized  8/29/2021 1019 by Yasmeen Curtis RN  Outcome: Ongoing  8/29/2021 0135 by Marielos Brunson RN  Outcome: Ongoing  8/29/2021 0135 by Marielos Brunson RN  Outcome: Met This Shift     Problem: Fluid Volume - Imbalance:  Goal: Will show no signs and symptoms of excessive bleeding  Description: Will show no signs and symptoms of excessive bleeding  8/29/2021 1019 by Yasmeen Curtis RN  Outcome: Ongoing  8/29/2021 0135 by Marielos Brunson RN  Outcome: Ongoing  8/29/2021 0135 by Marielos Brunson RN  Outcome: Met This Shift  Goal: Absence of imbalanced fluid volume signs and symptoms  Description: Absence of imbalanced fluid volume signs and symptoms  8/29/2021 1019 by Yasmeen Curtis RN  Outcome: Ongoing  8/29/2021 0135 by Marielos Brunson RN  Outcome: Ongoing  8/29/2021 0135 by Marielos Brunson RN  Outcome: Met This Shift     Problem: Anxiety:  Goal: Level of anxiety will decrease  Description: Level of anxiety will decrease  8/29/2021 1019 by Yasmeen Curtis RN  Outcome: Ongoing  8/29/2021 0135 by Marielos Brunson RN  Outcome: Ongoing  8/29/2021 0135 by Marielos Brunson RN  Outcome: Met This Shift     Problem: Cardiac Output - Decreased:  Goal: Cardiac output within specified parameters  Description: Cardiac output within specified parameters  8/29/2021 1019 by Yasmeen Curtis RN  Outcome: Ongoing  8/29/2021 0135 by Marielos Brunson RN  Outcome: Ongoing  8/29/2021 0135 by Marielos Brunson RN  Outcome: Met This Shift     Problem: Serum Glucose Level - Abnormal:  Goal: Ability to maintain appropriate glucose levels will improve  Description: Ability to maintain appropriate glucose levels will improve  8/29/2021 1019 by Yasmeen Curtis RN  Outcome: Ongoing  8/29/2021 0135 by Marielos Brunson RN  Outcome: Ongoing  8/29/2021 0135 by Viktoriya Alvarado Tyrese Jones RN  Outcome: Met This Shift     Problem: Tissue Perfusion - Cardiopulmonary, Altered:  Goal: Circulatory function within specified parameters  Description: Circulatory function within specified parameters  8/29/2021 1019 by Yasmeen Curtis RN  Outcome: Ongoing  8/29/2021 0135 by Marielos Brunson RN  Outcome: Ongoing  8/29/2021 0135 by Marielos Brunson RN  Outcome: Met This Shift  Goal: Absence of angina  Description: Absence of angina  8/29/2021 1019 by Yasmeen Curtis RN  Outcome: Ongoing  8/29/2021 0135 by Marielos Brunson RN  Outcome: Ongoing  8/29/2021 0135 by Marielos Brunson RN  Outcome: Met This Shift  Goal: Hemodynamic stability will improve  Description: Hemodynamic stability will improve  8/29/2021 1019 by Yasmeen Curtis RN  Outcome: Ongoing  8/29/2021 0135 by Marielos Brunson RN  Outcome: Ongoing  8/29/2021 0135 by Marielos Brunson RN  Outcome: Met This Shift     Problem: Tissue Perfusion - Peripheral, Altered:  Goal: Absence of hematoma at arterial access site  Description: Absence of hematoma at arterial access site  8/29/2021 1019 by Yasmeen Curtis RN  Outcome: Ongoing  8/29/2021 0135 by Marielos Brunson RN  Outcome: Ongoing  8/29/2021 0135 by Marielos Brunson RN  Outcome: Met This Shift  Goal: Circulatory function of lower extremities is within specified parameters  Description: Circulatory function of lower extremities is within specified parameters  8/29/2021 1019 by Yasmeen Curtis RN  Outcome: Ongoing  8/29/2021 0135 by Marielos Brunson RN  Outcome: Ongoing  8/29/2021 0135 by Marielos Brunson RN  Outcome: Met This Shift     Problem: Tissue Perfusion - Renal, Altered:  Goal: Electrolytes within specified parameters  Description: Electrolytes within specified parameters  8/29/2021 1019 by Yasmeen Curtis RN  Outcome: Ongoing  8/29/2021 0135 by Marielos Brunson RN  Outcome: Ongoing  8/29/2021 0135 by Marielos Brunson RN  Outcome: Met This Shift  Goal: Urine creatinine clearance will be within specified parameters  Description: Urine creatinine clearance will be within specified parameters  8/29/2021 1019 by Soy Toussaint RN  Outcome: Ongoing  8/29/2021 0135 by Lynsey Steward RN  Outcome: Ongoing  8/29/2021 0135 by Lynsey Steward RN  Outcome: Met This Shift  Goal: Serum creatinine will be within specified parameters  Description: Serum creatinine will be within specified parameters  8/29/2021 1019 by Soy Toussaint, RN  Outcome: Ongoing  8/29/2021 0135 by Lynsey Steward, RN  Outcome: Ongoing  8/29/2021 0135 by Lynsey Steward RN  Outcome: Met This Shift  Goal: Ability to achieve a balanced intake and output will improve  Description: Ability to achieve a balanced intake and output will improve  8/29/2021 1019 by Soy Toussaint RN  Outcome: Ongoing  8/29/2021 0135 by Lynsey Steward, RN  Outcome: Ongoing  8/29/2021 0135 by Lynsey Steward RN  Outcome: Met This Shift     Problem: OXYGENATION/RESPIRATORY FUNCTION  Goal: Patient will maintain patent airway  8/29/2021 1019 by Soy Toussaint, RN  Outcome: Ongoing  8/29/2021 0135 by Lynsey Steward, RN  Outcome: Ongoing  8/29/2021 0135 by Lynsey Steward RN  Outcome: Met This Shift  Goal: Patient will achieve/maintain normal respiratory rate/effort  Description: Respiratory rate and effort will be within normal limits for the patient  8/29/2021 1019 by Soy Toussaint, RN  Outcome: Ongoing  8/29/2021 0135 by Lynsey Steward, RN  Outcome: Ongoing  8/29/2021 0135 by Lynsey Steward RN  Outcome: Met This Shift     Problem: HEMODYNAMIC STATUS  Goal: Patient has stable vital signs and fluid balance  8/29/2021 1019 by Soy Toussaint RN  Outcome: Ongoing  8/29/2021 0135 by Lynsey Steward, RN  Outcome: Ongoing  8/29/2021 0135 by Lynsey Steward RN  Outcome: Met This Shift     Problem: FLUID AND ELECTROLYTE IMBALANCE  Goal: Fluid and electrolyte balance are achieved/maintained  8/29/2021 1019 by Soy Toussaint, RN  Outcome: Ongoing  8/29/2021 0135 by Lynsey Steward RN  Outcome: Ongoing  8/29/2021 0135 by Lynsey Steward, RN  Outcome: Met This Shift     Problem: ACTIVITY INTOLERANCE/IMPAIRED MOBILITY  Goal: Mobility/activity is maintained at optimum level for patient  8/29/2021 1019 by Lexii Weir RN  Outcome: Ongoing  8/29/2021 0135 by Constanza Merino RN  Outcome: Ongoing  8/29/2021 0135 by Constanza Merino RN  Outcome: Met This Shift     Problem: Falls - Risk of:  Goal: Will remain free from falls  Description: Will remain free from falls  8/29/2021 1019 by Lexii Weir RN  Outcome: Ongoing  8/29/2021 0135 by Constanza Merino RN  Outcome: Ongoing  8/29/2021 0135 by Constanza Merino RN  Outcome: Met This Shift  Goal: Absence of physical injury  Description: Absence of physical injury  8/29/2021 1019 by Lexii Weir RN  Outcome: Ongoing  8/29/2021 0135 by Constanza Merino RN  Outcome: Ongoing  8/29/2021 0135 by Constanza Merino RN  Outcome: Met This Shift     Problem: Nutrition  Goal: Optimal nutrition therapy  8/29/2021 1019 by Lexii Weir RN  Outcome: Ongoing  8/29/2021 0135 by Constanza Merino RN  Outcome: Ongoing  8/29/2021 0135 by Constanza Merino RN  Outcome: Met This Shift

## 2021-08-29 NOTE — PROGRESS NOTES
No acute events noted overnight. Pt remained free from pain throughout shift. Good urinary output via lozano catheter.   Electronically signed by Lynsey Steward RN on 8/29/2021 at 6:52 AM

## 2021-08-29 NOTE — PROGRESS NOTES
[facility]  Cardiology Consult    Eamon Swanson  1945    August 29, 2021      CC: Mild shortness of breath      Subjective:     History of Present Illness:    Eamon Swanson is a 77 y.o. patient with a PMH significant for severe coronary disease, congestive heart failure, cardiomyopathy presented with complaints of shortness of breath. Status post multivessel PCI high risk with Impella support. Slowly making improvements  No chest pain palpitation diaphoresis       Past Medical History:   has a past medical history of Anemia, Anxiety, Asthma, CAD (coronary artery disease), Cerebral artery occlusion with cerebral infarction ([address] Depression, Graves disease, Hiatal hernia, Hx of blood clots, Hyperlipidemia, Hypertension, IBS (irritable bowel syndrome), Kidney disease, chronic, stage III (moderate, EGFR 30-59 ml/min) ([address] MI (myocardial infarction) ([facility] [place] [address] Pneumonia, Prolonged emergence from general anesthesia, Sleep apnea, Thyroid disease, Urinary incontinence, UTI (urinary tract infection), Vocal cord dysfunction, and Weight loss. Surgical History:   has a past surgical history that includes Hand surgery (Left); Arm Surgery (Left); eye surgery; Colonoscopy; Cholecystectomy; Wrist fracture surgery (Right, 10/02/2020); Gastric fundoplication (9143); and Wrist Closed Reduction (Left, 05/06/2003). Social History:   reports that she has never smoked. She has never used smokeless tobacco. She reports that she does not drink alcohol and does not use drugs. Family History:  family history includes Breast Cancer in her sister; Coronary Art Dis in her father; Heart Attack in her paternal grandmother and sister; Heart Disease in her father; Hypothyroidism in her sister; [place] in her father; Stroke in her father and sister; Stroke (age of onset: 61) in her mother.     Home Medications:  Were reviewed and are listed in nursing record and/or below  Prior to Admission medications Provider, MD   promethazine (PHENERGAN) 12.5 MG tablet Take 12.5 mg by mouth every 6 hours as needed for Nausea   Yes Historical Provider, MD   polyethyl glycol-propyl glycol 0.4-0.3 % (SYSTANE) 0.4-0.3 % ophthalmic solution 1 drop as needed for Dry Eyes (6 drops daily)   Yes Historical Provider, MD   cycloSPORINE (RESTASIS) 0.05 % ophthalmic emulsion Place 2 drops into the left eye 2 times daily   Yes Historical Provider, MD   loteprednol (ALREX) 0.2 % SUSP 1 drop 2 times daily To eyes   Yes Historical Provider, MD   solifenacin (VESICARE) 5 MG tablet Take 10 mg by mouth daily As needed   Yes Historical Provider, MD   Multiple Vitamins-Minerals (THERAPEUTIC MULTIVITAMIN-MINERALS) tablet Take 1 tablet by mouth daily   Yes Historical Provider, MD   polycarbophil (FIBERCON) 625 MG tablet Take 625 mg by mouth daily    Historical Provider, MD   vitamin B-12 (CYANOCOBALAMIN) 100 MCG tablet Take 50 mcg by mouth daily    Historical Provider, MD   polyethylene glycol (GLYCOLAX) packet Take 17 g by mouth daily as needed for Constipation    Historical Provider, MD        CURRENT Medications:  potassium chloride (KLOR-CON) extended release tablet 40 mEq, Daily with breakfast  spironolactone (ALDACTONE) tablet 25 mg, Daily  digoxin (LANOXIN) tablet 125 mcg, Daily  perflutren lipid microspheres (DEFINITY) injection 1.65 mg, ONCE PRN  metoprolol succinate (TOPROL XL) extended release tablet 12.5 mg, Daily  enoxaparin (LOVENOX) injection 40 mg, Daily  0.9 % sodium chloride infusion, PRN  0.9 % sodium chloride infusion, PRN  sodium chloride flush 0.9 % injection 5-40 mL, 2 times per day  sodium chloride flush 0.9 % injection 5-40 mL, PRN  0.9 % sodium chloride infusion, PRN  acetaminophen (TYLENOL) tablet 650 mg, Q4H PRN  0.9 % sodium chloride bolus, PRN  ondansetron (ZOFRAN) injection 4 mg, Q6H PRN  ticagrelor (BRILINTA) tablet 90 mg, BID  cefdinir (OMNICEF) capsule 300 mg, 2 times per day  lidocaine PF 1 % injection 5 mL, Once  docusate sodium (COLACE) capsule 100 mg, Daily  oxyCODONE (ROXICODONE) immediate release tablet 5 mg, Q4H PRN  insulin glargine (LANTUS) injection vial 8 Units, QAM  morphine (PF) injection 2 mg, Q4H PRN  insulin lispro (HUMALOG) injection vial 0-12 Units, TID WC  insulin lispro (HUMALOG) injection vial 0-6 Units, Nightly  famotidine (PEPCID) tablet 20 mg, Daily  LORazepam (ATIVAN) tablet 0.5 mg, Daily  promethazine (PHENERGAN) tablet 12.5 mg, Q6H PRN  vitamin B-12 (CYANOCOBALAMIN) tablet 50 mcg, Daily  polyethylene glycol (GLYCOLAX) packet 17 g, Daily PRN  Vitamin D (CHOLECALCIFEROL) tablet 1,000 Units, Daily  conjugated estrogens (PREMARIN) vaginal cream, Once per day on Mon Wed Fri  nortriptyline (PAMELOR) capsule 25 mg, Nightly  levothyroxine (SYNTHROID) tablet 100 mcg, Daily  0.9 % sodium chloride bolus, PRN  hydrALAZINE (APRESOLINE) injection 10 mg, Q1H PRN  rosuvastatin (CRESTOR) tablet 40 mg, Nightly  aspirin chewable tablet 81 mg, Daily  glucose (GLUTOSE) 40 % oral gel 15 g, PRN  dextrose 50 % IV solution, PRN  glucagon (rDNA) injection 1 mg, PRN  dextrose 5 % solution, PRN  perflutren lipid microspheres (DEFINITY) injection 1.65 mg, ONCE PRN        Allergies:  Augmentin [amoxicillin-pot clavulanate], Bactrim [sulfamethoxazole-trimethoprim], Carafate [sucralfate], Clindamycin/lincomycin, Hyoscyamine, Macrobid [nitrofurantoin], Oxybutynin, Pcn [penicillins], Pravastatin, Prevacid [lansoprazole], Prilosec [omeprazole], Sulfamethoxazole, and Trazodone and nefazodone           Review of Systems: SEE HPI   · Constitutional: no unanticipated weight loss. There's been no change in energy level, sleep pattern, or activity level. No fevers, chills. · Eyes: No visual changes or diplopia. No scleral icterus. · ENT: No Headaches, hearing loss or vertigo. No mouth sores or sore throat. · Cardiovascular: No Chest pain, tightness or discomfort.  No Shortness of breath.    No Dyspnea on exertion, Orthopnea, Paroxysmal nocturnal dyspnea or breathlessness at rest.   No Palpitations.  No Syncope ('blackouts', 'faints', 'collapse') or dizziness. · Respiratory: No cough or wheezing, no sputum production. No hematemesis. · Gastrointestinal: No abdominal pain, appetite loss, blood in stools. No change in bowel or bladder habits. · Genitourinary: No dysuria, trouble voiding, or hematuria. · Musculoskeletal:  No gait disturbance, no joint complaints. · Integumentary: No rash or pruritis. · Neurological: No headache, diplopia, change in muscle strength, numbness or tingling. · Psychiatric: No anxiety or depression. · Endocrine: No temperature intolerance. No excessive thirst, fluid intake, or urination. No tremor. · Hematologic/Lymphatic: No abnormal bruising or bleeding, blood clots or swollen lymph nodes. · Allergic/Immunologic: No nasal congestion or hives. Objective:     PHYSICAL EXAM:      Vitals:    08/29/21 0442   BP: 109/67   Pulse: 87   Resp: 16   Temp: 97.9 °F (36.6 °C)   SpO2: 98%    Weight: 141 lb 8.6 oz (64.2 kg)       General Appearance:  Alert, cooperative, no distress, appears stated age. Head:  Normocephalic, without obvious abnormality, atraumatic. Eyes:  Pupils equal and round. No scleral icterus. Mouth: Moist mucosa, no pharyngeal erythema. Nose: Nares normal. No drainage or sinus tenderness. Neck: Supple, symmetrical, trachea midline. No adenopathy. No tenderness/mass/nodules. No carotid bruit or elevated JVD. Lungs:   Respiratory Effort: Normal   Auscultation: Clear to auscultation bilaterally, respirations unlabored. No wheeze, rales   Chest Wall:  No tenderness or deformity. Cardiovascular:    Pulses  Palpation: normal   Ascultation: Regular rate, S1/ S2 normal. No murmur, rub, or gallop. 2+ radial and pedal pulses, symmetric  Carotid  Femoral   Abdomen and Gastrointestinal:   Soft, non-tender, bowel sounds active.   Liver and Spleen  Masses   Musculoskeletal: No muscle were heavily calcified  vessels. They extended into the left main to the disease. This  underwent rotational atherectomy with a 1.5-mm Rotablator jung followed  by Shockwave balloon angioplasty with a 3-mm balloon. These then  underwent bifurcation stenting with a 3 mm stent in the LAD overlapped  by 3.5 mm [place] drug-eluting stent from the LAD into the left main  coronary artery. We then did the T-stenting with some overlap from the  proximal circumflex into the left main. We then performed kissing  balloon angioplasty resulting in 0% residual stenosis and excellent  flow. 2.  Successful removal of the Impella device from the left femoral  artery access site with balloon tamponade to control hemostasis. We  also removed the pulmonary artery catheter that was present. Assessment and Plan     -Acute anterior wall myocardial infarction. Involvement of left anterior descending artery circumflex left main and severe right coronary disease. Had a high risk percutaneous core intervention with Impella support and atherectomy. Multiple stents were placed. Kindly look at the Cath Lab report for details of the stent placement  Continue dual antiplatelet therapy. Continue physical therapy. Aggressive risk factor modification. Acute on chronic systolic heart failure. Continue careful titration of medications holding ACE ARB for now secondary to hypotension. Reduce spironolactone to 25 mg daily. Follow renal function. Follow blood pressure. Avoid severe hypotension    Acute on chronic kidney disease continue to watch renal function. Follow potassium levels. Thank you for allowing us to participate in the care of Tish Pemberton. Please do not hesitate to contact me if you have any questions.     Iveth Antonio MD, MPH    [address], [address] [place] [address]   Brad Staton [facility]  Ph: [phone]  Fax: [phone]    8/29/2021 7:32 AM

## 2021-08-29 NOTE — PLAN OF CARE
Problem: Pain:  Goal: Pain level will decrease  Description: Pain level will decrease  8/29/2021 0135 by Jillian Daley RN  Outcome: Ongoing  8/29/2021 0135 by Jillian Daley RN  Outcome: Met This Shift  Goal: Control of acute pain  Description: Control of acute pain  8/29/2021 0135 by Jillian Daley RN  Outcome: Ongoing  8/29/2021 0135 by Jillian Daley RN  Outcome: Met This Shift  Goal: Control of chronic pain  Description: Control of chronic pain  8/29/2021 0135 by Jillian Daley RN  Outcome: Ongoing  8/29/2021 0135 by Jillian Daley RN  Outcome: Met This Shift     Problem: Skin Integrity:  Goal: Will show no infection signs and symptoms  Description: Will show no infection signs and symptoms  8/29/2021 0135 by Jillian Daley RN  Outcome: Ongoing  8/29/2021 0135 by Jillian Daley RN  Outcome: Met This Shift  Goal: Absence of new skin breakdown  Description: Absence of new skin breakdown  8/29/2021 0135 by Jillian Daley RN  Outcome: Ongoing  8/29/2021 0135 by Jillian Daley RN  Outcome: Met This Shift     Problem: Preoperative Routine:  Goal: Will comply with preparation for surgery or procedure  Description: Will comply with preparation for surgery or procedure  8/29/2021 0135 by Jillian Daley RN  Outcome: Ongoing  8/29/2021 0135 by Jillian Daley RN  Outcome: Met This Shift     Problem: Urinary Elimination:  Goal: Signs and symptoms of infection will decrease  Description: Signs and symptoms of infection will decrease  8/29/2021 0135 by Jillian Daley RN  Outcome: Ongoing  8/29/2021 0135 by Jillian Daley RN  Outcome: Met This Shift  Goal: Complications related to the disease process, condition or treatment will be avoided or minimized  Description: Complications related to the disease process, condition or treatment will be avoided or minimized  8/29/2021 0135 by Jillian Daley RN  Outcome: Ongoing  8/29/2021 0135 by Jillian Daley RN  Outcome: Met This Shift     Problem: Fluid Volume - Imbalance:  Goal: Will show no signs and symptoms of excessive bleeding  Description: Will show no signs and symptoms of excessive bleeding  8/29/2021 0135 by Reza Jimenez RN  Outcome: Ongoing  8/29/2021 0135 by Reza Jimenez RN  Outcome: Met This Shift  Goal: Absence of imbalanced fluid volume signs and symptoms  Description: Absence of imbalanced fluid volume signs and symptoms  8/29/2021 0135 by Reza Jimenez RN  Outcome: Ongoing  8/29/2021 0135 by Reza Jimenez RN  Outcome: Met This Shift     Problem: Anxiety:  Goal: Level of anxiety will decrease  Description: Level of anxiety will decrease  8/29/2021 0135 by Reza Jimenez RN  Outcome: Ongoing  8/29/2021 0135 by Reza Jimenez RN  Outcome: Met This Shift     Problem: Cardiac Output - Decreased:  Goal: Cardiac output within specified parameters  Description: Cardiac output within specified parameters  8/29/2021 0135 by Reza Jimenez RN  Outcome: Ongoing  8/29/2021 0135 by Reza Jimenez RN  Outcome: Met This Shift     Problem: Serum Glucose Level - Abnormal:  Goal: Ability to maintain appropriate glucose levels will improve  Description: Ability to maintain appropriate glucose levels will improve  8/29/2021 0135 by Reza Jimenez RN  Outcome: Ongoing  8/29/2021 0135 by Reza Jimenez RN  Outcome: Met This Shift     Problem: Tissue Perfusion - Cardiopulmonary, Altered:  Goal: Circulatory function within specified parameters  Description: Circulatory function within specified parameters  8/29/2021 0135 by Reza Jimenez RN  Outcome: Ongoing  8/29/2021 0135 by Reza Jimenez RN  Outcome: Met This Shift  Goal: Absence of angina  Description: Absence of angina  8/29/2021 0135 by Reza Jimenez RN  Outcome: Ongoing  8/29/2021 0135 by Reza Jimenez RN  Outcome: Met This Shift  Goal: Hemodynamic stability will improve  Description: Hemodynamic stability will improve  8/29/2021 0135 by Reza Jimenez RN  Outcome: Ongoing  8/29/2021 0135 by Reza Jimenez RN  Outcome: Met This Shift     Problem: Tissue Perfusion - Peripheral, Altered:  Goal: Absence of hematoma at arterial access site  Description: Absence of hematoma at arterial access site  8/29/2021 0135 by Reza Jimenez RN  Outcome: Ongoing  8/29/2021 0135 by Reza Jimenez RN  Outcome: Met This Shift  Goal: Circulatory function of lower extremities is within specified parameters  Description: Circulatory function of lower extremities is within specified parameters  8/29/2021 0135 by Reza Jimenez RN  Outcome: Ongoing  8/29/2021 0135 by Reza Jimenez RN  Outcome: Met This Shift     Problem: Tissue Perfusion - Renal, Altered:  Goal: Electrolytes within specified parameters  Description: Electrolytes within specified parameters  8/29/2021 0135 by Reza Jimenez RN  Outcome: Ongoing  8/29/2021 0135 by Reza Jimenez RN  Outcome: Met This Shift  Goal: Urine creatinine clearance will be within specified parameters  Description: Urine creatinine clearance will be within specified parameters  8/29/2021 0135 by Reza Jimenez RN  Outcome: Ongoing  8/29/2021 0135 by Reza Jimenez RN  Outcome: Met This Shift  Goal: Serum creatinine will be within specified parameters  Description: Serum creatinine will be within specified parameters  8/29/2021 0135 by Reza Jimenez RN  Outcome: Ongoing  8/29/2021 0135 by Reza Jimenez RN  Outcome: Met This Shift  Goal: Ability to achieve a balanced intake and output will improve  Description: Ability to achieve a balanced intake and output will improve  8/29/2021 0135 by Reza Jimenez RN  Outcome: Ongoing  8/29/2021 0135 by Reza Jimenez RN  Outcome: Met This Shift     Problem: OXYGENATION/RESPIRATORY FUNCTION  Goal: Patient will maintain patent airway  8/29/2021 0135 by Reza Jimenez RN  Outcome: Ongoing  8/29/2021 0135 by Reza Jimenez RN  Outcome: Met This Shift  Goal: Patient will achieve/maintain normal respiratory rate/effort  Description: Respiratory rate and effort will be within normal limits for the patient  8/29/2021 0135 by Torsten Deutsch RN  Outcome: Ongoing  8/29/2021 0135 by Torsten Deutsch RN  Outcome: Met This Shift     Problem: HEMODYNAMIC STATUS  Goal: Patient has stable vital signs and fluid balance  8/29/2021 0135 by Torsten Deutsch RN  Outcome: Ongoing  8/29/2021 0135 by Torsten Deutsch RN  Outcome: Met This Shift     Problem: FLUID AND ELECTROLYTE IMBALANCE  Goal: Fluid and electrolyte balance are achieved/maintained  8/29/2021 0135 by Torsten Deutsch RN  Outcome: Ongoing  8/29/2021 0135 by Torsten Deutsch RN  Outcome: Met This Shift     Problem: ACTIVITY INTOLERANCE/IMPAIRED MOBILITY  Goal: Mobility/activity is maintained at optimum level for patient  8/29/2021 0135 by Torsten Deutsch RN  Outcome: Ongoing  8/29/2021 0135 by Torstne Deutsch RN  Outcome: Met This Shift     Problem: Falls - Risk of:  Goal: Will remain free from falls  Description: Will remain free from falls  8/29/2021 0135 by Torsten Deutsch RN  Outcome: Ongoing  8/29/2021 0135 by Torsten Deutsch RN  Outcome: Met This Shift  Goal: Absence of physical injury  Description: Absence of physical injury  8/29/2021 0135 by Torsten Deutsch RN  Outcome: Ongoing  8/29/2021 0135 by Torsten Deutsch RN  Outcome: Met This Shift     Problem: Nutrition  Goal: Optimal nutrition therapy  8/29/2021 0135 by Torsten Deutsch RN  Outcome: Ongoing  8/29/2021 0135 by Torsten Deutsch RN  Outcome: Met This Shift

## 2021-08-29 NOTE — PROGRESS NOTES
[facility]  Nephrology   Progress Note        We are following the patient for JESSICA/CKD   76 y/o WF with h/o CAD, CKD stage 3 baseline Cr ~ 1.2-1.4 mg follows with Dr Donnie Wade, presented on 8/18/21 with CP and diagnosed with STEMI Taken to cath lab and had PTCA of LAD, also had Impella placed for cardiogenic shock  Developed hematuria that has since resolved  Cr noted to have increased from 1.4 to 1.7 mg  Received Lasix initially but started on IVF's and Albumin as PCWP noted to be low UOP has picked up since  Urine CX growing E coli    SUBJECTIVE:  -pt seen and examined  -PMSHx and meds reviewed  -No family at bedside    Transferred out of CVICU  BP stable      ROS:  Neg chest pain/SOB  PMFSH:  medications reviewed.     OBJECTIVE:     PHYSICAL:    BP 98/63   Pulse 78   Temp 97.5 °F (36.4 °C) (Oral)   Resp 14   Ht 5' (1.524 m)   Wt 141 lb 8.6 oz (64.2 kg)   SpO2 92%   BMI 27.64 kg/m²   24HR INTAKE/OUTPUT:      Intake/Output Summary (Last 24 hours) at 8/29/2021 1647  Last data filed at 8/29/2021 1356  Gross per 24 hour   Intake 930 ml   Output 1250 ml   Net -320 ml       CONSTITUTIONAL:  awake, alert, cooperative, no apparent distress, and appears stated age  LUNGS:  No increased work of breathing, good air exchange, clear to auscultation bilaterally  CARDIOVASCULAR:  Normal apical impulse, regular rate and rhythm, normal S1 and S2,impella device in place  ABDOMEN:  No scars, normal bowel sounds, soft, non-distended, non-tender, no masses palpated, no hepatosplenomegally  SKIN: no bruising or bleeding  EXTREMITIES: trace to 1+ bilateral pedal edema         Data      CBC:   Recent Labs     08/27/21  1115 08/28/21  0440 08/29/21  0645   WBC 12.4* 10.5 10.7   RBC 2.93* 2.88* 2.85*   HGB 9.1* 9.2* 8.8*   HCT 26.1* 25.9* 26.1*    193 219     BMP:    Recent Labs     08/27/21  1115 08/28/21  0440 08/29/21  0645   * 129* 132*   K 4.2 3.6 4.4   CL 89* 91* 95*   CO2 26 26 26   BUN 25* 25* 23*   CREATININE 1.3* 1.2 1.2   CALCIUM 8.1* 8.2* 8.2*   GLUCOSE 245* 119* 108*     Phosphorus:    Recent Labs     08/27/21  0519 08/28/21  0440 08/29/21  0645   PHOS 2.8 2.9 3.4     Magnesium:    Recent Labs     08/27/21  0519 08/28/21  0440 08/29/21  0645   MG 2.30 2.10 2.10         ASSESSMENT/PLAN    1-JESSICA/CKD suspect in the setting of Cardiogenic shock and IV dye exposure Also has UTI UOP 3700 ml/24    - baseline Cr 1.2.-1.4   - Cr stable-continue supportive care   - on aldactone    2-Ac Anterior MI s/p PTCA LAD   - s/p ZAN in LAD   - off impella    3-Cardiogenic shock   - off impella   - better-on aldactone/digoxin, weight is trending lower    4 Hematuria resolved    5 UTI E coli   - off Abx    6 h/o HTN   -BP is variable-on aldactone for CHF    7 Hypokalemia   - aldactone started today by cardiology    8 anemia   - monitoring Hgb, Plt better   - per Heme    9 Shock liver    - resolved

## 2021-08-30 NOTE — PROGRESS NOTES
PICC line removed no complications. Pressure held for 5 minutes with a dressing placed on top.     Electronically signed by Mirian Keith RN on 8/30/2021 at 2:59 PM

## 2021-08-30 NOTE — PROGRESS NOTES
Physical Therapy  Facility/Department: [address] PROGRESSIVE CARE  Daily Treatment Note  NAME: Junior Cade  : 1945  MRN: [id number]    Date of Service: 2021    Discharge Recommendations:  Home with Home health PT   PT Equipment Recommendations  Other: Pt has a Rolling Walker and W/C per family. Junior Cade scored a 20/24 on the AM-PAC short mobility form. Current research shows that an AM-PAC score of 18 or greater is typically associated with a discharge to the patient's home setting. Based on the patient's AM-PAC score and their current functional mobility deficits, it is recommended that the patient have 2-3 sessions per week of Physical Therapy at d/c to increase the patient's independence. At this time, this patient demonstrates the endurance and safety to discharge home with Home PT Evaluation and a follow up treatment frequency of 2-3x/wk. Please see assessment section for further patient specific details. If patient discharges prior to next session this note will serve as a discharge summary. Please see below for the latest assessment towards goals. Assessment   Body structures, Functions, Activity limitations: Decreased functional mobility ; Decreased safe awareness;Decreased endurance  Assessment: Pt is a 77 y.o. F with significant past medical history of CAD and stents. Pt presents in Ed on 2021 with substernal chest pain. Improved with NTG in the ED. EKG c/w STEMI. LHC revealed EF 15-20%. Impella placed  and removed . Patient developed pulmonary edema initially at cath but stabilized with BiPAP and POBA. Pt has sig PMHx of CAD, HTN, MI with previous STENTs, Kidney disease and renal failure. PTA pt living with , daughter, MARU and 2 kids in a 1 story home with a basement, 2 steps to enter. Indep with all functional mobility, ADLs, active in the community. Pt currently edward short distances with Walker (30',40') Slight CGA.   Occass cues re safe positioning and hand placement with Transfers.  at bedside and observed safe use of Walker. Pt/family report adequate assist/support for d/c home. Will recommend Home PT. Prognosis: Good  Decision Making: Medium Complexity  Patient Education: Pt/family ed re : Safe use of Walker. REQUIRES PT FOLLOW UP: Yes  Activity Tolerance  Activity Tolerance: Patient Tolerated treatment well  Activity Tolerance: Cont limited endurance although improving. Patient Diagnosis(es): The primary encounter diagnosis was ST elevation myocardial infarction (STEMI), unspecified artery ([facility] [address] Diagnoses of Requires oxygen therapy and Other fatigue were also pertinent to this visit. has a past medical history of Anemia, Anxiety, Asthma, CAD (coronary artery disease), Cerebral artery occlusion with cerebral infarction ([address] Depression, Graves disease, Hiatal hernia, Hx of blood clots, Hyperlipidemia, Hypertension, IBS (irritable bowel syndrome), Kidney disease, chronic, stage III (moderate, EGFR 30-59 ml/min) ([address] MI (myocardial infarction) ([address] Pneumonia, Prolonged emergence from general anesthesia, Sleep apnea, Thyroid disease, Urinary incontinence, UTI (urinary tract infection), Vocal cord dysfunction, and Weight loss. has a past surgical history that includes Hand surgery (Left); Arm Surgery (Left); eye surgery; Colonoscopy; Cholecystectomy; Wrist fracture surgery (Right, 10/02/2020); Gastric fundoplication (1548); and Wrist Closed Reduction (Left, 05/06/2003). Restrictions  Restrictions/Precautions  Restrictions/Precautions: Fall Risk  Position Activity Restriction  Other position/activity restrictions: O2 2L via NC  Subjective   General  Chart Reviewed: Yes  Additional Pertinent Hx: 78 y/o female admit 8/18/2021  with significant past medical history of CAD and stents at [facility] who presents with substernal chest pain. Improved with NTG in the ED. EKG c/w STEMI.   LHC revealed EF 15-20%, likely some degree of MR, diffuse RCA disease and osteal LAD and CX lesions. Impella placed 8/18 and removed 8/24 with STENTs placed. Patient developed pulmonary edema initially at cath but stabilized with BiPAP and POBA. Pt has sig PMHx of CAD, HTN, MI with previous STENTs, Kidney disease and renal failure. Response To Previous Treatment: Patient with no complaints from previous session. Family / Caregiver Present: Yes (.)  Referring Practitioner: Dr. Michael Benitez  Subjective  Subjective: Pt agreeable to PT Rx. Feeling better, less fatigued. Orientation  Orientation  Overall Orientation Status: Within Functional Limits  Cognition   Cognition  Overall Cognitive Status: Exceptions  Safety Judgement: Decreased awareness of need for safety  Problem Solving: Assistance required to identify errors made;Assistance required to correct errors made  Objective   Bed mobility  Supine to Sit: Supervision  Sit to Supine: Supervision  Transfers  Sit to Stand: Contact guard assistance (With Richa Jesus. Occass cues for safe hand placement and positioning.)  Stand to sit: Contact guard assistance (With Richa Jesus. Occass cues for safe hand placement and positioning.)  Comment: Toilet Transfer CGA, cues for initial hand placement. Pt able to complete pericare and manage depends without assist.  Ambulation  Ambulation?: Yes  Ambulation 1  Surface: level tile  Distance: Pt amb to/from bathroom, within hospital room setting 30', 40' with Walker Slight CGA. Diminished step length/clearance although no LE buckling/giving way. Fatigued following although improving edward to activity. Benjamin Francoise AM-PAC Score  AM-PAC Inpatient Mobility Raw Score : 20 (08/30/21 1227)  AM-PAC Inpatient T-Scale Score : 47.67 (08/30/21 1227)  Mobility Inpatient CMS 0-100% Score: 35.83 (08/30/21 1227)  Mobility Inpatient CMS G-Code Modifier : CJ (08/30/21 1227)          Goals  Short term goals  Time Frame for Short term goals: Upon d/c acute care setting.   Short term goal 1: Bed Mob SBA. 8/30 Goal met. Short term goal 2: Transfers with/without assist device SBA.  8/30 Goal near met. Short term goal 3: Amb with/without assist device 48' SBA/CGA. 8/30 Goal near met. Short term goal 4: 2 stairs with CGA  Patient Goals   Patient goals : Go home with family. Plan    Plan  Times per week: 3-5x week while in acute care setting.   Current Treatment Recommendations: Functional Mobility Training, Transfer Training, Gait Training, Safety Education & Training, Patient/Caregiver Education & Training  Safety Devices  Type of devices: Call light within reach, Chair alarm in place, Left in chair, Nurse notified     Therapy Time   Individual Concurrent Group Co-treatment   Time In 1100         Time Out 1150         Minutes Constance Alvarado [id number], PT

## 2021-08-30 NOTE — PLAN OF CARE
Problem: Pain:  Goal: Pain level will decrease  Description: Pain level will decrease  8/30/2021 0740 by Tomas Velásquez RN  Outcome: Ongoing     Problem: Pain:  Goal: Control of acute pain  Description: Control of acute pain  8/30/2021 0740 by Tomas Velásquez RN  Outcome: Ongoing     Problem: Pain:  Goal: Control of chronic pain  Description: Control of chronic pain  8/30/2021 0740 by Tomas Velásquez RN  Outcome: Ongoing     Problem: Skin Integrity:  Goal: Will show no infection signs and symptoms  Description: Will show no infection signs and symptoms  8/30/2021 0740 by Tomas Velásquez RN  Outcome: Ongoing     Problem: Skin Integrity:  Goal: Absence of new skin breakdown  Description: Absence of new skin breakdown  8/30/2021 0740 by Tomas Velásquez RN  Outcome: Ongoing

## 2021-08-30 NOTE — PROGRESS NOTES
Discharge orders acknowledged by RN . Discharge teaching completed with pt and family. AVS reviewed and all questions answered. New prescriptions and current medication regimen reviewed and pt understands schedule. Follow up appointments also reviewed with pt and resources given for discharge. Pt was given written prescriptions to be filled and understands schedule. IV removed. Telemonitor removed and returned to [facility]. 60+ minutes of CHF education completed with pt. All other education completed. Required core measures completed. Pt vitals WDL. Pt discharged with all belongings to home with . Pt transported off of unit via wheelchair. No complications.        Electronically signed by Mirian Keith RN on 8/30/2021 at 4:16 PM

## 2021-08-30 NOTE — DISCHARGE INSTR - COC
Continuity of Care Form    Patient Name: Aicha Richter   :  1945  MRN:  [id number]    Admit date:  2021  Discharge date:      Code Status Order: Full Code   Advance Directives:      Admitting Physician:  Heraclio Miguel MD  PCP: Manuel Mart MD    Discharging Nurse: [facility] Unit/Room#: B2W-2095/5717-83  Discharging Unit Phone Number: [phone]    Emergency Contact:   Extended Emergency Contact Information  Primary Emergency Contact: Chloe Conn  Address: [address] [place] [address]  Home Phone: [phone]  Mobile Phone: [phone]  Relation: Spouse    Past Surgical History:  Past Surgical History:   Procedure Laterality Date    ARM SURGERY Left     L shoulder replacement    CHOLECYSTECTOMY      COLONOSCOPY      EYE SURGERY      contaract    GASTRIC FUNDOPLICATION  4643    hiatal hernia repair    HAND SURGERY Left     WRIST CLOSED REDUCTION Left 2003    left wrist fracture    WRIST FRACTURE SURGERY Right 10/02/2020    OPEN REDUCTION INTERNAL FIXATION RIGHT WRIST performed by Joanne Zhao MD at [place] [address]       Immunization History:   Immunization History   Administered Date(s) Administered    COVID-19, Treviño Peter, PF, 30mcg/0.3mL 2021, 2021       Active Problems:  Patient Active Problem List   Diagnosis Code    Closed fracture of right distal radius S52.501A    STEMI (ST elevation myocardial infarction) ([facility] [address] I21.3    Cardiogenic shock ([facility] [address] R57.0    Acute respiratory failure with hypoxia and hypercapnia (HCC) J96.01, J96.02    Acute kidney injury ([facility] [address] N17.9    Hypoxia R09.02    Acute cystitis without hematuria N30.00    Hemoptysis R04.2    Thrombocytopenia ([facility]) D69.6    Anemia D64.9    MARSHA (generalized anxiety disorder) A81.1    Acute systolic CHF (congestive heart failure), NYHA class 3 ([facility]) I50.21       Isolation/Infection:   Isolation            No Isolation          Patient Infection Status None to display            Nurse Assessment:  Last Vital Signs: BP (!) 143/73   Pulse 90   Temp 99 °F (37.2 °C) (Oral)   Resp 16   Ht 5' (1.524 m)   Wt 141 lb 1.5 oz (64 kg)   SpO2 95%   BMI 27.56 kg/m²     Last documented pain score (0-10 scale): Pain Level: 0  Last Weight:   Wt Readings from Last 1 Encounters:   08/30/21 141 lb 1.5 oz (64 kg)     Mental Status:  oriented and alert    IV Access:  - None    Nursing Mobility/ADLs:  Walking   Assisted  Transfer  Assisted  Bathing  Independent  Dressing  Independent  Toileting  Independent  Feeding  Independent  Med Admin  Independent  Med Delivery   whole    Wound Care Documentation and Therapy:  Wound 08/25/21 Femoral Anterior;Left;Proximal (Active)   Wound Image   08/25/21 1241   Wound Etiology Skin Tear 08/30/21 0730   Dressing Status Clean;Dry; Intact 08/28/21 0900   Dressing/Treatment Silicone border 46/98/56 0900   Dressing Change Due 08/28/21 08/28/21 0900   Wound Length (cm) 4 cm 08/25/21 1241   Wound Width (cm) 0.5 cm 08/25/21 1241   Wound Surface Area (cm^2) 2 cm^2 08/25/21 1241   Wound Assessment Other (Comment) 08/28/21 0900   Drainage Amount None 08/28/21 0900   Melody-wound Assessment Ecchymosis;Fragile 08/28/21 0400   Wound Thickness Description not for Pressure Injury Partial thickness 08/28/21 0400   Number of days: 4        Elimination:  Continence:   · Bowel: Yes  · Bladder: Yes  Urinary Catheter: None   Colostomy/Ileostomy/Ileal Conduit: No       Date of Last BM: 8/29    Intake/Output Summary (Last 24 hours) at 8/30/2021 1146  Last data filed at 8/30/2021 1133  Gross per 24 hour   Intake 480 ml   Output 750 ml   Net -270 ml     I/O last 3 completed shifts: In: 56 [P.O.:480; I.V.:10]  Out: 450 [Urine:450]    Safety Concerns:      At Risk for Falls    Impairments/Disabilities:      Vision    Nutrition Therapy:  Current Nutrition Therapy:   - Oral Diet:  Low Sodium (2gm)    Routes of Feeding: Oral  Liquids: No Restrictions  Daily Fluid Restriction: no  Last Modified Barium Swallow with Video (Video Swallowing Test): not done    Treatments at the Time of Hospital Discharge:   Respiratory Treatments:   Oxygen Therapy:  is not on home oxygen therapy. Ventilator:    - No ventilator support    Rehab Therapies: Physical Therapy and Occupational Therapy  Weight Bearing Status/Restrictions: No weight bearing restirctions  Other Medical Equipment (for information only, NOT a DME order):  walker  Other Treatments:     Patient's personal belongings (please select all that are sent with patient):  Glasses, Dentures upper and lower    RN SIGNATURE:  Electronically signed by Swathi Walker RN on 8/30/21 at 11:49 AM EDT    CASE MANAGEMENT/SOCIAL WORK SECTION    Inpatient Status Date: 8/18/21    Readmission Risk Assessment Score:  Readmission Risk              Risk of Unplanned Readmission:  24           Discharging to Facility/ Agency   · Name: [facility] (awaiting family choice)   · Address:  · Phone:  · Fax:    / signature: Electronically signed by OSVALDO Luo, ZULEMAW on 8/30/21 at 12:13 PM EDT    PHYSICIAN SECTION    Prognosis: {Prognosis:1796905670}    Condition at Discharge: [address] Patient Condition:[id number]}    Rehab Potential (if transferring to Rehab): {Prognosis:4093333594}    Recommended Labs or Other Treatments After Discharge: ***    Physician Certification: I certify the above information and transfer of Jose Armando Doe  is necessary for the continuing treatment of the diagnosis listed and that she requires {Admit to Appropriate Level of Care:84121} for {GREATER/LESS:283011345} 30 days.      Update Admission H&P: {CHP DME Changes in JSHIX:999383643}    PHYSICIAN SIGNATURE:  Electronically signed by Berton Osler, MD on 8/30/21 at 11:53 AM EDT

## 2021-08-30 NOTE — PROGRESS NOTES
[facility]  Nephrology   Progress Note        We are following the patient for JESSICA/CKD   76 y/o WF with h/o CAD, CKD stage 3 baseline Cr ~ 1.2-1.4 mg follows with Dr Daisy Feliz, presented on 8/18/21 with CP and diagnosed with STEMI Taken to cath lab and had PTCA of LAD, also had Impella placed for cardiogenic shock  Developed hematuria that has since resolved  Cr noted to have increased from 1.4 to 1.7 mg  Received Lasix initially but started on IVF's and Albumin as PCWP noted to be low UOP has picked up since  Urine CX growing E coli    SUBJECTIVE:  -pt seen and examined    HPI:  Breathing comfortably. No CP. Weak. ROS:  In bed. [address]:  medications reviewed.     OBJECTIVE:     PHYSICAL:    BP (!) 142/74   Pulse 97   Temp 97.6 °F (36.4 °C) (Oral)   Resp 16   Ht 5' (1.524 m)   Wt 141 lb 1.5 oz (64 kg)   SpO2 92%   BMI 27.56 kg/m²   24HR INTAKE/OUTPUT:      Intake/Output Summary (Last 24 hours) at 8/30/2021 1624  Last data filed at 8/30/2021 1133  Gross per 24 hour   Intake 240 ml   Output 750 ml   Net -510 ml       CONSTITUTIONAL:  awake, alert, cooperative, no apparent distress, and appears stated age  LUNGS:  No increased work of breathing, good air exchange, clear to auscultation bilaterally  CARDIOVASCULAR:  Normal apical impulse, regular rate and rhythm, normal S1 and S2,impella device in place  ABDOMEN:  No scars, normal bowel sounds, soft, non-distended, non-tender, no masses palpated, no hepatosplenomegally  SKIN: no bruising or bleeding  EXTREMITIES: trace to 1+ bilateral pedal edema         Data      CBC:   Recent Labs     08/28/21  0440 08/29/21  0645 08/30/21  0651   WBC 10.5 10.7 9.6   RBC 2.88* 2.85* 2.94*   HGB 9.2* 8.8* 9.5*   HCT 25.9* 26.1* 27.2*    219 237     BMP:    Recent Labs     08/28/21  0440 08/29/21  0645 08/30/21  0651   * 132* 134*   K 3.6 4.4 4.6   CL 91* 95* 99   CO2 26 26 19*   BUN 25* 23* 20   CREATININE 1.2 1.2 1.3*   CALCIUM 8.2* 8.2*

## 2021-08-30 NOTE — PROGRESS NOTES
No acute events noted overnight. Pt remained free from chest pain. Having good urinary output without lozano catheter.    Electronically signed by Willa Pruett RN on 8/30/2021 at 6:54 AM

## 2021-08-30 NOTE — CARE COORDINATION
CASE MANAGEMENT DISCHARGE SUMMARY:    DISCHARGE DATE: 8/30/21    DISCHARGED TO: Home with spouse; [facility]  Met with patient and spouse at bedside. Discussed discharge plan. Provided [facility] list. IM provided. Spouse to transport. Family to follow up with SW to verify [facility] choice. HOME CARE AGENCY: Awaiting choice from patient and spouse. SKIP completed and HHC ordered. TRANSPORTATION: Spouse at bedside             TIME: Patient to coordinate with RN     PREFERRED PHARMACY: [facility]              NUMBER: [phone]    The Plan for Transition of Care is related to the following treatment goals: [facility]    The Patient and spouse were provided with a choice of provider and agrees   with the discharge plan. [x] Yes [] No    Freedom of choice list was provided with basic dialogue that supports the patient's individualized plan of care/goals, treatment preferences and shares the quality data associated with the providers.  [x] Yes [] No    OSVALDO Boyer, AMENA, Social Work/Case Management   [phone]  Electronically signed by OSVALDO Boyer LSW on 8/30/2021 at 12:12 PM

## 2021-08-30 NOTE — PLAN OF CARE
Problem: Pain:  Goal: Pain level will decrease  Description: Pain level will decrease  Outcome: Ongoing  Goal: Control of acute pain  Description: Control of acute pain  Outcome: Ongoing  Goal: Control of chronic pain  Description: Control of chronic pain  Outcome: Ongoing     Problem: Skin Integrity:  Goal: Will show no infection signs and symptoms  Description: Will show no infection signs and symptoms  Outcome: Ongoing  Goal: Absence of new skin breakdown  Description: Absence of new skin breakdown  Outcome: Ongoing     Problem: Preoperative Routine:  Goal: Will comply with preparation for surgery or procedure  Description: Will comply with preparation for surgery or procedure  Outcome: Ongoing     Problem: Urinary Elimination:  Goal: Signs and symptoms of infection will decrease  Description: Signs and symptoms of infection will decrease  Outcome: Ongoing  Goal: Complications related to the disease process, condition or treatment will be avoided or minimized  Description: Complications related to the disease process, condition or treatment will be avoided or minimized  Outcome: Ongoing     Problem: Fluid Volume - Imbalance:  Goal: Will show no signs and symptoms of excessive bleeding  Description: Will show no signs and symptoms of excessive bleeding  Outcome: Ongoing  Goal: Absence of imbalanced fluid volume signs and symptoms  Description: Absence of imbalanced fluid volume signs and symptoms  Outcome: Ongoing     Problem: Anxiety:  Goal: Level of anxiety will decrease  Description: Level of anxiety will decrease  Outcome: Ongoing     Problem: Cardiac Output - Decreased:  Goal: Cardiac output within specified parameters  Description: Cardiac output within specified parameters  Outcome: Ongoing     Problem: Serum Glucose Level - Abnormal:  Goal: Ability to maintain appropriate glucose levels will improve  Description: Ability to maintain appropriate glucose levels will improve  Outcome: Ongoing     Problem: Tissue Perfusion - Cardiopulmonary, Altered:  Goal: Circulatory function within specified parameters  Description: Circulatory function within specified parameters  Outcome: Ongoing  Goal: Absence of angina  Description: Absence of angina  Outcome: Ongoing  Goal: Hemodynamic stability will improve  Description: Hemodynamic stability will improve  Outcome: Ongoing     Problem: Tissue Perfusion - Peripheral, Altered:  Goal: Absence of hematoma at arterial access site  Description: Absence of hematoma at arterial access site  Outcome: Ongoing  Goal: Circulatory function of lower extremities is within specified parameters  Description: Circulatory function of lower extremities is within specified parameters  Outcome: Ongoing     Problem: Tissue Perfusion - Renal, Altered:  Goal: Electrolytes within specified parameters  Description: Electrolytes within specified parameters  Outcome: Ongoing  Goal: Urine creatinine clearance will be within specified parameters  Description: Urine creatinine clearance will be within specified parameters  Outcome: Ongoing  Goal: Serum creatinine will be within specified parameters  Description: Serum creatinine will be within specified parameters  Outcome: Ongoing  Goal: Ability to achieve a balanced intake and output will improve  Description: Ability to achieve a balanced intake and output will improve  Outcome: Ongoing     Problem: OXYGENATION/RESPIRATORY FUNCTION  Goal: Patient will maintain patent airway  Outcome: Ongoing  Goal: Patient will achieve/maintain normal respiratory rate/effort  Description: Respiratory rate and effort will be within normal limits for the patient  Outcome: Ongoing     Problem: HEMODYNAMIC STATUS  Goal: Patient has stable vital signs and fluid balance  Outcome: Ongoing     Problem: FLUID AND ELECTROLYTE IMBALANCE  Goal: Fluid and electrolyte balance are achieved/maintained  Outcome: Ongoing     Problem: ACTIVITY INTOLERANCE/IMPAIRED MOBILITY  Goal: Mobility/activity is maintained at optimum level for patient  Outcome: Ongoing     Problem: Falls - Risk of:  Goal: Will remain free from falls  Description: Will remain free from falls  Outcome: Ongoing  Goal: Absence of physical injury  Description: Absence of physical injury  Outcome: Ongoing     Problem: Nutrition  Goal: Optimal nutrition therapy  Outcome: Ongoing

## 2021-08-30 NOTE — DISCHARGE SUMMARY
[address] [facility] Discharge Note      Patient ID: Evan Rodriguez 77 y.o. 1945    Admission Date: 8/18/2021     Discharge Date:  8/30/21    Reason for Admission:  Principal Diagnosis: Acute Anterior Myocardial Infarction  Secondary Diagnosis: Cardiogenic Shock    Procedures:  Left heart Catheterization with PCI using ZAN and atherectomy to ostial Circumflex and LAD and distal Left Main  Echocardiogram  Telemetry Monitoring  Bipap ventilation  Impella LV Support    Brief Summary of Patient's Course:  Ms. Dalton Webb  is a 77 y. o. female with a past medical history significant for CAD with prior PCI to her Circumflex in 2011 who presented to [facility] from home with chest pain that began at dinner tonight. STEMI was activated. She was taken to the cath lab and had a subtotal occlusion of her ostial LAD and a 99% Circumflex artery. I placed an Impella and performed PTCA to the ostia of the LAD and Circ restoring FLACO 3 flow. She was determined to be a poor surgical candidate for CABG and underwent Impella assisted PCI instead on 8/24/21. Serge Machado is doing well today and is able to ambulate well with PT. She has no shortness of breath or chest pain. Sinus rhythm on telemetry. Vitals:    08/30/21 1445   BP: (!) 142/74   Pulse: 97   Resp: 16   Temp: 97.6 °F (36.4 °C)   SpO2: 92%   RR, normal S1/S2, no M/R/G  Lungs bilaterally CTA  Abd soft, NT ND  No edema  No focal neurologic deficits  2+ bilateral radial and femoral pulses      Labs reviewed and remarkable for hemoglobin of 9.5, sodium 134, BUN 20/creatinine 1.3. Cath/PCI 8/24/21:  1.  95% ostial to proximal left anterior descending artery stenosis. There is 90% ostial circumflex disease. These were heavily calcified  vessels. They extended into the left main to the disease. This  underwent rotational atherectomy with a 1.5-mm Rotablator jung followed  by Shockwave balloon angioplasty with a 3-mm balloon.   These then  underwent bifurcation stenting with a 3 mm stent in the LAD overlapped  by 3.5 mm [place] drug-eluting stent from the LAD into the left main  coronary artery. We then did the T-stenting with some overlap from the  proximal circumflex into the left main. We then performed kissing  balloon angioplasty resulting in 0% residual stenosis and excellent  flow. 2.  Successful removal of the Impella device from the left femoral  artery access site with balloon tamponade to control hemostasis. We  also removed the pulmonary artery catheter that was present. Echo 8/27/2021:  Overall left ventricular systolic function appears severely reduced with global hypokinesis and akinesis of the jeramy and inferoseptum. Ejection fraction is visually estimated to be 30%. Normal right ventricular size and function. There are no valvular structural abnormalities appreciated. We will discharge Kimo Rincon to home today. She will be on DAPT with aspirin and Brilinta. For her cardiomyopathy she will be on Toprol XL and low dose lisinopril 2.5mg daily. She is on aldactone 25mg daily as well. Rosuvastatin 40mg daily for her CAD. She will need 3 months of GDMT before a repeat echo in evaluation for an AICD. We will see her in the office in follow-up in 1 week. She will be referred to cardiac rehab Phase 2 as an outpatient. Total time of discharge >30 minutes. The patient was in good condition and stable at discharge. Discharge Instructions:   Medications    Activity Limitations:  No heavy lifting. Diet:  low sodium    Follow Up Instructions:   To office in: in 1 week  Instructed Re: Medications    Discharge Medications:   Joycelyn Denney   Home Medication Instructions VSB:[id number]    Printed on:08/30/21 1150   Medication Information                      aspirin 81 MG chewable tablet  Take 1 tablet by mouth daily             aspirin EC 81 MG EC tablet  Take 81 mg by mouth daily             conjugated estrogens (PREMARIN) 0.625 MG/GM vaginal cream  Place vaginally three times a week             cycloSPORINE (RESTASIS) 0.05 % ophthalmic emulsion  Place 2 drops into the left eye 2 times daily             desvenlafaxine succinate (PRISTIQ) 50 MG TB24 extended release tablet  Take 25 mg by mouth daily             digoxin (LANOXIN) 125 MCG tablet  Take 1 tablet by mouth daily             famotidine (PEPCID) 40 MG tablet  Take 1 tablet by mouth nightly             fexofenadine (ALLEGRA) 30 MG tablet  Take 30 mg by mouth 2 times daily             fluticasone (FLONASE) 50 MCG/ACT nasal spray  1 spray by Nasal route nightly as needed             glipiZIDE (GLUCOTROL) 5 MG tablet  Take 5 mg by mouth once daily             levothyroxine (SYNTHROID) 100 MCG tablet  Take 100 mcg by mouth Daily             lisinopril (PRINIVIL;ZESTRIL) 2.5 MG tablet  Take 2.5 mg by mouth daily             loteprednol (ALREX) 0.2 % SUSP  1 drop 2 times daily To eyes             metoprolol succinate (TOPROL XL) 25 MG extended release tablet  Take 0.5 tablets by mouth daily             Multiple Vitamins-Minerals (THERAPEUTIC MULTIVITAMIN-MINERALS) tablet  Take 1 tablet by mouth daily             nortriptyline (PAMELOR) 25 MG capsule  Take 1 capsule by mouth nightly             polycarbophil (FIBERCON) 625 MG tablet  Take 625 mg by mouth daily             polyethyl glycol-propyl glycol 0.4-0.3 % (SYSTANE) 0.4-0.3 % ophthalmic solution  1 drop as needed for Dry Eyes (6 drops daily)             polyethylene glycol (GLYCOLAX) packet  Take 17 g by mouth daily as needed for Constipation             promethazine (PHENERGAN) 12.5 MG tablet  Take 12.5 mg by mouth every 6 hours as needed for Nausea             rosuvastatin (CRESTOR) 40 MG tablet  Take 1 tablet by mouth nightly             solifenacin (VESICARE) 5 MG tablet  Take 10 mg by mouth daily As needed             spironolactone (ALDACTONE) 25 MG tablet  Take 1 tablet by mouth daily             ticagrelor (BRILINTA) 90 MG TABS tablet  Take 1 tablet by mouth 2 times daily             vitamin B-12 (CYANOCOBALAMIN) 100 MCG tablet  Take 50 mcg by mouth daily             vitamin D3 (CHOLECALCIFEROL) 25 MCG (1000 UT) TABS tablet  Take 1 tablet by mouth daily                                         Discharge Summary as above.     Attending Physician:   Colby Marshall MD, MD, 8/30/2021, 11:50 AM

## 2021-09-03 NOTE — TELEPHONE ENCOUNTER
Called and spoke to patients  and they are looking for home care therapy. Called and spoke to social work, Leighton Abdullahi. Was told that new orders would need to be placed. Orders placed and faxed. Called and let Eduardo Daley know that the referral has been faxed and to call with any questions or concerns.

## 2021-09-03 NOTE — TELEPHONE ENCOUNTER
called in, Vani Aldrich was released from hospital on Monday 9/1/21 and needs a referral for Cardiac Therapy, did not see any referrals listed & they have not received a phone call.     Mary Mora can be reached at [phone]

## 2021-09-08 NOTE — PROGRESS NOTES
The [address] [place] [address], [address] [place] [address] [address] [address]  [phone]    PrimaryCare Doctor:  Terry Kasper MD  Primary Cardiologist: Dr. Don Lopez    Chief Complaint   Patient presents with    Follow-up     no cc         History of Present Illness:  Marcelino Avery is a 77 y.o. female with PMH CAD s/p PCI to CX 2011, IBS, PE 2011, CVA, hyperthyroid, HTN, HLP, graves dz, CKD3, anemia. Patient was admitted to [facility] 8/18-8/2/2021 with acute anterior MI and cardiogenic and liver shock. Taken to cath lab and had a subtotal occlusion of her ostial LAD and a 99% Circumflex artery. Dr. Don Lopez placed an Impella and performed PTCA to the ostia of the LAD and Circ restoring FLACO 3 flow. Developed JESSICA on CKD (Neph following) and resp failure. Originally planned for CABG but had increased risk with thrombocytopenia and anemia. Opted for repeat PCI on 8/24>see below. Started on Dig for tachycardia. Hem/Onc> thrombocytopenia thought to be from impella- improved when removed. Patient presents to [facility] cardiology for follow up for CAD. Anxious to be active again. She is getting PT/OT and RN at home [facility]  She is using a walker to ambulate from room to room. Gets fatigued easily. Denies CP, SOB, LH, dizziness, orthopnea, syncope, palpitations. Has a lot of questions about heart healthy diet. Brilinta cost her over $400 when filled 30 day. Her  help her with her medications. She is compliant. Reviewed 2gm Na diet and 64 fl oz restriction. She is compliant but has a lot of questions. Basic information given but she agrees to be seen by [facility]. Review of Systems:   General: Denies fever, chills  Skin: Denies skin changes, rash, itching, lesions.   HEENT: Denies headache, dizziness, vision changes, nosebleeds, sore throat, nasal drainage  RESP: Denies cough, sputum, dyspnea, wheeze, snoring  CARD: Denies palpitations,  murmur  GI:Denies nausea, vomiting, heartburn, loss of appetite, change in bowels  : Denies frequency, pain, incontinence, polyuria  VASC: Denies claudication, leg cramps, clots  MUSC/SKEL: Denies pain, stiffness, arthritis  PSYCH: Denies anxiety, depression, stress  NEURO: Denies numbness, tingling, weakness,change in mood or memory  HEME: Denies abn bruising, bleeding, anemia  ENDO: Denies intolerance to heat, cold, excessive thirst or hunger, hx thyroid disease    /64   Pulse 86   Ht 5' (1.524 m)   Wt 132 lb 12.8 oz (60.2 kg)   SpO2 100%   BMI 25.94 kg/m²   Wt Readings from Last 3 Encounters:   09/08/21 132 lb 12.8 oz (60.2 kg)   08/30/21 141 lb 1.5 oz (64 kg)   01/13/21 150 lb (68 kg)       Physical Exam:  GEN: Appears frail, no acute distress  SKIN: Pink, warm, dry. Nails without clubbing. HEENT: PERRLA. Normocephalic, atraumatic. Neck supple. No adenopathy. LUNG: AP diameter normal. Clear bilateral. No wheeze, rales, or ronchi. Respiratory effort normal.  HEART: S1S2 A/R. No JVD. No carotid bruit. No murmur, rub or gallop. ABD: Soft, nontender. +BS X 4 quads. No hepatomegaly. EXT: Radial and pedal pulses 2+ and symmetric. Without varicosities. No edema. MUSCSKEL: Good ROM X4 extremities. No deformity. NEURO: A/O X3. Calm and cooperative. Past Medical History:   has a past medical history of Anemia, Anxiety, Asthma, CAD (coronary artery disease), Cerebral artery occlusion with cerebral infarction ([address] Depression, Graves disease, Hiatal hernia, Hx of blood clots, Hyperlipidemia, Hypertension, IBS (irritable bowel syndrome), Kidney disease, chronic, stage III (moderate, EGFR 30-59 ml/min) ([address] MI (myocardial infarction) ([address] Pneumonia, Prolonged emergence from general anesthesia, Sleep apnea, Thyroid disease, Urinary incontinence, UTI (urinary tract infection), Vocal cord dysfunction, and Weight loss. Surgical History:   has a past surgical history that includes Hand surgery (Left);  Arm Surgery (Left); eye surgery; Colonoscopy; Cholecystectomy; Wrist fracture surgery (Right, 10/02/2020); Gastric fundoplication (8792); and Wrist Closed Reduction (Left, 05/06/2003). Social History:   reports that she has never smoked. She has never used smokeless tobacco. She reports that she does not drink alcohol and does not use drugs. Family History:   Family History   Problem Relation Age of Onset    Stroke Mother 61    Coronary Art Dis Father     Stroke Father     Lung Cancer Father     Heart Disease Father     Stroke Sister     Hypothyroidism Sister     Breast Cancer Sister     Heart Attack Paternal Grandmother         middle aged   Sandie Me Heart Attack Sister         in her 62s       HomeMedications:  Prior to Admission medications    Medication Sig Start Date End Date Taking?  Authorizing Provider   lisinopril (PRINIVIL;ZESTRIL) 2.5 MG tablet Take 1 tablet by mouth daily 8/31/21  Yes Patricia Lee MD   rosuvastatin (CRESTOR) 40 MG tablet Take 1 tablet by mouth nightly 8/30/21  Yes Patricia Lee MD   metoprolol succinate (TOPROL XL) 25 MG extended release tablet Take 0.5 tablets by mouth daily 8/31/21  Yes Patricia Lee MD   digoxin [facility]) 125 MCG tablet Take 1 tablet by mouth daily 8/31/21  Yes Patricia Lee MD   [facility]) 90 MG TABS tablet Take 1 tablet by mouth 2 times daily 8/30/21  Yes Patricia Lee MD   levothyroxine (SYNTHROID) 100 MCG tablet Take 100 mcg by mouth Daily   Yes Historical Provider, MD   glipiZIDE (GLUCOTROL) 5 MG tablet Take 5 mg by mouth once daily   Yes Historical Provider, MD   aspirin EC 81 MG EC tablet Take 81 mg by mouth daily   Yes Historical Provider, MD   famotidine (PEPCID) 40 MG tablet Take 1 tablet by mouth nightly 2/24/20  Yes Historical Provider, MD   vitamin D3 (CHOLECALCIFEROL) 25 MCG (1000 UT) TABS tablet Take 1 tablet by mouth daily   Yes Historical Provider, MD   fluticasone (FLONASE) 50 MCG/ACT nasal spray 1 spray by Nasal route nightly as needed   Yes Historical Provider, MD   nortriptyline (PAMELOR) 25 MG capsule Take 1 capsule by mouth nightly 9/17/20  Yes Historical Provider, MD   fexofenadine (ALLEGRA) 30 MG tablet Take 30 mg by mouth 2 times daily prn   Yes Historical Provider, MD   promethazine (PHENERGAN) 12.5 MG tablet Take 12.5 mg by mouth every 6 hours as needed for Nausea   Yes Historical Provider, MD   polyethyl glycol-propyl glycol 0.4-0.3 % (SYSTANE) 0.4-0.3 % ophthalmic solution 1 drop as needed for Dry Eyes (6 drops daily)   Yes Historical Provider, MD   cycloSPORINE (RESTASIS) 0.05 % ophthalmic emulsion Place 2 drops into the left eye 2 times daily prn   Yes Historical Provider, MD   solifenacin (VESICARE) 5 MG tablet Take 10 mg by mouth daily As needed   Yes Historical Provider, MD   Multiple Vitamins-Minerals (THERAPEUTIC MULTIVITAMIN-MINERALS) tablet Take 1 tablet by mouth daily   Yes Historical Provider, MD   vitamin B-12 (CYANOCOBALAMIN) 100 MCG tablet Take 50 mcg by mouth daily   Yes Historical Provider, MD   polyethylene glycol (GLYCOLAX) packet Take 17 g by mouth daily as needed for Constipation   Yes Historical Provider, MD   aspirin 81 MG chewable tablet Take 1 tablet by mouth daily  Patient not taking: Reported on 9/8/2021 8/31/21   Tessa Burroughs MD   desvenlafaxine succinate (PRISTIQ) 50 MG TB24 extended release tablet Take 25 mg by mouth daily    Historical Provider, MD        Allergies:  Augmentin [amoxicillin-pot clavulanate], Bactrim [sulfamethoxazole-trimethoprim], Carafate [sucralfate], Clindamycin/lincomycin, Hyoscyamine, Macrobid [nitrofurantoin], Oxybutynin, Pcn [penicillins], Pravastatin, Prevacid [lansoprazole], Prilosec [omeprazole], Sulfamethoxazole, and Trazodone and nefazodone       LABS: Results reviewed with patient today.     CBC:   Lab Results   Component Value Date    WBC 9.6 08/30/2021    WBC 10.7 08/29/2021    WBC 10.5 08/28/2021    RBC 2.94 08/30/2021    RBC 2.85 08/29/2021 8/27/2021:  Overall left ventricular systolic function appears severely reduced with global hypokinesis and akinesis of the jeramy and inferoseptum. Ejection fraction is visually estimated to be 30%. Normal right ventricular size and function. There are no valvular structural abnormalities appreciated. Cath/PCI 8/24/21:  1.  95% ostial to proximal left anterior descending artery stenosis. There is 90% ostial circumflex disease.  These were heavily calcified  vessels.  They extended into the left main to the disease.  This  underwent rotational atherectomy with a 1.5-mm Rotablator jung followed  by Shockwave balloon angioplasty with a 3-mm balloon. [place] then  underwent bifurcation stenting with a 3 mm stent in the LAD overlapped  by 3.5 mm [place] drug-eluting stent from the LAD into the left main  coronary artery.  We then did the T-stenting with some overlap from the  proximal circumflex into the left main.  We then performed kissing  balloon angioplasty resulting in 0% residual stenosis and excellent  flow. 2.  Successful removal of the Impella device from the left femoral  artery access site with balloon tamponade to control hemostasis.  We  also removed the pulmonary artery catheter that was present. Assessment/Plan:      1.) CAD s/p PCI to CX 2011, [facility] with ZAN/atherectomy to LAD, ostial CX and LM: No evidence of angina/ischemia. Continue GDMT. Check BMP,BNP before titrating  DAPT: brilinta, asa  Beta Blocker: Toprol XL  ACEi/ARB: lisinopril  Anti anginal:   Lipid management/high intensity statin: crestor  Risk factor management: high blood pressure, high cholesterol, Diabetes, smoking, obesity, family hx  Lifestyle modification: Heart healthy diet, regular exercise, weight loss, smoking cessation, stress reduction  Cardiac Rehab: Phase 2 after home PT/OT    2.) Acute systolic heart failure: EF 30%. Appears euvolemic without edema, SOB, wt gain, orthopnea.   NYHA Class III   Stage C  Diuretic: none  Beta Blocker: Toprol XL  ACEi/ARB/ARNI: lisinopril  Aldosterone Antagonist: not taking  SGLT2 Inhibitor: none  2gm Na diet, daily weight, 64 oz fluid restriction  Avoid NSAIDS and other nephrotoxic meds  Cardiac Rehab: after PT/OT  ICD: after 3 mos GDMT    3.) JESSICA on CKD: Will check renal fx     Instructions:   1. Medications: Continue current meds  2. Labs: BNP,BMP, Mag  3. Follow up: 1 month  4. Daily weight: Call for increase 3 lbs/day or 5 lbs/week. 5. 2 gm sodium diet:  6. Fluid Restriction: 64 oz.   7.Referred to [employer] for Education:  Yes    I appreciate the opportunity of cooperating in the care of this individual.    NEVA Castillo, APRN - CNP, CNP, 9/8/2021,3:19 PM

## 2021-09-09 NOTE — TELEPHONE ENCOUNTER
Jewels with [facility] [phone] called in a critical lab value.     CO2   14

## 2021-09-13 PROBLEM — R41.82 AMS (ALTERED MENTAL STATUS): Status: ACTIVE | Noted: 2021-01-01

## 2021-09-13 PROBLEM — N17.9 AKI (ACUTE KIDNEY INJURY) (HCC): Status: ACTIVE | Noted: 2021-01-01

## 2021-09-13 PROBLEM — I25.10 CAD (CORONARY ARTERY DISEASE): Status: ACTIVE | Noted: 2021-01-01

## 2021-09-13 PROBLEM — I95.9 HYPOTENSION: Status: ACTIVE | Noted: 2021-01-01

## 2021-09-13 PROBLEM — I50.9 CHF (CONGESTIVE HEART FAILURE) (HCC): Status: ACTIVE | Noted: 2021-01-01

## 2021-09-13 PROBLEM — E11.9 DM2 (DIABETES MELLITUS, TYPE 2) (HCC): Status: ACTIVE | Noted: 2021-01-01

## 2021-09-13 PROBLEM — E87.1 HYPONATREMIA: Status: ACTIVE | Noted: 2021-01-01

## 2021-09-13 PROBLEM — E16.2 HYPOGLYCEMIA: Status: ACTIVE | Noted: 2021-01-01

## 2021-09-13 PROBLEM — E89.0 POSTABLATIVE HYPOTHYROIDISM: Status: ACTIVE | Noted: 2021-01-01

## 2021-09-13 NOTE — ACP (ADVANCE CARE PLANNING)
Advance Care Planning     Advance Care Planning Activator (Inpatient)  Conversation Note      Date of ACP Conversation: 9/13/2021     Conversation Conducted with: Patient with Decision Making Capacity    ACP Activator: Lyman Clifford, [address] [facility] Decision Maker:     Current Designated Health Care Decision Maker:     Primary Decision Maker: Priscilla  [phone]    Today we documented Decision Maker(s) consistent with Legal Next of Kin hierarchy. Care Preferences    Ventilation: \"If you were in your present state of health and suddenly became very ill and were unable to breathe on your own, what would your preference be about the use of a ventilator (breathing machine) if it were available to you? \"      Would the patient desire the use of ventilator (breathing machine)?: yes    \"If your health worsens and it becomes clear that your chance of recovery is unlikely, what would your preference be about the use of a ventilator (breathing machine) if it were available to you? \"     Would the patient desire the use of ventilator (breathing machine)?: Yes      Resuscitation  \"CPR works best to restart the heart when there is a sudden event, like a heart attack, in someone who is otherwise healthy. Unfortunately, CPR does not typically restart the heart for people who have serious health conditions or who are very sick. \"    \"In the event your heart stopped as a result of an underlying serious health condition, would you want attempts to be made to restart your heart (answer \"yes\" for attempt to resuscitate) or would you prefer a natural death (answer \"no\" for do not attempt to resuscitate)? \" yes       [x] Yes   [] No   Educated Patient / Jo Jones regarding differences between Advance Directives and portable DNR orders.     Length of ACP Conversation in minutes:      Conversation Outcomes:  [x] ACP discussion completed  [] Existing advance directive reviewed with patient; no changes to patient's previously recorded wishes  [] New Advance Directive completed  [] Portable Do Not Rescitate prepared for Provider review and signature  [] POLST/POST/MOLST/MOST prepared for Provider review and signature      Follow-up plan:    [] Schedule follow-up conversation to continue planning  [] Referred individual to Provider for additional questions/concerns   [] Advised patient/agent/surrogate to review completed ACP document and update if needed with changes in condition, patient preferences or care setting    [x] This note routed to one or more involved healthcare providers

## 2021-09-13 NOTE — ED NOTES
Report given to Stephanie Escalera, [facility], [facility]. Denies questions at this time.      Aura Todd RN  09/13/21 3343

## 2021-09-13 NOTE — H&P
Hospital Medicine History & Physical      PCP: Trina Arnold MD    Date of Admission: 9/12/2021    Date of Service: Pt seen/examined on 9/13/2021 and Admitted to Inpatient     Chief Complaint: Altered mental status, increasing fatigue, decreased p.o. intake, glycemia      History Of Present Illness: The patient is a 77 y.o. female the past with history as below and most recently had a STEMI and was admitted for stenting for a number of days about 2 weeks ago and who does have systolic dysfunction with EF in the 35s who presents to [facility] from home after EMS brought patient due to 2 bouts of recurrent hypoglycemia and altered mental status when the  found her very somnolent and about to pass out. Patient's blood glucose was noted to be 50s on presentation both times and has been was worried for her safety and so brought her to the ED. She was still hypoglycemic in the ED here and required IV fluids to keep her sugars up. Patient responded and was overall more alert and oriented upon interview.  notes the patient was taking multiple medications for heart recently including diuretics but was not eating or drinking very well after she was discharged from the hospital 2 weeks ago. She did follow-up with cardiology recently and she was just started on Lasix as well as some other medications for new onset heart failure after her STEMI.  notes that her appetite is not very good and notably patient is frustrated because she was not advised on proper diet to take in after her heart attack.   Other than the symptoms as noted per the , patient has had persistent fatigue but otherwise no other symptoms of fever, chills, dizziness, abdominal pain/nausea/vomiting/diarrhea, blood in urine/stool/sputum, leg swelling, chest pain, shortness of breath. Of note, patient does take glipizide and she was most recently started on this a few weeks ago by her primary care physician. Past Medical History:        Diagnosis Date    Anemia     Anxiety     Asthma     CAD (coronary artery disease)     s/p thrombectomy and ZAN to Circ 2011    Cerebral artery occlusion with cerebral infarction ([facility]     mini strokes, told by PCP, pt doesn't recall any symptoms. many years ago.  Depression     Graves disease     ablation 4/16/21    Hiatal hernia     Hx of blood clots 2011    lungs after heart attack    Hyperlipidemia     Hypertension     IBS (irritable bowel syndrome)     w constipation and diarrhea. no bleeding    Kidney disease, chronic, stage III (moderate, EGFR 30-59 ml/min) ([facility]     MI (myocardial infarction) ([facility] 08/18/2021    STEMI. PCI LAD, LCx    Pneumonia     aspiration pneumonitis 2007; 2019 bronchitis, outpt abx    Prolonged emergence from general anesthesia     Sleep apnea     does not need to use cpap per MD due to weight loss    Thyroid disease     hyper thyroidism    Urinary incontinence     UTI (urinary tract infection) 04/26/2021    Vocal cord dysfunction     Weight loss     lost 50 lbs. intentional. working out, intake reduction       Past Surgical History:        Procedure Laterality Date    ARM SURGERY Left     L shoulder replacement    CHOLECYSTECTOMY      COLONOSCOPY      EYE SURGERY      contaract    GASTRIC FUNDOPLICATION  8165    hiatal hernia repair    HAND SURGERY Left     WRIST CLOSED REDUCTION Left 05/06/2003    left wrist fracture    WRIST FRACTURE SURGERY Right 10/02/2020    OPEN REDUCTION INTERNAL FIXATION RIGHT WRIST performed by Magy Castaneda MD at [place] [id number]       Medications Prior to Admission:    Prior to Admission medications    Medication Sig Start Date End Date Taking?  Authorizing Provider   furosemide (LASIX) 40 MG tablet Take 1 tablet by mouth daily 9/9/21   ULYSSES Jensen - CNP lisinopril (PRINIVIL;ZESTRIL) 2.5 MG tablet Take 1 tablet by mouth daily 8/31/21   Nadiya Taylor MD   aspirin 81 MG chewable tablet Take 1 tablet by mouth daily  Patient not taking: Reported on 9/8/2021 8/31/21   Nadiya Taylor MD   rosuvastatin (CRESTOR) 40 MG tablet Take 1 tablet by mouth nightly 8/30/21   Nadiya Taylor MD   metoprolol succinate (TOPROL XL) 25 MG extended release tablet Take 0.5 tablets by mouth daily 8/31/21   Nadiya Taylor MD   digoxin [facility]) 125 MCG tablet Take 1 tablet by mouth daily 8/31/21   Nadiya Taylor MD   [facility]) 90 MG TABS tablet Take 1 tablet by mouth 2 times daily 8/30/21   Nadiya Taylor MD   desvenlafaxine succinate (PRISTIQ) 50 MG TB24 extended release tablet Take 25 mg by mouth daily    Historical Provider, MD   levothyroxine (SYNTHROID) 100 MCG tablet Take 100 mcg by mouth Daily    Historical Provider, MD   glipiZIDE (GLUCOTROL) 5 MG tablet Take 5 mg by mouth once daily    Historical Provider, MD   aspirin EC 81 MG EC tablet Take 81 mg by mouth daily    Historical Provider, MD   famotidine (PEPCID) 40 MG tablet Take 1 tablet by mouth nightly 2/24/20   Historical Provider, MD   vitamin D3 (CHOLECALCIFEROL) 25 MCG (1000 UT) TABS tablet Take 1 tablet by mouth daily    Historical Provider, MD   fluticasone (FLONASE) 50 MCG/ACT nasal spray 1 spray by Nasal route nightly as needed    Historical Provider, MD   nortriptyline (PAMELOR) 25 MG capsule Take 1 capsule by mouth nightly 9/17/20   Historical Provider, MD   fexofenadine (ALLEGRA) 30 MG tablet Take 30 mg by mouth 2 times daily prn    Historical Provider, MD   promethazine (PHENERGAN) 12.5 MG tablet Take 12.5 mg by mouth every 6 hours as needed for Nausea    Historical Provider, MD   polyethyl glycol-propyl glycol 0.4-0.3 % (SYSTANE) 0.4-0.3 % ophthalmic solution 1 drop as needed for Dry Eyes (6 drops daily)    Historical Provider, MD   cycloSPORINE (RESTASIS) 0.05 % ophthalmic emulsion Place 2 drops into the left eye 2 times daily prn    Historical Provider, MD   solifenacin (VESICARE) 5 MG tablet Take 10 mg by mouth daily As needed    Historical Provider, MD   Multiple Vitamins-Minerals (THERAPEUTIC MULTIVITAMIN-MINERALS) tablet Take 1 tablet by mouth daily    Historical Provider, MD   vitamin B-12 (CYANOCOBALAMIN) 100 MCG tablet Take 50 mcg by mouth daily    Historical Provider, MD   polyethylene glycol (GLYCOLAX) packet Take 17 g by mouth daily as needed for Constipation    Historical Provider, MD       Allergies:  Augmentin [amoxicillin-pot clavulanate], Bactrim [sulfamethoxazole-trimethoprim], Carafate [sucralfate], Clindamycin/lincomycin, Hyoscyamine, Macrobid [nitrofurantoin], Oxybutynin, Pcn [penicillins], Pravastatin, Prevacid [lansoprazole], Prilosec [omeprazole], Sulfamethoxazole, and Trazodone and nefazodone    Social History:  The patient currently lives home    TOBACCO:   reports that she has never smoked. She has never used smokeless tobacco.  ETOH:   reports no history of alcohol use. Family History:  Reviewed in detail and negative for DM, Early CAD, Cancer, CVA. Positive as follows:        Problem Relation Age of Onset    Stroke Mother 61    Coronary Art Dis Father     Stroke Father     Lung Cancer Father     Heart Disease Father     Stroke Sister     Hypothyroidism Sister     Breast Cancer Sister     Heart Attack Paternal Grandmother         middle aged   [facility] Heart Attack Sister         in her 62s       REVIEW OF SYSTEMS:   as noted in the HPI. All other systems reviewed and negative. PHYSICAL EXAM:    BP (!) 103/42   Pulse 84   Temp 97.5 °F (36.4 °C) (Oral)   Resp 20   Wt 132 lb 11.5 oz (60.2 kg)   SpO2 96%   BMI 25.92 kg/m²     General appearance: fatigued appearing, alert and oriented x4, no acute respiratory stress, conversational  HEENT Normal cephalic, atraumatic without obvious deformity.   Pupils equal, round, and reactive to light. Extra ocular muscles intact. Conjunctivae/corneas clear. Dry mucous membranes  Neck: Supple, no JVD  Lungs: Clear to auscultation, bilaterally without Rales/Wheezes/Rhonchi with good respiratory effort. Heart: Regular rate and rhythm with Normal S1/S2 without murmurs, rubs or gallops, point of maximum impulse non-displaced  Abdomen: Soft, non-tender or non-distended without rigidity or guarding and positive bowel sounds all four quadrants. Extremities: No edema  Skin: No rashes  Neurologic: Alert and oriented X 3, neurovascularly intact with sensory/motor intact upper extremities/lower extremities, bilaterally. Cranial nerves: II-XII intact, grossly non-focal.  Mental status: Alert, oriented, thought content appropriate. Capillary Refill: Acceptable  < 3 seconds  Peripheral Pulses: +3 Easily felt, not easily obliterated with pressure    CT head without contrast: No acute process  CT abdomen and pelvis without contrast:  1. Liquid stool throughout the colon suggest diarrhea.  This could be related   to mild enteritis given fluid distention multiple small bowel loops, some   possibly with associated slight inflammation.  However, the appearance of the   small bowel could also be seen with ileus. 2. Peribronchovascular and less prominent subpleural groundglass and   reticulations in the lung bases could be related to pneumonia, edema, and/or   fibrosis, appearing largely improved since 08/19/2021. CBC   Recent Labs     09/12/21 2343   WBC 11.0   HGB 12.6   HCT 37.4         RENAL  Recent Labs     09/12/21 2343 09/13/21  0315   * 129*   K 4.5 5.2*   CL 92* 98*   CO2 17* 16*   BUN 54* 51*   CREATININE 3.9* 3.7*     LFT'S  Recent Labs     09/12/21  2343   AST 23   ALT 13   BILIDIR <0.2   BILITOT 0.5   ALKPHOS 128     COAG  No results for input(s): INR in the last 72 hours.   CARDIAC ENZYMES  Recent Labs     09/12/21 2343   TROPONINI 0.03*       U/A:    Lab Results   Component Value Date    COLORU YELLOW 09/12/2021    WBCUA  08/19/2021    RBCUA see below 08/19/2021    BACTERIA 3+ 08/19/2021    CLARITYU Clear 09/12/2021    SPECGRAV 1.007 09/12/2021    LEUKOCYTESUR Negative 09/12/2021    BLOODU Negative 09/12/2021    GLUCOSEU Negative 09/12/2021       ABG    Lab Results   Component Value Date    ZOX0HIE 23.7 08/24/2021    BEART -2 08/24/2021    X4FHKCOT 86 08/24/2021    PHART 7.364 08/24/2021    CKC9JWK 41.6 08/24/2021    PO2ART 54.0 08/24/2021    MXT7ZRR 25 08/24/2021           Active Hospital Problems    Diagnosis Date Noted    AMS (altered mental status) [R41.82] 09/13/2021     Priority: High    Hypoglycemia [E16.2] 09/13/2021     Priority: High    JESSICA (acute kidney injury) ([facility] [N17.9] 09/13/2021     Priority: High    Hypotension [I95.9] 09/13/2021     Priority: Medium    Hyponatremia [E87.1] 09/13/2021     Priority: Medium    CAD (coronary artery disease) [I25.10] 09/13/2021    DM2 (diabetes mellitus, type 2) ([facility] [E11.9] 09/13/2021    Postablative hypothyroidism [E89.0] 09/13/2021    CHF (congestive heart failure) ([facility] [I50.9] 09/13/2021         PHYSICIANS CERTIFICATION:    I certify that Jayden Mosquera is expected to be hospitalized for greater than 2 midnights based on the following assessment and plan:      ASSESSMENT/PLAN:  · Altered mental status  · Hypoglycemia  · Hypotension  · JESSICA  · Hyponatremia  · CAD  · Type 2 diabetes  · Post ablative hypothyroidism  · CHF    Plan:  · Patient's hypoglycemia is responding to IV fluids, continue patient on D5 LR solution for a fixed amount of time and reevaluate, avoid fluid overload at this time  · Holding patient's home medications including her diuretics and other medications that could cause worsening kidney injury  · Ordering urine lites and osmolality to evaluate hyponatremia, likely secondary to dehydration but will further evaluate  · Patient initially hypotensive, improving with IV fluids  · Repeating labs, checking a digoxin level to rule out digoxin toxicity  · Holding patient sulfonylurea, likely this is the cause of patient's hypoglycemia in addition to her lack of proper appetite, consider switching to a different anti-hyperglycemic medication  · Continue as many home medications possible but avoid nephrotoxic medications at this time    DVT Prophylaxis: Heparin  Diet: ADULT DIET; Regular; 4 carb choices (60 gm/meal); Low Sodium (2 gm); 1500 ml  Code Status: Full Code  PT/OT Eval Status: Ambulatory    Dispo -pending clinical course       Michael Junior DO    Thank you Wellington Potter MD for the opportunity to be involved in this patient's care. If you have any questions or concerns please feel free to contact me at [phone].

## 2021-09-13 NOTE — ED NOTES
Handoff report given to [facility], RN to assume care. No further questions at this time.       Efra Balderrama RN  09/13/21 2847

## 2021-09-13 NOTE — ED NOTES
Patient ate a few bites of jello. Ordered meal tray for patient.      Quinn Hopkins RN  09/13/21 [id number]

## 2021-09-13 NOTE — PROGRESS NOTES
Medication Reconciliation    List of medications for Ledy Munoz is currently taking is complete. Source of Information:   Epic records  Conversation with patient and spouse at bedside and by phone     Allergies  Allergy list not thoroughly reviewed with patient at this time  Allergies listed in Epic as follows:  Augmentin [amoxicillin-pot clavulanate], Bactrim [sulfamethoxazole-trimethoprim], Carafate [sucralfate], Clindamycin/lincomycin, Hyoscyamine, Macrobid [nitrofurantoin], Oxybutynin, Pcn [penicillins], Pravastatin, Prevacid [lansoprazole], Prilosec [omeprazole], Sulfamethoxazole, and Trazodone and nefazodone       Notes Regarding Home Medications:   Patient received all of their home medications prior to arrival to the emergency department  Patients  reports med list brought with the patient is up to date - this was the after visit summary  Added flaxseed oil and coq10 which were handwritten on the S       [facility], Pharmacy intern  9/13/2021 10:46 AM

## 2021-09-13 NOTE — ED PROVIDER NOTES
[address] [facility]      Pt Name: Jessika Zuniga  MRN: [id number]  [place] 1945  Date of evaluation: 9/12/2021  Provider: Arlen Julien MD    CHIEF COMPLAINT       Chief Complaint   Patient presents with    Hypoglycemia     patient to ED via EMS from  home. Per EMS they have been called to patient home twice today for hypoglycemia. Pt glucose in 60's and given oral glucose. pt. c/o nausea and states that she is unable to eat/drink. pt alert and oriented upon arrival.     Emesis     Nausea, emesis, hypoglycemia. HISTORY OF PRESENT ILLNESS   (Location/Symptom, Timing/Onset,Context/Setting, Quality, Duration, Modifying Factors, Severity)  Note limiting factors. Jessika Zuniga is a 77 y.o. female who presents to the emergency department complaining of nausea and vomiting for the past day, had hypoglycemia to the 60s prior to arrival.  Patient takes glipizide. EMS gave oral glucose in route. Patient states nausea and emesis described as nonbloody nonbilious. She states that it is severe to the point that she is unable to eat or drink. Denies chest pain, shortness of breath. Has had recent loose stools. Denies fevers. Symptoms not otherwise alleviated or exacerbated by other factors. NursingNotes were reviewed. REVIEW OF SYSTEMS    (2-9 systems for level 4, 10 or more for level 5)       Constitutional: No fever or chills. Eye: No visual disturbances. No eye pain. Ear/Nose/Mouth/Throat: No nasal congestion. No sore throat. Respiratory: No cough, No shortness of breath, No sputum production. Cardiovascular: No chest pain. No palpitations. Gastrointestinal: No abdominal pain. + nausea or vomiting + loose stools  Genitourinary: No dysuria. No hematuria. Hematology/Lymphatics: No bleeding or bruising tendency. Immunologic: No malaise. No swollen glands. Musculoskeletal: No back pain. No joint pain. Integumentary: No rash.  No abrasions. Neurologic: No headache. No focal numbness or weakness. PAST MEDICAL HISTORY     Past Medical History:   Diagnosis Date    Anemia     Anxiety     Asthma     CAD (coronary artery disease)     s/p thrombectomy and ZAN to Circ 2011    Cerebral artery occlusion with cerebral infarction ([facility] [address]     mini strokes, told by PCP, pt doesn't recall any symptoms. many years ago.  Depression     Graves disease     ablation 4/16/21    Hiatal hernia     Hx of blood clots 2011    lungs after heart attack    Hyperlipidemia     Hypertension     IBS (irritable bowel syndrome)     w constipation and diarrhea. no bleeding    Kidney disease, chronic, stage III (moderate, EGFR 30-59 ml/min) ([facility]     MI (myocardial infarction) ([facility] [address] 08/18/2021    STEMI. PCI LAD, LCx    Pneumonia     aspiration pneumonitis 2007; 2019 bronchitis, outpt abx    Prolonged emergence from general anesthesia     Sleep apnea     does not need to use cpap per MD due to weight loss    Thyroid disease     hyper thyroidism    Urinary incontinence     UTI (urinary tract infection) 04/26/2021    Vocal cord dysfunction     Weight loss     lost 50 lbs.  intentional. working out, intake reduction         SURGICALHISTORY       Past Surgical History:   Procedure Laterality Date    ARM SURGERY Left     L shoulder replacement    CHOLECYSTECTOMY      COLONOSCOPY      EYE SURGERY      contaract    GASTRIC FUNDOPLICATION  7645    hiatal hernia repair    HAND SURGERY Left     WRIST CLOSED REDUCTION Left 05/06/2003    left wrist fracture    WRIST FRACTURE SURGERY Right 10/02/2020    OPEN REDUCTION INTERNAL FIXATION RIGHT WRIST performed by Alyssa Bertrand MD at hospitals       Previous Medications    ASPIRIN 81 MG CHEWABLE TABLET    Take 1 tablet by mouth daily    ASPIRIN EC 81 MG EC TABLET    Take 81 mg by mouth daily    CYCLOSPORINE (RESTASIS) 0.05 % OPHTHALMIC EMULSION    Place 2 drops into the left eye 2 times daily prn    DESVENLAFAXINE SUCCINATE (PRISTIQ) 50 MG TB24 EXTENDED RELEASE TABLET    Take 25 mg by mouth daily    DIGOXIN (LANOXIN) 125 MCG TABLET    Take 1 tablet by mouth daily    FAMOTIDINE (PEPCID) 40 MG TABLET    Take 1 tablet by mouth nightly    FEXOFENADINE (ALLEGRA) 30 MG TABLET    Take 30 mg by mouth 2 times daily prn    FLUTICASONE (FLONASE) 50 MCG/ACT NASAL SPRAY    1 spray by Nasal route nightly as needed    FUROSEMIDE (LASIX) 40 MG TABLET    Take 1 tablet by mouth daily    GLIPIZIDE (GLUCOTROL) 5 MG TABLET    Take 5 mg by mouth once daily    LEVOTHYROXINE (SYNTHROID) 100 MCG TABLET    Take 100 mcg by mouth Daily    LISINOPRIL (PRINIVIL;ZESTRIL) 2.5 MG TABLET    Take 1 tablet by mouth daily    METOPROLOL SUCCINATE (TOPROL XL) 25 MG EXTENDED RELEASE TABLET    Take 0.5 tablets by mouth daily    MULTIPLE VITAMINS-MINERALS (THERAPEUTIC MULTIVITAMIN-MINERALS) TABLET    Take 1 tablet by mouth daily    NORTRIPTYLINE (PAMELOR) 25 MG CAPSULE    Take 1 capsule by mouth nightly    POLYETHYL GLYCOL-PROPYL GLYCOL 0.4-0.3 % (SYSTANE) 0.4-0.3 % OPHTHALMIC SOLUTION    1 drop as needed for Dry Eyes (6 drops daily)    POLYETHYLENE GLYCOL (GLYCOLAX) PACKET    Take 17 g by mouth daily as needed for Constipation    PROMETHAZINE (PHENERGAN) 12.5 MG TABLET    Take 12.5 mg by mouth every 6 hours as needed for Nausea    ROSUVASTATIN (CRESTOR) 40 MG TABLET    Take 1 tablet by mouth nightly    SOLIFENACIN (VESICARE) 5 MG TABLET    Take 10 mg by mouth daily As needed    TICAGRELOR (BRILINTA) 90 MG TABS TABLET    Take 1 tablet by mouth 2 times daily    VITAMIN B-12 (CYANOCOBALAMIN) 100 MCG TABLET    Take 50 mcg by mouth daily    VITAMIN D3 (CHOLECALCIFEROL) 25 MCG (1000 UT) TABS TABLET    Take 1 tablet by mouth daily       ALLERGIES     Augmentin [amoxicillin-pot clavulanate], Bactrim [sulfamethoxazole-trimethoprim], Carafate [sucralfate], Clindamycin/lincomycin, Hyoscyamine, Macrobid [nitrofurantoin], Oxybutynin, Pcn [penicillins], Pravastatin, Prevacid [lansoprazole], Prilosec [omeprazole], Sulfamethoxazole, and Trazodone and nefazodone    FAMILY HISTORY       Family History   Problem Relation Age of Onset   [facility] Stroke Mother 61    Coronary Art Dis Father     Stroke Father     Lung Cancer Father     Heart Disease Father     Stroke Sister     Hypothyroidism Sister     Breast Cancer Sister     Heart Attack Paternal [de-identified]         middle aged   [facility] Heart Attack Sister         in her 62s          SOCIAL HISTORY       Social History     Socioeconomic History    Marital status:      Spouse name: None    Number of children: None    Years of education: None    Highest education level: None   Occupational History    None   Tobacco Use    Smoking status: Never Smoker    Smokeless tobacco: Never Used   Vaping Use    Vaping Use: Never used   Substance and Sexual Activity    Alcohol use: No     Comment: rare glass of wine    Drug use: No    Sexual activity: None   Other Topics Concern    None   Social History Narrative    None     Social Determinants of Health     Financial Resource Strain:     Difficulty of Paying Living Expenses:    Food Insecurity:     Worried About Running Out of Food in the Last Year:     Ran Out of Food in the Last Year:    Transportation Needs:     Lack of Transportation (Medical):      Lack of Transportation (Non-Medical):    Physical Activity:     Days of Exercise per Week:     Minutes of Exercise per Session:    Stress:     Feeling of Stress :    Social Connections:     Frequency of Communication with Friends and Family:     Frequency of Social Gatherings with Friends and Family:     Attends Jewish Services:     Active Member of Clubs or Organizations:     Attends Club or Organization Meetings:     Marital Status:    Intimate Partner Violence:     Fear of Current or Ex-Partner:     Emotionally Abused:     Physically Abused:     Sexually Abused:        SCREENINGS Peribronchovascular and less prominent subpleural groundglass and   reticulations in the lung bases could be related to pneumonia, edema, and/or   fibrosis, appearing largely improved since 08/19/2021. CT Head WO Contrast   Final Result   No acute hemorrhage or midline shift. LABS:  Labs Reviewed   CBC WITH AUTO DIFFERENTIAL - Abnormal; Notable for the following components:       Result Value    RBC 3.89 (*)     RDW 19.0 (*)     Neutrophils Absolute 9.6 (*)     Lymphocytes Absolute 0.7 (*)     All other components within normal limits    Narrative:     Performed at:  [facility]  [address] [facility] [employer] [address]   Phone [phone]   BASIC METABOLIC PANEL W/ REFLEX TO MG FOR LOW K - Abnormal; Notable for the following components:    Sodium 128 (*)     Chloride 92 (*)     CO2 17 (*)     Anion Gap 19 (*)     Glucose 39 (*)     BUN 54 (*)     CREATININE 3.9 (*)     GFR Non- 11 (*)     GFR  14 (*)     All other components within normal limits    Narrative:     [employer] tel. [phone],  Chemistry results called to and read back by Jose Cruz Thapa, 09/13/2021  00:29, by [facility]  Performed at:  [facility]  [address] [facility] [employer] [address]   Phone [phone]   HEPATIC FUNCTION PANEL - Abnormal; Notable for the following components:     Total Protein 8.9 (*)     All other components within normal limits    Narrative:     [employer] tel. [phone],  Chemistry results called to and read back by Jose Cruz Thapa, 09/13/2021  00:29, by [facility]  Performed at:  [facility]  [address] [facility] [address]   Phone [phone]   BLOOD GAS, VENOUS - Abnormal; Notable for the following components:    pH, Phuc 7.315 (*)     HCO3, Venous 21 (*)     All other components within normal limits    Narrative:     Performed at:  [facility]) - [facility]  [address] [facility] [employer] [address]   Phone [phone]   TROPONIN - Abnormal; Notable for the following components:    Troponin 0.03 (*)     All other components within normal limits    Narrative:     Performed at:  [facility]  [address] [facility] [employer] [address]   Phone [phone]   POCT GLUCOSE - Abnormal; Notable for the following components:    POC Glucose 48 (*)     All other components within normal limits    Narrative:     Performed at:  [facility]  [address] [facility] [employer] [address]   Phone [phone]   LIPASE    Narrative:     Sarita Baldwinet [phone],  Chemistry results called to and read back by Lauren Kapoor, 09/13/2021  00:29, by [facility]  Performed at:  [facility]  [address] [facility] [employer] [address]   Phone [phone]   LACTIC ACID, PLASMA    Narrative:     Performed at:  [facility]  [address] [facility] [address] [address]   Phone [phone]   URINE RT REFLEX TO CULTURE   POCT GLUCOSE    Narrative:     Performed at:  [facility]  [address] [facility] / [facility] [address] [address]   Phone [phone]       All other labs were within normal range or not returned as of this dictation. EMERGENCY DEPARTMENT COURSE and DIFFERENTIAL DIAGNOSIS/MDM:   Vitals:    Vitals:    09/12/21 2322   BP: (!) 99/44   Pulse: 89   Resp: 18   Temp: 97.5 °F (36.4 °C)   TempSrc: Oral   SpO2: 93%   Weight: 132 lb 11.5 oz (60.2 kg)         Medical decision making:  Nati Carreon  is a 76 yo F who p/w hypoglycemia, fatigue, nausea, emesis, loose stools, likely gastroenteritis given CT demonstrating findings consistent largely with gastroenteritis. Fits with patient's symptoms of loose stools, nausea, vomiting, abdominal pain.   Found to have acute renal failure, given IVFs, on D5LR gtt for hypoglycemia, possibly sulfonylurea related. Has hemodynamic improvment with fluids, recent admission for MI. Medications   iopamidol (ISOVUE-370) 76 % injection 75 mL (75 mLs IntraVENous Not Given 9/13/21 0048)   dextrose 5 % in lactated ringers infusion ( IntraVENous New Bag 9/13/21 0455)   dextrose 50 % solution (50 mLs  Given 9/12/21 2331)   0.9 % sodium chloride bolus (0 mLs IntraVENous Stopped 9/13/21 0210)   lactated ringers bolus (0 mLs IntraVENous Stopped 9/13/21 0454)         FINAL IMPRESSION      1. Acute renal failure, unspecified acute renal failure type ([facility] [address]    2. Gastroenteritis    3. Hypoglycemia    4.  Hyponatremia          DISPOSITION/PLAN   DISPOSITION  Admitted      (Please note that portions of this note were completed with a voice recognition program.Efforts were made to edit the dictations but occasionally words are mis-transcribed.)    Lisette Dye MD (electronically signed)  Attending Emergency Physician        Lisette Dye MD  09/13/21 4618

## 2021-09-13 NOTE — PROGRESS NOTES
Thank you for considering [facility] for inpatient consultation. Noted that the patient was recently seen and followed by [facility] group. Please direct all renal related questions to  Kidney and Hypertension for this patient- [phone]       Rayna Johnson MD  [facility] nephrology  [facility] [place]  [phone]

## 2021-09-13 NOTE — ED NOTES
Pt refuses to be straight cath for urine. I placed a purewick; pt tolerated very well.       Helder Siu, RN  09/13/21 2290

## 2021-09-13 NOTE — PROGRESS NOTES
Patient admitted overnight. Chart reviewed    Patient is 52-year-old female with a past medical history of coronary artery disease, diabetes who was recently discharged on 8/30. Patient was admitted at that time for STEMI and underwent stent to the LAD with Impella. Patient was discharged on lisinopril Aldactone Brilinta. She has been having poor p.o. intake at home. Patient was brought with nausea vomiting and hypoglycemia in the 60s. She was admitted with acute kidney injury with hyponatremia and hyperkalemia    Acute kidney injury on chronic kidney disease stage III  -Patient's baseline creatinine is from 1.2-1.7  -creat on admission 3.9  - ivf   -hold nephrotoxic meds   -Nephrology consulted  -Continue strict SHIVAM  -Patient is on digoxin.   We will get a stat level to rule out digoxin toxicity    Recent STEMI  -Restart Brilinta  -Continue low-dose metoprolol and statin    Hyperkalemia  -Hold Aldactone  -We will give a dose of Lokelma    Hypoglycemia  -Hold glipizide  -IV fluids with dextrose  -Monitor blood sugar    DM  -restart sliding scale insulin when blood sugar improves    Possible gastroenteritis  -Monitor for diarrhea    Estuardo Schmidt MD

## 2021-09-14 NOTE — PLAN OF CARE
Problem: Falls - Risk of:  Goal: Will remain free from falls  Description: Will remain free from falls  Outcome: Ongoing  Goal: Absence of physical injury  Description: Absence of physical injury  Outcome: Ongoing     Problem: Infection:  Goal: Will remain free from infection  Description: Will remain free from infection  Outcome: Ongoing     Problem: Safety:  Goal: Free from accidental physical injury  Description: Free from accidental physical injury  Outcome: Ongoing  Goal: Free from intentional harm  Description: Free from intentional harm  Outcome: Ongoing     Problem: Daily Care:  Goal: Daily care needs are met  Description: Daily care needs are met  Outcome: Ongoing     Problem: Pain:  Goal: Patient's pain/discomfort is manageable  Description: Patient's pain/discomfort is manageable  Outcome: Ongoing     Problem: Skin Integrity:  Goal: Skin integrity will stabilize  Description: Skin integrity will stabilize  Outcome: Ongoing     Problem: Discharge Planning:  Goal: Patients continuum of care needs are met  Description: Patients continuum of care needs are met  Outcome: Ongoing     Problem: OXYGENATION/RESPIRATORY FUNCTION  Goal: Patient will maintain patent airway  Outcome: Ongoing  Goal: Patient will achieve/maintain normal respiratory rate/effort  Description: Respiratory rate and effort will be within normal limits for the patient  Outcome: Ongoing     Problem: HEMODYNAMIC STATUS  Goal: Patient has stable vital signs and fluid balance  Outcome: Ongoing     Problem: FLUID AND ELECTROLYTE IMBALANCE  Goal: Fluid and electrolyte balance are achieved/maintained  Outcome: Ongoing     Problem: ACTIVITY INTOLERANCE/IMPAIRED MOBILITY  Goal: Mobility/activity is maintained at optimum level for patient  Outcome: Ongoing     Problem:  Activity:  Goal: Risk for activity intolerance will decrease  Description: Risk for activity intolerance will decrease  Outcome: Ongoing     Problem: Coping:  Goal: Ability to identify and develop effective coping behavior will improve  Description: Ability to identify and develop effective coping behavior will improve  Outcome: Ongoing     Problem: Fluid Volume:  Goal: Will show no signs or symptoms of fluid imbalance  Description: Will show no signs or symptoms of fluid imbalance  Outcome: Ongoing     Problem: Health Behavior:  Goal: Ability to manage health-related needs will improve  Description: Ability to manage health-related needs will improve  Outcome: Ongoing     Problem: Nutritional:  Goal: Maintenance of adequate nutrition will be supported  Description: Maintenance of adequate nutrition will be supported  Outcome: Ongoing     Problem: Physical Regulation:  Goal: Complications related to the disease process, condition or treatment will be avoided or minimized  Description: Complications related to the disease process, condition or treatment will be avoided or minimized  Outcome: Ongoing     Problem: Urinary Elimination:  Goal: Ability to achieve and maintain adequate urine output will be supported  Description: Ability to achieve and maintain adequate urine output will be supported  Outcome: Ongoing

## 2021-09-14 NOTE — PROGRESS NOTES
percussion. CV: Regular rate. Regular rhythm. No murmur or rub. No edema. GI: Non-tender. Non-distended. No hernia. Skin: Warm, dry, normal texture and turgor. No nodule on exposed extremities. Lymph: No cervical LAD. No supraclavicular LAD. M/S: No cyanosis. No clubbing. No joint deformity. Neuro: Moves all four extremities. CN 2-12 tested, no defect noted. Psych: Oriented x 3. No anxiety. Awake. Data    LABS  CBC:   Recent Labs     09/12/21  2343 09/13/21  1714 09/14/21  0605   WBC 11.0 6.4 5.7   HGB 12.6 10.6* 10.4*   HCT 37.4 30.8* 30.9*   MCV 96.2 94.2 95.3    136 119*     BMP:   Recent Labs     09/13/21  0315 09/13/21  1714 09/14/21  0605   * 135* 136   K 5.2* 4.2 4.2   CL 98* 101 104   CO2 16* 20* 19*   BUN 51* 40* 35*   CREATININE 3.7* 2.9* 2.5*   GLUCOSE 154* 185* 135*     POC GLUCOSE:    Recent Labs     09/13/21  0038 09/13/21  0738 09/13/21  1341 09/13/21  2129 09/14/21  0827   POCGLU 93 161* 185* 137* 131*     LIVER PROFILE:   Recent Labs     09/12/21  2343 09/13/21  1714 09/14/21  0605   AST 23 18 17   ALT 13 11 11   LIPASE 53.0  --   --    LABALBU 3.7 3.0* 2.9*   BILIDIR <0.2  --   --    BILITOT 0.5 0.4 0.5   ALKPHOS 128 119 104     PT/INR: No results for input(s): PROTIME, INR in the last 72 hours. APTT: No results for input(s): APTT in the last 72 hours. UA:  Recent Labs     09/12/21  2343   COLORU YELLOW   PHUR 5.0   CLARITYU Clear   SPECGRAV 1.007   LEUKOCYTESUR Negative   UROBILINOGEN 0.2   BILIRUBINUR Negative   BLOODU Negative   GLUCOSEU Negative   KETUA Negative     Microbiology:  Wound Culture: No results for input(s): WNDABS, ORG in the last 72 hours. Invalid input(s):  LABGRAM  Nasal Culture: No results for input(s): ORG, MRSAPCR in the last 72 hours. Blood Culture: No results for input(s): BC, BLOODCULT2 in the last 72 hours. Fungal Culture:   No results for input(s): FUNGSM in the last 72 hours.   No results for input(s): FUNCXBLD in the last 72 hyperkalemia     Acute kidney injury on chronic kidney disease stage III- improving   -Patient's baseline creatinine is from 1.2-1.7  -creat on admission 3.9  - ivf   -hold nephrotoxic meds   -Nephrology consulted  -Continue strict SHIVAM  -digoxin level ok      Recent STEMI  -Restart Brilinta  -Continue low-dose metoprolol(add holding parameters )and statin     Hyperkalemia-improved  -Hold Aldactone  -We will give a dose of Lokelma     Hypoglycemia-improving  -Hold glipizide  -Monitor blood sugar       DM  -restart sliding scale insulin when blood sugar improves     Possible gastroenteritis-no diarrhea in the hospital  -Monitor for diarrhea      DVT Prophylaxis: Heparin  Diet: ADULT DIET; Regular; 4 carb choices (60 gm/meal); Low Sodium (2 gm); 1500 ml  Code Status: Full Code    PT/OT Eval Status: Ordered    Discharge plan -continue care    The patient and / or the family were informed of the results of any tests, a time was given to answer questions, a plan was proposed and they agreed with plan. Discussed with consulting physicians, nursing and social work     The note was completed using EMR. Every effort was made to ensure accuracy; however, inadvertent computerized transcription errors may be present.        Shay Sung MD

## 2021-09-14 NOTE — CONSULTS
[facility]  Consult Note           Reason for Consult:  JESSICA/CKD   Requesting Physician:  Dr. Jesus Manuel Castellanos      History Obtained From:  patient, electronic medical record    History of Present Ilness:  76 y/o WF well known to us with recent hospitalization in 8/21 with STEMI and cardiogenic shock requiring PCI and Impella  Had JESSICA in the setting but Cr had improved to baseline of 1.3 mg by the time of discharge on 8/30/21 She follows with Dr Reynaldo Eaton and baseline Cr is 1.2-1.4 mg  She was started on Aldactone and Lisinopril prior to discharge Her wt was 141 lbs on discharge Subsequently was started on Lasix as out patient  Presented with AMS and Hypoglycemia  Had not been eating since discharge with nausea  Wt down 10 lbs since discharge 2 weeks ago  Has had issues with dentures also Had been on Glipizide  Noted to have JESSICA with Cr of 3.9 mg Na+ 128 meq on admission  Denies NSAID's  No urinary symptoms        Past Medical History:        Diagnosis Date    Anemia     Anxiety     Asthma     CAD (coronary artery disease)     s/p thrombectomy and ZAN to Circ 2011    Cerebral artery occlusion with cerebral infarction ([address]     mini strokes, told by PCP, pt doesn't recall any symptoms. many years ago.  Depression     Graves disease     ablation 4/16/21    Hiatal hernia     Hx of blood clots 2011    lungs after heart attack    Hyperlipidemia     Hypertension     IBS (irritable bowel syndrome)     w constipation and diarrhea. no bleeding    Kidney disease, chronic, stage III (moderate, EGFR 30-59 ml/min) (HCC)     MI (myocardial infarction) ([address] 08/18/2021    STEMI.  PCI LAD, LCx    Pneumonia     aspiration pneumonitis 2007; 2019 bronchitis, outpt abx    Prolonged emergence from general anesthesia     Sleep apnea     does not need to use cpap per MD due to weight loss    Thyroid disease     hyper thyroidism    Urinary incontinence     UTI (urinary tract infection) 04/26/2021    Vocal nasal spray 1 spray by Nasal route nightly as needed      nortriptyline (PAMELOR) 25 MG capsule Take 1 capsule by mouth nightly      fexofenadine (ALLEGRA) 30 MG tablet Take 30 mg by mouth 2 times daily prn      promethazine (PHENERGAN) 12.5 MG tablet Take 12.5 mg by mouth every 6 hours as needed for Nausea      polyethyl glycol-propyl glycol 0.4-0.3 % (SYSTANE) 0.4-0.3 % ophthalmic solution 1 drop as needed for Dry Eyes (6 drops daily)      cycloSPORINE (RESTASIS) 0.05 % ophthalmic emulsion Place 2 drops into the left eye 2 times daily prn      solifenacin (VESICARE) 5 MG tablet Take 10 mg by mouth daily As needed      Multiple Vitamins-Minerals (THERAPEUTIC MULTIVITAMIN-MINERALS) tablet Take 1 tablet by mouth daily      vitamin B-12 (CYANOCOBALAMIN) 100 MCG tablet Take 50 mcg by mouth daily      polyethylene glycol (GLYCOLAX) packet Take 17 g by mouth daily as needed for Constipation         Allergies:  Augmentin [amoxicillin-pot clavulanate], Bactrim [sulfamethoxazole-trimethoprim], Carafate [sucralfate], Clindamycin/lincomycin, Hyoscyamine, Macrobid [nitrofurantoin], Oxybutynin, Pcn [penicillins], Pravastatin, Prevacid [lansoprazole], Prilosec [omeprazole], Sulfamethoxazole, and Trazodone and nefazodone    Social History:    Social History     Socioeconomic History    Marital status:      Spouse name: Not on file    Number of children: Not on file    Years of education: Not on file    Highest education level: Not on file   Occupational History    Not on file   Tobacco Use    Smoking status: Never Smoker    Smokeless tobacco: Never Used   Vaping Use    Vaping Use: Never used   Substance and Sexual Activity    Alcohol use: No     Comment: rare glass of wine    Drug use: No    Sexual activity: Not on file   Other Topics Concern    Not on file   Social History Narrative    Not on file     Social Determinants of Health     Financial Resource Strain:     Difficulty of Paying Living Expenses:    Food Insecurity:     Worried About Running Out of Food in the Last Year:     [address] in the Last Year:    Transportation Needs:     Lack of Transportation (Medical):  Lack of Transportation (Non-Medical):    Physical Activity:     Days of Exercise per Week:     Minutes of Exercise per Session:    Stress:     Feeling of Stress :    Social Connections:     Frequency of Communication with Friends and Family:     Frequency of Social Gatherings with Friends and Family:     Attends Mandaen Services:     Active Member of Clubs or Organizations:     Attends Club or Organization Meetings:     Marital Status:    Intimate Partner Violence:     Fear of Current or Ex-Partner:     Emotionally Abused:     Physically Abused:     Sexually Abused:        Family History:   Family History   Problem Relation Age of Onset    Stroke Mother 61    Coronary Art Dis Father     Stroke Father     Lung Cancer Father     Heart Disease Father     Stroke Sister     Hypothyroidism Sister     Breast Cancer Sister     Heart Attack Paternal Grandmother         middle aged   [employer] Heart Attack Sister         in her 62s       Review of Systems:   Pertinent positives stated above in HPI. All other systems were reviewed and were negative.     Physical exam:   Constitutional:  VITALS:  /80   Pulse 94   Temp 97.7 °F (36.5 °C) (Axillary)   Resp 16   Ht 5' (1.524 m)   Wt 135 lb 2.3 oz (61.3 kg)   SpO2 94%   BMI 26.39 kg/m²   Gen: alert, awake, nad  Skin: no rash, turgor wnl  Heent:  eomi, mmm  Neck: no bruits or jvd noted, thyroid normal  Cardiovascular:  S1, S2 without m/r/g  Respiratory: CTA B without w/r/r; respiratory effort normal  Abdomen:  +bs, soft, nt, nd, no hepatosplenomegaly  Ext: no lower extremity edema  Psychiatric: mood and affect appropriate; judgement and insight intact  Musculoskeletal:  Rom, muscular strength intact; digits, nails normal    Data/  CBC:   Lab Results   Component

## 2021-09-14 NOTE — PLAN OF CARE
Problem: Falls - Risk of:  Goal: Will remain free from falls  Description: Will remain free from falls  9/14/2021 0954 by Lyndsay Lambert RN  Outcome: Ongoing     Problem: Falls - Risk of:  Goal: Absence of physical injury  Description: Absence of physical injury  9/14/2021 0954 by Lyndsay Lambert RN  Outcome: Ongoing     Problem: Infection:  Goal: Will remain free from infection  Description: Will remain free from infection  9/14/2021 0954 by Lyndsay Lambert RN  Outcome: Ongoing     Problem: Safety:  Goal: Free from accidental physical injury  Description: Free from accidental physical injury  9/14/2021 0954 by Lyndsay Lambert RN  Outcome: Ongoing     Problem: Safety:  Goal: Free from intentional harm  Description: Free from intentional harm  9/14/2021 0954 by Lyndsay Lambert RN  Outcome: Ongoing     Problem: Daily Care:  Goal: Daily care needs are met  Description: Daily care needs are met  9/14/2021 0954 by Lyndsay Lambert RN  Outcome: Ongoing     Problem: Pain:  Goal: Patient's pain/discomfort is manageable  Description: Patient's pain/discomfort is manageable  9/14/2021 0954 by Lyndsay Lambert RN  Outcome: Ongoing     Problem: Skin Integrity:  Goal: Skin integrity will stabilize  Description: Skin integrity will stabilize  9/14/2021 0954 by Lyndsay Lambert RN  Outcome: Ongoing     Problem: Discharge Planning:  Goal: Patients continuum of care needs are met  Description: Patients continuum of care needs are met  9/14/2021 0954 by Lyndsay Lambert RN  Outcome: Ongoing     Problem: OXYGENATION/RESPIRATORY FUNCTION  Goal: Patient will maintain patent airway  9/14/2021 0954 by Lyndsay Lambert RN  Outcome: Ongoing     Problem: OXYGENATION/RESPIRATORY FUNCTION  Goal: Patient will achieve/maintain normal respiratory rate/effort  Description: Respiratory rate and effort will be within normal limits for the patient  9/14/2021 0954 by Lyndsay Lambert RN  Outcome: Ongoing     Problem: HEMODYNAMIC STATUS  Goal: Patient has stable vital signs and fluid balance  9/14/2021 0954 by Tasha Angulo RN  Outcome: Ongoing     Problem: FLUID AND ELECTROLYTE IMBALANCE  Goal: Fluid and electrolyte balance are achieved/maintained  9/14/2021 0954 by Tasha Angulo RN  Outcome: Ongoing     Problem: ACTIVITY INTOLERANCE/IMPAIRED MOBILITY  Goal: Mobility/activity is maintained at optimum level for patient  9/14/2021 0954 by Tasha Angulo RN  Outcome: Ongoing     Problem:  Activity:  Goal: Risk for activity intolerance will decrease  Description: Risk for activity intolerance will decrease  9/14/2021 0954 by Tasha Angulo RN  Outcome: Ongoing     Problem: Coping:  Goal: Ability to identify and develop effective coping behavior will improve  Description: Ability to identify and develop effective coping behavior will improve  9/14/2021 0954 by Tasha Angulo RN  Outcome: Ongoing     Problem: Fluid Volume:  Goal: Will show no signs or symptoms of fluid imbalance  Description: Will show no signs or symptoms of fluid imbalance  9/14/2021 0954 by Tasha Angulo RN  Outcome: Ongoing     Problem: Health Behavior:  Goal: Ability to manage health-related needs will improve  Description: Ability to manage health-related needs will improve  9/14/2021 0954 by Tasha Angulo RN  Outcome: Ongoing     Problem: Nutritional:  Goal: Maintenance of adequate nutrition will be supported  Description: Maintenance of adequate nutrition will be supported  9/14/2021 0954 by Tasha Angulo RN  Outcome: Ongoing     Problem: Physical Regulation:  Goal: Complications related to the disease process, condition or treatment will be avoided or minimized  Description: Complications related to the disease process, condition or treatment will be avoided or minimized  9/14/2021 0954 by Tasha Angulo RN  Outcome: Ongoing     Problem: Urinary Elimination:  Goal: Ability to achieve and maintain adequate urine output will be supported  Description: Ability to achieve and maintain adequate urine output will be supported  9/14/2021 0954 by Ranjan Coleman RN  Outcome: Ongoing

## 2021-09-14 NOTE — PROGRESS NOTES
Pt arrived via stretcher from ED to room 5130 at 2128. Heart monitor connected and verified with [facility]. VS, assessment, and admission complete. 4 eyes assessment complete. Pt oriented to unit and room. Call light and bedside table in reach. All questions answered. Pt resting quietly in bed with no complaints or voiced needs at this time. Will continue to monitor. Blood sugar 137.

## 2021-09-15 NOTE — PLAN OF CARE
Problem: Falls - Risk of:  Goal: Will remain free from falls  Description: Will remain free from falls  9/14/2021 2252 by Lyndon Maria RN  Outcome: Ongoing     Problem: Falls - Risk of:  Goal: Absence of physical injury  Description: Absence of physical injury  9/14/2021 2252 by Lyndon Maria RN  Outcome: Ongoing     Problem: Infection:  Goal: Will remain free from infection  Description: Will remain free from infection  9/14/2021 2252 by Lyndon Maria RN  Outcome: Ongoing     Problem: Safety:  Goal: Free from accidental physical injury  Description: Free from accidental physical injury  9/14/2021 2252 by Lyndon Maria RN  Outcome: Ongoing     Problem: Safety:  Goal: Free from intentional harm  Description: Free from intentional harm  9/14/2021 2252 by Lyndon Maria RN  Outcome: Ongoing     Problem: Daily Care:  Goal: Daily care needs are met  Description: Daily care needs are met  9/14/2021 2252 by Lyndon Maria RN  Outcome: Ongoing     Problem: Pain:  Goal: Patient's pain/discomfort is manageable  Description: Patient's pain/discomfort is manageable  9/14/2021 2252 by Lyndon Maria RN  Outcome: Ongoing     Problem: Skin Integrity:  Goal: Skin integrity will stabilize  Description: Skin integrity will stabilize  9/14/2021 2252 by Lyndon Maria RN  Outcome: Ongoing     Problem: Discharge Planning:  Goal: Patients continuum of care needs are met  Description: Patients continuum of care needs are met  9/14/2021 2252 by Lyndon Maria RN  Outcome: Ongoing     Problem: OXYGENATION/RESPIRATORY FUNCTION  Goal: Patient will maintain patent airway  9/14/2021 2252 by Lyndon Maria RN  Outcome: Ongoing     Problem: OXYGENATION/RESPIRATORY FUNCTION  Goal: Patient will achieve/maintain normal respiratory rate/effort  Description: Respiratory rate and effort will be within normal limits for the patient  9/14/2021 2252 by Lyndon Maria RN  Outcome: Ongoing     Problem: HEMODYNAMIC STATUS  Goal: Patient has stable vital signs and fluid balance  9/14/2021 2252 by Kendall Gamez RN  Outcome: Ongoing     Problem: FLUID AND ELECTROLYTE IMBALANCE  Goal: Fluid and electrolyte balance are achieved/maintained  9/14/2021 2252 by Kendall Gamez RN  Outcome: Ongoing     Problem: ACTIVITY INTOLERANCE/IMPAIRED MOBILITY  Goal: Mobility/activity is maintained at optimum level for patient  9/14/2021 2252 by Kendall Gamez RN  Outcome: Ongoing     Problem:  Activity:  Goal: Risk for activity intolerance will decrease  Description: Risk for activity intolerance will decrease  9/14/2021 2252 by Kendall Gamez RN  Outcome: Ongoing     Problem: Coping:  Goal: Ability to identify and develop effective coping behavior will improve  Description: Ability to identify and develop effective coping behavior will improve  9/14/2021 2252 by Kendall Gamez RN  Outcome: Ongoing     Problem: Fluid Volume:  Goal: Will show no signs or symptoms of fluid imbalance  Description: Will show no signs or symptoms of fluid imbalance  9/14/2021 2252 by Kendall Gamez RN  Outcome: Ongoing     Problem: Health Behavior:  Goal: Ability to manage health-related needs will improve  Description: Ability to manage health-related needs will improve  9/14/2021 2252 by Kendall Gamez RN  Outcome: Ongoing     Problem: Nutritional:  Goal: Maintenance of adequate nutrition will be supported  Description: Maintenance of adequate nutrition will be supported  9/14/2021 2252 by Kendall Gamez RN  Outcome: Ongoing     Problem: Physical Regulation:  Goal: Complications related to the disease process, condition or treatment will be avoided or minimized  Description: Complications related to the disease process, condition or treatment will be avoided or minimized  9/14/2021 2252 by Kendall Gamez RN  Outcome: Ongoing     Problem: Urinary Elimination:  Goal: Ability to achieve and maintain adequate urine output will be supported  Description: Ability to achieve and maintain adequate urine output will be supported  9/14/2021 2898 by Mikhail Barillas, RN  Outcome: Ongoing

## 2021-09-15 NOTE — PROGRESS NOTES
Patient reporting nausea. Refuses antiemetic at this time. Will wait for  to arrive with food to see how she tolerates. Refusing medications until she is able to eat something. Will continue to monitor.

## 2021-09-15 NOTE — PROGRESS NOTES
Physical Therapy    Facility/Department: [address] PROGRESSIVE CARE  Initial Assessment    NAME: Bianca Uriarte  : 1945  MRN: [id number]    Date of Service: 9/15/2021    Discharge Recommendations:  Home with assist PRN, Home with Home health PT, S Level 1, Patient would benefit from continued therapy after discharge   PT Equipment Recommendations  Equipment Needed: No     Bianca  scored a 19/24 on the AM-PAC short mobility form. Current research shows that an AM-PAC score of 18 or greater is typically associated with a discharge to the patient's home setting. Based on the patient's AM-PAC score and their current functional mobility deficits, it is recommended that the patient have 2-3 sessions per week of Physical Therapy at d/c to increase the patient's independence. At this time, this patient demonstrates the endurance and safety to discharge home with home PT and a follow up treatment frequency of 2-3x/wk. Please see assessment section for further patient specific details. If patient discharges prior to next session this note will serve as a discharge summary. Please see below for the latest assessment towards goals. HOME HEALTH CARE: LEVEL 1 STANDARD     -Initial home health evaluation to occur within 24-48 hours, in patient home    -Home health agency to establish plan of care for patient over 60 day period    -Medication Reconciliation    -PCP Visit scheduled within seven days of discharge    -PT/OT to evaluate with goal of regaining prior level of functioning    -OT to evaluate if patient has [address] needs for personal care       Assessment   Body structures, Functions, Activity limitations: Decreased functional mobility ; Decreased endurance  Assessment: Pt is a 76 y/o female who presented to the ED with hypoglycemia and AMS. Pt discharged from the hospital approx 2 weeks ago after STEMI.   Pt lives with family on the main level of the home with 2 JACK and was indep ambulating with a RW PTA. Today, pt required SBA for transfers and short distance ambulation with RW and standing time limited by fatigue. Anticipate that pt will be safe to d/c home with assist PRN from family but recommend resuming home health PT to continue to address mobility and activity tolerance. Will continue to follow. Treatment Diagnosis: functional mobility slightly below baseline, decreased activity tolerance  Prognosis: Good  Decision Making: Medium Complexity  History: Per Keiko Choudhary MD \"The patient is a 77 y.o. female the past with history as below and most recently had a STEMI and was admitted for stenting for a number of days about 2 weeks ago and who does have systolic dysfunction with EF in the 35s who presents to [facility] from home after EMS brought patient due to 2 bouts of recurrent hypoglycemia and altered mental status when the  found her very somnolent and about to pass out. Patient's blood glucose was noted to be 50s on presentation both times and has been was worried for her safety and so brought her to the ED. She was still hypoglycemic in the ED here and required IV fluids to keep her sugars up. Patient responded and was overall more alert and oriented upon interview.  notes the patient was taking multiple medications for heart recently including diuretics but was not eating or drinking very well after she was discharged from the hospital 2 weeks ago. She did follow-up with cardiology recently and she was just started on Lasix as well as some other medications for new onset heart failure after her STEMI. \"  Exam: functional mobility, ROM, strength  Clinical Presentation: evolving  PT Education: Goals;PT Role;Plan of Care;General Safety;Transfer Training  Barriers to Learning: none  REQUIRES PT FOLLOW UP: Yes  Activity Tolerance  Activity Tolerance: Patient Tolerated treatment well;Patient limited by endurance       Patient Diagnosis(es): The primary encounter diagnosis was Acute renal failure, unspecified acute renal failure type ([address] Diagnoses of Gastroenteritis, Hypoglycemia, and Hyponatremia were also pertinent to this visit. has a past medical history of Anemia, Anxiety, Asthma, CAD (coronary artery disease), Cerebral artery occlusion with cerebral infarction ([address] Depression, Graves disease, Hiatal hernia, Hx of blood clots, Hyperlipidemia, Hypertension, IBS (irritable bowel syndrome), Kidney disease, chronic, stage III (moderate, EGFR 30-59 ml/min) ([address] MI (myocardial infarction) ([address] Pneumonia, Prolonged emergence from general anesthesia, Sleep apnea, Thyroid disease, Urinary incontinence, UTI (urinary tract infection), Vocal cord dysfunction, and Weight loss. has a past surgical history that includes Hand surgery (Left); Arm Surgery (Left); eye surgery; Colonoscopy; Cholecystectomy; Wrist fracture surgery (Right, 10/02/2020); Gastric fundoplication (9153); and Wrist Closed Reduction (Left, 05/06/2003). Restrictions  Restrictions/Precautions  Restrictions/Precautions: Fall Risk  Position Activity Restriction  Other position/activity restrictions: IV     Vision/Hearing  Vision: Impaired  Vision Exceptions: Wears glasses for reading  Hearing: Within functional limits       Subjective  General  Chart Reviewed: Yes  Patient assessed for rehabilitation services?: Yes  Additional Pertinent Hx: Guy Ruiz MD \"The patient is a 77 y.o. female the past with history as below and most recently had a STEMI and was admitted for stenting for a number of days about 2 weeks ago and who does have systolic dysfunction with EF in the 35s who presents to [facility] from home after EMS brought patient due to 2 bouts of recurrent hypoglycemia and altered mental status when the  found her very somnolent and about to pass out.   Patient's blood glucose was noted to be 50s on presentation both times and has been was worried for her safety and so brought her to the ED. She was still hypoglycemic in the ED here and required IV fluids to keep her sugars up. Patient responded and was overall more alert and oriented upon interview.  notes the patient was taking multiple medications for heart recently including diuretics but was not eating or drinking very well after she was discharged from the hospital 2 weeks ago. She did follow-up with cardiology recently and she was just started on Lasix as well as some other medications for new onset heart failure after her STEMI. \"  Response To Previous Treatment: Not applicable  Family / Caregiver Present: Yes ()  Referring Practitioner: Awais Alvarado MD  Referral Date : 09/14/21  Diagnosis: hypoglycemia, AMS  Follows Commands: Within Functional Limits  Subjective  Subjective: Pt very pleasant and agreeable to PT eval and treat. Denies pain. In chair upon arrival.  Pain Screening  Patient Currently in Pain: Denies          Orientation  Orientation  Overall Orientation Status: Impaired  Orientation Level: Oriented to place;Oriented to person;Disoriented to situation;Oriented to time     Social/Functional History  Social/Functional History  Lives With: Spouse, Daughter (Daughter, MARU, and family live with pt and )  Type of Home: House  Home Layout: Laundry in basement, Able to Live on Main level with bedroom/bathroom  Home Access: Stairs to enter with rails  Entrance Stairs - Number of Steps: 2  Entrance Stairs - Rails: Left  Bathroom Shower/Tub: Tub/Shower unit  Bathroom Toilet: Handicap height  Bathroom Equipment: Shower chair  Home Equipment: Rolling walker  Receives Help From: Home health (home PT, OT and RN)  ADL Assistance: Needs assistance (indep toileting, assist from  with dressing, pt recently been spongebathing)  Ambulation Assistance: Independent (with RW)  Transfer Assistance: Independent  Active :  Yes  Additional Comments: denies recent hx of falls       Objective          AROM RLE (degrees)  RLE AROM: WFL  AROM LLE (degrees)  LLE AROM : WFL  Strength RLE  Comment: hip flex, knee ext and ankle DF 4+/5  Strength LLE  Comment: hip flex, knee ext and ankle DF 4+/5  Tone RLE  RLE Tone: Normotonic  Tone LLE  LLE Tone: Normotonic  Motor Control  Gross Motor?: WFL  Sensation  Overall Sensation Status: WFL     Bed mobility  Comment: not tested- up in chair at beginning and end of session     Transfers  Sit to Stand: Stand by assistance  Stand to sit: Stand by assistance  Comment: cues for hand placement     Ambulation  Ambulation?: Yes  More Ambulation?: No  Ambulation 1  Surface: level tile  Device: Rolling Walker  Other Apparatus:  (therapist managed IV)  Assistance: Stand by assistance  Quality of Gait: steady with RW support, no LOB  Gait Deviations: Slow Brunilda  Distance: 25'x2  Stairs/Curb  Stairs?: No     Balance  Posture: Fair  Sitting - Static: Good  Sitting - Dynamic: Good  Standing - Static: Fair  Standing - Dynamic: Fair  Comments: pt stood at the sink for 2-3 min at sink with SBA for balance performing ADLs; standing time limited by fatigue        Plan   Plan  Times per week: 3-5x/wk  Current Treatment Recommendations: Functional Mobility Training, Endurance Training, Safety Education & Training, Patient/Caregiver Education & Training  Safety Devices  Type of devices:  All fall risk precautions in place, Call light within reach, Chair alarm in place, Gait belt, Left in chair, Nurse notified (CLYDE Gardner notified)  Restraints  Initially in place: No      AM-PAC Score  AM-PAC Inpatient Mobility Raw Score : 19 (09/15/21 1324)  AM-PAC Inpatient T-Scale Score : 45.44 (09/15/21 1324)  Mobility Inpatient CMS 0-100% Score: 41.77 (09/15/21 1324)  Mobility Inpatient CMS G-Code Modifier : CK (09/15/21 1324)          Goals  Short term goals  Time Frame for Short term goals: upon d/c  Short term goal 1: bed mobility indep  Short term goal 2: transfers mod I  Short term goal 3: ambulate 76' with RW and mod I  Long term goals  Time Frame for Long term goals : LTG=STG  Patient Goals   Patient goals : \"to go home\"       Therapy Time   Individual Concurrent Group Co-treatment   Time In 1300         Time Out 1328         Minutes 28         Timed Code Treatment Minutes: 13 Minutes         Electronically signed by Arielle Huerta, PT [id number] on 9/15/2021 at 1:31 PM

## 2021-09-15 NOTE — CARE COORDINATION
[facility]  Patient is active with [facility], Will follow for discharge to home with orders to resume care.   Marta Bobo LPN  CTN with  [facility],  [address] [place], [address] [place] Cell [phone], Fax [phone]

## 2021-09-15 NOTE — PROGRESS NOTES
[facility]  Nephrology   Progress Note        We are following the patient for  JESSICA   76 y/o WF well known to us with recent hospitalization in  with STEMI and cardiogenic shock requiring PCI and Tim  Had JESSICA in the setting but Cr had improved to baseline of 1.3 mg by the time of discharge on 21 She follows with Dr Isaias Sethi and baseline Cr is 1.2-1.4 mg  She was started on Aldactone and Lisinopril prior to discharge Her wt was 141 lbs on discharge Subsequently was started on Lasix as out patient  Presented with AMS and Hypoglycemia  Had not been eating since discharge with nausea  Wt down 10 lbs since discharge 2 weeks ago  Has had issues with dentures also Had been on Glipizide  Noted to have JESSICA with Cr of 3.9 mg Na+ 128 meq on admission      SUBJECTIVE:  Feels OK but has no taste and poor oral intake Also needs new dentures  Cr improving with IVF's  No SOB  BP better off of doxazosin     at bedside    OBJECTIVE:     PHYSICAL:    TEMPERATURE:  Current - Temp: 97.8 °F (36.6 °C);  Max - Temp  Av.9 °F (36.6 °C)  Min: 97.4 °F (36.3 °C)  Max: 98.6 °F (37 °C)  RESPIRATIONS RANGE: Resp  Av.7  Min: 16  Max: 22  PULSE RANGE: Pulse  Av.8  Min: 77  Max: 95  BLOOD PRESSURE RANGE:  Systolic (42PJY), HFT:885 , Min:111 , TVV:977   ; Diastolic (74ZAY), PPO:06, Min:61, Max:73    PULSE OXIMETRY RANGE: SpO2  Av.2 %  Min: 91 %  Max: 98 %  24HR INTAKE/OUTPUT:    Intake/Output Summary (Last 24 hours) at 9/15/2021 1711  Last data filed at 2021 2040  Gross per 24 hour   Intake 986.49 ml   Output 100 ml   Net 886.49 ml       CONSTITUTIONAL:  awake, alert, cooperative, no apparent distress, and appears stated age  HEENT:  Lids and lashes normal, pupils equal, round and reactive to light  NECK:  Supple, symmetrical, trachea midline, no adenopathy, thyroid symmetric, not enlarged and no tenderness, skin normal  LUNGS:  No increased work of breathing, good air exchange, clear to auscultation bilaterally, no crackles or wheezing  CARDIOVASCULAR:  Normal apical impulse, regular rate and rhythm, normal S1 and S2  ABDOMEN:  No scars, normal bowel sounds, soft, non-distended  NEUROLOGIC:  alert, oriented, normal speech, no focal findings or movement disorder noted  SKIN: no bruising or bleeding  EXTREMITIES: no edema    Medications     sodium chloride      dextrose      sodium chloride 50 mL/hr at 09/15/21 1409        Data      CBC:   Recent Labs     09/13/21  1714 09/14/21  0605 09/15/21  0509   WBC 6.4 5.7 5.7   RBC 3.27* 3.24* 3.10*   HGB 10.6* 10.4* 10.5*   HCT 30.8* 30.9* 29.7*    119* 111*     BMP:    Recent Labs     09/13/21  1714 09/14/21  0605 09/15/21  0509   * 136 136   K 4.2 4.2 4.0    104 105   CO2 20* 19* 19*   BUN 40* 35* 27*   CREATININE 2.9* 2.5* 2.0*   CALCIUM 8.4 8.6 8.4   GLUCOSE 185* 135* 121*     Phosphorus:  No results for input(s): PHOS in the last 72 hours.   Magnesium:    Recent Labs     09/13/21  1714 09/14/21  0605 09/15/21  0509   MG 1.50* 1.40* 1.70*         ASSESSMENT     Patient Active Problem List   Diagnosis    Closed fracture of right distal radius    [facility]) [facility]    Cardiogenic shock [facility]    Acute respiratory failure with hypoxia and hypercapnia (HCC)    Acute kidney injury ([facility]    Hypoxia    Acute cystitis without hematuria    Hemoptysis    Thrombocytopenia (HCC)    Anemia    MARSHA (generalized anxiety disorder)    Acute systolic CHF (congestive heart failure), NYHA class 3 ([facility]    AMS (altered mental status)    Hypoglycemia    JESSICA (acute kidney injury) ([facility]    Hypotension    CAD (coronary artery disease)    DM2 (diabetes mellitus, type 2) ([facility])    Postablative hypothyroidism    CHF (congestive heart failure) ([facility])    Hyponatremia       PLAN    1-JESSICA suspect volume depletion, Hypotension in the setting of ACE-I use Baseline Cr 1.2-1.4 mg Cr 3.9 mg on admission Had been on Lisinopril Aldactone and Lasix CT shows no evidence of obstruction   U/A benign Cr improving with IVF's and holding culprit meds Cr down to 2 mg today Continue gentle IVF's rate reduced Avoid Hypotension   2-Hyponatremia due to volume depletion, diuretic effect improved Na+ 136 meq  3 Hyperkalemia resolved off of Aldactone  3-Hypomagnesemia improved  4 S/P PCI and Cardiogenic shock on Digoxin Dig level 1.7       Disha Briscoe MD, [facility]

## 2021-09-15 NOTE — PROGRESS NOTES
Progress Note  Admit Date: 9/12/2021      PCP: Lisa Bangura MD     CC: F/U for JESSICA    Days in hospital:  2    SUBJECTIVE / Interval History:  Patient feels better. Nausea has resolved. Tolerating a diet  No shortness of breath      Allergies  Augmentin [amoxicillin-pot clavulanate], Bactrim [sulfamethoxazole-trimethoprim], Carafate [sucralfate], Clindamycin/lincomycin, Hyoscyamine, Macrobid [nitrofurantoin], Oxybutynin, Pcn [penicillins], Pravastatin, Prevacid [lansoprazole], Prilosec [omeprazole], Sulfamethoxazole, and Trazodone and nefazodone    Medications    Scheduled Meds:   levothyroxine  100 mcg Oral Daily    metoprolol succinate  12.5 mg Oral Daily    rosuvastatin  40 mg Oral Nightly    desvenlafaxine succinate  25 mg Oral Daily    sodium chloride flush  5-40 mL IntraVENous 2 times per day    heparin (porcine)  5,000 Units SubCUTAneous 3 times per day    famotidine  20 mg Oral Nightly    ticagrelor  90 mg Oral BID    insulin lispro  0-6 Units SubCUTAneous TID WC    insulin lispro  0-3 Units SubCUTAneous Nightly     Continuous Infusions:   sodium chloride      dextrose      sodium chloride 75 mL/hr at 09/15/21 0935       PRN Meds:  iopamidol, polyvinyl alcohol, sodium chloride flush, sodium chloride, acetaminophen **OR** acetaminophen, ondansetron, glucose, dextrose, glucagon (rDNA), dextrose, fluticasone    Vitals    /68   Pulse 85   Temp 97.4 °F (36.3 °C) (Oral)   Resp 22   Ht 5' (1.524 m)   Wt 135 lb 12.9 oz (61.6 kg)   SpO2 93%   BMI 26.52 kg/m²     Exam:    Gen: No distress. Eyes: PERRL. No sclera icterus. No conjunctival injection. ENT: No discharge. Pharynx clear. External appearance of ears and nose normal.  Neck: Trachea midline. No obvious mass. Resp: No accessory muscle use. No crackles. No wheezes. No rhonchi. No dullness on percussion. CV: Regular rate. Regular rhythm. No murmur or rub. No edema. GI: Non-tender. Non-distended. No hernia.    Skin: Warm, dry, normal texture and turgor. No nodule on exposed extremities. Lymph: No cervical LAD. No supraclavicular LAD. M/S: No cyanosis. No clubbing. No joint deformity. Neuro: Moves all four extremities. CN 2-12 tested, no defect noted. Psych: Oriented x 3. No anxiety. Awake. Data    LABS  CBC:   Recent Labs     09/13/21 1714 09/14/21  0605 09/15/21  0509   WBC 6.4 5.7 5.7   HGB 10.6* 10.4* 10.5*   HCT 30.8* 30.9* 29.7*   MCV 94.2 95.3 95.6    119* 111*     BMP:   Recent Labs     09/13/21 1714 09/14/21  0605 09/15/21  0509   * 136 136   K 4.2 4.2 4.0    104 105   CO2 20* 19* 19*   BUN 40* 35* 27*   CREATININE 2.9* 2.5* 2.0*   GLUCOSE 185* 135* 121*     POC GLUCOSE:    Recent Labs     09/14/21  0827 09/14/21  1153 09/14/21  1735 09/14/21  2040 09/15/21  0751   POCGLU 131* 176* 180* 125* 128*     LIVER PROFILE:   Recent Labs     09/12/21  2343 09/12/21  2343 09/13/21 1714 09/14/21  0605 09/15/21  0509   AST 23   < > 18 17 18   ALT 13   < > 11 11 11   LIPASE 53.0  --   --   --   --    LABALBU 3.7   < > 3.0* 2.9* 2.7*   BILIDIR <0.2  --   --   --   --    BILITOT 0.5   < > 0.4 0.5 0.5   ALKPHOS 128   < > 119 104 101    < > = values in this interval not displayed. PT/INR: No results for input(s): PROTIME, INR in the last 72 hours. APTT: No results for input(s): APTT in the last 72 hours. UA:  Recent Labs     09/12/21  2343   COLORU YELLOW   PHUR 5.0   CLARITYU Clear   SPECGRAV 1.007   LEUKOCYTESUR Negative   UROBILINOGEN 0.2   BILIRUBINUR Negative   BLOODU Negative   GLUCOSEU Negative   KETUA Negative     Microbiology:  Wound Culture: No results for input(s): WNDABS, ORG in the last 72 hours. Invalid input(s):  LABGRAM  Nasal Culture: No results for input(s): ORG, MRSAPCR in the last 72 hours. Blood Culture: No results for input(s): BC, BLOODCULT2 in the last 72 hours. Fungal Culture:   No results for input(s): FUNGSM in the last 72 hours.   No results for input(s): FUNCXBLD in the last 72 hours. CSF Culture:  No results for input(s): COLORCSF, APPEARCSF, CFTUBE, CLOTCSF, WBCCSF, RBCCSF, NEUTCSF, NUMCELLSCSF, LYMPHSCSF, MONOCSF, GLUCCSF, VOLCSF in the last 72 hours. Respiratory Culture:  No results for input(s): Alferd Kand in the last 72 hours. AFB:No results for input(s): AFBSMEAR in the last 72 hours. Urine Culture  No results for input(s): LABURIN in the last 72 hours. RADIOLOGY:    CT ABDOMEN PELVIS WO CONTRAST Additional Contrast? None   Final Result   1. Liquid stool throughout the colon suggest diarrhea. This could be related   to mild enteritis given fluid distention multiple small bowel loops, some   possibly with associated slight inflammation. However, the appearance of the   small bowel could also be seen with ileus. 2. Peribronchovascular and less prominent subpleural groundglass and   reticulations in the lung bases could be related to pneumonia, edema, and/or   fibrosis, appearing largely improved since 08/19/2021. CT Head WO Contrast   Final Result   No acute hemorrhage or midline shift. CONSULTS:    IP CONSULT TO NEPHROLOGY    ASSESSMENT AND PLAN:      Active Problems:    AMS (altered mental status)    Hypoglycemia    JESSICA (acute kidney injury) ([facility]    Hypotension    CAD (coronary artery disease)    DM2 (diabetes mellitus, type 2) ([facility])    Postablative hypothyroidism    CHF (congestive heart failure) ([facility])    Hyponatremia  Resolved Problems:    * No resolved hospital problems. *    Patient is 70-year-old female with a past medical history of coronary artery disease, diabetes who was recently discharged on 8/30. Patient was admitted at that time for STEMI and underwent stent to the LAD with Impella. Patient was discharged on lisinopril Aldactone Brilinta. She has been having poor p.o. intake at home. Patient was brought with nausea vomiting and hypoglycemia in the 60s.   She was admitted with acute kidney injury with hyponatremia and hyperkalemia     Acute kidney injury on chronic kidney disease stage III- improving   -Patient's baseline creatinine is from 1.2-1.7  -creat on admission 3.9  - ivf , cut back rate  -hold nephrotoxic meds   -Nephrology consulted  -Continue strict SHIVAM  -digoxin level ok      Recent STEMI  -Restart Brilinta  -Continue low-dose metoprolol(add holding parameters )and statin    Systolic dysfunction  -EF 30%  -Hold all cardiac meds     Hyperkalemia-improved  -Hold Aldactone  -We will give a dose of Lokelma     Hypoglycemia-improving  -Hold glipizide  -Monitor blood sugar       DM-sugars controlled  -restart sliding scale insulin when blood sugar improves    Hypomagnesemia  -Replace and monitor     Possible gastroenteritis-no diarrhea in the hospital  -Monitor for diarrhea      DVT Prophylaxis: Heparin  Diet: ADULT DIET; Regular; 4 carb choices (60 gm/meal); Low Sodium (2 gm); 1500 ml  Code Status: Full Code    PT/OT Eval Status: Ordered    Discharge plan -possibly tomorrow depending on patient's creatinine    The patient and / or the family were informed of the results of any tests, a time was given to answer questions, a plan was proposed and they agreed with plan. Discussed with consulting physicians, nursing and social work     The note was completed using EMR. Every effort was made to ensure accuracy; however, inadvertent computerized transcription errors may be present.        Amaya White MD

## 2021-09-15 NOTE — PROGRESS NOTES
Occupational Therapy   Occupational Therapy Initial Assessment  Date: 9/15/2021   Patient Name: Dipesh Minor  MRN: [id number]     : 1945    Date of Service: 9/15/2021    Discharge Recommendations:  Home with Home health OT, 24 hour supervision or assist  OT Equipment Recommendations  Equipment Needed: No    Dipesh Minor scored a 20/24 on the AM-PAC ADL Inpatient form. Current research shows that an AM-PAC score of 18 or greater is typically associated with a discharge to the patient's home setting. Based on the patient's AM-PAC score, and their current ADL deficits, it is recommended that the patient have 2-3 sessions per week of Occupational Therapy at d/c to increase the patient's independence. At this time, this patient demonstrates the endurance and safety to discharge home with home therapy and a follow up treatment frequency of 2-3x/wk. Please see assessment section for further patient specific details. If patient discharges prior to next session this note will serve as a discharge summary. Please see below for the latest assessment towards goals. Assessment   Performance deficits / Impairments: Decreased functional mobility ; Decreased safe awareness;Decreased balance;Decreased ADL status; Decreased high-level IADLs;Decreased endurance  Assessment: 78 y/o female admitted 2021 with hypoglycemia and JESSICA. PTA pt lives at home with spouse and independent with ADLs and functional mobility with RW. Today, pt completed ADL transfers and functional mobility around room/bathroom with RW and SBA/CGA. Pt reporting fatigue after stanidng at sink ~ 2 min for grooming tasks. Pt will benefit from skilled therapy while in hospital. Anticipate pt will progress well enough to return home with family suppor at discharge.   Prognosis: Good  Decision Making: Medium Complexity  OT Education: OT Role;Transfer Training;Plan of Care  REQUIRES OT FOLLOW UP: Yes  Activity Tolerance  Activity Tolerance: hyponatremia and hyperkalemia\"  Family / Caregiver Present: Yes  Referring Practitioner: Dr. Najera Payment  Diagnosis: hypoglycemia  Subjective  Subjective: Pt seen bedside and agreeable to therapy  General Comment  Comments: Per RN ok for therapy. Social/Functional History  Social/Functional History  Lives With: Spouse, Daughter (Daughter, MARU, and family live with pt and )  Type of Home: House  Home Layout: Laundry in basement, Able to Live on Main level with bedroom/bathroom  Home Access: Stairs to enter with rails  Entrance Stairs - Number of Steps: 2  Entrance Stairs - Rails: Left  Bathroom Shower/Tub: Tub/Shower unit  Bathroom Toilet: Handicap height  Bathroom Equipment: Shower chair  Home Equipment: Rolling walker  Receives Help From: Home health (home PT, OT and RN)  ADL Assistance: Needs assistance (indep toileting, assist from  with dressing, pt recently been spongebathing)  Ambulation Assistance: Independent (with RW)  Transfer Assistance: Independent  Active : Yes  Additional Comments: denies recent hx of falls       Objective   Vision: Impaired  Vision Exceptions: Wears glasses for reading  Hearing: Within functional limits    Orientation  Overall Orientation Status: Within Functional Limits (somewhat confused to recent events/situation)     Balance  Sitting Balance: Stand by assistance  Standing Balance: Contact guard assistance  Standing Balance  Time: stood ~ 2-3 min at sink for grooming tasks  Functional Mobility  Functional Mobility Comments: chair <> bathroom with RW and SBA     ADL  Grooming: Stand by assistance (standing at sink to brush teeth and comb hair)  Additional Comments: Anticipate pt will be CGA for balance during standing components of ADLs.         Bed mobility  Comment: Pt in chair at start/end of session  Transfers  Sit to stand: Contact guard assistance  Stand to sit: Contact guard assistance  Transfer Comments: to/from RW- cuing for hand placement Cognition  Overall Cognitive Status: WFL  Perception  Overall Perceptual Status: WFL     Sensation  Overall Sensation Status: WFL        LUE AROM (degrees)  LUE AROM : WFL  Left Hand AROM (degrees)  Left Hand AROM: WFL  RUE AROM (degrees)  RUE AROM : WFL  Right Hand AROM (degrees)  Right Hand AROM: WFL     Plan   Plan  Times per week: 3-5  Current Treatment Recommendations: Strengthening, Endurance Training, Balance Training, Gait Training, Functional Mobility Training, Self-Care / ADL    AM-PAC Score  AM-PAC Inpatient Daily Activity Raw Score: 20 (09/15/21 1327)  AM-PAC Inpatient ADL T-Scale Score : 42.03 (09/15/21 1327)  ADL Inpatient CMS 0-100% Score: 38.32 (09/15/21 1327)  ADL Inpatient CMS G-Code Modifier : Clarita Michelle (09/15/21 1327)    Goals  Short term goals  Time Frame for Short term goals: Prior to DC: Short term goal 1: Pt will complete ADL transfers/mobility with supervision  Short term goal 2: Pt will complete toileting with supervision  Short term goal 3: Pt will complete LB dressing with supervision  Short term goal 4: Pt will tolerate standing > 5 min for functional task with supervision  Patient Goals   Patient goals : to return home       Therapy Time   Individual Concurrent Group Co-treatment   Time In 1300         Time Out 1328         Minutes 28         Timed Code Treatment Minutes: 10 Minutes     This note to serve as OT d/c summary if pt is d/c-ed prior to next therapy session.     Marvin Pace OTR/L

## 2021-09-16 NOTE — PLAN OF CARE
Problem: Falls - Risk of:  Goal: Will remain free from falls  Description: Will remain free from falls  9/16/2021 0205 by Deondre Joaquin RN  Outcome: Met This Shift  9/15/2021 1614 by Ralph Harper RN  Outcome: Ongoing  Goal: Absence of physical injury  Description: Absence of physical injury  9/16/2021 0205 by Deondre Joaquin RN  Outcome: Met This Shift  9/15/2021 1614 by Ralph Harper RN  Outcome: Ongoing   Alert and oriented x4 Resp. Easy and even Sats.  WNL on RA Lungs diminished in bases Cough and deep breathing exercises encouraged No c/o pain Up to bathroom with walker and SBA Free from fall/injury Frequently turns and repositions self

## 2021-09-16 NOTE — PROGRESS NOTES
[facility]  Nephrology   Progress Note        We are following the patient for  JESSICA   78 y/o WF well known to us with recent hospitalization in  with STEMI and cardiogenic shock requiring PCI and Tim  Had JESSICA in the setting but Cr had improved to baseline of 1.3 mg by the time of discharge on 21 She follows with Dr Nadiya Argueta and baseline Cr is 1.2-1.4 mg  She was started on Aldactone and Lisinopril prior to discharge Her wt was 141 lbs on discharge Subsequently was started on Lasix as out patient  Presented with AMS and Hypoglycemia  Had not been eating since discharge with nausea  Wt down 10 lbs since discharge 2 weeks ago  Has had issues with dentures also Had been on Glipizide  Noted to have JESSICA with Cr of 3.9 mg Na+ 128 meq on admission      SUBJECTIVE:  Feels OK Ate BF this AM  Wants to go home  No SOB  BP better off of doxazosin     at bedside    OBJECTIVE:     PHYSICAL:    TEMPERATURE:  Current - Temp: 98.1 °F (36.7 °C);  Max - Temp  Av °F (36.7 °C)  Min: 97.8 °F (36.6 °C)  Max: 98.3 °F (36.8 °C)  RESPIRATIONS RANGE: Resp  Avg: 15.7  Min: 14  Max: 18  PULSE RANGE: Pulse  Av  Min: 69  Max: 84  BLOOD PRESSURE RANGE:  Systolic (91TFX), YBM:958 , Min:108 , ZLV:327   ; Diastolic (46GGM), DEISI:35, Min:58, Max:69    PULSE OXIMETRY RANGE: SpO2  Av.7 %  Min: 93 %  Max: 97 %  24HR INTAKE/OUTPUT:      Intake/Output Summary (Last 24 hours) at 2021 1238  Last data filed at 2021 1049  Gross per 24 hour   Intake 2575.47 ml   Output --   Net 2575.47 ml       CONSTITUTIONAL:  awake, alert, cooperative, no apparent distress, and appears stated age  HEENT:  Lids and lashes normal, pupils equal, round and reactive to light symmetric, not enlarged and no tenderness, skin normal  LUNGS:  No increased work of breathing, good air exchange, clear to auscultation bilaterally  CARDIOVASCULAR:  Normal apical impulse, regular rate and rhythm, normal S1 and S2  ABDOMEN: No scars, normal bowel sounds, soft, non-distended  NEUROLOGIC:  alert, oriented, normal speech, no focal findings or movement disorder noted  SKIN: no bruising or bleeding  EXTREMITIES: no edema    Medications     sodium chloride      dextrose          Data      CBC:   Recent Labs     09/14/21  0605 09/15/21  0509 09/16/21  0542   WBC 5.7 5.7 6.3   RBC 3.24* 3.10* 3.13*   HGB 10.4* 10.5* 10.2*   HCT 30.9* 29.7* 30.2*   * 111* 111*     BMP:    Recent Labs     09/14/21  0605 09/15/21  0509 09/16/21  0542    136 136   K 4.2 4.0 3.6    105 102   CO2 19* 19* 18*   BUN 35* 27* 19   CREATININE 2.5* 2.0* 1.7*   CALCIUM 8.6 8.4 8.4   GLUCOSE 135* 121* 141*     Phosphorus:  No results for input(s): PHOS in the last 72 hours.   Magnesium:    Recent Labs     09/14/21  0605 09/15/21  0509 09/16/21  0542   MG 1.40* 1.70* 1.90         ASSESSMENT     Patient Active Problem List   Diagnosis    Closed fracture of right distal radius    [facility]) [facility]    Cardiogenic shock (HCC)    [facility] and [facility]capnia [facility]    Acute kidney injury [facility] [facility]    Hypoxia    Acute cystitis without hematuria    Hemoptysis    Thrombocytopenia (HCC)    Anemia    MARSHA (generalized anxiety disorder)    Acute systolic CHF (congestive heart failure), NYHA class 3 ([facility] [address] [facility]    AMS (altered mental status)    Hypoglycemia    JESSICA (acute kidney injury) ([facility] [address] [facility]    Hypotension    CAD (coronary artery disease)    DM2 [facility]diabetes mellitus, type 2) ([facility]    Postablative hypothyroidism    CHF (congestive heart failure) ([facility]    Hyponatremia       PLAN    1-JESSICA suspect volume depletion, Hypotension in the setting of ACE-I use Baseline Cr 1.2-1.4 mg Cr 3.9 mg on admission Had been on Lisinopril Aldactone and Lasix CT shows no evidence of obstruction   U/A benign Cr improving with IVF's and holding culprit meds Cr down to 1.7 mg today OK for discharge from renal SP Follow up with Dr Livan Sun 1-2 weeks   2-Hyponatremia due to volume depletion, diuretic effect improved Na+ 136 meq  3 Hyperkalemia resolved off of Aldactone K+ 3.6 meq   3-Hypomagnesemia improved  4 S/P PCI and Cardiogenic shock on Digoxin Dig level 1.7 Keep off of Lisinopril Lasix and Aldactone on disharge   5 HTN BP better off of Doxazosin on low dose Metoprolol      Smita Chery MD, [address]

## 2021-09-16 NOTE — PROGRESS NOTES
Progress Note  Admit Date: 9/12/2021      PCP: Ghassan Gallegos MD     CC: F/U for JESSICA    Days in hospital:  3    SUBJECTIVE / Interval History:  Pt feels okay. Tolerating diet      Allergies  Augmentin [amoxicillin-pot clavulanate], Bactrim [sulfamethoxazole-trimethoprim], Carafate [sucralfate], Clindamycin/lincomycin, Hyoscyamine, Macrobid [nitrofurantoin], Oxybutynin, Pcn [penicillins], Pravastatin, Prevacid [lansoprazole], Prilosec [omeprazole], Sulfamethoxazole, and Trazodone and nefazodone    Medications    Scheduled Meds:   levothyroxine  100 mcg Oral Daily    metoprolol succinate  12.5 mg Oral Daily    rosuvastatin  40 mg Oral Nightly    desvenlafaxine succinate  25 mg Oral Daily    sodium chloride flush  5-40 mL IntraVENous 2 times per day    heparin (porcine)  5,000 Units SubCUTAneous 3 times per day    famotidine  20 mg Oral Nightly    ticagrelor  90 mg Oral BID    insulin lispro  0-6 Units SubCUTAneous TID WC    insulin lispro  0-3 Units SubCUTAneous Nightly     Continuous Infusions:   sodium chloride      dextrose         PRN Meds:  iopamidol, polyvinyl alcohol, sodium chloride flush, sodium chloride, acetaminophen **OR** acetaminophen, ondansetron, glucose, dextrose, glucagon (rDNA), dextrose, fluticasone    Vitals    /65   Pulse 77   Temp 98.3 °F (36.8 °C) (Oral)   Resp 14   Ht 5' (1.524 m)   Wt 136 lb 0.4 oz (61.7 kg)   SpO2 94%   BMI 26.57 kg/m²     Exam:    Gen: No distress. Eyes: PERRL. No sclera icterus. No conjunctival injection. ENT: No discharge. Pharynx clear. External appearance of ears and nose normal.  Neck: Trachea midline. No obvious mass. Resp: No accessory muscle use. No crackles. No wheezes. No rhonchi. No dullness on percussion. CV: Regular rate. Regular rhythm. No murmur or rub. No edema. GI: Non-tender. Non-distended. No hernia. Skin: Warm, dry, normal texture and turgor. No nodule on exposed extremities. Lymph: No cervical LAD.  No supraclavicular LAD. M/S: No cyanosis. No clubbing. No joint deformity. Neuro: Moves all four extremities. CN 2-12 tested, no defect noted. Psych: Oriented x 3. No anxiety. Awake. Data    LABS  CBC:   Recent Labs     09/14/21  0605 09/15/21  0509 09/16/21  0542   WBC 5.7 5.7 6.3   HGB 10.4* 10.5* 10.2*   HCT 30.9* 29.7* 30.2*   MCV 95.3 95.6 96.4   * 111* 111*     BMP:   Recent Labs     09/14/21  0605 09/15/21  0509 09/16/21  0542    136 136   K 4.2 4.0 3.6    105 102   CO2 19* 19* 18*   BUN 35* 27* 19   CREATININE 2.5* 2.0* 1.7*   GLUCOSE 135* 121* 141*     POC GLUCOSE:    Recent Labs     09/15/21  0751 09/15/21  1144 09/15/21  1532 09/15/21  2151 09/16/21  0816   POCGLU 128* 172* 144* 111* 131*     LIVER PROFILE:   Recent Labs     09/14/21  0605 09/15/21  0509 09/16/21  0542   AST 17 18 17   ALT 11 11 10   LABALBU 2.9* 2.7* 2.7*   BILITOT 0.5 0.5 0.6   ALKPHOS 104 101 112     PT/INR: No results for input(s): PROTIME, INR in the last 72 hours. APTT: No results for input(s): APTT in the last 72 hours. UA:  No results for input(s): NITRITE, COLORU, PHUR, LABCAST, WBCUA, RBCUA, MUCUS, TRICHOMONAS, YEAST, BACTERIA, CLARITYU, SPECGRAV, LEUKOCYTESUR, UROBILINOGEN, BILIRUBINUR, BLOODU, GLUCOSEU, KETUA, AMORPHOUS in the last 72 hours. Microbiology:  Wound Culture: No results for input(s): WNDABS, ORG in the last 72 hours. Invalid input(s):  LABGRAM  Nasal Culture: No results for input(s): ORG, MRSAPCR in the last 72 hours. Blood Culture: No results for input(s): BC, BLOODCULT2 in the last 72 hours. Fungal Culture:   No results for input(s): FUNGSM in the last 72 hours. No results for input(s): FUNCXBLD in the last 72 hours. CSF Culture:  No results for input(s): COLORCSF, APPEARCSF, CFTUBE, CLOTCSF, WBCCSF, RBCCSF, NEUTCSF, NUMCELLSCSF, LYMPHSCSF, MONOCSF, GLUCCSF, VOLCSF in the last 72 hours.   Respiratory Culture:  No results for input(s): Keira Mercado in the last 72 hours.  AFB:No results for input(s): AFBSMEAR in the last 72 hours. Urine Culture  No results for input(s): LABURIN in the last 72 hours. RADIOLOGY:    CT ABDOMEN PELVIS WO CONTRAST Additional Contrast? None   Final Result   1. Liquid stool throughout the colon suggest diarrhea. This could be related   to mild enteritis given fluid distention multiple small bowel loops, some   possibly with associated slight inflammation. However, the appearance of the   small bowel could also be seen with ileus. 2. Peribronchovascular and less prominent subpleural groundglass and   reticulations in the lung bases could be related to pneumonia, edema, and/or   fibrosis, appearing largely improved since 08/19/2021. CT Head WO Contrast   Final Result   No acute hemorrhage or midline shift. CONSULTS:    IP CONSULT TO NEPHROLOGY    ASSESSMENT AND PLAN:      Active Problems:    AMS (altered mental status)    Hypoglycemia    JESSICA (acute kidney injury) ([facility] [address]    Hypotension    CAD (coronary artery disease)    DM2 (diabetes mellitus, type 2) ([facility])    Postablative hypothyroidism    CHF (congestive heart failure) ([facility])    Hyponatremia  Resolved Problems:    * No resolved hospital problems. *    Patient is 81-year-old female with a past medical history of coronary artery disease, diabetes who was recently discharged on 8/30. Patient was admitted at that time for STEMI and underwent stent to the LAD with Impella. Patient was discharged on lisinopril Aldactone Brilinta. She has been having poor p.o. intake at home. Patient was brought with nausea vomiting and hypoglycemia in the 60s. She was admitted with acute kidney injury with hyponatremia and hyperkalemia. Patient was treated with IV fluids and her creatinine has improved to 1.7 at the time of discharge. Patient was on digoxin lisinopril, Aldactone at home this has been held at the time of discharge.  She will need follow-up with her cardiologist as an outpatient and medications will need to be added slowly to her regimen with close monitoring of her BMP. Patient was also hypoglycemic at the time of admission and her A1c is 5.8. Patient's oral hypoglycemic agent has been held at the time of discharge. Patient will need outpatient follow-up with her PCP to monitor her blood sugar levels.     Acute kidney injury on chronic kidney disease stage III- improving   -Patient's baseline creatinine is from 1.2-1.7  -creat on admission 3.9  - will stop  -hold nephrotoxic meds   -Nephrology consulted  -Continue strict SHIVAM  -digoxin level ok      Recent STEMI  -Restart Brilinta  -Continue low-dose metoprolol(add holding parameters )and statin    Systolic dysfunction  -EF 30%  -Hold all cardiac meds     Hyperkalemia-improved  -Hold Aldactone  -We will give a dose of Lokelma     Hypoglycemia-improving  -Hold glipizide  -Monitor blood sugar       DM-sugars controlled  -restart sliding scale insulin     Hypomagnesemia  -Replace and monitor     Possible gastroenteritis-no diarrhea in the hospital  -Monitor for diarrhea      DVT Prophylaxis: Heparin  Diet: ADULT DIET; Regular; 4 carb choices (60 gm/meal); Low Sodium (2 gm); 1500 ml  Code Status: Full Code    PT/OT Eval Status: Ordered    Discharge plan -home care     The patient and / or the family were informed of the results of any tests, a time was given to answer questions, a plan was proposed and they agreed with plan. Discussed with consulting physicians, nursing and social work     The note was completed using EMR. Every effort was made to ensure accuracy; however, inadvertent computerized transcription errors may be present.        Sanaz Noonan MD

## 2021-09-16 NOTE — PROGRESS NOTES
Discharge instructions reviewed with pt/family, discussed medications, VU, denies any further questions or anxiety related to discharge. Educational handouts in regards to new medications, given via [employer], side effects discussed, VU. IV/tele discontinued. Pt discharged to home with home care. Taken from room via wheelchair by PCA, personal belongings taken with. Follow up appointment made with PCP  for patient. New medications supplied through outpatient pharmacy.      Electronically signed by Huma Whalen RN on 9/16/2021 at 2:19 PM

## 2021-09-16 NOTE — CARE COORDINATION
[facility]  Received referral from case management   Faxed orders to [address] care to see patient by   9/18/21  Johann Moore LPN    [address] [facility] Cell [phone], Fax [phone]

## 2021-09-16 NOTE — DISCHARGE INSTR - COC
Continuity of Care Form    Patient Name: Ned Rebolledo   :  1945  MRN:  [id number]    Admit date:  2021  Discharge date:  ***    Code Status Order: Full Code   Advance Directives:      Admitting Physician:  Carmela Marino DO  PCP: Dre Canela MD    Discharging Nurse: [facility] Unit/Room#: C8U-8696/0205-67  Discharging Unit Phone Number: ***    Emergency Contact:   Extended Emergency Contact Information  Primary Emergency Contact: Makenna Jones  Address: [address] [place] [address]  Home Phone: [phone]  Mobile Phone: [phone]  Relation: Spouse    Past Surgical History:  Past Surgical History:   Procedure Laterality Date    ARM SURGERY Left     L shoulder replacement    CHOLECYSTECTOMY      COLONOSCOPY      EYE SURGERY      contaract    GASTRIC FUNDOPLICATION  8177    hiatal hernia repair    HAND SURGERY Left     WRIST CLOSED REDUCTION Left 2003    left wrist fracture    WRIST FRACTURE SURGERY Right 10/02/2020    OPEN REDUCTION INTERNAL FIXATION RIGHT WRIST performed by Bakari Maria MD at [place] [address]       Immunization History:   Immunization History   Administered Date(s) Administered    COVID-19, Pfizer, PF, 30mcg/0.3mL 2021, 2021       Active Problems:  Patient Active Problem List   Diagnosis Code    Closed fracture of right distal radius S52.501A    STEMI (ST elevation myocardial infarction) ([facility] [address] I21.3    Cardiogenic shock (HCC) R57.0    Acute respiratory failure with hypoxia and hypercapnia (HCC) J96.01, J96.02    Acute kidney injury ([facility] [address] N17.9    Hypoxia R09.02    Acute cystitis without hematuria N30.00    Hemoptysis R04.2    Thrombocytopenia (HCC) D69.6    Anemia D64.9    MARSHA (generalized anxiety disorder) Z59.5    Acute systolic CHF (congestive heart failure), NYHA class 3 (HCC) I50.21    AMS (altered mental status) R41.82    Hypoglycemia E16.2    JESSICA (acute kidney injury) ([facility] [address] N17.9    Hypotension I95.9    CAD Date of Last BM: ***    Intake/Output Summary (Last 24 hours) at 2021 1244  Last data filed at 2021 1049  Gross per 24 hour   Intake 2575.47 ml   Output --   Net 2575.47 ml     I/O last 3 completed shifts: In: 2299.7 [I.V.:2299.7]  Out: -     Safety Concerns:     [employer] Safety Concerns:872941122}    Impairments/Disabilities:      [employer] Impairments/Disabilities:071007880}    Nutrition Therapy:  Current Nutrition Therapy:   [employer] Diet List:702467388}    Routes of Feeding: {CHP DME Other Feedings:061690549}  Liquids: {Slp liquid thickness:93198}  Daily Fluid Restriction: {CHP DME Yes amt example:249980612}  Last Modified Barium Swallow with Video (Video Swallowing Test): {Done Not Done MHSK:221027728}    Treatments at the Time of Hospital Discharge:   Respiratory Treatments: ***  Oxygen Therapy:  {Therapy; copd oxygen:54573}  Ventilator:    { CC Vent FCGR:038903864}    Rehab Therapies: Physical Therapy, Occupational Therapy and Nurse    Weight Bearing Status/Restrictions: [employer]  Weight Bearin}  Other Medical Equipment (for information only, NOT a DME order):  {EQUIPMENT:896691113}  Other Treatments:     Heart Failure Instructions for Daily Management  Patient was treated for chronic systolic heart failure. she  will require the following:    Please weigh daily on the same scale and approximately the same time of day. Report weight gain of 3 pounds/day or 5 pounds/week to : [place] [address] [phone]. Please use hospital discharge weight as baseline reference. Please monitor for signs and symptoms of and report to MD:  Worsening Heart Failure: sudden weight gain, shortness of breath, lower extremity or general edema/swelling, abdominal bloating/swelling, inability to lie flat, intolerance to usual activity, or cough (especially at night). Report these finding even if no increase in weight.   Dehydration:  having difficulty or a decrease in urination, dizziness, worsening fatigue, or new onset/worsening of generalized weakness. Please continue a LOW SODIUM diet and LIMIT fluid intake to 48 - 64 ounces ( 1.5 - 2 liters) per day. Call [place] [address] [phone] and/or Allan Fish @ [phone] with any questions or concerns. Please continue heart failure education to patient and family/support system. See After Visit Summary for hospital follow up appointment details. Consider spiritual care referral for support and/or completion of advance directives [phone]. Consider: [facility]  telehealth program if patient agreeable and able to participate, palliative care consult for ongoing goals of care, end of life, and/or chronic disease management discussions and referral to [facility] [phone] once SNF/HHC complete.       HOME HEALTH CARE: LEVEL 1 STANDARD    Home health agency to establish plan of care for patient over 60 day period   Nursing  Initial home SN evaluation visit to occur within 24-48 hours for:  1)  medication management  2)  VS and clinical assessment  3)  S&S chronic disease exacerbation education + when to contact MD/NP  4)  care coordination  Medication Reconciliation during 1st SN visit    PT/OT   Evaluate with goal of regaining prior level of functioning   OT to evaluate if patient has [address] needs for personal care    PCP Visit scheduled within 7 days of hospital discharge   Telehealth-Homecare Vitals(If patient is agreeable and meets guidelines)      Patient's personal belongings (please select all that are sent with patient):  {ALON DME Belongings:501786708}    RN SIGNATURE:  {Esignature:396178545}    CASE MANAGEMENT/SOCIAL WORK SECTION    Inpatient Status Date: 09/13/2021    Readmission Risk Assessment Score:  Readmission Risk              Risk of Unplanned Readmission:  23           Discharging to Facility/ Agency   Name:  [facility] care    Address: [address] [place] [address]., [address] [place], [place] [address]  Phone: [phone]  Fax: [phone]     / signature: Electronically signed by Orquidea Sadler RN on 9/16/21 at 12:46 PM EDT    PHYSICIAN SECTION    Prognosis: Fair    Condition at Discharge: Stable    Rehab Potential (if transferring to Rehab): Good    Recommended Labs or Other Treatments After Discharge: bmp in 1 week    Physician Certification: I certify the above information and transfer of Gurdeep Casas  is necessary for the continuing treatment of the diagnosis listed and that she requires Home Care for less 30 days.      Update Admission H&P: No change in H&P    PHYSICIAN SIGNATURE:  Electronically signed by Jane Lawton MD on 9/16/21 at 2:13 PM EDT

## 2021-09-16 NOTE — DISCHARGE SUMMARY
111 09/16/2021       RENAL:   Lab Results   Component Value Date     09/16/2021    K 3.6 09/16/2021    K 4.5 09/12/2021     09/16/2021    CO2 18 09/16/2021    BUN 19 09/16/2021    CREATININE 1.7 09/16/2021           Discharge Medications:    Kalyan Patel   Home Medication Instructions [id number]    Printed on:09/16/21 1700   Medication Information                      aspirin EC 81 MG EC tablet  Take 81 mg by mouth daily             Coenzyme Q10 (COQ10) 200 MG CAPS  Take 1 capsule by mouth daily             cycloSPORINE (RESTASIS) 0.05 % ophthalmic emulsion  Place 2 drops into the left eye 2 times daily prn             desvenlafaxine succinate (PRISTIQ) 50 MG TB24 extended release tablet  Take 25 mg by mouth daily             famotidine (PEPCID) 40 MG tablet  Take 1 tablet by mouth nightly             fexofenadine (ALLEGRA) 30 MG tablet  Take 30 mg by mouth 2 times daily prn             Flaxseed, Linseed, (FLAXSEED OIL MAX STR) 1300 MG CAPS  Take 1 capsule by mouth daily             fluticasone (FLONASE) 50 MCG/ACT nasal spray  1 spray by Nasal route nightly as needed             levothyroxine (SYNTHROID) 100 MCG tablet  Take 100 mcg by mouth Daily             metoprolol succinate (TOPROL XL) 25 MG extended release tablet  Take 0.5 tablets by mouth daily             Multiple Vitamins-Minerals (THERAPEUTIC MULTIVITAMIN-MINERALS) tablet  Take 1 tablet by mouth daily             nortriptyline (PAMELOR) 25 MG capsule  Take 1 capsule by mouth nightly             polyethyl glycol-propyl glycol 0.4-0.3 % (SYSTANE) 0.4-0.3 % ophthalmic solution  1 drop as needed for Dry Eyes (6 drops daily)             polyethylene glycol (GLYCOLAX) packet  Take 17 g by mouth daily as needed for Constipation             promethazine (PHENERGAN) 12.5 MG tablet  Take 12.5 mg by mouth every 6 hours as needed for Nausea             rosuvastatin (CRESTOR) 40 MG tablet  Take 1 tablet by mouth nightly             solifenacin (VESICARE) 5 MG tablet  Take 10 mg by mouth daily As needed             ticagrelor (BRILINTA) 90 MG TABS tablet  Take 1 tablet by mouth 2 times daily             vitamin B-12 (CYANOCOBALAMIN) 100 MCG tablet  Take 50 mcg by mouth daily             vitamin D3 (CHOLECALCIFEROL) 25 MCG (1000 UT) TABS tablet  Take 1 tablet by mouth daily                 Future Appointments   Date Time Provider Geremias Sifuentes   9/21/2021 10:40 AM ULYSSES Escamilla CNP [facility]   10/6/2021  1:40 PM ULYSSES Escamilla CNP [facility]       Time Spent on discharge is more than 45 minutes in the examination, evaluation, counseling and review of medications and discharge plan. Signed:  Azael Kontt MD   9/16/2021    The note was completed using EMR. Every effort was made to ensure accuracy; however, inadvertent computerized transcription errors may be present. Thank you Vesta Fragoso MD for the opportunity to be involved in this patient's care. If you have any questions or concerns please feel free to contact me at [phone].

## 2021-09-20 NOTE — TELEPHONE ENCOUNTER
Last OV: 9/8/21  Next OV: 9/21/21  Last refill: 8/30/21  Most recent Labs: 9/16/21  Last EKG (if needed):9/13/21

## 2021-09-21 NOTE — TELEPHONE ENCOUNTER
Last ov 9/8/21  Pending appt 9/29/21  Last refill 8/30/21 #30x3  Last labs 8/19/21  Last ekg 9/13/21
no cough/no wheezing/no dyspnea

## 2021-09-21 NOTE — TELEPHONE ENCOUNTER
Ida Epperson called me back, she is not sure what happened after pt got out of the hospital. They will see pt tomorrow.

## 2021-09-21 NOTE — TELEPHONE ENCOUNTER
Please call [facility] and verify they received the home care referral that was sent on 9/3/21.

## 2021-09-21 NOTE — TELEPHONE ENCOUNTER
Carolyn Levine called in this morning and states that he just spoke with [facility] and they do not have the orders for her Home Care. He is very upset and they are waiting for them to arrive because she is not feeling well.     He can be reached back at [phone]

## 2021-09-21 NOTE — TELEPHONE ENCOUNTER
Called [facility], per Sonam Hernandez, there is a note that says it is tied up in coding.   She is not sure what that means she will have Lucy Jones, the supervisor call me to let me know that status

## 2021-09-21 NOTE — TELEPHONE ENCOUNTER
[address] [place] [address]  Patient was seen:  9/4/21 Skilled Nursing Visit  9/7/21 Physical Therapy  9/9/21 Skilled Nursing Visit  9/10/21 Physical Therapy    Patient was in Hospital from 9/12/21 to 9/16/21. Katie Carrera was not aware that patient was discharged. Called patient and made aware. Requested that he call Katie Carrera and request them to restart visits. David Meyer said that he already called them and hung up the phone.

## 2021-09-29 NOTE — PROGRESS NOTES
nosebleeds, sore throat, nasal drainage  RESP: Denies cough, sputum, dyspnea, wheeze, snoring  CARD: Denies palpitations,  murmur  GI:Denies nausea, vomiting, heartburn, loss of appetite, change in bowels  : Denies frequency, pain, incontinence, polyuria  VASC: Denies claudication, leg cramps, clots  MUSC/SKEL: Denies pain, stiffness, arthritis  PSYCH: Denies anxiety, depression, stress  NEURO: Denies numbness, tingling, weakness,change in mood or memory  HEME: Denies abn bruising, bleeding, anemia  ENDO: Denies intolerance to heat, cold, excessive thirst or hunger, hx thyroid disease    /64   Pulse 116   Ht 5' (1.524 m)   Wt 125 lb (56.7 kg)   SpO2 98%   BMI 24.41 kg/m²   Wt Readings from Last 3 Encounters:   09/29/21 125 lb (56.7 kg)   09/16/21 136 lb 0.4 oz (61.7 kg)   09/08/21 132 lb 12.8 oz (60.2 kg)       Physical Exam:  GEN: Appears frail, no acute distress  SKIN: Pink, warm, dry. Nails without clubbing. HEENT: PERRLA. Normocephalic, atraumatic. Neck supple. No adenopathy. LUNG: AP diameter normal. Clear bilateral. No wheeze, rales, or ronchi. Respiratory effort normal.  HEART: S1S2 A/R. No JVD. No carotid bruit. No murmur, rub or gallop. ABD: Soft, nontender. +BS X 4 quads. No hepatomegaly. EXT: Radial and pedal pulses 2+ and symmetric. Without varicosities. No edema. MUSCSKEL: Good ROM X4 extremities. No deformity. NEURO: A/O X3. Calm and cooperative.       Past Medical History:   has a past medical history of Anemia, Anxiety, Asthma, CAD (coronary artery disease), Cerebral artery occlusion with cerebral infarction ([address] Depression, Graves disease, Hiatal hernia, Hx of blood clots, Hyperlipidemia, Hypertension, IBS (irritable bowel syndrome), Kidney disease, chronic, stage III (moderate, EGFR 30-59 ml/min) ([address] MI (myocardial infarction) ([address] Pneumonia, Prolonged emergence from general anesthesia, Sleep apnea, Thyroid disease, Urinary incontinence, UTI (urinary tract infection), Vocal cord dysfunction, and Weight loss. Surgical History:   has a past surgical history that includes Hand surgery (Left); Arm Surgery (Left); eye surgery; Colonoscopy; Cholecystectomy; Wrist fracture surgery (Right, 10/02/2020); Gastric fundoplication (2739); and Wrist Closed Reduction (Left, 05/06/2003). Social History:   reports that she has never smoked. She has never used smokeless tobacco. She reports that she does not drink alcohol and does not use drugs. Family History:   Family History   Problem Relation Age of Onset    Stroke Mother 61    Coronary Art Dis Father     Stroke Father     Lung Cancer Father     Heart Disease Father     Stroke Sister     Hypothyroidism Sister     Breast Cancer Sister     Heart Attack Paternal Grandmother         middle aged   Bushra Her Heart Attack Sister         in her 62s       HomeMedications:  Prior to Admission medications    Medication Sig Start Date End Date Taking?  Authorizing Provider   BRILINTA 90 MG TABS tablet TAKE 1 TABLET BY MOUTH TWICE A DAY 9/21/21  Yes Marie Mello MD   rosuvastatin (CRESTOR) 40 MG tablet TAKE 1 TABLET BY MOUTH EVERY DAY AT NIGHT 9/21/21  Yes Marie Mello MD   Flaxseed, Linseed, (FLAXSEED OIL MAX STR) 1300 MG CAPS Take 1 capsule by mouth daily   Yes Historical Provider, MD   Coenzyme Q10 (COQ10) 200 MG CAPS Take 1 capsule by mouth daily   Yes Historical Provider, MD   metoprolol succinate (TOPROL XL) 25 MG extended release tablet Take 0.5 tablets by mouth daily 8/31/21  Yes Marie Mello MD   levothyroxine (SYNTHROID) 100 MCG tablet Take 100 mcg by mouth Daily   Yes Historical Provider, MD   aspirin EC 81 MG EC tablet Take 81 mg by mouth daily   Yes Historical Provider, MD   famotidine (PEPCID) 40 MG tablet Take 1 tablet by mouth nightly 2/24/20  Yes Historical Provider, MD   vitamin D3 (CHOLECALCIFEROL) 25 MCG (1000 UT) TABS tablet Take 1 tablet by mouth daily   Yes Historical Provider, MD   fluticasone (FLONASE) 50 MCG/ACT nasal spray 1 spray by Nasal route nightly as needed   Yes Historical Provider, MD   nortriptyline (PAMELOR) 25 MG capsule Take 1 capsule by mouth nightly 9/17/20  Yes Historical Provider, MD   fexofenadine (ALLEGRA) 30 MG tablet Take 30 mg by mouth 2 times daily prn   Yes Historical Provider, MD   promethazine (PHENERGAN) 12.5 MG tablet Take 12.5 mg by mouth every 6 hours as needed for Nausea   Yes Historical Provider, MD   polyethyl glycol-propyl glycol 0.4-0.3 % (SYSTANE) 0.4-0.3 % ophthalmic solution 1 drop as needed for Dry Eyes (6 drops daily)   Yes Historical Provider, MD   cycloSPORINE (RESTASIS) 0.05 % ophthalmic emulsion Place 2 drops into the left eye 2 times daily prn   Yes Historical Provider, MD   solifenacin (VESICARE) 5 MG tablet Take 10 mg by mouth daily As needed   Yes Historical Provider, MD   Multiple Vitamins-Minerals (THERAPEUTIC MULTIVITAMIN-MINERALS) tablet Take 1 tablet by mouth daily   Yes Historical Provider, MD   vitamin B-12 (CYANOCOBALAMIN) 100 MCG tablet Take 50 mcg by mouth daily   Yes Historical Provider, MD   polyethylene glycol (GLYCOLAX) packet Take 17 g by mouth daily as needed for Constipation   Yes Historical Provider, MD        Allergies:  Augmentin [amoxicillin-pot clavulanate], Bactrim [sulfamethoxazole-trimethoprim], Carafate [sucralfate], Clindamycin/lincomycin, Hyoscyamine, Macrobid [nitrofurantoin], Oxybutynin, Pcn [penicillins], Pravastatin, Prevacid [lansoprazole], Prilosec [omeprazole], Sulfamethoxazole, and Trazodone and nefazodone       LABS: Results reviewed with patient today.     CBC:   Lab Results   Component Value Date    WBC 6.3 09/16/2021    WBC 5.7 09/15/2021    WBC 5.7 09/14/2021    RBC 3.13 09/16/2021    RBC 3.10 09/15/2021    RBC 3.24 09/14/2021    HGB 10.2 09/16/2021    HGB 10.5 09/15/2021    HGB 10.4 09/14/2021    HCT 30.2 09/16/2021    HCT 29.7 09/15/2021    HCT 30.9 09/14/2021    MCV 96.4 09/16/2021    MCV 95.6 09/15/2021    MCV 95.3 09/14/2021    RDW 18.3 09/16/2021    RDW 18.4 09/15/2021    RDW 18.8 09/14/2021     09/16/2021     09/15/2021     09/14/2021     BMP:  Lab Results   Component Value Date     09/16/2021     09/15/2021     09/14/2021    K 3.6 09/16/2021    K 4.0 09/15/2021    K 4.2 09/14/2021    K 4.5 09/12/2021    K 4.6 08/19/2021    K 5.4 08/18/2021     09/16/2021     09/15/2021     09/14/2021    CO2 18 09/16/2021    CO2 19 09/15/2021    CO2 19 09/14/2021    PHOS 3.5 08/30/2021    PHOS 3.4 08/29/2021    PHOS 2.9 08/28/2021    BUN 19 09/16/2021    BUN 27 09/15/2021    BUN 35 09/14/2021    CREATININE 1.7 09/16/2021    CREATININE 2.0 09/15/2021    CREATININE 2.5 09/14/2021     BNP:   Lab Results   Component Value Date    PROBNP 3,479 09/08/2021    PROBNP 1,506 08/18/2021       Parameters:   > 450 pg/mL under age 48  > 900 pg/mL ages 54-65  > 1800 pg/mL over age 76    Iron Studies:    Lab Results   Component Value Date    TIBC 252 08/22/2021    FERRITIN 253.6 08/22/2021     GLUCOSE: No results for input(s): GLUCOSE in the last 72 hours. LIVER PROFILE:   Lab Results   Component Value Date    AST 17 09/16/2021    ALT 10 09/16/2021    LIPASE 53.0 09/12/2021    LABALBU 2.7 09/16/2021    BILIDIR <0.2 09/12/2021    BILITOT 0.6 09/16/2021    ALKPHOS 112 09/16/2021     PT/INR:   Lab Results   Component Value Date    PROTIME 12.7 08/23/2021    INR 1.12 08/23/2021     Cardiac Enzymes:  Lab Results   Component Value Date    TROPONINI 0.03 09/12/2021     FASTING LIPID PANEL:  Lab Results   Component Value Date    CHOL 126 08/19/2021    HDL 58 08/19/2021    LDLCALC 58 08/19/2021    TRIG 52 08/19/2021       Cardiac Imaging: Reports reviewed with patient today.      EKG: ST HR  abn ST/T waves consistent with previous MI.    ECHO:   Echo 8/27/2021:  Overall left ventricular systolic function appears severely reduced with global hypokinesis and akinesis of the jeramy and inferoseptum. Ejection fraction is visually estimated to be 30%. Normal right ventricular size and function. There are no valvular structural abnormalities appreciated. Cath/PCI 8/24/21:  1.  95% ostial to proximal left anterior descending artery stenosis. There is 90% ostial circumflex disease.  These were heavily calcified  vessels.  They extended into the left main to the disease.  This  underwent rotational atherectomy with a 1.5-mm Rotablator jung followed  by Shockwave balloon angioplasty with a 3-mm balloon. Monica Gonzales then  underwent bifurcation stenting with a 3 mm stent in the LAD overlapped  by 3.5 mm [place] drug-eluting stent from the LAD into the left main  coronary artery.  We then did the T-stenting with some overlap from the  proximal circumflex into the left main.  We then performed kissing  balloon angioplasty resulting in 0% residual stenosis and excellent  flow. 2.  Successful removal of the Impella device from the left femoral  artery access site with balloon tamponade to control hemostasis.  We  also removed the pulmonary artery catheter that was present. Assessment/Plan:      1.) CAD s/p PCI to CX 2011, [facility] with ZAN/atherectomy to LAD, ostial CX and LM: No evidence of angina/ischemia. Continue GDMT. Holding ACEi/AA D/T JESSICA. Check BMP,BNP before restarting. DAPT: brilinta, asa  Beta Blocker: Toprol XL  ACEi/ARB:hold  Anti anginal:   Lipid management/high intensity statin: crestor  Risk factor management: high blood pressure, high cholesterol, Diabetes, smoking, obesity, family hx  Lifestyle modification: Heart healthy diet, regular exercise, weight loss, smoking cessation, stress reduction  Cardiac Rehab: Phase 2 after home PT/OT    2.) Acute systolic heart failure: EF 30%. Appears euvolemic without edema, SOB, wt gain, orthopnea. AC EI/AA held for JESSICA. Check BNP, BMP before restarting.    NYHA Class III   Stage C  Diuretic: none  Beta Blocker: Toprol XL  ACEi/ARB/ARNI: held  Aldosterone Antagonist: held  SGLT2 Inhibitor: none  2gm Na diet, daily weight, 64 oz fluid restriction  Avoid NSAIDS and other nephrotoxic meds  Cardiac Rehab: after PT/OT  ICD: after 3 mos GDMT    3.) JESSICA on CKD: Will check renal fx    4.) Sinus tachycardia: increase BB     Instructions: Follow up 6 weeks  Daily weight: Call for increase 3 lbs/day or 5 lbs/week. 2 gm sodium diet:  Fluid Restriction: 64 oz.       I appreciate the opportunity of cooperating in the care of this individual.    NEVA Jackson, APRN - CNP, CNP, 9/29/2021,2:51 PM

## 2021-09-29 NOTE — PATIENT INSTRUCTIONS
Instructions: Follow up 6 weeks  Daily weight: Call for increase 3 lbs/day or 5 lbs/week. 2 gm sodium diet:  Fluid Restriction: 64 oz.

## 2021-10-01 NOTE — TELEPHONE ENCOUNTER
Received call from Eriberto Andrews at [facility] with critical lab; Potassium of 2.9. patient was seen in office with Rolan Harvey on 9/29/21. Has a hx of CAD with stent in 2011, PE 2011, CVA, HTN, HLD, CKD3 anemia and hyperthyroid. Discussed with Dr. Zenaida Weinberg and he would like patient to have one dose of potassium 40meq and repeat BMP. Spoke with Mr. Aninka Hidalgo (okay per hippa) to inform of Dr. Omid Avitia recommendations and he v/u. Rx for potassium sent local [facility]. Order placed for BMP.

## 2021-10-07 NOTE — TELEPHONE ENCOUNTER
Restart lisinopril 2.5gm daily and spironolactone 12.5mg daily. Repeat labs 2 weeks and FU as scheduled.

## 2021-10-07 NOTE — TELEPHONE ENCOUNTER
Please see Xena's recommendations and notify the patient. Orders have been placed for the labs and the medications.

## 2021-10-12 NOTE — TELEPHONE ENCOUNTER
Please just verify that the order was faxed to  and results will get faxed to [facility].

## 2021-10-12 NOTE — TELEPHONE ENCOUNTER
Wife called back with numbers from  lab. Fax: [phone]  Phone: [phone]  Attempted to call for results.  transferred my call to lab.   Left message to call our office with results of BMP & BMP.

## 2021-10-12 NOTE — TELEPHONE ENCOUNTER
Faxed lab orders for BMP & BMP to  [phone] with request to fax results to [phone]. Fax confirmation rec'd.

## 2021-10-13 NOTE — TELEPHONE ENCOUNTER
Xena BOSCH FYI:    Called  today since wife could not remember if or when she had lab work. Salas Johns said that on 10/7/21  Xena BOSCH told him to have lab work repeated in two weeks. Patient will have lab work done next week. Lab order faxed again yesterday to [phone]. Phone number to lab to check on results is [phone].  will call us when he gets home so we are aware that it has be drawn.

## 2021-10-18 NOTE — PROGRESS NOTES
[facility]  Heart Failure    [address]  Phone: [phone]  Fax: [phone]      NAME: Jonas Long  MEDICAL RECORD NUMBER:  [id number]  AGE: 77 y.o. GENDER: female  : 1945  EPISODE DATE:  10/18/2021      Chief Complaint   Patient presents with    Congestive Heart Failure        Past Medical History:   Diagnosis Date    Anemia     Anxiety     Asthma     CAD (coronary artery disease)     s/p thrombectomy and ZAN to Circ     Cerebral artery occlusion with cerebral infarction ([facility] [address]     mini strokes, told by PCP, pt doesn't recall any symptoms. many years ago.  Depression     Graves disease     ablation 21    Hiatal hernia     Hx of blood clots     lungs after heart attack    Hyperlipidemia     Hypertension     IBS (irritable bowel syndrome)     w constipation and diarrhea. no bleeding    Kidney disease, chronic, stage III (moderate, EGFR 30-59 ml/min) ([facility])     MI (myocardial infarction) ([facility] [address] 2021    STEMI. PCI LAD, LCx    Pneumonia     aspiration pneumonitis ;  bronchitis, outpt abx    Prolonged emergence from general anesthesia     Sleep apnea     does not need to use cpap per MD due to weight loss    Thyroid disease     hyper thyroidism    Urinary incontinence     UTI (urinary tract infection) 2021    Vocal cord dysfunction     Weight loss     lost 50 lbs.  intentional. working out, intake reduction      Past Surgical History:   Procedure Laterality Date    ARM SURGERY Left     L shoulder replacement    CHOLECYSTECTOMY      COLONOSCOPY      EYE SURGERY      contaract    GASTRIC FUNDOPLICATION  3190    hiatal hernia repair    HAND SURGERY Left     WRIST CLOSED REDUCTION Left 2003    left wrist fracture    WRIST FRACTURE SURGERY Right 10/02/2020    OPEN REDUCTION INTERNAL FIXATION RIGHT WRIST performed by Edgard Corea MD [facility] History   Problem Relation Age of Onset    Stroke Mother 61    Coronary Art Dis Father     Stroke Father     Lung Cancer Father     Heart Disease Father     Stroke Sister     Hypothyroidism Sister     Breast Cancer Sister     Heart Attack Paternal Grandmother         middle aged   [facility] Heart Attack Sister         in her 62s     Social History     Tobacco Use    Smoking status: Never Smoker    Smokeless tobacco: Never Used   Vaping Use    Vaping Use: Never used   Substance Use Topics    Alcohol use: No     Comment: rare glass of wine    Drug use: No     Counseling given: Not Answered     Immunization History   Administered Date(s) Administered    COVID-19, Pfizer, PF, 30mcg/0.3mL 02/23/2021, 03/16/2021     Allergies   Allergen Reactions    Augmentin [Amoxicillin-Pot Clavulanate]     Bactrim [Sulfamethoxazole-Trimethoprim]     Carafate [Sucralfate]     Clindamycin/Lincomycin Nausea Only     Thinks if she has a smaller dose she would be fine      Hyoscyamine     Macrobid [Nitrofurantoin]     Oxybutynin     Pcn [Penicillins]     Pravastatin     Prevacid [Lansoprazole]     Prilosec [Omeprazole]     Sulfamethoxazole     Trazodone And Nefazodone         ECHO EF%: 30%  Date: 8/27/2021      Last Hospitalization: 9/12/2021    History of GAURAV: Denies  []BIPAP/CPAP use:   []Education about proper cleaning completed today     Subjective   HPI: Ms. Dallas Rogers is a 77 y.o. female being evaluated by a virtual visit via telephone encounter from patient's home due to [facility]- public health emergency. Due to the circumstances physical examination is very limited. This visit was conducted with the patient's consent for billing of her insurance. A telephone visit was necessary today to reduce the patient's exposure to COVID-19 and to provide necessary medical care.   This patient has been advised to contact this office, her cardiologist, or primary care physician if symptoms develop or to call 911 for emergency medical treatment if deemed necessary. She has a medical history significant for CAD, STEMI, DM 2, HTN, and CHF. Tells me that she is doing \"good\". Is having a difficult time with her diet because her dentures do not fit well and she is having a hard time chewing. Tells me that she loves solid but she has been unable to eat it. She has physical therapy coming to the home and her  as well as her son and his family lives in the home with them to provide assistance as needed. She is not weighing herself daily but tells me she has been consistently 115 pounds which is approximately 10 pounds under her discharge weight. She tells me that she has a poor appetite and does not know what to eat so eats very little throughout the day. She is also very closely watching her sodium and consumes less than 64 ounces of fluid each day. Her legs have been weak since she was discharged from the hospital but tells me it is very hard to sit around because she is used to being up and moving. Reports being very active prior to hospitalization. We discussed the importance of adequate protein intake to maintain strong muscles. I recommend natural organic creamy peanut butter for her which will provide protein and calories with less sodium. She does have shortness of breath with exertion and some fatigue since hospital discharge but nothing acute. Denies edema, CP, palpitations, orthopnea, or abdominal fullness. Her last A1c was 5.8 resulted in August 2021. Last follow-up with cardiology on 9/29/2021. Review of Systems:   Constitutional: + fatigue, Negative for activity change, appetite change, chills, diaphoresis, fever and unexpected weight change. HENT: Negative for congestion, ear pain, postnasal drip, rhinorrhea, sinus pressure, sinus pain, sneezing, sore throat and trouble swallowing. Eyes: Negative for discharge and redness.    Respiratory: Negative for cough, chest tightness, sputum, shortness of breath and wheezing. Cardiovascular: Negative for chest pain, palpitations and leg swelling. Gastrointestinal: Negative for abdominal distention, abdominal pain, constipation, diarrhea, nausea and vomiting. Genitourinary: Negative for decreased urine volume, difficulty urinating, dysuria and hematuria. Skin: Negative for pallor and rash. Neurological: Negative for dizziness, tremors, weakness, light-headedness and headaches. Psychiatric/Behavioral: Negative for confusion. The patient is not nervous/anxious. Sleep: Negative for restless sleep, snoring, frequent waking       Functional Activity New York Heart Association Classification  Patient Symptoms   []   Class I No limitation of physical activity. Ordinary physical activity does not cause undue fatigue, palpitation, dyspnea (shortness of breath). [x]   Class II Slight limitation of physical activity. Comfortable at rest. Ordinary physical activity results in fatigue, palpitation, dyspnea (shortness of breath). []   Class III  Gray limitation of physical activity. Comfortable at rest.  Less than ordinary activity causes fatigue, palpitation, dyspnea. []   Class IV Unable to carry on any physical activity without discomfort. Symptoms of heart failure at rest.  If any physical activity is undertaken, discomfort increases. Shortness of Breath:   []   None   [x]   Dyspnea on exertion   []   Dyspnea with normal activities  []   Dyspnea at rest  []   Dyspnea while sleeping    Patient Findings:   []  Missed doses  []  Diet changes  []  Sodium intake changes    []  Alcohol intake changes  []  Night time cough  []  Edema    []  Activity changes   [x]  Fatigue that limits activity []  Acute illness  []  Early saiety, abd fullness []  Chest pain   []  Other        Objective: There were no vitals taken for this visit.   BP Readings from Last 3 Encounters:   09/29/21 112/64   09/16/21 121/69   09/08/21 120/64     Wt Readings from Last 6 Encounters: 09/29/21 125 lb (56.7 kg)   09/16/21 136 lb 0.4 oz (61.7 kg)   09/08/21 132 lb 12.8 oz (60.2 kg)   08/30/21 141 lb 1.5 oz (64 kg)   01/13/21 150 lb (68 kg)   11/25/20 150 lb (68 kg)     Physical Exam:   Constitutional: Oriented to person, place, and time. No distress detected. Psychiatric: Normal mood and affect.      BMP:   Lab Results   Component Value Date     09/16/2021    K 3.6 09/16/2021    K 4.5 09/12/2021     09/16/2021    CO2 18 09/16/2021    BUN 19 09/16/2021    CREATININE 1.7 09/16/2021       CBC:    Lab Results   Component Value Date    WBC 6.3 09/16/2021    HGB 10.2 09/16/2021    HCT 30.2 09/16/2021     09/16/2021     HgA1C:   Lab Results   Component Value Date    LABA1C 5.8 08/19/2021     TSH: No results found for: TSH  Lipids:   Lab Results   Component Value Date    CHOL 126 08/19/2021    TRIG 52 08/19/2021    HDL 58 08/19/2021    LDLCALC 58 08/19/2021     ProBNP:   Lab Results   Component Value Date    PROBNP 3,479 09/08/2021    PROBNP 1,506 08/18/2021       [x]Reviewed daily weight log and assessed self management skills including early recognition and notification of exacerbation   [x]Encouraged daily weights and to call with increase of 3 pounds in one day or 5 pounds in one week or weight increased above dry weight    [x]Discussed fluid restriction and sodium restriction as well as nutrition goals   []Weight loss counseling performed, including carbohydrate and calorie reduction  []Exercise Counseling performed      Medications reviewed:   [x] compared patient's bottles with EPIC list  [] patient did not bring medication bottles      Current medications:  Outpatient Medications Marked as Taking for the 10/18/21 encounter (Virtual Visit) with ULYSSES Mason CNP   Medication Sig Dispense Refill    lisinopril (PRINIVIL;ZESTRIL) 2.5 MG tablet Take 1 tablet by mouth daily 90 tablet 3    spironolactone (ALDACTONE) 25 MG tablet Take 0.5 tablets by mouth daily 45 tablet 3  metoprolol succinate (TOPROL XL) 25 MG extended release tablet Take 1 tablet by mouth daily 90 tablet 3    atorvastatin (LIPITOR) 40 MG tablet Take 1 tablet by mouth daily 90 tablet 3    BRILINTA 90 MG TABS tablet TAKE 1 TABLET BY MOUTH TWICE A DAY 30 tablet 5    Flaxseed, Linseed, (FLAXSEED OIL MAX STR) 1300 MG CAPS Take 1 capsule by mouth daily      Coenzyme Q10 (COQ10) 200 MG CAPS Take 1 capsule by mouth daily      levothyroxine (SYNTHROID) 100 MCG tablet Take 100 mcg by mouth Daily      aspirin EC 81 MG EC tablet Take 81 mg by mouth daily      famotidine (PEPCID) 40 MG tablet Take 1 tablet by mouth nightly      vitamin D3 (CHOLECALCIFEROL) 25 MCG (1000 UT) TABS tablet Take 1 tablet by mouth daily      fluticasone (FLONASE) 50 MCG/ACT nasal spray 1 spray by Nasal route nightly as needed      nortriptyline (PAMELOR) 25 MG capsule Take 1 capsule by mouth nightly      fexofenadine (ALLEGRA) 30 MG tablet Take 30 mg by mouth 2 times daily prn      promethazine (PHENERGAN) 12.5 MG tablet Take 12.5 mg by mouth every 6 hours as needed for Nausea      polyethyl glycol-propyl glycol 0.4-0.3 % (SYSTANE) 0.4-0.3 % ophthalmic solution 1 drop as needed for Dry Eyes (6 drops daily)      cycloSPORINE (RESTASIS) 0.05 % ophthalmic emulsion Place 2 drops into the left eye 2 times daily prn      solifenacin (VESICARE) 5 MG tablet Take 10 mg by mouth daily As needed      Multiple Vitamins-Minerals (THERAPEUTIC MULTIVITAMIN-MINERALS) tablet Take 1 tablet by mouth daily      vitamin B-12 (CYANOCOBALAMIN) 100 MCG tablet Take 50 mcg by mouth daily      polyethylene glycol (GLYCOLAX) packet Take 17 g by mouth daily as needed for Constipation         [x]Reviewed heart failure medications including how they work and potential side effects. [x]Advised patient to avoid NSAIDs. Get With The Guidelines  Ms. Arias Romano is on a Beta-Blocker for (HFrEF) (systolic) EF </= 94%  Yes    Ms. Arias Romano is on an Ace-Inhibitor / ARB / Entresto for (HFrEF) (systolic) EF </= 96% Yes      Ms. Arias Romano is on a Aldosterone Receptor Antagonist for (HFrEF) (systolic) EF </= 31% or EF </= 40% with MI (Okay to use if SCr </= 2.5mg/dL in men, SCr </= 2mg/dL in women; Potassium < 5.0meq/L) No      Ms. Arias Romano is on a Diuretic Yes    If the above guidelines are not met, reason documented above or recommendations made: Yes.       [] Advanced Directives completed and scanned  [x] Advanced Directives need to be addressed      Barriers to Adherence: Active attentive participant    Environmental Concerns: not applicable      Christy Valenzuela is a 77 y.o. female evaluated via telephone on 10/18/2021. Consent:  She and/or health care decision maker is aware that that she may receive a bill for this telephone service, depending on her insurance coverage, and has provided verbal consent to proceed: Yes      Documentation:  I communicated with the patient and/or health care decision maker about chronic systolic heart failure. Details of this discussion including any medical advice provided: See visit note      I affirm this is a Patient Initiated Episode with a Patient who has not had a related appointment within my department in the past 7 days or scheduled within the next 24 hours. Patient identification was verified at the start of the visit: Yes      Note: not billable if this call serves to triage the patient into an appointment for the relevant concern      ULYSSES PHILLIPS - CNP      A heart failure binder has been provided prior to discharge in addition to extensive education including the information noted above. Assessment/Plan     Problem List Items Addressed This Visit     CHF (congestive heart failure) ([facility] [address] - Primary     Symptoms of acute exacerbation today. Strongly encourage daily weight. Continue sodium and fluid restriction. Emergency action plan reviewed today with patient.   Continue management and follow-up with cardiology as scheduled. No orders of the defined types were placed in this encounter. Follow up with wellness center: Refuses follow-up at this time  Time spent in visit today including counseling and education: 45 minutes. *This note was transcribed using [employer]. Please disregard any translational errors.          Electronically signed by ULYSSES Friedman CNP on 11/3/2021 at 1:22 PM

## 2021-11-03 NOTE — ASSESSMENT & PLAN NOTE
Symptoms of acute exacerbation today. Strongly encourage daily weight. Continue sodium and fluid restriction. Emergency action plan reviewed today with patient. Continue management and follow-up with cardiology as scheduled.

## 2021-11-11 NOTE — TELEPHONE ENCOUNTER
Pt's Brilinta is going to cost over 400$ a month. Requests samples        Medication Samples    Medication:BRILINTA      Dosage of the medication:90 MG TABS tablet       How are you taking this medication (QD, BID, TID, QID, PRN):  TAKE 1 TABLET BY MOUTH TWICE A DAY     in the office or Mail to your home?    in office

## 2021-11-11 NOTE — TELEPHONE ENCOUNTER
Prepared samples of Brilinta 90 mg tablets. Eight bottles of 8 tablets. Total of 64. Called patient and made aware. Patient said that her  will  samples at [facility] office.

## 2021-11-18 NOTE — PROGRESS NOTES
From: Eileen Christianson  To: Rashaun Milian MD  Sent: 11/17/2021 12:16 PM CST  Subject: Refill     Hello,  I have been messaging since November 11th to get a refill on my Ambien. I have gotten no answer.  Can someone please call in my refill of Ambien to my pharmac Late entries due to patient care: At [phone], report was received from [address] [facility]. At [address] [facility], patient was assisted to the bedside commode for a bowel movement. Patient needed small amount of disimpaction from this RN to pass bowel movement. Hard brown a medium amount of stool noted. Patient was then assisted x1 to the chair. At [phone], patient was ambulated from chair to the door with a walker x1. Patient tolerated fair but did report some mild lightheadedness with ambulation. Patient resting in chair. Plan for transfer to [facility]. At [phone], report was given to [facility] on [address] [facility]. States no further questions at this time. At 1520, but was transferred via wheelchair to [facility] in no apparent state of distress. Left with all of her belongings.

## 2021-11-29 NOTE — PROGRESS NOTES
[facility], [address] [place] [address] [address] [facility] PhoenixBrad [place] [address]  [phone]    PrimaryCare Doctor:  Tere Andres MD  Primary Cardiologist: Dr. Merle Strong    Chief Complaint   Patient presents with    Follow-up     fatigue          History of Present Illness:  Kristie Posadas is a 77 y.o. female with PMH CAD s/p PCI to CX 2011, IBS, PE 2011, CVA, hyperthyroid, HTN, HLP, graves , [place]- Dr. Martin Davis, anemia. Patient was admitted to [facility] 8/18-8/2/2021 with acute anterior MI and cardiogenic and liver shock. Taken to cath lab and had a subtotal occlusion of her ostial LAD and a 99% Circumflex artery. Dr. Merle Strong placed an Impella and performed PTCA to the ostia of the LAD and Circ restoring FLACO 3 flow. Developed JESSICA on CKD (Neph following) and resp failure. Originally planned for CABG but had increased risk with thrombocytopenia and anemia. Opted for repeat PCI on 8/24>see below. Started on Dig for tachycardia. Hem/Onc> thrombocytopenia thought to be from impella- improved when removed. Re admitted to [facility] with hypoglycemia and N/V. Found to have JESSICA (creat 3.9) 9/12-9/16/2021. Treated with IVF and given lokelma. Creat improved to 1.7. Dig, lisinopril, spironolactone held on discharge. Patient presents to [facility] cardiology for follow up for CAD/MI. Accompanied by her . She mostly C/O lack of energy and fatigue. Improves with rest.  She completed PT/OT. Denies CP, SOB, LH, dizziness, palpitations, syncope. Attempted GDMT- lisinopril - stopped by PCP for lwo BP. She is following up routinely with PCP and Neph. Daily weight - home today 131 lbs - stable, no wt gain/loss. Review of Systems:   General: Denies fever, chills  Skin: Denies skin changes, rash, itching, lesions.   HEENT: Denies headache, dizziness, vision changes, nosebleeds, sore throat, nasal drainage  RESP: Denies cough, sputum, dyspnea, wheeze, snoring  CARD: Denies palpitations, murmur  GI:Denies nausea, vomiting, heartburn, loss of appetite, change in bowels  : Denies frequency, pain, incontinence, polyuria  VASC: Denies claudication, leg cramps, clots  MUSC/SKEL: Denies pain, stiffness, arthritis  PSYCH: Denies anxiety, depression, stress  NEURO: Denies numbness, tingling, weakness,change in mood or memory  HEME: Denies abn bruising, bleeding, anemia  ENDO: Denies intolerance to heat, cold, excessive thirst or hunger, hx thyroid disease    BP 98/60   Pulse 85   Ht 5' (1.524 m)   Wt 117 lb 12.8 oz (53.4 kg)   BMI 23.01 kg/m²   Wt Readings from Last 3 Encounters:   11/29/21 117 lb 12.8 oz (53.4 kg)   09/29/21 125 lb (56.7 kg)   09/16/21 136 lb 0.4 oz (61.7 kg)       Physical Exam:  GEN: Appears frail, no acute distress  SKIN: Pink, warm, dry. Nails without clubbing. HEENT: PERRLA. Normocephalic, atraumatic. Neck supple. No adenopathy. LUNG: AP diameter normal. Clear bilateral. No wheeze, rales, or ronchi. Respiratory effort normal.  HEART: S1S2 A/R. No JVD. No carotid bruit. No murmur, rub or gallop. ABD: Soft, nontender. +BS X 4 quads. No hepatomegaly. EXT: Radial and pedal pulses 2+ and symmetric. Without varicosities. No edema. MUSCSKEL: Good ROM X4 extremities. No deformity. NEURO: A/O X3. Calm and cooperative. Past Medical History:   has a past medical history of Anemia, Anxiety, Asthma, CAD (coronary artery disease), Cerebral artery occlusion with cerebral infarction ([facility] [address] Depression, Graves disease, Hiatal hernia, Hx of blood clots, Hyperlipidemia, Hypertension, IBS (irritable bowel syndrome), Kidney disease, chronic, stage III (moderate, EGFR 30-59 ml/min) ([address] MI (myocardial infarction) ([facility] [address] Pneumonia, Prolonged emergence from general anesthesia, Sleep apnea, Thyroid disease, Urinary incontinence, UTI (urinary tract infection), Vocal cord dysfunction, and Weight loss. Surgical History:   has a past surgical history that includes Hand surgery (Left);  Arm Surgery (Left); eye surgery; Colonoscopy; Cholecystectomy; Wrist fracture surgery (Right, 10/02/2020); Gastric fundoplication (3079); and Wrist Closed Reduction (Left, 05/06/2003). Social History:   reports that she has never smoked. She has never used smokeless tobacco. She reports that she does not drink alcohol and does not use drugs. Family History:   Family History   Problem Relation Age of Onset    Stroke Mother 61    Coronary Art Dis Father     Stroke Father     Lung Cancer Father     Heart Disease Father     Stroke Sister     Hypothyroidism Sister     Breast Cancer Sister     Heart Attack Paternal Grandmother         middle aged   [facility] [address] Heart Attack Sister         in her 62s       HomeMedications:  Prior to Admission medications    Medication Sig Start Date End Date Taking? Authorizing Provider   rosuvastatin (CRESTOR) 40 MG tablet Take 40 mg by mouth every evening Patient is currently on Crestor and will start Lipitor when she finishes medication   Yes Historical Provider, MD   spironolactone (ALDACTONE) 25 MG tablet Take 0.5 tablets by mouth daily 10/7/21  Yes ULYSSES Everett CNP   potassium chloride (KLOR-CON M) 20 MEQ extended release tablet Take 2 tablets by mouth once for 1 dose 10/1/21 11/29/21 Yes Millie Holguin MD   metoprolol succinate (TOPROL XL) 25 MG extended release tablet Take 1 tablet by mouth daily 9/29/21  Yes ULYSSES Silva CNP   atorvastatin (LIPITOR) 40 MG tablet Take 1 tablet by mouth daily  Patient taking differently: Take 40 mg by mouth daily Patient will start when she finishes her Crestor.  9/29/21  Yes ULYSSES Silva CNP   BRILINTA 90 MG TABS tablet TAKE 1 TABLET BY MOUTH TWICE A DAY 9/21/21  Yes Julee Vargas MD   Flaxseed, Linseed, (FLAXSEED OIL MAX STR) 1300 MG CAPS Take 1 capsule by mouth daily   Yes Historical Provider, MD   Coenzyme Q10 (COQ10) 200 MG CAPS Take 1 capsule by mouth daily   Yes Historical Provider, MD   levothyroxine (SYNTHROID) 100 MCG tablet Take 88 mcg by mouth Daily    Yes Historical Provider, MD   aspirin EC 81 MG EC tablet Take 81 mg by mouth daily   Yes Historical Provider, MD   famotidine (PEPCID) 40 MG tablet Take 1 tablet by mouth nightly 2/24/20  Yes Historical Provider, MD   vitamin D3 (CHOLECALCIFEROL) 25 MCG (1000 UT) TABS tablet Take 1 tablet by mouth daily   Yes Historical Provider, MD   fluticasone (FLONASE) 50 MCG/ACT nasal spray 1 spray by Nasal route nightly as needed   Yes Historical Provider, MD   nortriptyline (PAMELOR) 25 MG capsule Take 1 capsule by mouth nightly 9/17/20  Yes Historical Provider, MD   fexofenadine (ALLEGRA) 30 MG tablet Take 30 mg by mouth 2 times daily prn   Yes Historical Provider, MD   promethazine (PHENERGAN) 12.5 MG tablet Take 12.5 mg by mouth every 6 hours as needed for Nausea   Yes Historical Provider, MD   polyethyl glycol-propyl glycol 0.4-0.3 % (SYSTANE) 0.4-0.3 % ophthalmic solution 1 drop as needed for Dry Eyes (6 drops daily)   Yes Historical Provider, MD   cycloSPORINE (RESTASIS) 0.05 % ophthalmic emulsion Place 2 drops into the left eye 2 times daily prn   Yes Historical Provider, MD   solifenacin (VESICARE) 5 MG tablet Take 10 mg by mouth daily As needed   Yes Historical Provider, MD   Multiple Vitamins-Minerals (THERAPEUTIC MULTIVITAMIN-MINERALS) tablet Take 1 tablet by mouth daily   Yes Historical Provider, MD   vitamin B-12 (CYANOCOBALAMIN) 100 MCG tablet Take 50 mcg by mouth daily   Yes Historical Provider, MD   polyethylene glycol (GLYCOLAX) packet Take 17 g by mouth daily as needed for Constipation   Yes Historical Provider, MD        Allergies:  Augmentin [amoxicillin-pot clavulanate], Bactrim [sulfamethoxazole-trimethoprim], Carafate [sucralfate], Clindamycin/lincomycin, Hyoscyamine, Macrobid [nitrofurantoin], Oxybutynin, Pcn [penicillins], Pravastatin, Prevacid [lansoprazole], Prilosec [omeprazole], Sulfamethoxazole, and Trazodone and nefazodone       LABS: Results reviewed with patient today. CBC:   Lab Results   Component Value Date    WBC 6.3 09/16/2021    WBC 5.7 09/15/2021    WBC 5.7 09/14/2021    RBC 3.13 09/16/2021    RBC 3.10 09/15/2021    RBC 3.24 09/14/2021    HGB 10.2 09/16/2021    HGB 10.5 09/15/2021    HGB 10.4 09/14/2021    HCT 30.2 09/16/2021    HCT 29.7 09/15/2021    HCT 30.9 09/14/2021    MCV 96.4 09/16/2021    MCV 95.6 09/15/2021    MCV 95.3 09/14/2021    RDW 18.3 09/16/2021    RDW 18.4 09/15/2021    RDW 18.8 09/14/2021     09/16/2021     09/15/2021     09/14/2021     11/10/2021 UC  WBC 9.6, RBC 3.38, Hgb 10.9, Hct 32.8, Platelets 985    BMP:  Lab Results   Component Value Date     09/16/2021     09/15/2021     09/14/2021    K 3.6 09/16/2021    K 4.0 09/15/2021    K 4.2 09/14/2021    K 4.5 09/12/2021    K 4.6 08/19/2021    K 5.4 08/18/2021     09/16/2021     09/15/2021     09/14/2021    CO2 18 09/16/2021    CO2 19 09/15/2021    CO2 19 09/14/2021    PHOS 3.5 08/30/2021    PHOS 3.4 08/29/2021    PHOS 2.9 08/28/2021    BUN 19 09/16/2021    BUN 27 09/15/2021    BUN 35 09/14/2021    CREATININE 1.7 09/16/2021    CREATININE 2.0 09/15/2021    CREATININE 2.5 09/14/2021     11/10/2021  UC   K 3.6 Chl 98 CO2 26 AG 13 BUN 22 Creat 1.73 Glucose 139 Calc 10.5    BNP:   Lab Results   Component Value Date    PROBNP 3,479 09/08/2021    PROBNP 1,506 08/18/2021    BNP 10/20/2021 UC  110    Parameters:   > 450 pg/mL under age 48  > 900 pg/mL ages 54-65  > 1800 pg/mL over age 76    Iron Studies:    Lab Results   Component Value Date    TIBC 252 08/22/2021    FERRITIN 253.6 08/22/2021     GLUCOSE: No results for input(s): GLUCOSE in the last 72 hours.    HGB A1c 11/10/2021 5.0    LIVER PROFILE:   Lab Results   Component Value Date    AST 17 09/16/2021    ALT 10 09/16/2021    LIPASE 53.0 09/12/2021    LABALBU 2.7 09/16/2021    BILIDIR <0.2 09/12/2021    BILITOT 0.6 09/16/2021    ALKPHOS 112 09/16/2021 Liver profile  11/10/2021 -WNL see Care Everywhere    PT/INR:   Lab Results   Component Value Date    PROTIME 12.7 08/23/2021    INR 1.12 08/23/2021     Cardiac Enzymes:  Lab Results   Component Value Date    TROPONINI 0.03 09/12/2021     FASTING LIPID PANEL:  Lab Results   Component Value Date    CHOL 126 08/19/2021    HDL 58 08/19/2021    LDLCALC 58 08/19/2021    TRIG 52 08/19/2021       Cardiac Imaging: Reports reviewed with patient today. EKG: ST HR  abn ST/T waves consistent with previous MI.    ECHO:   Echo 8/27/2021:  Overall left ventricular systolic function appears severely reduced with global hypokinesis and akinesis of the jeramy and inferoseptum. Ejection fraction is visually estimated to be 30%. Normal right ventricular size and function. There are no valvular structural abnormalities appreciated. Cath/PCI 8/24/21:  1.  95% ostial to proximal left anterior descending artery stenosis. There is 90% ostial circumflex disease.  These were heavily calcified  vessels.  They extended into the left main to the disease.  This  underwent rotational atherectomy with a 1.5-mm Rotablator jung followed  by Shockwave balloon angioplasty with a 3-mm balloon. Mary Dillard then  underwent bifurcation stenting with a 3 mm stent in the LAD overlapped  by 3.5 mm [place] drug-eluting stent from the LAD into the left main  coronary artery.  We then did the T-stenting with some overlap from the  proximal circumflex into the left main.  We then performed kissing  balloon angioplasty resulting in 0% residual stenosis and excellent  flow. 2.  Successful removal of the Impella device from the left femoral  artery access site with balloon tamponade to control hemostasis.  We  also removed the pulmonary artery catheter that was present. Assessment/Plan:      1.) CAD s/p PCI to CX 2011, [facility] with ZAN/atherectomy to LAD, ostial CX and LM: No evidence of angina/ischemia. Continue GDMT. Holding ACEi D/T JESSICA/hypotension. DAPT: brilinta, asa  Beta Blocker: Toprol XL  ACEi/ARB:hold  Anti anginal:   Lipid management/high intensity statin: crestor  Risk factor management: high blood pressure, high cholesterol, Diabetes, smoking, obesity, family hx  Lifestyle modification: Heart healthy diet, regular exercise, weight loss, smoking cessation, stress reduction  Cardiac Rehab: Phase 2- sent referral today  Has many questions regarding diet,     2.)Chronic systolic heart failure: EF 30%. Appears euvolemic without edema, SOB, wt gain, orthopnea. ACEI held by PCP for hypotension. Will add midodrine and restart ACEi and other GDMT when BP improved. NYHA Class III   Stage C  Diuretic: none  Beta Blocker: Toprol XL  ACEi/ARB/ARNI: stopped by Dr. Kenan Ha for hypotension  Aldosterone Antagonist: spironolactone  SGLT2 Inhibitor: none  2gm Na diet, daily weight, 64 oz fluid restriction  Avoid NSAIDS and other nephrotoxic meds  Cardiac Rehab: after PT/OT  ICD: after 3 mos GDMT    3.) JESSICA on CKD: Follow with Neph    4.) Hypotension:   Add midodrine      Instructions: Follow up 4-6 weeks  Daily weight: Call for increase 3 lbs/day or 5 lbs/week. 2 gm sodium diet:  Fluid Restriction: 64 oz.     I appreciate the opportunity of cooperating in the care of this individual.    NEVA Shoemaker, ULYSSES - CNP, CNP, 11/29/2021,11:40 AM

## 2021-12-02 NOTE — TELEPHONE ENCOUNTER
Outbound call from outpatient wellness center following up on referral from [facility]. Patient request to be scheduled after New Year's Day.   Virtual visit scheduled for Monday, January 3, 2022 at 10:15 AM.

## 2021-12-09 NOTE — TELEPHONE ENCOUNTER
Medication Samples    Medication:  Brilinta    Dosage of the medication:  90 mg tablets    How are you taking this medication (QD, BID, TID, QID, PRN):  TAKE 1 TABLET BY MOUTH TWICE A DAY     in the office or Mail to your home?      in office

## 2021-12-15 NOTE — PATIENT INSTRUCTIONS
Patient Education        Learning About Heart Failure Zones  What are heart failure zones? Heart failure zones give you an easy way to see changes in your heart failure symptoms. They also tell you when you need to get help. Check every day to see which zone you are in. Green zone. You are doing well. This is where you want to be. · Your weight is stable. It's not going up or down. · You breathe easily. · You are sleeping well. You are able to lie flat without shortness of breath. · You can do your usual activities. Yellow zone. Be careful. Your symptoms are changing. Call your doctor. · You have new or increased shortness of breath. · You are dizzy or lightheaded, or you feel like you may faint. · You have sudden weight gain, such as more than 2 to 3 pounds in a day or 5 pounds in a week. (Your doctor may suggest a different range of weight gain.)  · You have increased swelling in your legs, ankles, or feet. · You are so tired or weak that you can't do your usual activities. · You are not sleeping well. Shortness of breath wakes you up at night. You need extra pillows. Red zone. This is an emergency. Call 911. You have symptoms of sudden heart failure. For example:  · You have severe trouble breathing. · You cough up pink, foamy mucus. · You have a new irregular or fast heartbeat. You have symptoms of a heart attack. These may include:  · Chest pain or pressure, or a strange feeling in the chest.  · Sweating. · Shortness of breath. · Nausea or vomiting. · Pain, pressure, or a strange feeling in the back, neck, jaw, or upper belly or in one or both shoulders or arms. · Lightheadedness or sudden weakness. · A fast or irregular heartbeat. If you have symptoms of a heart attack: After you call 911, the  may tell you to chew 1 adult-strength or 2 to 4 low-dose aspirin. Wait for an ambulance. Do not try to drive yourself. Follow-up care is a key part of your treatment and safety.  Be sure to make and go to all appointments, and call your doctor if you are having problems. It's also a good idea to know your test results and keep a list of the medicines you take. Where can you learn more? Go to https://chakil.[employer]. org and sign in to your [employer] account. Enter T174 in the [facility] box to learn more about \"Learning About Heart Failure Zones. \"     If you do not have an account, please click on the \"Sign Up Now\" link. Current as of: April 29, 2021               Content Version: 13.0  © 6829-7478 Healthwise, Incorporated. Care instructions adapted under license by [facility] ([facility]). If you have questions about a medical condition or this instruction, always ask your healthcare professional. [address] any warranty or liability for your use of this information.

## 2021-12-15 NOTE — PROGRESS NOTES
The [address] [place] [address], [address] [place] [address] [address] [facility], [place] [address]  [phone]    PrimaryCare Doctor:  Annette Dillard MD  Primary Cardiologist: Dr. Palomo Grigsby    Chief Complaint   Patient presents with    Follow-up     No cc       History of Present Illness:  Ashlie Lara is a 77 y.o. female with PMH CAD s/p PCI to CX 2011, IBS, PE 2011, CVA, hyperthyroid, HTN, HLP, graves , [place]- Dr. Edita Braun, anemia. Patient was admitted to [facility] 8/18-8/2/2021 with acute anterior MI and cardiogenic and liver shock. Taken to cath lab and had a subtotal occlusion of her ostial LAD and a 99% Circumflex artery. Dr. Palomo Grigsby placed an Impella and performed PTCA to the ostia of the LAD and Circ restoring FLACO 3 flow. Developed JESSICA on CKD (Neph following) and resp failure. Originally planned for CABG but had increased risk with thrombocytopenia and anemia. Opted for repeat PCI on 8/24>see below. Started on Dig for tachycardia. Hem/Onc> thrombocytopenia thought to be from impella- improved when removed. Re admitted to [facility] with hypoglycemia and N/V. Found to have JESSICA (creat 3.9) 9/12-9/16/2021. Treated with IVF and given lokelma. Creat improved to 1.7. Dig, lisinopril, spironolactone held on discharge. Patient presents to [facility] cardiology for follow up for CAD/MI. Accompanied by her . She was recently started on midodrine. She is here today so she could be seen before the holidays. Today she reports that her energy is improving. She has been fairly active at home. Denies CP, SOB, LH, dizziness, palpitations, syncope. Attempted GDMT- lisinopril - stopped by PCP for lwo BP. She is following up routinely with PCP and Neph. Daily weight stable stable, no wt gain/loss. Plan is to start cardiac rehab after [place]. Review of Systems:   General: Denies fever, chills  Skin: Denies skin changes, rash, itching, lesions.   HEENT: Denies headache, dizziness, vision changes, nosebleeds, sore throat, nasal drainage  RESP: Denies cough, sputum, dyspnea, wheeze, snoring  CARD: Denies palpitations,  murmur  GI:Denies nausea, vomiting, heartburn, loss of appetite, change in bowels  : Denies frequency, pain, incontinence, polyuria  VASC: Denies claudication, leg cramps, clots  MUSC/SKEL: Denies pain, stiffness, arthritis  PSYCH: Denies anxiety, depression, stress  NEURO: Denies numbness, tingling, weakness,change in mood or memory  HEME: Denies abn bruising, bleeding, anemia  ENDO: Denies intolerance to heat, cold, excessive thirst or hunger, hx thyroid disease    /70   Ht 5' (1.524 m)   Wt 114 lb (51.7 kg)   BMI 22.26 kg/m²   Wt Readings from Last 3 Encounters:   12/15/21 114 lb (51.7 kg)   11/29/21 117 lb 12.8 oz (53.4 kg)   09/29/21 125 lb (56.7 kg)       Physical Exam:  GEN: Appears frail, no acute distress  SKIN: Pink, warm, dry. Nails without clubbing. HEENT: PERRLA. Normocephalic, atraumatic. Neck supple. No adenopathy. LUNG: AP diameter normal. Clear bilateral. No wheeze, rales, or ronchi. Respiratory effort normal.  HEART: S1S2 A/R. No JVD. No carotid bruit. No murmur, rub or gallop. ABD: Soft, nontender. +BS X 4 quads. No hepatomegaly. EXT: Radial and pedal pulses 2+ and symmetric. Without varicosities. No edema. MUSCSKEL: Good ROM X4 extremities. No deformity. NEURO: A/O X3. Calm and cooperative.       Past Medical History:   has a past medical history of Anemia, Anxiety, Asthma, CAD (coronary artery disease), Cerebral artery occlusion with cerebral infarction ([address] Depression, Graves disease, Hiatal hernia, Hx of blood clots, Hyperlipidemia, Hypertension, IBS (irritable bowel syndrome), Kidney disease, chronic, stage III (moderate, EGFR 30-59 ml/min) ([address] MI (myocardial infarction) ([address] Pneumonia, Prolonged emergence from general anesthesia, Sleep apnea, Thyroid disease, Urinary incontinence, UTI (urinary tract infection), Vocal cord dysfunction, and Weight loss. Surgical History:   has a past surgical history that includes Hand surgery (Left); Arm Surgery (Left); eye surgery; Colonoscopy; Cholecystectomy; Wrist fracture surgery (Right, 10/02/2020); Gastric fundoplication (7555); and Wrist Closed Reduction (Left, 05/06/2003). Social History:   reports that she has never smoked. She has never used smokeless tobacco. She reports that she does not drink alcohol and does not use drugs. Family History:   Family History   Problem Relation Age of Onset    Stroke Mother 61    Coronary Art Dis Father     Stroke Father     Lung Cancer Father     Heart Disease Father     Stroke Sister     Hypothyroidism Sister     Breast Cancer Sister     Heart Attack Paternal Grandmother         middle aged   Dk Bark Heart Attack Sister         in her 62s       HomeMedications:  Prior to Admission medications    Medication Sig Start Date End Date Taking?  Authorizing Provider   rosuvastatin (CRESTOR) 40 MG tablet Take 40 mg by mouth every evening Patient is currently on Crestor and will start Lipitor when she finishes medication   Yes Historical Provider, MD   midodrine (PROAMATINE) 5 MG tablet Take 1 tablet by mouth 3 times daily 11/29/21  Yes ULYSSES Campoverde CNP   spironolactone (ALDACTONE) 25 MG tablet Take 0.5 tablets by mouth daily 10/7/21  Yes ULYSSES Campoverde CNP   metoprolol succinate (TOPROL XL) 25 MG extended release tablet Take 1 tablet by mouth daily 9/29/21  Yes ULYSSES Rodríguez CNP   BRILINTA 90 MG TABS tablet TAKE 1 TABLET BY MOUTH TWICE A DAY 9/21/21  Yes Regina Padilla MD   Flaxseed, Linseed, (FLAXSEED OIL MAX STR) 1300 MG CAPS Take 1 capsule by mouth daily   Yes Historical Provider, MD   Coenzyme Q10 (COQ10) 200 MG CAPS Take 1 capsule by mouth daily   Yes Historical Provider, MD   levothyroxine (SYNTHROID) 100 MCG tablet Take 88 mcg by mouth Daily    Yes Historical Provider, MD   aspirin EC 81 MG EC tablet Take 81 mg by mouth daily Oxybutynin, Pcn [penicillins], Pravastatin, Prevacid [lansoprazole], Prilosec [omeprazole], Sulfamethoxazole, and Trazodone and nefazodone       LABS: Results reviewed with patient today. CBC:   Lab Results   Component Value Date    WBC 6.3 09/16/2021    WBC 5.7 09/15/2021    WBC 5.7 09/14/2021    RBC 3.13 09/16/2021    RBC 3.10 09/15/2021    RBC 3.24 09/14/2021    HGB 10.2 09/16/2021    HGB 10.5 09/15/2021    HGB 10.4 09/14/2021    HCT 30.2 09/16/2021    HCT 29.7 09/15/2021    HCT 30.9 09/14/2021    MCV 96.4 09/16/2021    MCV 95.6 09/15/2021    MCV 95.3 09/14/2021    RDW 18.3 09/16/2021    RDW 18.4 09/15/2021    RDW 18.8 09/14/2021     09/16/2021     09/15/2021     09/14/2021     11/10/2021 UC  WBC 9.6, RBC 3.38, Hgb 10.9, Hct 32.8, Platelets 419    BMP:  Lab Results   Component Value Date     09/16/2021     09/15/2021     09/14/2021    K 3.6 09/16/2021    K 4.0 09/15/2021    K 4.2 09/14/2021    K 4.5 09/12/2021    K 4.6 08/19/2021    K 5.4 08/18/2021     09/16/2021     09/15/2021     09/14/2021    CO2 18 09/16/2021    CO2 19 09/15/2021    CO2 19 09/14/2021    PHOS 3.5 08/30/2021    PHOS 3.4 08/29/2021    PHOS 2.9 08/28/2021    BUN 19 09/16/2021    BUN 27 09/15/2021    BUN 35 09/14/2021    CREATININE 1.7 09/16/2021    CREATININE 2.0 09/15/2021    CREATININE 2.5 09/14/2021     11/10/2021  UC   K 3.6 Chl 98 CO2 26 AG 13 BUN 22 Creat 1.73 Glucose 139 Calc 10.5    BNP:   Lab Results   Component Value Date    PROBNP 3,479 09/08/2021    PROBNP 1,506 08/18/2021    BNP 10/20/2021 UC  110    Parameters:   > 450 pg/mL under age 48  > 900 pg/mL ages 54-65  > 1800 pg/mL over age 76    Iron Studies:    Lab Results   Component Value Date    TIBC 252 08/22/2021    FERRITIN 253.6 08/22/2021     GLUCOSE: No results for input(s): GLUCOSE in the last 72 hours.    HGB A1c 11/10/2021 5.0    LIVER PROFILE:   Lab Results   Component Value Date    AST 17 09/16/2021    ALT 10 09/16/2021    LIPASE 53.0 09/12/2021    LABALBU 2.7 09/16/2021    BILIDIR <0.2 09/12/2021    BILITOT 0.6 09/16/2021    ALKPHOS 112 09/16/2021      Liver profile  11/10/2021 -WNL see Care Everywhere    PT/INR:   Lab Results   Component Value Date    PROTIME 12.7 08/23/2021    INR 1.12 08/23/2021     Cardiac Enzymes:  Lab Results   Component Value Date    TROPONINI 0.03 09/12/2021     FASTING LIPID PANEL:  Lab Results   Component Value Date    CHOL 126 08/19/2021    HDL 58 08/19/2021    LDLCALC 58 08/19/2021    TRIG 52 08/19/2021       Cardiac Imaging: Reports reviewed with patient today. EKG: ST HR  abn ST/T waves consistent with previous MI.    ECHO:   Echo 8/27/2021:  Overall left ventricular systolic function appears severely reduced with global hypokinesis and akinesis of the jeramy and inferoseptum. Ejection fraction is visually estimated to be 30%. Normal right ventricular size and function. There are no valvular structural abnormalities appreciated. Cath/PCI 8/24/21:  1.  95% ostial to proximal left anterior descending artery stenosis. There is 90% ostial circumflex disease.  These were heavily calcified  vessels.  They extended into the left main to the disease.  This  underwent rotational atherectomy with a 1.5-mm Rotablator jung followed  by Shockwave balloon angioplasty with a 3-mm balloon. Maynor Sensing then  underwent bifurcation stenting with a 3 mm stent in the LAD overlapped  by 3.5 mm [place] drug-eluting stent from the LAD into the left main  coronary artery.  We then did the T-stenting with some overlap from the  proximal circumflex into the left main.  We then performed kissing  balloon angioplasty resulting in 0% residual stenosis and excellent  flow. 2.  Successful removal of the Impella device from the left femoral  artery access site with balloon tamponade to control hemostasis.  We  also removed the pulmonary artery catheter that was present.     Assessment/Plan:      1.) CAD s/p PCI to CX 2011, [facility] with ZAN/atherectomy to LAD, ostial CX and LM: Stable today, No evidence of angina/ischemia. Continue GDMT. Holding ACEi D/T JESSICA/hypotension. DAPT: brilinta, asa  Beta Blocker: Toprol XL  ACEi/ARB:hold  Anti anginal:   Lipid management/high intensity statin: lipitor  Risk factor management: high blood pressure, high cholesterol, Diabetes, smoking, obesity, family hx  Lifestyle modification: Heart healthy diet, regular exercise, weight loss, smoking cessation, stress reduction  Cardiac Rehab: Phase 2- plans to start after [place]    2.)Chronic systolic heart failure: EF 30%. Appears euvolemic without edema, SOB, wt gain, orthopnea. ACEI held by PCP for hypotension. She is only on spironolactone 12.5mg daily so I do not suspect she is hypovolemic. HER SBP is only 100 today. Will increase midodrine and restart ACEi and other GDMT when BP improved. NYHA Class III   Stage C  Diuretic: none  Beta Blocker: Toprol XL  ACEi/ARB/ARNI: stopped by Dr. Cecilia Yanes for hypotension  Aldosterone Antagonist: spironolactone  SGLT2 Inhibitor: none  2gm Na diet, daily weight, 64 oz fluid restriction  Avoid NSAIDS and other nephrotoxic meds  Cardiac Rehab: after PT/OT  ICD: after 3 mos GDMT    3.) JESSICA on CKD: Follow with Neph    4.) Hypotension:   Increase midodrine      Instructions:   See AVS  Daily weight: Call for increase 3 lbs/day or 5 lbs/week. 2 gm sodium diet:  Fluid Restriction: 64 oz.     I appreciate the opportunity of cooperating in the care of this individual.    NEVA Lucero, APRN - CNP, CNP, 12/15/2021,4:42 PM

## 2021-12-17 NOTE — TELEPHONE ENCOUNTER
Patient seen in office on 12/15 by Kaleigh Sarkar and KRISTOFER and BNP was ordered  Labs from  below. Sodium 133 - 146 mmol/L 139     Potassium 3.5 - 5.3 mmol/L 3.7  Hemolysis Present:  Results may be influenced artificially.  Recommend recollection as clinically indicated. Chloride 98 - 110 mmol/L 102     CO2 21 - 33 mmol/L 23     Anion Gap 3 - 16 mmol/L 14     BUN 7 - 25 mg/dL 18     Creatinine 0.60 - 1.30 mg/dL 1.90 High      Glucose 70 - 100 mg/dL 118 High      Calcium 8.6 - 10.3 mg/dL 10.8 High      Osmolality, Calculated 278 - 305 mOsm/kg 291     eGFR AA CKD-EPI See note.  29          Ref Range & Units 1 d ago Comments   BNP 0 - 100 pg/mL 169

## 2022-01-01 ENCOUNTER — APPOINTMENT (OUTPATIENT)
Dept: CARDIAC REHAB | Age: 77
End: 2022-01-01
Payer: MEDICARE

## 2022-01-01 ENCOUNTER — HOSPITAL ENCOUNTER (OUTPATIENT)
Dept: CARDIAC REHAB | Age: 77
Setting detail: THERAPIES SERIES
Discharge: HOME | End: 2022-01-18
Payer: MEDICARE

## 2022-01-01 ENCOUNTER — HOSPITAL ENCOUNTER (OUTPATIENT)
Dept: CARDIAC REHAB | Age: 77
Setting detail: THERAPIES SERIES
Discharge: HOME | End: 2022-02-08
Payer: MEDICARE

## 2022-01-01 ENCOUNTER — HOSPITAL ENCOUNTER (OUTPATIENT)
Dept: CARDIAC REHAB | Age: 77
Setting detail: THERAPIES SERIES
Discharge: HOME | End: 2022-02-24
Payer: MEDICARE

## 2022-01-01 ENCOUNTER — HOSPITAL ENCOUNTER (INPATIENT)
Age: 77
LOS: 4 days | DRG: 283 | End: 2022-03-29
Attending: EMERGENCY MEDICINE | Admitting: STUDENT IN AN ORGANIZED HEALTH CARE EDUCATION/TRAINING PROGRAM
Payer: MEDICARE

## 2022-01-01 ENCOUNTER — TELEPHONE (OUTPATIENT)
Dept: CARDIOLOGY CLINIC | Age: 77
End: 2022-01-01

## 2022-01-01 ENCOUNTER — APPOINTMENT (OUTPATIENT)
Dept: CT IMAGING | Age: 77
DRG: 283 | End: 2022-01-01
Payer: MEDICARE

## 2022-01-01 ENCOUNTER — HOSPITAL ENCOUNTER (OUTPATIENT)
Dept: CARDIAC REHAB | Age: 77
Setting detail: THERAPIES SERIES
Discharge: HOME | End: 2022-03-15
Payer: MEDICARE

## 2022-01-01 ENCOUNTER — HOSPITAL ENCOUNTER (OUTPATIENT)
Dept: CARDIAC REHAB | Age: 77
Setting detail: THERAPIES SERIES
Discharge: HOME | End: 2022-02-15
Payer: MEDICARE

## 2022-01-01 ENCOUNTER — HOSPITAL ENCOUNTER (OUTPATIENT)
Dept: CARDIAC REHAB | Age: 77
Setting detail: THERAPIES SERIES
Discharge: HOME | End: 2022-02-22
Payer: MEDICARE

## 2022-01-01 ENCOUNTER — HOSPITAL ENCOUNTER (OUTPATIENT)
Dept: CARDIAC REHAB | Age: 77
Setting detail: THERAPIES SERIES
Discharge: HOME | End: 2022-03-10
Payer: MEDICARE

## 2022-01-01 ENCOUNTER — HOSPITAL ENCOUNTER (OUTPATIENT)
Dept: CARDIAC REHAB | Age: 77
Setting detail: THERAPIES SERIES
Discharge: HOME | End: 2022-03-03
Payer: MEDICARE

## 2022-01-01 ENCOUNTER — HOSPITAL ENCOUNTER (OUTPATIENT)
Dept: CARDIAC REHAB | Age: 77
Setting detail: THERAPIES SERIES
Discharge: HOME | End: 2022-03-22
Payer: MEDICARE

## 2022-01-01 ENCOUNTER — HOSPITAL ENCOUNTER (OUTPATIENT)
Dept: CARDIAC REHAB | Age: 77
Setting detail: THERAPIES SERIES
Discharge: HOME | End: 2022-02-10
Payer: MEDICARE

## 2022-01-01 ENCOUNTER — HOSPITAL ENCOUNTER (OUTPATIENT)
Dept: CARDIAC REHAB | Age: 77
Setting detail: THERAPIES SERIES
Discharge: HOME | End: 2022-01-11
Payer: MEDICARE

## 2022-01-01 ENCOUNTER — OFFICE VISIT (OUTPATIENT)
Dept: CARDIOLOGY CLINIC | Age: 77
End: 2022-01-01
Payer: MEDICARE

## 2022-01-01 ENCOUNTER — HOSPITAL ENCOUNTER (OUTPATIENT)
Dept: CARDIAC REHAB | Age: 77
Setting detail: THERAPIES SERIES
Discharge: HOME | End: 2022-02-17
Payer: MEDICARE

## 2022-01-01 ENCOUNTER — HOSPITAL ENCOUNTER (OUTPATIENT)
Dept: CARDIAC REHAB | Age: 77
Setting detail: THERAPIES SERIES
Discharge: HOME | End: 2022-01-06
Payer: MEDICARE

## 2022-01-01 ENCOUNTER — HOSPITAL ENCOUNTER (OUTPATIENT)
Dept: CARDIAC REHAB | Age: 77
Setting detail: THERAPIES SERIES
Discharge: HOME | End: 2022-01-25
Payer: MEDICARE

## 2022-01-01 ENCOUNTER — HOSPITAL ENCOUNTER (OUTPATIENT)
Dept: CARDIAC REHAB | Age: 77
Setting detail: THERAPIES SERIES
Discharge: HOME | End: 2022-03-17
Payer: MEDICARE

## 2022-01-01 ENCOUNTER — HOSPITAL ENCOUNTER (OUTPATIENT)
Dept: CARDIAC REHAB | Age: 77
Setting detail: THERAPIES SERIES
Discharge: HOME | End: 2022-01-13
Payer: MEDICARE

## 2022-01-01 ENCOUNTER — APPOINTMENT (OUTPATIENT)
Dept: PHARMACY | Age: 77
End: 2022-01-01
Payer: MEDICARE

## 2022-01-01 ENCOUNTER — APPOINTMENT (OUTPATIENT)
Dept: CARDIAC CATH/INVASIVE PROCEDURES | Age: 77
DRG: 283 | End: 2022-01-01
Payer: MEDICARE

## 2022-01-01 ENCOUNTER — VIRTUAL VISIT (OUTPATIENT)
Dept: PHARMACY | Age: 77
End: 2022-01-01
Payer: MEDICARE

## 2022-01-01 ENCOUNTER — APPOINTMENT (OUTPATIENT)
Dept: GENERAL RADIOLOGY | Age: 77
DRG: 283 | End: 2022-01-01
Payer: MEDICARE

## 2022-01-01 ENCOUNTER — HOSPITAL ENCOUNTER (OUTPATIENT)
Dept: CARDIAC REHAB | Age: 77
Setting detail: THERAPIES SERIES
Discharge: HOME | End: 2022-03-01
Payer: MEDICARE

## 2022-01-01 VITALS
DIASTOLIC BLOOD PRESSURE: 19 MMHG | OXYGEN SATURATION: 85 % | TEMPERATURE: 97.4 F | HEIGHT: 60 IN | WEIGHT: 103.17 LBS | SYSTOLIC BLOOD PRESSURE: 71 MMHG | BODY MASS INDEX: 20.26 KG/M2

## 2022-01-01 VITALS
HEART RATE: 70 BPM | HEIGHT: 60 IN | WEIGHT: 109.2 LBS | BODY MASS INDEX: 21.44 KG/M2 | SYSTOLIC BLOOD PRESSURE: 114 MMHG | DIASTOLIC BLOOD PRESSURE: 70 MMHG

## 2022-01-01 DIAGNOSIS — I21.02 ST ELEVATION MYOCARDIAL INFARCTION INVOLVING LEFT ANTERIOR DESCENDING (LAD) CORONARY ARTERY (HCC): ICD-10-CM

## 2022-01-01 DIAGNOSIS — I21.4 NSTEMI (NON-ST ELEVATED MYOCARDIAL INFARCTION) (HCC): Primary | ICD-10-CM

## 2022-01-01 DIAGNOSIS — I50.22 CHRONIC SYSTOLIC HEART FAILURE (HCC): Primary | ICD-10-CM

## 2022-01-01 DIAGNOSIS — I50.9 ACUTE CONGESTIVE HEART FAILURE, UNSPECIFIED HEART FAILURE TYPE (HCC): ICD-10-CM

## 2022-01-01 DIAGNOSIS — I50.22 CHRONIC SYSTOLIC CONGESTIVE HEART FAILURE (HCC): Primary | ICD-10-CM

## 2022-01-01 LAB
ANION GAP SERPL CALCULATED.3IONS-SCNC: 15 MMOL/L (ref 3–16)
ANION GAP SERPL CALCULATED.3IONS-SCNC: 16 MMOL/L (ref 3–16)
ANION GAP SERPL CALCULATED.3IONS-SCNC: 17 MMOL/L (ref 3–16)
ANION GAP SERPL CALCULATED.3IONS-SCNC: 20 MMOL/L (ref 3–16)
ANION GAP SERPL CALCULATED.3IONS-SCNC: 22 MMOL/L (ref 3–16)
APTT: 122.2 SEC (ref 26.2–38.6)
APTT: 148.9 SEC (ref 26.2–38.6)
APTT: 32.4 SEC (ref 26.2–38.6)
APTT: 44.6 SEC (ref 26.2–38.6)
APTT: 54.3 SEC (ref 26.2–38.6)
APTT: 76.3 SEC (ref 26.2–38.6)
APTT: 76.8 SEC (ref 26.2–38.6)
APTT: 87.4 SEC (ref 26.2–38.6)
BACTERIA: ABNORMAL /HPF
BASOPHILS ABSOLUTE: 0 K/UL (ref 0–0.2)
BASOPHILS ABSOLUTE: 0.1 K/UL (ref 0–0.2)
BASOPHILS RELATIVE PERCENT: 0 %
BASOPHILS RELATIVE PERCENT: 0.2 %
BASOPHILS RELATIVE PERCENT: 0.3 %
BASOPHILS RELATIVE PERCENT: 0.3 %
BETA-HYDROXYBUTYRATE: 0.25 MMOL/L (ref 0–0.27)
BILIRUBIN URINE: NEGATIVE
BLOOD, URINE: ABNORMAL
BUN BLDV-MCNC: 27 MG/DL (ref 7–20)
BUN BLDV-MCNC: 28 MG/DL (ref 7–20)
BUN BLDV-MCNC: 50 MG/DL (ref 7–20)
BUN BLDV-MCNC: 66 MG/DL (ref 7–20)
BUN BLDV-MCNC: 69 MG/DL (ref 7–20)
CALCIUM SERPL-MCNC: 8.9 MG/DL (ref 8.3–10.6)
CALCIUM SERPL-MCNC: 8.9 MG/DL (ref 8.3–10.6)
CALCIUM SERPL-MCNC: 9.3 MG/DL (ref 8.3–10.6)
CALCIUM SERPL-MCNC: 9.3 MG/DL (ref 8.3–10.6)
CALCIUM SERPL-MCNC: 9.6 MG/DL (ref 8.3–10.6)
CHLORIDE BLD-SCNC: 106 MMOL/L (ref 99–110)
CHLORIDE BLD-SCNC: 108 MMOL/L (ref 99–110)
CHLORIDE BLD-SCNC: 96 MMOL/L (ref 99–110)
CHLORIDE BLD-SCNC: 97 MMOL/L (ref 99–110)
CHLORIDE BLD-SCNC: 98 MMOL/L (ref 99–110)
CLARITY: ABNORMAL
CO2: 16 MMOL/L (ref 21–32)
CO2: 18 MMOL/L (ref 21–32)
CO2: 18 MMOL/L (ref 21–32)
CO2: 19 MMOL/L (ref 21–32)
CO2: 19 MMOL/L (ref 21–32)
COLOR: YELLOW
CREAT SERPL-MCNC: 1.5 MG/DL (ref 0.6–1.2)
CREAT SERPL-MCNC: 1.7 MG/DL (ref 0.6–1.2)
CREAT SERPL-MCNC: 2.3 MG/DL (ref 0.6–1.2)
CREAT SERPL-MCNC: 2.6 MG/DL (ref 0.6–1.2)
CREAT SERPL-MCNC: 2.8 MG/DL (ref 0.6–1.2)
EKG ATRIAL RATE: 82 BPM
EKG DIAGNOSIS: NORMAL
EKG P AXIS: 49 DEGREES
EKG P-R INTERVAL: 154 MS
EKG Q-T INTERVAL: 418 MS
EKG QRS DURATION: 76 MS
EKG QTC CALCULATION (BAZETT): 488 MS
EKG R AXIS: -4 DEGREES
EKG T AXIS: 60 DEGREES
EKG VENTRICULAR RATE: 82 BPM
EOSINOPHILS ABSOLUTE: 0 K/UL (ref 0–0.6)
EOSINOPHILS ABSOLUTE: 0.1 K/UL (ref 0–0.6)
EOSINOPHILS RELATIVE PERCENT: 0 %
EOSINOPHILS RELATIVE PERCENT: 0.1 %
EOSINOPHILS RELATIVE PERCENT: 0.1 %
EOSINOPHILS RELATIVE PERCENT: 0.8 %
EPITHELIAL CELLS, UA: 4 /HPF (ref 0–5)
ESTIMATED AVERAGE GLUCOSE: 108.3 MG/DL
FERRITIN: 804.9 NG/ML (ref 15–150)
FOLATE: 8.89 NG/ML (ref 4.78–24.2)
GFR AFRICAN AMERICAN: 20
GFR AFRICAN AMERICAN: 22
GFR AFRICAN AMERICAN: 25
GFR AFRICAN AMERICAN: 35
GFR AFRICAN AMERICAN: 41
GFR NON-AFRICAN AMERICAN: 16
GFR NON-AFRICAN AMERICAN: 18
GFR NON-AFRICAN AMERICAN: 21
GFR NON-AFRICAN AMERICAN: 29
GFR NON-AFRICAN AMERICAN: 34
GLUCOSE BLD-MCNC: 114 MG/DL (ref 70–99)
GLUCOSE BLD-MCNC: 127 MG/DL (ref 70–99)
GLUCOSE BLD-MCNC: 154 MG/DL (ref 70–99)
GLUCOSE BLD-MCNC: 159 MG/DL (ref 70–99)
GLUCOSE BLD-MCNC: 161 MG/DL (ref 70–99)
GLUCOSE BLD-MCNC: 164 MG/DL (ref 70–99)
GLUCOSE BLD-MCNC: 168 MG/DL (ref 70–99)
GLUCOSE BLD-MCNC: 175 MG/DL (ref 70–99)
GLUCOSE BLD-MCNC: 187 MG/DL (ref 70–99)
GLUCOSE BLD-MCNC: 202 MG/DL (ref 70–99)
GLUCOSE BLD-MCNC: 231 MG/DL (ref 70–99)
GLUCOSE BLD-MCNC: 258 MG/DL (ref 70–99)
GLUCOSE BLD-MCNC: 309 MG/DL (ref 70–99)
GLUCOSE URINE: NEGATIVE MG/DL
HBA1C MFR BLD: 5.4 %
HCT VFR BLD CALC: 29.2 % (ref 36–48)
HCT VFR BLD CALC: 30 % (ref 36–48)
HCT VFR BLD CALC: 30.7 % (ref 36–48)
HCT VFR BLD CALC: 31.4 % (ref 36–48)
HCT VFR BLD CALC: 33.7 % (ref 36–48)
HEMOGLOBIN: 10.2 G/DL (ref 12–16)
HEMOGLOBIN: 10.2 G/DL (ref 12–16)
HEMOGLOBIN: 10.6 G/DL (ref 12–16)
HEMOGLOBIN: 11.2 G/DL (ref 12–16)
HEMOGLOBIN: 9.8 G/DL (ref 12–16)
HYALINE CASTS: 7 /LPF (ref 0–8)
IRON SATURATION: 9 % (ref 15–50)
IRON: 20 UG/DL (ref 37–145)
KETONES, URINE: NEGATIVE MG/DL
LACTIC ACID: 3.3 MMOL/L (ref 0.4–2)
LEUKOCYTE ESTERASE, URINE: ABNORMAL
LV EF: 33 %
LVEF MODALITY: NORMAL
LYMPHOCYTES ABSOLUTE: 0.7 K/UL (ref 1–5.1)
LYMPHOCYTES ABSOLUTE: 0.7 K/UL (ref 1–5.1)
LYMPHOCYTES ABSOLUTE: 0.8 K/UL (ref 1–5.1)
LYMPHOCYTES ABSOLUTE: 1.4 K/UL (ref 1–5.1)
LYMPHOCYTES RELATIVE PERCENT: 15.6 %
LYMPHOCYTES RELATIVE PERCENT: 3 %
LYMPHOCYTES RELATIVE PERCENT: 7 %
LYMPHOCYTES RELATIVE PERCENT: 9.1 %
MAGNESIUM: 1.7 MG/DL (ref 1.8–2.4)
MAGNESIUM: 2.2 MG/DL (ref 1.8–2.4)
MAGNESIUM: 2.3 MG/DL (ref 1.8–2.4)
MCH RBC QN AUTO: 33 PG (ref 26–34)
MCH RBC QN AUTO: 33.1 PG (ref 26–34)
MCH RBC QN AUTO: 33.3 PG (ref 26–34)
MCH RBC QN AUTO: 33.4 PG (ref 26–34)
MCH RBC QN AUTO: 33.5 PG (ref 26–34)
MCHC RBC AUTO-ENTMCNC: 33.3 G/DL (ref 31–36)
MCHC RBC AUTO-ENTMCNC: 33.4 G/DL (ref 31–36)
MCHC RBC AUTO-ENTMCNC: 33.7 G/DL (ref 31–36)
MCHC RBC AUTO-ENTMCNC: 33.7 G/DL (ref 31–36)
MCHC RBC AUTO-ENTMCNC: 33.9 G/DL (ref 31–36)
MCV RBC AUTO: 98.3 FL (ref 80–100)
MCV RBC AUTO: 98.9 FL (ref 80–100)
MCV RBC AUTO: 99 FL (ref 80–100)
MCV RBC AUTO: 99.3 FL (ref 80–100)
MCV RBC AUTO: 99.3 FL (ref 80–100)
MICROSCOPIC EXAMINATION: YES
MONOCYTES ABSOLUTE: 0.6 K/UL (ref 0–1.3)
MONOCYTES ABSOLUTE: 0.8 K/UL (ref 0–1.3)
MONOCYTES ABSOLUTE: 0.9 K/UL (ref 0–1.3)
MONOCYTES ABSOLUTE: 1 K/UL (ref 0–1.3)
MONOCYTES RELATIVE PERCENT: 4.8 %
MONOCYTES RELATIVE PERCENT: 6 %
MONOCYTES RELATIVE PERCENT: 7.6 %
MONOCYTES RELATIVE PERCENT: 9.7 %
NEUTROPHILS ABSOLUTE: 20.1 K/UL (ref 1.7–7.7)
NEUTROPHILS ABSOLUTE: 6.5 K/UL (ref 1.7–7.7)
NEUTROPHILS ABSOLUTE: 7.8 K/UL (ref 1.7–7.7)
NEUTROPHILS ABSOLUTE: 9 K/UL (ref 1.7–7.7)
NEUTROPHILS RELATIVE PERCENT: 73.6 %
NEUTROPHILS RELATIVE PERCENT: 84.8 %
NEUTROPHILS RELATIVE PERCENT: 85.1 %
NEUTROPHILS RELATIVE PERCENT: 91.9 %
NITRITE, URINE: NEGATIVE
PDW BLD-RTO: 14 % (ref 12.4–15.4)
PDW BLD-RTO: 14.1 % (ref 12.4–15.4)
PDW BLD-RTO: 14.2 % (ref 12.4–15.4)
PDW BLD-RTO: 14.2 % (ref 12.4–15.4)
PDW BLD-RTO: 14.4 % (ref 12.4–15.4)
PERFORMED ON: ABNORMAL
PH UA: 5 (ref 5–8)
PHOSPHORUS: 5 MG/DL (ref 2.5–4.9)
PHOSPHORUS: 5.7 MG/DL (ref 2.5–4.9)
PLATELET # BLD: 115 K/UL (ref 135–450)
PLATELET # BLD: 125 K/UL (ref 135–450)
PLATELET # BLD: 128 K/UL (ref 135–450)
PLATELET # BLD: 129 K/UL (ref 135–450)
PLATELET # BLD: 141 K/UL (ref 135–450)
PMV BLD AUTO: 10.4 FL (ref 5–10.5)
PMV BLD AUTO: 9.3 FL (ref 5–10.5)
PMV BLD AUTO: 9.6 FL (ref 5–10.5)
PMV BLD AUTO: 9.8 FL (ref 5–10.5)
PMV BLD AUTO: 9.8 FL (ref 5–10.5)
POTASSIUM REFLEX MAGNESIUM: 3.6 MMOL/L (ref 3.5–5.1)
POTASSIUM SERPL-SCNC: 3.8 MMOL/L (ref 3.5–5.1)
POTASSIUM SERPL-SCNC: 4.1 MMOL/L (ref 3.5–5.1)
POTASSIUM SERPL-SCNC: 4.1 MMOL/L (ref 3.5–5.1)
POTASSIUM SERPL-SCNC: 4.4 MMOL/L (ref 3.5–5.1)
PRO-BNP: ABNORMAL PG/ML (ref 0–449)
PROCALCITONIN: 0.08 NG/ML (ref 0–0.15)
PROCALCITONIN: 2.77 NG/ML (ref 0–0.15)
PROTEIN UA: ABNORMAL MG/DL
RBC # BLD: 2.95 M/UL (ref 4–5.2)
RBC # BLD: 3.05 M/UL (ref 4–5.2)
RBC # BLD: 3.1 M/UL (ref 4–5.2)
RBC # BLD: 3.17 M/UL (ref 4–5.2)
RBC # BLD: 3.39 M/UL (ref 4–5.2)
RBC UA: >100 /HPF (ref 0–4)
SARS-COV-2, NAAT: NOT DETECTED
SODIUM BLD-SCNC: 133 MMOL/L (ref 136–145)
SODIUM BLD-SCNC: 134 MMOL/L (ref 136–145)
SODIUM BLD-SCNC: 135 MMOL/L (ref 136–145)
SODIUM BLD-SCNC: 141 MMOL/L (ref 136–145)
SODIUM BLD-SCNC: 142 MMOL/L (ref 136–145)
SPECIFIC GRAVITY UA: 1.02 (ref 1–1.03)
TOTAL IRON BINDING CAPACITY: 211 UG/DL (ref 260–445)
TROPONIN: 0.25 NG/ML
TROPONIN: 0.27 NG/ML
TROPONIN: 0.43 NG/ML
TROPONIN: 0.79 NG/ML
TROPONIN: 1.52 NG/ML
TROPONIN: 1.89 NG/ML
URINE TYPE: ABNORMAL
UROBILINOGEN, URINE: 0.2 E.U./DL
VITAMIN B-12: 430 PG/ML (ref 211–911)
WBC # BLD: 10.5 K/UL (ref 4–11)
WBC # BLD: 13.8 K/UL (ref 4–11)
WBC # BLD: 21.9 K/UL (ref 4–11)
WBC # BLD: 8.9 K/UL (ref 4–11)
WBC # BLD: 9.2 K/UL (ref 4–11)
WBC UA: 227 /HPF (ref 0–5)

## 2022-01-01 PROCEDURE — 93798 PHYS/QHP OP CAR RHAB W/ECG: CPT

## 2022-01-01 PROCEDURE — 84484 ASSAY OF TROPONIN QUANT: CPT

## 2022-01-01 PROCEDURE — 85025 COMPLETE CBC W/AUTO DIFF WBC: CPT

## 2022-01-01 PROCEDURE — 82010 KETONE BODYS QUAN: CPT

## 2022-01-01 PROCEDURE — 6370000000 HC RX 637 (ALT 250 FOR IP): Performed by: STUDENT IN AN ORGANIZED HEALTH CARE EDUCATION/TRAINING PROGRAM

## 2022-01-01 PROCEDURE — 87186 SC STD MICRODIL/AGAR DIL: CPT

## 2022-01-01 PROCEDURE — 80048 BASIC METABOLIC PNL TOTAL CA: CPT

## 2022-01-01 PROCEDURE — 85730 THROMBOPLASTIN TIME PARTIAL: CPT

## 2022-01-01 PROCEDURE — 70490 CT SOFT TISSUE NECK W/O DYE: CPT

## 2022-01-01 PROCEDURE — 83550 IRON BINDING TEST: CPT

## 2022-01-01 PROCEDURE — 2500000003 HC RX 250 WO HCPCS: Performed by: NURSE PRACTITIONER

## 2022-01-01 PROCEDURE — 4040F PNEUMOC VAC/ADMIN/RCVD: CPT | Performed by: NURSE PRACTITIONER

## 2022-01-01 PROCEDURE — 82607 VITAMIN B-12: CPT

## 2022-01-01 PROCEDURE — 6360000002 HC RX W HCPCS: Performed by: HOSPITALIST

## 2022-01-01 PROCEDURE — 87086 URINE CULTURE/COLONY COUNT: CPT

## 2022-01-01 PROCEDURE — 82746 ASSAY OF FOLIC ACID SERUM: CPT

## 2022-01-01 PROCEDURE — 36569 INSJ PICC 5 YR+ W/O IMAGING: CPT

## 2022-01-01 PROCEDURE — 36592 COLLECT BLOOD FROM PICC: CPT

## 2022-01-01 PROCEDURE — 99285 EMERGENCY DEPT VISIT HI MDM: CPT

## 2022-01-01 PROCEDURE — 2500000003 HC RX 250 WO HCPCS: Performed by: INTERNAL MEDICINE

## 2022-01-01 PROCEDURE — 76937 US GUIDE VASCULAR ACCESS: CPT

## 2022-01-01 PROCEDURE — 83036 HEMOGLOBIN GLYCOSYLATED A1C: CPT

## 2022-01-01 PROCEDURE — 6370000000 HC RX 637 (ALT 250 FOR IP): Performed by: NURSE PRACTITIONER

## 2022-01-01 PROCEDURE — 99291 CRITICAL CARE FIRST HOUR: CPT | Performed by: INTERNAL MEDICINE

## 2022-01-01 PROCEDURE — 6360000002 HC RX W HCPCS: Performed by: INTERNAL MEDICINE

## 2022-01-01 PROCEDURE — 87635 SARS-COV-2 COVID-19 AMP PRB: CPT

## 2022-01-01 PROCEDURE — 74176 CT ABD & PELVIS W/O CONTRAST: CPT

## 2022-01-01 PROCEDURE — 84145 PROCALCITONIN (PCT): CPT

## 2022-01-01 PROCEDURE — 1036F TOBACCO NON-USER: CPT | Performed by: NURSE PRACTITIONER

## 2022-01-01 PROCEDURE — 2580000003 HC RX 258: Performed by: INTERNAL MEDICINE

## 2022-01-01 PROCEDURE — G8420 CALC BMI NORM PARAMETERS: HCPCS | Performed by: NURSE PRACTITIONER

## 2022-01-01 PROCEDURE — 71046 X-RAY EXAM CHEST 2 VIEWS: CPT

## 2022-01-01 PROCEDURE — P9047 ALBUMIN (HUMAN), 25%, 50ML: HCPCS | Performed by: STUDENT IN AN ORGANIZED HEALTH CARE EDUCATION/TRAINING PROGRAM

## 2022-01-01 PROCEDURE — 81001 URINALYSIS AUTO W/SCOPE: CPT

## 2022-01-01 PROCEDURE — 71045 X-RAY EXAM CHEST 1 VIEW: CPT

## 2022-01-01 PROCEDURE — 84100 ASSAY OF PHOSPHORUS: CPT

## 2022-01-01 PROCEDURE — 2700000000 HC OXYGEN THERAPY PER DAY

## 2022-01-01 PROCEDURE — APPNB15 APP NON BILLABLE TIME 0-15 MINS: Performed by: NURSE PRACTITIONER

## 2022-01-01 PROCEDURE — 6360000002 HC RX W HCPCS: Performed by: NURSE PRACTITIONER

## 2022-01-01 PROCEDURE — 83880 ASSAY OF NATRIURETIC PEPTIDE: CPT

## 2022-01-01 PROCEDURE — 36415 COLL VENOUS BLD VENIPUNCTURE: CPT

## 2022-01-01 PROCEDURE — G8400 PT W/DXA NO RESULTS DOC: HCPCS | Performed by: NURSE PRACTITIONER

## 2022-01-01 PROCEDURE — 1090F PRES/ABSN URINE INCON ASSESS: CPT | Performed by: NURSE PRACTITIONER

## 2022-01-01 PROCEDURE — 2000000000 HC ICU R&B

## 2022-01-01 PROCEDURE — 2580000003 HC RX 258: Performed by: NURSE PRACTITIONER

## 2022-01-01 PROCEDURE — 6360000002 HC RX W HCPCS

## 2022-01-01 PROCEDURE — 83735 ASSAY OF MAGNESIUM: CPT

## 2022-01-01 PROCEDURE — 6360000002 HC RX W HCPCS: Performed by: STUDENT IN AN ORGANIZED HEALTH CARE EDUCATION/TRAINING PROGRAM

## 2022-01-01 PROCEDURE — 94660 CPAP INITIATION&MGMT: CPT

## 2022-01-01 PROCEDURE — G8427 DOCREV CUR MEDS BY ELIG CLIN: HCPCS | Performed by: NURSE PRACTITIONER

## 2022-01-01 PROCEDURE — 94761 N-INVAS EAR/PLS OXIMETRY MLT: CPT

## 2022-01-01 PROCEDURE — 99292 CRITICAL CARE ADDL 30 MIN: CPT | Performed by: INTERNAL MEDICINE

## 2022-01-01 PROCEDURE — 71250 CT THORAX DX C-: CPT

## 2022-01-01 PROCEDURE — 82728 ASSAY OF FERRITIN: CPT

## 2022-01-01 PROCEDURE — 85027 COMPLETE CBC AUTOMATED: CPT

## 2022-01-01 PROCEDURE — 02HV33Z INSERTION OF INFUSION DEVICE INTO SUPERIOR VENA CAVA, PERCUTANEOUS APPROACH: ICD-10-PCS | Performed by: INTERNAL MEDICINE

## 2022-01-01 PROCEDURE — 2580000003 HC RX 258: Performed by: STUDENT IN AN ORGANIZED HEALTH CARE EDUCATION/TRAINING PROGRAM

## 2022-01-01 PROCEDURE — C1751 CATH, INF, PER/CENT/MIDLINE: HCPCS

## 2022-01-01 PROCEDURE — G8484 FLU IMMUNIZE NO ADMIN: HCPCS | Performed by: NURSE PRACTITIONER

## 2022-01-01 PROCEDURE — 87040 BLOOD CULTURE FOR BACTERIA: CPT

## 2022-01-01 PROCEDURE — 93005 ELECTROCARDIOGRAM TRACING: CPT | Performed by: EMERGENCY MEDICINE

## 2022-01-01 PROCEDURE — 2060000000 HC ICU INTERMEDIATE R&B

## 2022-01-01 PROCEDURE — 99213 OFFICE O/P EST LOW 20 MIN: CPT | Performed by: NURSE PRACTITIONER

## 2022-01-01 PROCEDURE — 99214 OFFICE O/P EST MOD 30 MIN: CPT | Performed by: NURSE PRACTITIONER

## 2022-01-01 PROCEDURE — 93306 TTE W/DOPPLER COMPLETE: CPT

## 2022-01-01 PROCEDURE — 83605 ASSAY OF LACTIC ACID: CPT

## 2022-01-01 PROCEDURE — 1123F ACP DISCUSS/DSCN MKR DOCD: CPT | Performed by: NURSE PRACTITIONER

## 2022-01-01 PROCEDURE — 83540 ASSAY OF IRON: CPT

## 2022-01-01 PROCEDURE — 93010 ELECTROCARDIOGRAM REPORT: CPT | Performed by: INTERNAL MEDICINE

## 2022-01-01 PROCEDURE — 87088 URINE BACTERIA CULTURE: CPT

## 2022-01-01 PROCEDURE — 05HB33Z INSERTION OF INFUSION DEVICE INTO RIGHT BASILIC VEIN, PERCUTANEOUS APPROACH: ICD-10-PCS | Performed by: INTERNAL MEDICINE

## 2022-01-01 PROCEDURE — 3017F COLORECTAL CA SCREEN DOC REV: CPT | Performed by: NURSE PRACTITIONER

## 2022-01-01 RX ORDER — HEPARIN SODIUM 1000 [USP'U]/ML
1400 INJECTION, SOLUTION INTRAVENOUS; SUBCUTANEOUS ONCE
Status: COMPLETED | OUTPATIENT
Start: 2022-01-01 | End: 2022-01-01

## 2022-01-01 RX ORDER — MIDODRINE HYDROCHLORIDE 10 MG/1
10 TABLET ORAL
Status: DISCONTINUED | OUTPATIENT
Start: 2022-01-01 | End: 2022-03-29 | Stop reason: HOSPADM

## 2022-01-01 RX ORDER — MORPHINE SULFATE 2 MG/ML
2 INJECTION, SOLUTION INTRAMUSCULAR; INTRAVENOUS EVERY 4 HOURS PRN
Status: DISCONTINUED | OUTPATIENT
Start: 2022-01-01 | End: 2022-01-01

## 2022-01-01 RX ORDER — DEXTROSE MONOHYDRATE 25 G/50ML
12.5 INJECTION, SOLUTION INTRAVENOUS PRN
Status: DISCONTINUED | OUTPATIENT
Start: 2022-01-01 | End: 2022-03-29 | Stop reason: HOSPADM

## 2022-01-01 RX ORDER — LIDOCAINE HYDROCHLORIDE 10 MG/ML
5 INJECTION, SOLUTION EPIDURAL; INFILTRATION; INTRACAUDAL; PERINEURAL ONCE
Status: DISCONTINUED | OUTPATIENT
Start: 2022-01-01 | End: 2022-01-01

## 2022-01-01 RX ORDER — PANTOPRAZOLE SODIUM 40 MG/1
40 TABLET, DELAYED RELEASE ORAL
Status: DISCONTINUED | OUTPATIENT
Start: 2022-01-01 | End: 2022-03-29 | Stop reason: HOSPADM

## 2022-01-01 RX ORDER — ACETAMINOPHEN 325 MG/1
650 TABLET ORAL EVERY 6 HOURS PRN
Status: DISCONTINUED | OUTPATIENT
Start: 2022-01-01 | End: 2022-03-29 | Stop reason: HOSPADM

## 2022-01-01 RX ORDER — LORAZEPAM 1 MG/1
1-2 TABLET ORAL EVERY EVENING
COMMUNITY

## 2022-01-01 RX ORDER — ASPIRIN 81 MG/1
81 TABLET ORAL EVERY MORNING
Status: DISCONTINUED | OUTPATIENT
Start: 2022-01-01 | End: 2022-03-29 | Stop reason: HOSPADM

## 2022-01-01 RX ORDER — DOBUTAMINE HYDROCHLORIDE 200 MG/100ML
5 INJECTION INTRAVENOUS CONTINUOUS
Status: DISCONTINUED | OUTPATIENT
Start: 2022-01-01 | End: 2022-01-01

## 2022-01-01 RX ORDER — FENTANYL CITRATE 50 UG/ML
25 INJECTION, SOLUTION INTRAMUSCULAR; INTRAVENOUS
Status: DISCONTINUED | OUTPATIENT
Start: 2022-01-01 | End: 2022-03-29 | Stop reason: HOSPADM

## 2022-01-01 RX ORDER — LEVOTHYROXINE SODIUM 88 UG/1
88 TABLET ORAL DAILY
Status: DISCONTINUED | OUTPATIENT
Start: 2022-01-01 | End: 2022-03-29 | Stop reason: HOSPADM

## 2022-01-01 RX ORDER — FUROSEMIDE 10 MG/ML
20 INJECTION INTRAMUSCULAR; INTRAVENOUS ONCE
Status: COMPLETED | OUTPATIENT
Start: 2022-01-01 | End: 2022-01-01

## 2022-01-01 RX ORDER — MIDODRINE HYDROCHLORIDE 10 MG/1
TABLET ORAL
Qty: 90 TABLET | Refills: 3 | Status: SHIPPED | OUTPATIENT
Start: 2022-01-01

## 2022-01-01 RX ORDER — ACETAMINOPHEN 650 MG/1
650 SUPPOSITORY RECTAL EVERY 6 HOURS PRN
Status: DISCONTINUED | OUTPATIENT
Start: 2022-01-01 | End: 2022-03-29 | Stop reason: HOSPADM

## 2022-01-01 RX ORDER — ONDANSETRON 2 MG/ML
4 INJECTION INTRAMUSCULAR; INTRAVENOUS EVERY 6 HOURS PRN
Status: DISCONTINUED | OUTPATIENT
Start: 2022-01-01 | End: 2022-03-29 | Stop reason: HOSPADM

## 2022-01-01 RX ORDER — LORAZEPAM 2 MG/ML
0.5 INJECTION INTRAMUSCULAR EVERY 8 HOURS PRN
Status: DISCONTINUED | OUTPATIENT
Start: 2022-01-01 | End: 2022-01-01

## 2022-01-01 RX ORDER — SODIUM CHLORIDE 0.9 % (FLUSH) 0.9 %
5-40 SYRINGE (ML) INJECTION EVERY 12 HOURS SCHEDULED
Status: DISCONTINUED | OUTPATIENT
Start: 2022-01-01 | End: 2022-01-01 | Stop reason: SDUPTHER

## 2022-01-01 RX ORDER — AMLODIPINE BESYLATE 2.5 MG/1
2.5 TABLET ORAL DAILY
COMMUNITY

## 2022-01-01 RX ORDER — MORPHINE SULFATE 2 MG/ML
1 INJECTION, SOLUTION INTRAMUSCULAR; INTRAVENOUS
Status: DISCONTINUED | OUTPATIENT
Start: 2022-01-01 | End: 2022-03-29 | Stop reason: HOSPADM

## 2022-01-01 RX ORDER — DEXTROSE MONOHYDRATE 50 MG/ML
100 INJECTION, SOLUTION INTRAVENOUS PRN
Status: DISCONTINUED | OUTPATIENT
Start: 2022-01-01 | End: 2022-03-29 | Stop reason: HOSPADM

## 2022-01-01 RX ORDER — PANTOPRAZOLE SODIUM 40 MG/1
40 TABLET, DELAYED RELEASE ORAL 2 TIMES DAILY
COMMUNITY

## 2022-01-01 RX ORDER — SODIUM CHLORIDE 0.9 % (FLUSH) 0.9 %
5-40 SYRINGE (ML) INJECTION EVERY 12 HOURS SCHEDULED
Status: DISCONTINUED | OUTPATIENT
Start: 2022-01-01 | End: 2022-03-29 | Stop reason: HOSPADM

## 2022-01-01 RX ORDER — FUROSEMIDE 10 MG/ML
20 INJECTION INTRAMUSCULAR; INTRAVENOUS DAILY
Status: DISCONTINUED | OUTPATIENT
Start: 2022-01-01 | End: 2022-01-01

## 2022-01-01 RX ORDER — HEPARIN SODIUM 1000 [USP'U]/ML
30 INJECTION, SOLUTION INTRAVENOUS; SUBCUTANEOUS PRN
Status: DISCONTINUED | OUTPATIENT
Start: 2022-01-01 | End: 2022-01-01

## 2022-01-01 RX ORDER — MAGNESIUM SULFATE 1 G/100ML
1000 INJECTION INTRAVENOUS ONCE
Status: COMPLETED | OUTPATIENT
Start: 2022-01-01 | End: 2022-01-01

## 2022-01-01 RX ORDER — FUROSEMIDE 10 MG/ML
40 INJECTION INTRAMUSCULAR; INTRAVENOUS ONCE
Status: DISCONTINUED | OUTPATIENT
Start: 2022-01-01 | End: 2022-01-01

## 2022-01-01 RX ORDER — MORPHINE SULFATE 2 MG/ML
0.5 INJECTION, SOLUTION INTRAMUSCULAR; INTRAVENOUS EVERY 4 HOURS PRN
Status: DISCONTINUED | OUTPATIENT
Start: 2022-01-01 | End: 2022-01-01

## 2022-01-01 RX ORDER — ALBUMIN (HUMAN) 12.5 G/50ML
25 SOLUTION INTRAVENOUS ONCE
Status: COMPLETED | OUTPATIENT
Start: 2022-01-01 | End: 2022-01-01

## 2022-01-01 RX ORDER — HEPARIN SODIUM 5000 [USP'U]/ML
5000 INJECTION, SOLUTION INTRAVENOUS; SUBCUTANEOUS EVERY 8 HOURS SCHEDULED
Status: DISCONTINUED | OUTPATIENT
Start: 2022-01-01 | End: 2022-01-01

## 2022-01-01 RX ORDER — SODIUM CHLORIDE 9 MG/ML
25 INJECTION, SOLUTION INTRAVENOUS PRN
Status: DISCONTINUED | OUTPATIENT
Start: 2022-01-01 | End: 2022-03-29 | Stop reason: HOSPADM

## 2022-01-01 RX ORDER — SODIUM CHLORIDE 9 MG/ML
25 INJECTION, SOLUTION INTRAVENOUS PRN
Status: DISCONTINUED | OUTPATIENT
Start: 2022-01-01 | End: 2022-01-01 | Stop reason: SDUPTHER

## 2022-01-01 RX ORDER — FUROSEMIDE 10 MG/ML
40 INJECTION INTRAMUSCULAR; INTRAVENOUS 2 TIMES DAILY
Status: DISCONTINUED | OUTPATIENT
Start: 2022-01-01 | End: 2022-01-01

## 2022-01-01 RX ORDER — NORTRIPTYLINE HYDROCHLORIDE 25 MG/1
25 CAPSULE ORAL NIGHTLY
Status: DISCONTINUED | OUTPATIENT
Start: 2022-01-01 | End: 2022-03-29 | Stop reason: HOSPADM

## 2022-01-01 RX ORDER — LORAZEPAM 2 MG/ML
2 INJECTION INTRAMUSCULAR
Status: DISCONTINUED | OUTPATIENT
Start: 2022-01-01 | End: 2022-03-29 | Stop reason: HOSPADM

## 2022-01-01 RX ORDER — HEPARIN SODIUM 1000 [USP'U]/ML
2700 INJECTION, SOLUTION INTRAVENOUS; SUBCUTANEOUS ONCE
Status: COMPLETED | OUTPATIENT
Start: 2022-01-01 | End: 2022-01-01

## 2022-01-01 RX ORDER — VITAMIN B COMPLEX
TABLET ORAL
COMMUNITY

## 2022-01-01 RX ORDER — SODIUM CHLORIDE 0.9 % (FLUSH) 0.9 %
5-40 SYRINGE (ML) INJECTION PRN
Status: DISCONTINUED | OUTPATIENT
Start: 2022-01-01 | End: 2022-01-01 | Stop reason: SDUPTHER

## 2022-01-01 RX ORDER — ATORVASTATIN CALCIUM 40 MG/1
40 TABLET, FILM COATED ORAL DAILY
Status: DISCONTINUED | OUTPATIENT
Start: 2022-01-01 | End: 2022-03-29 | Stop reason: HOSPADM

## 2022-01-01 RX ORDER — METOPROLOL SUCCINATE 25 MG/1
25 TABLET, EXTENDED RELEASE ORAL DAILY
Status: DISCONTINUED | OUTPATIENT
Start: 2022-01-01 | End: 2022-03-29 | Stop reason: HOSPADM

## 2022-01-01 RX ORDER — NICOTINE POLACRILEX 4 MG
15 LOZENGE BUCCAL PRN
Status: DISCONTINUED | OUTPATIENT
Start: 2022-01-01 | End: 2022-03-29 | Stop reason: HOSPADM

## 2022-01-01 RX ORDER — FENTANYL CITRATE 50 UG/ML
25 INJECTION, SOLUTION INTRAMUSCULAR; INTRAVENOUS EVERY 4 HOURS PRN
Status: DISCONTINUED | OUTPATIENT
Start: 2022-01-01 | End: 2022-01-01

## 2022-01-01 RX ORDER — HEPARIN SODIUM 10000 [USP'U]/100ML
0-3000 INJECTION, SOLUTION INTRAVENOUS CONTINUOUS
Status: DISCONTINUED | OUTPATIENT
Start: 2022-01-01 | End: 2022-03-29 | Stop reason: HOSPADM

## 2022-01-01 RX ORDER — FUROSEMIDE 10 MG/ML
20 INJECTION INTRAMUSCULAR; INTRAVENOUS 2 TIMES DAILY
Status: DISCONTINUED | OUTPATIENT
Start: 2022-01-01 | End: 2022-01-01

## 2022-01-01 RX ORDER — HEPARIN SODIUM 1000 [USP'U]/ML
2800 INJECTION, SOLUTION INTRAVENOUS; SUBCUTANEOUS ONCE
Status: COMPLETED | OUTPATIENT
Start: 2022-01-01 | End: 2022-01-01

## 2022-01-01 RX ORDER — SODIUM CHLORIDE 0.9 % (FLUSH) 0.9 %
5-40 SYRINGE (ML) INJECTION PRN
Status: DISCONTINUED | OUTPATIENT
Start: 2022-01-01 | End: 2022-03-29 | Stop reason: HOSPADM

## 2022-01-01 RX ORDER — HEPARIN SODIUM 1000 [USP'U]/ML
60 INJECTION, SOLUTION INTRAVENOUS; SUBCUTANEOUS PRN
Status: DISCONTINUED | OUTPATIENT
Start: 2022-01-01 | End: 2022-01-01

## 2022-01-01 RX ADMIN — ASPIRIN 81 MG: 81 TABLET, COATED ORAL at 08:18

## 2022-01-01 RX ADMIN — MIDODRINE HYDROCHLORIDE 10 MG: 10 TABLET ORAL at 07:52

## 2022-01-01 RX ADMIN — FUROSEMIDE 10 MG/HR: 10 INJECTION, SOLUTION INTRAMUSCULAR; INTRAVENOUS at 19:19

## 2022-01-01 RX ADMIN — NORTRIPTYLINE HYDROCHLORIDE 25 MG: 25 CAPSULE ORAL at 04:21

## 2022-01-01 RX ADMIN — MIDODRINE HYDROCHLORIDE 10 MG: 10 TABLET ORAL at 08:18

## 2022-01-01 RX ADMIN — HEPARIN SODIUM 2700 UNITS: 1000 INJECTION INTRAVENOUS; SUBCUTANEOUS at 11:50

## 2022-01-01 RX ADMIN — MIDODRINE HYDROCHLORIDE 10 MG: 10 TABLET ORAL at 09:15

## 2022-01-01 RX ADMIN — MORPHINE SULFATE 2 MG: 2 INJECTION, SOLUTION INTRAMUSCULAR; INTRAVENOUS at 02:10

## 2022-01-01 RX ADMIN — CEFEPIME HYDROCHLORIDE 1000 MG: 1 INJECTION, POWDER, FOR SOLUTION INTRAMUSCULAR; INTRAVENOUS at 09:38

## 2022-01-01 RX ADMIN — MORPHINE SULFATE 2 MG: 2 INJECTION, SOLUTION INTRAMUSCULAR; INTRAVENOUS at 21:45

## 2022-01-01 RX ADMIN — INSULIN LISPRO 4 UNITS: 100 INJECTION, SOLUTION INTRAVENOUS; SUBCUTANEOUS at 12:31

## 2022-01-01 RX ADMIN — LEVOTHYROXINE SODIUM 88 MCG: 0.09 TABLET ORAL at 06:21

## 2022-01-01 RX ADMIN — PHENYLEPHRINE HYDROCHLORIDE 20 MCG/MIN: 10 INJECTION INTRAVENOUS at 18:19

## 2022-01-01 RX ADMIN — ONDANSETRON 4 MG: 2 INJECTION INTRAMUSCULAR; INTRAVENOUS at 16:40

## 2022-01-01 RX ADMIN — PANTOPRAZOLE SODIUM 40 MG: 40 TABLET, DELAYED RELEASE ORAL at 06:40

## 2022-01-01 RX ADMIN — TICAGRELOR 90 MG: 90 TABLET ORAL at 08:54

## 2022-01-01 RX ADMIN — MORPHINE SULFATE 0.5 MG: 2 INJECTION, SOLUTION INTRAMUSCULAR; INTRAVENOUS at 14:39

## 2022-01-01 RX ADMIN — HEPARIN SODIUM 5000 UNITS: 5000 INJECTION INTRAVENOUS; SUBCUTANEOUS at 06:31

## 2022-01-01 RX ADMIN — FUROSEMIDE 10 MG/HR: 10 INJECTION, SOLUTION INTRAMUSCULAR; INTRAVENOUS at 15:59

## 2022-01-01 RX ADMIN — ASPIRIN 81 MG: 81 TABLET, COATED ORAL at 07:57

## 2022-01-01 RX ADMIN — MIDODRINE HYDROCHLORIDE 10 MG: 10 TABLET ORAL at 17:39

## 2022-01-01 RX ADMIN — FUROSEMIDE 20 MG: 10 INJECTION, SOLUTION INTRAMUSCULAR; INTRAVENOUS at 04:08

## 2022-01-01 RX ADMIN — NOREPINEPHRINE BITARTRATE 5 MCG/MIN: 1 INJECTION, SOLUTION, CONCENTRATE INTRAVENOUS at 12:20

## 2022-01-01 RX ADMIN — VASOPRESSIN 0.04 UNITS/MIN: 20 INJECTION INTRAVENOUS at 19:26

## 2022-01-01 RX ADMIN — MORPHINE SULFATE 0.5 MG: 2 INJECTION, SOLUTION INTRAMUSCULAR; INTRAVENOUS at 09:16

## 2022-01-01 RX ADMIN — MORPHINE SULFATE 1 MG: 2 INJECTION, SOLUTION INTRAMUSCULAR; INTRAVENOUS at 18:17

## 2022-01-01 RX ADMIN — ALBUMIN (HUMAN) 25 G: 0.25 INJECTION, SOLUTION INTRAVENOUS at 23:19

## 2022-01-01 RX ADMIN — LORAZEPAM 2 MG: 2 INJECTION INTRAMUSCULAR; INTRAVENOUS at 12:47

## 2022-01-01 RX ADMIN — ASPIRIN 81 MG: 81 TABLET, COATED ORAL at 09:16

## 2022-01-01 RX ADMIN — HEPARIN SODIUM 560 UNITS/HR: 10000 INJECTION, SOLUTION INTRAVENOUS at 11:08

## 2022-01-01 RX ADMIN — PHENYLEPHRINE HYDROCHLORIDE 30 MCG/MIN: 10 INJECTION INTRAVENOUS at 20:00

## 2022-01-01 RX ADMIN — LEVOTHYROXINE SODIUM 88 MCG: 0.09 TABLET ORAL at 05:58

## 2022-01-01 RX ADMIN — CEFEPIME HYDROCHLORIDE 1000 MG: 1 INJECTION, POWDER, FOR SOLUTION INTRAMUSCULAR; INTRAVENOUS at 22:08

## 2022-01-01 RX ADMIN — ATORVASTATIN CALCIUM 40 MG: 40 TABLET, FILM COATED ORAL at 09:15

## 2022-01-01 RX ADMIN — ONDANSETRON 4 MG: 2 INJECTION INTRAMUSCULAR; INTRAVENOUS at 04:36

## 2022-01-01 RX ADMIN — TICAGRELOR 90 MG: 90 TABLET ORAL at 19:54

## 2022-01-01 RX ADMIN — PANTOPRAZOLE SODIUM 40 MG: 40 TABLET, DELAYED RELEASE ORAL at 06:21

## 2022-01-01 RX ADMIN — FUROSEMIDE 40 MG: 10 INJECTION, SOLUTION INTRAMUSCULAR; INTRAVENOUS at 17:50

## 2022-01-01 RX ADMIN — MAGNESIUM SULFATE HEPTAHYDRATE 1000 MG: 1 INJECTION, SOLUTION INTRAVENOUS at 11:09

## 2022-01-01 RX ADMIN — VASOPRESSIN 0.04 UNITS/MIN: 20 INJECTION INTRAVENOUS at 11:48

## 2022-01-01 RX ADMIN — MIDODRINE HYDROCHLORIDE 10 MG: 10 TABLET ORAL at 12:32

## 2022-01-01 RX ADMIN — INSULIN LISPRO 2 UNITS: 100 INJECTION, SOLUTION INTRAVENOUS; SUBCUTANEOUS at 17:39

## 2022-01-01 RX ADMIN — INSULIN LISPRO 1 UNITS: 100 INJECTION, SOLUTION INTRAVENOUS; SUBCUTANEOUS at 20:59

## 2022-01-01 RX ADMIN — NORTRIPTYLINE HYDROCHLORIDE 25 MG: 25 CAPSULE ORAL at 19:54

## 2022-01-01 RX ADMIN — NOREPINEPHRINE BITARTRATE 10 MCG/MIN: 1 INJECTION, SOLUTION, CONCENTRATE INTRAVENOUS at 12:33

## 2022-01-01 RX ADMIN — TICAGRELOR 90 MG: 90 TABLET ORAL at 04:21

## 2022-01-01 RX ADMIN — CEFEPIME HYDROCHLORIDE 1000 MG: 1 INJECTION, POWDER, FOR SOLUTION INTRAMUSCULAR; INTRAVENOUS at 10:35

## 2022-01-01 RX ADMIN — HEPARIN SODIUM 700 UNITS/HR: 10000 INJECTION, SOLUTION INTRAVENOUS at 14:23

## 2022-01-01 RX ADMIN — ATORVASTATIN CALCIUM 40 MG: 40 TABLET, FILM COATED ORAL at 08:54

## 2022-01-01 RX ADMIN — Medication 10 ML: at 20:58

## 2022-01-01 RX ADMIN — HEPARIN SODIUM 510 UNITS/HR: 10000 INJECTION, SOLUTION INTRAVENOUS at 15:37

## 2022-01-01 RX ADMIN — ACETAMINOPHEN 650 MG: 325 TABLET ORAL at 07:55

## 2022-01-01 RX ADMIN — EPINEPHRINE 1 MCG/MIN: 1 INJECTION INTRAMUSCULAR; INTRAVENOUS; SUBCUTANEOUS at 20:36

## 2022-01-01 RX ADMIN — Medication 5 MCG/MIN: at 10:29

## 2022-01-01 RX ADMIN — Medication 100 MCG/MIN: at 19:03

## 2022-01-01 RX ADMIN — Medication 10 ML: at 10:35

## 2022-01-01 RX ADMIN — INSULIN LISPRO 6 UNITS: 100 INJECTION, SOLUTION INTRAVENOUS; SUBCUTANEOUS at 17:21

## 2022-01-01 RX ADMIN — HEPARIN SODIUM 420 UNITS/HR: 10000 INJECTION, SOLUTION INTRAVENOUS at 21:15

## 2022-01-01 RX ADMIN — ATORVASTATIN CALCIUM 40 MG: 40 TABLET, FILM COATED ORAL at 08:18

## 2022-01-01 RX ADMIN — HEPARIN SODIUM 1400 UNITS: 1000 INJECTION INTRAVENOUS; SUBCUTANEOUS at 15:36

## 2022-01-01 RX ADMIN — HEPARIN SODIUM 2800 UNITS: 1000 INJECTION INTRAVENOUS; SUBCUTANEOUS at 11:02

## 2022-01-01 RX ADMIN — NORTRIPTYLINE HYDROCHLORIDE 25 MG: 25 CAPSULE ORAL at 20:58

## 2022-01-01 RX ADMIN — LEVOTHYROXINE SODIUM 88 MCG: 0.09 TABLET ORAL at 06:40

## 2022-01-01 RX ADMIN — TICAGRELOR 90 MG: 90 TABLET ORAL at 20:58

## 2022-01-01 RX ADMIN — DOBUTAMINE HYDROCHLORIDE 5 MCG/KG/MIN: 200 INJECTION INTRAVENOUS at 01:12

## 2022-01-01 RX ADMIN — TICAGRELOR 90 MG: 90 TABLET ORAL at 08:18

## 2022-01-01 RX ADMIN — INSULIN LISPRO 4 UNITS: 100 INJECTION, SOLUTION INTRAVENOUS; SUBCUTANEOUS at 09:30

## 2022-01-01 RX ADMIN — PANTOPRAZOLE SODIUM 40 MG: 40 TABLET, DELAYED RELEASE ORAL at 17:39

## 2022-01-01 RX ADMIN — FUROSEMIDE 10 MG/HR: 10 INJECTION, SOLUTION INTRAMUSCULAR; INTRAVENOUS at 05:53

## 2022-01-01 RX ADMIN — Medication 10 ML: at 19:54

## 2022-01-01 RX ADMIN — FUROSEMIDE 10 MG/HR: 10 INJECTION, SOLUTION INTRAMUSCULAR; INTRAVENOUS at 10:27

## 2022-01-01 RX ADMIN — INSULIN LISPRO 2 UNITS: 100 INJECTION, SOLUTION INTRAVENOUS; SUBCUTANEOUS at 09:35

## 2022-01-01 RX ADMIN — LORAZEPAM 2 MG: 2 INJECTION INTRAMUSCULAR; INTRAVENOUS at 17:01

## 2022-01-01 RX ADMIN — PANTOPRAZOLE SODIUM 40 MG: 40 TABLET, DELAYED RELEASE ORAL at 05:58

## 2022-01-01 RX ADMIN — TICAGRELOR 90 MG: 90 TABLET ORAL at 09:16

## 2022-01-01 ASSESSMENT — ENCOUNTER SYMPTOMS
ABDOMINAL DISTENTION: 0
EYE REDNESS: 0
EYE PAIN: 0
SHORTNESS OF BREATH: 1
SORE THROAT: 0
COUGH: 0
ABDOMINAL PAIN: 0

## 2022-01-01 ASSESSMENT — PAIN DESCRIPTION - PROGRESSION
CLINICAL_PROGRESSION: NOT CHANGED

## 2022-01-01 ASSESSMENT — PAIN DESCRIPTION - FREQUENCY
FREQUENCY: CONTINUOUS

## 2022-01-01 ASSESSMENT — PAIN SCALES - GENERAL
PAINLEVEL_OUTOF10: 6
PAINLEVEL_OUTOF10: 0
PAINLEVEL_OUTOF10: 0
PAINLEVEL_OUTOF10: 6
PAINLEVEL_OUTOF10: 7
PAINLEVEL_OUTOF10: 0
PAINLEVEL_OUTOF10: 10
PAINLEVEL_OUTOF10: 10
PAINLEVEL_OUTOF10: 0
PAINLEVEL_OUTOF10: 3
PAINLEVEL_OUTOF10: 3
PAINLEVEL_OUTOF10: 8
PAINLEVEL_OUTOF10: 5
PAINLEVEL_OUTOF10: 3
PAINLEVEL_OUTOF10: 0
PAINLEVEL_OUTOF10: 2
PAINLEVEL_OUTOF10: 5
PAINLEVEL_OUTOF10: 0
PAINLEVEL_OUTOF10: 0
PAINLEVEL_OUTOF10: 6
PAINLEVEL_OUTOF10: 3
PAINLEVEL_OUTOF10: 8
PAINLEVEL_OUTOF10: 0

## 2022-01-01 ASSESSMENT — PAIN DESCRIPTION - ORIENTATION
ORIENTATION: MID;UPPER
ORIENTATION: UPPER;MID
ORIENTATION: MID;UPPER

## 2022-01-01 ASSESSMENT — PAIN DESCRIPTION - LOCATION
LOCATION: STERNUM
LOCATION: UMBILICUS

## 2022-01-01 ASSESSMENT — PAIN DESCRIPTION - DESCRIPTORS
DESCRIPTORS: ACHING
DESCRIPTORS: ACHING
DESCRIPTORS: HEADACHE
DESCRIPTORS: ACHING

## 2022-01-01 ASSESSMENT — PAIN DESCRIPTION - ONSET
ONSET: ON-GOING

## 2022-01-01 ASSESSMENT — PAIN SCALES - WONG BAKER
WONGBAKER_NUMERICALRESPONSE: 0
WONGBAKER_NUMERICALRESPONSE: 0

## 2022-01-01 ASSESSMENT — PAIN DESCRIPTION - PAIN TYPE
TYPE: ACUTE PAIN

## 2022-01-01 ASSESSMENT — PAIN - FUNCTIONAL ASSESSMENT
PAIN_FUNCTIONAL_ASSESSMENT: ACTIVITIES ARE NOT PREVENTED
PAIN_FUNCTIONAL_ASSESSMENT: PREVENTS OR INTERFERES SOME ACTIVE ACTIVITIES AND ADLS
PAIN_FUNCTIONAL_ASSESSMENT: PREVENTS OR INTERFERES SOME ACTIVE ACTIVITIES AND ADLS

## 2022-01-04 NOTE — PROGRESS NOTES
[facility]  Heart Failure    [address]  Phone: [phone]  Fax: [phone]      NAME: Domonique Mejia  MEDICAL RECORD NUMBER:  [id number]  AGE: 77 y.o. GENDER: female  : 1945  EPISODE DATE:  2022      Chief Complaint   Patient presents with    Congestive Heart Failure        Past Medical History:   Diagnosis Date    Anemia     Anxiety     Asthma     CAD (coronary artery disease)     s/p thrombectomy and ZAN to Circ     Cerebral artery occlusion with cerebral infarction ([facility]     mini strokes, told by PCP, pt doesn't recall any symptoms. many years ago.  Depression     Graves disease     ablation 21    Hiatal hernia     Hx of blood clots     lungs after heart attack    Hyperlipidemia     Hypertension     IBS (irritable bowel syndrome)     w constipation and diarrhea. no bleeding    Kidney disease, chronic, stage III (moderate, EGFR 30-59 ml/min) ([facility])     MI (myocardial infarction) ([facility] 2021    STEMI. PCI LAD, LCx    Pneumonia     aspiration pneumonitis ;  bronchitis, outpt abx    Prolonged emergence from general anesthesia     Sleep apnea     does not need to use cpap per MD due to weight loss    Thyroid disease     hyper thyroidism    Urinary incontinence     UTI (urinary tract infection) 2021    Vocal cord dysfunction     Weight loss     lost 50 lbs.  intentional. working out, intake reduction      Past Surgical History:   Procedure Laterality Date    ARM SURGERY Left     L shoulder replacement    CHOLECYSTECTOMY      COLONOSCOPY      EYE SURGERY      contaract    GASTRIC FUNDOPLICATION  [address]    hiatal hernia repair    HAND SURGERY Left     WRIST CLOSED REDUCTION Left 2003    left wrist fracture    WRIST FRACTURE SURGERY Right 10/02/2020    OPEN REDUCTION INTERNAL FIXATION RIGHT WRIST performed by Cassidy Reeves MD at [facility] History   Problem Relation Age of Onset    Stroke Mother 61    Coronary Art Dis Father     Stroke Father     Lung Cancer Father     Heart Disease Father     Stroke Sister     Hypothyroidism Sister     Breast Cancer Sister     Heart Attack Paternal Grandmother         middle aged   Hilaria Roller Heart Attack Sister         in her 62s     Social History     Tobacco Use    Smoking status: Never Smoker    Smokeless tobacco: Never Used   Vaping Use    Vaping Use: Never used   Substance Use Topics    Alcohol use: No     Comment: rare glass of wine    Drug use: No     Counseling given: Not Answered     Immunization History   Administered Date(s) Administered    COVID-19, Pfizer Purple top, DILUTE for use, 12+ yrs, 30mcg/0.3mL dose 02/23/2021, 03/16/2021, 12/16/2021     Allergies   Allergen Reactions    Augmentin [Amoxicillin-Pot Clavulanate]     Bactrim [Sulfamethoxazole-Trimethoprim]     Carafate [Sucralfate]     Clindamycin/Lincomycin Nausea Only     Thinks if she has a smaller dose she would be fine      Hyoscyamine     Macrobid [Nitrofurantoin]     Oxybutynin     Pcn [Penicillins]     Pravastatin     Prevacid [Lansoprazole]     Prilosec [Omeprazole]     Sulfamethoxazole     Trazodone And Nefazodone         ECHO EF%: 30%  Date: 8/27/2021      Last Hospitalization: 9/12/2021    History of GAURAV: Denies  []BIPAP/CPAP use:   []Education about proper cleaning completed today     Subjective   HPI: Ms. Destiny Anne is a 77 y.o. female being evaluated by a virtual visit via telephone encounter from patient's home due to [address] emergency. Due to the circumstances physical examination is very limited. This visit was conducted with the patient's consent for billing of her insurance. A telephone visit was necessary today to reduce the patient's exposure to COVID-19 and to provide necessary medical care.   This patient has been advised to contact this office, her cardiologist, or primary care physician abdominal distention, abdominal pain, constipation, +diarrhea, no nausea and vomiting. Genitourinary: Negative for decreased urine volume, difficulty urinating, dysuria and hematuria. Skin: Negative for pallor and rash. Neurological: Negative for dizziness, tremors, weakness, light-headedness and headaches. Psychiatric/Behavioral: Negative for confusion. The patient is not nervous/anxious. Sleep: Negative for restless sleep, snoring, frequent waking       Functional Activity New York Heart Association Classification  Patient Symptoms   []   Class I No limitation of physical activity. Ordinary physical activity does not cause undue fatigue, palpitation, dyspnea (shortness of breath). [x]   Class II Slight limitation of physical activity. Comfortable at rest. Ordinary physical activity results in fatigue, palpitation, dyspnea (shortness of breath). []   Class III  Gray limitation of physical activity. Comfortable at rest.  Less than ordinary activity causes fatigue, palpitation, dyspnea. []   Class IV Unable to carry on any physical activity without discomfort. Symptoms of heart failure at rest.  If any physical activity is undertaken, discomfort increases. Shortness of Breath:   []   None   [x]   Dyspnea on exertion   []   Dyspnea with normal activities  []   Dyspnea at rest  []   Dyspnea while sleeping    Patient Findings:   []  Missed doses  []  Diet changes  []  Sodium intake changes    []  Alcohol intake changes  []  Night time cough  []  Edema    []  Activity changes   [x]  Fatigue that limits activity []  Acute illness  []  Early saiety, abd fullness []  Chest pain   []  Other        Objective: There were no vitals taken for this visit.   BP Readings from Last 3 Encounters:   12/15/21 100/70   11/29/21 98/60   09/29/21 112/64     Wt Readings from Last 6 Encounters:   12/15/21 114 lb (51.7 kg)   11/29/21 117 lb 12.8 oz (53.4 kg)   09/29/21 125 lb (56.7 kg)   09/16/21 136 lb 0.4 oz (61.7 kg) 09/08/21 132 lb 12.8 oz (60.2 kg)   08/30/21 141 lb 1.5 oz (64 kg)     Physical Exam:   Constitutional: Oriented to person, place, and time. No distress detected. Psychiatric: Normal mood and affect. BMP:   Lab Results   Component Value Date     09/16/2021    K 3.6 09/16/2021    K 4.5 09/12/2021     09/16/2021    CO2 18 09/16/2021    BUN 19 09/16/2021    CREATININE 1.7 09/16/2021       CBC:    Lab Results   Component Value Date    WBC 6.3 09/16/2021    HGB 10.2 09/16/2021    HCT 30.2 09/16/2021     09/16/2021     HgA1C:   Lab Results   Component Value Date    LABA1C 5.8 08/19/2021     TSH: No results found for: TSH  Lipids:   Lab Results   Component Value Date    CHOL 126 08/19/2021    TRIG 52 08/19/2021    HDL 58 08/19/2021    LDLCALC 58 08/19/2021     ProBNP:   Lab Results   Component Value Date    PROBNP 3,479 09/08/2021    PROBNP 1,506 08/18/2021       [x]Reviewed daily weight log and assessed self management skills including early recognition and notification of exacerbation   [x]Encouraged daily weights and to call with increase of 3 pounds in one day or 5 pounds in one week or weight increased above dry weight    [x]Discussed fluid restriction and sodium restriction as well as nutrition goals   []Weight loss counseling performed, including carbohydrate and calorie reduction  []Exercise Counseling performed      Medications reviewed:   [] compared patient's bottles with EPIC list  [x]States she does not feel well enough to do a med rec today unable to get up to get her bottles. Current medications:  No outpatient medications have been marked as taking for the 1/4/22 encounter (Virtual Visit) with ULYSSES Enriquez CNP. [x]Reviewed heart failure medications including how they work and potential side effects. [x]Advised patient to avoid NSAIDs. Get With The Guidelines  Ms. Dawson Sky is on a Beta-Blocker for (HFrEF) (systolic) EF </= 34%  Yes    Ms. Dawson Sky is on an Ace-Inhibitor / ARB / Entresto for (HFrEF) (systolic) EF </= 52% Yes      Ms. Gio Whitten is on a Aldosterone Receptor Antagonist for (HFrEF) (systolic) EF </= 85% or EF </= 40% with MI (Okay to use if SCr </= 2.5mg/dL in men, SCr </= 2mg/dL in women; Potassium < 5.0meq/L) No      Ms. Gio Whitten is on a Diuretic Yes    If the above guidelines are not met, reason documented above or recommendations made: Yes.       [] Advanced Directives completed and scanned  [x] Advanced Directives need to be addressed      Barriers to Adherence: Active attentive participant    Environmental Concerns: not applicable      Lia Morse is a 77 y.o. female evaluated via telephone on 1/4/2022. Consent:  She and/or health care decision maker is aware that that she may receive a bill for this telephone service, depending on her insurance coverage, and has provided verbal consent to proceed: Yes      Documentation:  I communicated with the patient and/or health care decision maker about chronic systolic heart failure. Details of this discussion including any medical advice provided: See visit note      I affirm this is a Patient Initiated Episode with a Patient who has not had a related appointment within my department in the past 7 days or scheduled within the next 24 hours. Patient identification was verified at the start of the visit: Yes      Note: not billable if this call serves to triage the patient into an appointment for the relevant concern      ULYSSES PHILLIPS - CNP      A heart failure binder has been provided prior to discharge in addition to extensive education including the information noted above. Assessment/Plan     Problem List Items Addressed This Visit     CHF (congestive heart failure) ([facility] - Primary     No symptoms of acute exacerbation today. Strongly encouraged daily weight. Continue sodium and fluid restriction. Emergency action plan reviewed today with patient.   Continue management

## 2022-02-01 NOTE — TELEPHONE ENCOUNTER
We are currently out of Brilinta samples and patient states medication is $400. Advised patient to stop by office to  patient assistance forms. She v/u.

## 2022-02-02 NOTE — ASSESSMENT & PLAN NOTE
No symptoms of acute exacerbation today. Strongly encouraged daily weight. Continue sodium and fluid restriction. Emergency action plan reviewed today with patient. Continue management and follow-up with cardiology as scheduled.

## 2022-02-15 NOTE — PATIENT INSTRUCTIONS
Daily weight: Call for increase 3 lbs/day or 5 lbs/week. 2 gm sodium diet:  Fluid Restriction: 64 oz.

## 2022-02-15 NOTE — PROGRESS NOTES
The [address] [place] [address] [address] [address]  [phone]    PrimaryCare Doctor:  Cricket Taylor MD  Primary Cardiologist: Dr. Keily Newton    Chief Complaint   Patient presents with    Follow-up     no cc     History of Present Illness:  Sunshine Andrea is a 77 y.o. female with PMH CAD s/p PCI to CX 2011, IBS, PE 2011, CVA, hyperthyroid, HTN, HLP, graves , [place]- Dr. Montgomery Cooks, anemia. Patient was admitted to [facility] 8/18-8/2/2021 with acute anterior MI and cardiogenic and liver shock. Taken to cath lab and had a subtotal occlusion of her ostial LAD and a 99% Circumflex artery. Dr. Keily Newton placed an Impella and performed PTCA to the ostia of the LAD and Circ restoring FLACO 3 flow. Developed JESSICA on CKD (Neph following) and resp failure. Originally planned for CABG but had increased risk with thrombocytopenia and anemia. Opted for repeat PCI on 8/24>see below. Started on Dig for tachycardia. Hem/Onc> thrombocytopenia thought to be from impella- improved when removed. Re admitted to [facility] with hypoglycemia and N/V. Found to have JESSICA (creat 3.9) 9/12-9/16/2021. Treated with IVF and given lokelma. Creat improved to 1.7. Dig, lisinopril, spironolactone held on discharge. Patient presents to [facility] cardiology for follow up for CAD/MI. She has started cardiac rehab- going 2x/week and tolerating well. She is feeling well. Tolerating routine activity at home - doing laundry, going up and down steps. She has a good appetite. Denies CP, SOB, LH, dizziness, palpitations, syncope. Daily weight stable stable, no wt gain/loss. Review of Systems:   General: Denies fever, chills  Skin: Denies skin changes, rash, itching, lesions.   HEENT: Denies headache, dizziness, vision changes, nosebleeds, sore throat, nasal drainage  RESP: Denies cough, sputum, dyspnea, wheeze, snoring  CARD: Denies palpitations,  murmur  GI:Denies nausea, vomiting, heartburn, loss of appetite, change in bowels  : Denies frequency, pain, incontinence, polyuria  VASC: Denies claudication, leg cramps, clots  MUSC/SKEL: Denies pain, stiffness, arthritis  PSYCH: Denies anxiety, depression, stress  NEURO: Denies numbness, tingling, weakness,change in mood or memory  HEME: Denies abn bruising, bleeding, anemia  ENDO: Denies intolerance to heat, cold, excessive thirst or hunger, hx thyroid disease    /70   Pulse 70   Ht 5' (1.524 m)   Wt 109 lb 3.2 oz (49.5 kg)   BMI 21.33 kg/m²   Wt Readings from Last 3 Encounters:   02/15/22 109 lb 3.2 oz (49.5 kg)   12/15/21 114 lb (51.7 kg)   11/29/21 117 lb 12.8 oz (53.4 kg)       Physical Exam:  GEN: Appears frail, no acute distress  SKIN: Pink, warm, dry. Nails without clubbing. HEENT: PERRLA. Normocephalic, atraumatic. Neck supple. No adenopathy. LUNG: AP diameter normal. Clear bilateral. No wheeze, rales, or ronchi. Respiratory effort normal.  HEART: S1S2 A/R. No JVD. No carotid bruit. No murmur, rub or gallop. ABD: Soft, nontender. +BS X 4 quads. No hepatomegaly. EXT: Radial and pedal pulses 2+ and symmetric. Without varicosities. No edema. MUSCSKEL: Good ROM X4 extremities. No deformity. NEURO: A/O X3. Calm and cooperative. Past Medical History:   has a past medical history of Anemia, Anxiety, Asthma, CAD (coronary artery disease), Cerebral artery occlusion with cerebral infarction ([address] Depression, Graves disease, Hiatal hernia, Hx of blood clots, Hyperlipidemia, Hypertension, IBS (irritable bowel syndrome), Kidney disease, chronic, stage III (moderate, EGFR 30-59 ml/min) ([address] MI (myocardial infarction) ([address] Pneumonia, Prolonged emergence from general anesthesia, Sleep apnea, Thyroid disease, Urinary incontinence, UTI (urinary tract infection), Vocal cord dysfunction, and Weight loss. Surgical History:   has a past surgical history that includes Hand surgery (Left); Arm Surgery (Left); eye surgery; Colonoscopy;  Cholecystectomy; 2/24/20  Yes Historical Provider, MD   vitamin D3 (CHOLECALCIFEROL) 25 MCG (1000 UT) TABS tablet Take 2 tablets by mouth daily    Yes Historical Provider, MD   fluticasone (FLONASE) 50 MCG/ACT nasal spray 1 spray by Nasal route nightly as needed   Yes Historical Provider, MD   nortriptyline (PAMELOR) 25 MG capsule Take 1 capsule by mouth nightly 9/17/20  Yes Historical Provider, MD   fexofenadine (ALLEGRA) 30 MG tablet Take 30 mg by mouth 2 times daily prn   Yes Historical Provider, MD   promethazine (PHENERGAN) 12.5 MG tablet Take 12.5 mg by mouth every 6 hours as needed for Nausea   Yes Historical Provider, MD   polyethyl glycol-propyl glycol 0.4-0.3 % (SYSTANE) 0.4-0.3 % ophthalmic solution 1 drop as needed for Dry Eyes (6 drops daily)   Yes Historical Provider, MD   cycloSPORINE (RESTASIS) 0.05 % ophthalmic emulsion Place 2 drops into the left eye 2 times daily prn   Yes Historical Provider, MD   solifenacin (VESICARE) 5 MG tablet Take 10 mg by mouth daily As needed   Yes Historical Provider, MD   Multiple Vitamins-Minerals (THERAPEUTIC MULTIVITAMIN-MINERALS) tablet Take 1 tablet by mouth daily   Yes Historical Provider, MD   vitamin B-12 (CYANOCOBALAMIN) 100 MCG tablet Take 50 mcg by mouth daily   Yes Historical Provider, MD   polyethylene glycol (GLYCOLAX) packet Take 17 g by mouth daily as needed for Constipation   Yes Historical Provider, MD        Allergies:  Augmentin [amoxicillin-pot clavulanate], Bactrim [sulfamethoxazole-trimethoprim], Carafate [sucralfate], Clindamycin/lincomycin, Hyoscyamine, Macrobid [nitrofurantoin], Oxybutynin, Pcn [penicillins], Pravastatin, Prevacid [lansoprazole], Prilosec [omeprazole], Sulfamethoxazole, and Trazodone and nefazodone       LABS: Results reviewed with patient today.     CBC:   Lab Results   Component Value Date    WBC 6.3 09/16/2021    WBC 5.7 09/15/2021    WBC 5.7 09/14/2021    RBC 3.13 09/16/2021    RBC 3.10 09/15/2021    RBC 3.24 09/14/2021    HGB 10.2 09/16/2021    HGB 10.5 09/15/2021    HGB 10.4 09/14/2021    HCT 30.2 09/16/2021    HCT 29.7 09/15/2021    HCT 30.9 09/14/2021    MCV 96.4 09/16/2021    MCV 95.6 09/15/2021    MCV 95.3 09/14/2021    RDW 18.3 09/16/2021    RDW 18.4 09/15/2021    RDW 18.8 09/14/2021     09/16/2021     09/15/2021     09/14/2021 12/16/2021 UC  WBC 6.7, RBC 3.56, Hgb 11.5, Hct 34.5, Platelets 882    BMP:  Lab Results   Component Value Date     09/16/2021     09/15/2021     09/14/2021    K 3.6 09/16/2021    K 4.0 09/15/2021    K 4.2 09/14/2021    K 4.5 09/12/2021    K 4.6 08/19/2021    K 5.4 08/18/2021     09/16/2021     09/15/2021     09/14/2021    CO2 18 09/16/2021    CO2 19 09/15/2021    CO2 19 09/14/2021    PHOS 3.5 08/30/2021    PHOS 3.4 08/29/2021    PHOS 2.9 08/28/2021    BUN 19 09/16/2021    BUN 27 09/15/2021    BUN 35 09/14/2021    CREATININE 1.7 09/16/2021    CREATININE 2.0 09/15/2021    CREATININE 2.5 09/14/2021 12/16/2021  UC   K 3.7 Chl 102 CO2 23 AG 14 BUN 18 Creat 1.9 Glucose 118 Calc 10.8    BNP:   Lab Results   Component Value Date    PROBNP 3,479 09/08/2021    PROBNP 1,506 08/18/2021    BNP 12/16/2021 UC  169    Parameters:   > 450 pg/mL under age 48  > 900 pg/mL ages 54-65  > 1800 pg/mL over age 76    Iron Studies:    Lab Results   Component Value Date    TIBC 252 08/22/2021    FERRITIN 253.6 08/22/2021     GLUCOSE: No results for input(s): GLUCOSE in the last 72 hours.    HGB A1c 11/10/2021 5.0    LIVER PROFILE:   Lab Results   Component Value Date    AST 17 09/16/2021    ALT 10 09/16/2021    LIPASE 53.0 09/12/2021    LABALBU 2.7 09/16/2021    BILIDIR <0.2 09/12/2021    BILITOT 0.6 09/16/2021    ALKPHOS 112 09/16/2021      Liver profile  12/16/2021 -WNL see Care Everywhere  LDL 52    PT/INR:   Lab Results   Component Value Date    PROTIME 12.7 08/23/2021    INR 1.12 08/23/2021     Cardiac Enzymes:  Lab Results   Component Value Date    TROPONINI high blood pressure, high cholesterol, Diabetes, smoking, obesity, family hx  Lifestyle modification: Heart healthy diet, regular exercise, weight loss, smoking cessation, stress reduction  Cardiac Rehab: Phase 2- Atttending 2x/week -tolerating well-notes reviewed    2. )Chronic systolic heart failure: EF 30%. Appears euvolemic without edema, SOB, wt gain, orthopnea. ACEI held by PCP for hypotension. ECHO ~ 2mos after cardiac rehab to assess LV fx on GDMT and after CR. NYHA Class III   Stage C  Diuretic: none  Beta Blocker: Toprol XL  ACEi/ARB/ARNI: stopped by Dr. Carola Choi for hypotension  Aldosterone Antagonist: spironolactone  SGLT2 Inhibitor: none  2gm Na diet, daily weight, 64 oz fluid restriction  Avoid NSAIDS and other nephrotoxic meds  Cardiac Rehab: after PT/OT  ICD: after 3 mos GDMT    3.) JESSICA on CKD Stage 3: Follow with Neph     4.) Hypotension:   Cont midodrine     Instructions:   Daily weight: Call for increase 3 lbs/day or 5 lbs/week. 2 gm sodium diet:  Fluid Restriction: 64 oz.     I appreciate the opportunity of cooperating in the care of this individual.    NEVA Peña, APRN - CNP, CNP, 2/15/2022,2:27 PM

## 2022-03-10 NOTE — TELEPHONE ENCOUNTER
Pts  called for samples for his wife. She will be here around 11 for rehab today. Medication Samples    Medication:BRILINTA    Dosage of the medication:90 mg    How are you taking this medication (QD, BID, TID, QID, PRN):TAKE 1 TABLET BY MOUTH TWICE A DAY     in the office or Mail to your home?  Roderick Valerio     Mr Rai Hannah # [phone]

## 2022-03-25 PROBLEM — I50.21 ACUTE CLINICAL SYSTOLIC HEART FAILURE (HCC): Status: ACTIVE | Noted: 2022-01-01

## 2022-03-25 PROBLEM — R77.8 ELEVATED TROPONIN: Status: ACTIVE | Noted: 2022-01-01

## 2022-03-25 NOTE — ED NOTES
Attempt x3 to obtain blood and place IV by this RN and CLYDE Celaya without success. Charge RN Yulisa Dela Cruz aware and will attempt with US.       Ashu Heart RN  03/25/22 0787

## 2022-03-25 NOTE — ED NOTES
Pt to ED with c/o sob, pain to neck and sternal area x3 days. Pt has a hx of MI with stent placement. States the pain and sob started while she was at cardiac rehab Tuesday.        Anthony Patton, RN  03/25/22 1928

## 2022-03-25 NOTE — ED NOTES
PICC RN at bedside. Dr. Linda Miller received clearance from nephrology for midline insertion.      Cody Sethi RN  03/25/22 1943

## 2022-03-25 NOTE — PROGRESS NOTES
[facility]    Consulted for PICC placement; after review of EMR, it is noted that pt has hx of CKD stage 3, sees Dr. Tavarez Hidden and no current labs have been resulted - specifically renal panel; will re evaluate when labs drawn and resulted; pt will need clearance for PICC placement from her Nephrologist, along w any specific arm restrictions r/t vessel preservation or an inpatient Nephrology consult; attempted to contact primary EDRN and was unable to have discussion via telephone; will follow up in an hour    MY RECOMMENDATIONS:  IJ or Fem CVC or I can place a PIV until Nephrology clearance obtained; please advise     CRESENCIO Claudio RN, BSN, [facility].

## 2022-03-25 NOTE — ED PROVIDER NOTES
[address] [facility]      Pt Name: Marita Gonsalez  MRN: [id number]  [place] 1945  Date of evaluation: 3/25/2022  Provider: Maco Goins MD    [address] [place] [address]       Chief Complaint   Patient presents with    Shortness of Breath     States shortness of breath onset of today. Spitting up blood complains of pain in the neck. HISTORY OF PRESENT ILLNESS   (Location/Symptom, Timing/Onset, Context/Setting, Quality, Duration, Modifying Factors, Severity)  Note limiting factors. Marita Gonsalez is a 69 y.o. female who presents to the emergency department complaining of shortness of breath and neck pain for 3 days. The patient states that she has known coronary artery disease has had total of 5 cardiac stents. She had an MI 10 years ago and got 2 cardiac stents and then got 3 more in August when she had her second MI. Patient is also had hyperthyroidism treated with radioactive iodine a year ago. The patient states that she went to cardiac rehab on Tuesday and she got pain at the bottom of her throat right at the upper chest at the sternal notch. She was doing exercise bike of some sort and it was enough that made her stop it then she did some slower walking and the pain went away. The patient also felt short of breath then when she came home getting out of the car and bring all her things and she got the pain again which was relieved again with rest and Tylenol after 20 minutes. The patient has been having this upper chest pain lower neck pain since Tuesday it seems to occur more at night. She has had chest pains in the past but there will always lower chest pains with her heart attacks. The patient was apparently not a candidate for coronary bypass grafting surgery although they had wanted to do that but they could not find any veins to actually harvest to be able to give her a bypass.   Patient did get diaphoretic at one time    Nursing Notes were reviewed. REVIEW OF SYSTEMS    (2-9 systems for level 4, 10 or more for level 5)     Review of Systems   Constitutional: Negative. HENT: Negative for postnasal drip and sore throat. Eyes: Negative for pain and redness. Respiratory: Positive for shortness of breath. Negative for cough. Cardiovascular: Positive for chest pain (The patient states it is not chest pain but she is pointing to the sternal notch which really is chest pain). Gastrointestinal: Negative for abdominal distention and abdominal pain. Endocrine: Negative for polydipsia and polyphagia. Genitourinary: Negative for dysuria and flank pain. Musculoskeletal: Positive for neck pain. Negative for myalgias. Neurological: Negative for dizziness, seizures and weakness. Psychiatric/Behavioral: Negative for agitation and behavioral problems. Except as noted above the remainder of the review of systems was reviewed and negative. PAST MEDICAL HISTORY     Past Medical History:   Diagnosis Date    Anemia     Anxiety     Asthma     CAD (coronary artery disease)     s/p thrombectomy and ZAN to Circ 2011    Cerebral artery occlusion with cerebral infarction ([facility]     mini strokes, told by PCP, pt doesn't recall any symptoms. many years ago.  Depression     Graves disease     ablation 4/16/21    Hiatal hernia     Hx of blood clots 2011    lungs after heart attack    Hyperlipidemia     Hypertension     IBS (irritable bowel syndrome)     w constipation and diarrhea. no bleeding    Kidney disease, chronic, stage III (moderate, EGFR 30-59 ml/min) ([facility])     MI (myocardial infarction) ([facility] [address] 08/18/2021    STEMI.  PCI LAD, LCx    Pneumonia     aspiration pneumonitis 2007; 2019 bronchitis, outpt abx    Prolonged emergence from general anesthesia     Sleep apnea     does not need to use cpap per MD due to weight loss    Thyroid disease     hyper thyroidism    Urinary incontinence     UTI (urinary tract infection) 04/26/2021    Vocal cord dysfunction     Weight loss     lost 50 lbs. intentional. working out, intake reduction         SURGICAL HISTORY       Past Surgical History:   Procedure Laterality Date    ARM SURGERY Left     L shoulder replacement    CHOLECYSTECTOMY      COLONOSCOPY      EYE SURGERY      contaract    GASTRIC FUNDOPLICATION  2521    hiatal hernia repair    HAND SURGERY Left     WRIST CLOSED REDUCTION Left 05/06/2003    left wrist fracture    WRIST FRACTURE SURGERY Right 10/02/2020    OPEN REDUCTION INTERNAL FIXATION RIGHT WRIST performed by Mana Barba MD at [address]       Previous Medications    AMLODIPINE (NORVASC) 2.5 MG TABLET    Take 2.5 mg by mouth daily    ASPIRIN EC 81 MG EC TABLET    Take 81 mg by mouth every morning     ATORVASTATIN (LIPITOR) 40 MG TABLET    Take 1 tablet by mouth daily    B COMPLEX VITAMINS (B-COMPLEX/B-12) TABS    Take by mouth    BRILINTA 90 MG TABS TABLET    TAKE 1 TABLET BY MOUTH TWICE A DAY    CHOLECALCIFEROL (VITAMIN D3) 50 MCG (2000 UT) TABS    Take 1,000 Units by mouth daily     COENZYME Q10 (COQ-10) 100 MG CAPS    Take 2 capsules by mouth daily    FAMOTIDINE (PEPCID) 40 MG TABLET    Take 1 tablet by mouth nightly    FLAXSEED, LINSEED, (FLAXSEED OIL MAX STR) 1300 MG CAPS    Take 1 capsule by mouth daily    LEVOTHYROXINE (SYNTHROID) 88 MCG TABLET    Take 88 mcg by mouth Daily Takes everyday EXCEPT Sundays--MD trying to reduce dose for patient (03/25/22)    LORAZEPAM (ATIVAN) 1 MG TABLET    Take 1-2 mg by mouth every evening.     METOPROLOL SUCCINATE (TOPROL XL) 25 MG EXTENDED RELEASE TABLET    Take 1 tablet by mouth daily    MIDODRINE (PROAMATINE) 10 MG TABLET    TAKE 1 TABLET BY MOUTH THREE TIMES A DAY    NORTRIPTYLINE (PAMELOR) 25 MG CAPSULE    Take 1 capsule by mouth nightly    PANTOPRAZOLE (PROTONIX) 40 MG TABLET    Take 40 mg by mouth 2 times daily    POLYETHYL GLYCOL-PROPYL GLYCOL 0.4-0.3 % (SYSTANE) 0.4-0.3 % OPHTHALMIC SOLUTION    1 drop as needed for Dry Eyes (6 drops daily)    POTASSIUM CHLORIDE (KLOR-CON M) 20 MEQ EXTENDED RELEASE TABLET    Take 2 tablets by mouth once for 1 dose    PROMETHAZINE (PHENERGAN) 12.5 MG TABLET    Take 12.5 mg by mouth every 6 hours as needed for Nausea    SPIRONOLACTONE (ALDACTONE) 25 MG TABLET    Take 0.5 tablets by mouth daily       ALLERGIES     Augmentin [amoxicillin-pot clavulanate], Bactrim [sulfamethoxazole-trimethoprim], Carafate [sucralfate], Clindamycin/lincomycin, Hyoscyamine, Macrobid [nitrofurantoin], Oxybutynin, Pcn [penicillins], Pravastatin, Prevacid [lansoprazole], Prilosec [omeprazole], Sulfamethoxazole, and Trazodone and nefazodone    FAMILY HISTORY       Family History   Problem Relation Age of Onset    Stroke Mother 61    Coronary Art Dis Father     Stroke Father     Lung Cancer Father     Heart Disease Father     Stroke Sister     Hypothyroidism Sister     Breast Cancer Sister     Heart Attack Paternal Grandmother         middle aged   Stas Rudder Heart Attack Sister         in her 62s          SOCIAL HISTORY       Social History     Socioeconomic History    Marital status:      Spouse name: None    Number of children: None    Years of education: None    Highest education level: None   Occupational History    None   Tobacco Use    Smoking status: Never Smoker    Smokeless tobacco: Never Used   Vaping Use    Vaping Use: Never used   Substance and Sexual Activity    Alcohol use: No     Comment: rare glass of wine    Drug use: No    Sexual activity: Not Currently   Other Topics Concern    None   Social History Narrative    None     Social Determinants of Health     Financial Resource Strain:     Difficulty of Paying Living Expenses: Not on file   Food Insecurity:     Worried About Running Out of Food in the Last Year: Not on file    [place] of Food in the Last Year: Not on file   Transportation Needs:     Lack of Transportation (Medical): Not on file    Lack of Transportation (Non-Medical): Not on file   Physical Activity:     Days of Exercise per Week: Not on file    Minutes of Exercise per Session: Not on file   Stress:     Feeling of Stress : Not on file   Social Connections:     Frequency of Communication with Friends and Family: Not on file    Frequency of Social Gatherings with Friends and Family: Not on file    Attends Evangelical Services: Not on file    Active Member of [address] [place] [address] [employer] or Organizations: Not on file    Attends Club or Organization Meetings: Not on file    Marital Status: Not on file   Intimate Partner Violence:     Fear of Current or Ex-Partner: Not on file    Emotionally Abused: Not on file    Physically Abused: Not on file    Sexually Abused: Not on file   Housing Stability:     Unable to Pay for Housing in the Last Year: Not on file    Number of [place] in the Last Year: Not on file    Unstable Housing in the Last Year: Not on file       SCREENINGS         Marisela Coma Scale  Eye Opening: Spontaneous  Best Verbal Response: Oriented  Best Motor Response: Obeys commands  Marisela Coma Scale Score: 15                     CIWA Assessment  BP: (!) 96/53  Pulse: 100                 PHYSICAL EXAM    (up to 7 for level 4, 8 or more for level 5)     ED Triage Vitals [03/25/22 1620]   BP Temp Temp Source Pulse Resp SpO2 Height Weight   110/61 96.6 °F (35.9 °C) Oral 86 16 -- 5' (1.524 m) 109 lb 2 oz (49.5 kg)       Physical Exam  Constitutional:       Appearance: She is well-developed and normal weight. HENT:      Head: Normocephalic and atraumatic. Eyes:      Extraocular Movements: Extraocular movements intact. Pupils: Pupils are equal, round, and reactive to light. Neck:      Vascular: No JVD. Cardiovascular:      Rate and Rhythm: Normal rate and regular rhythm. Heart sounds: No murmur heard. Pulmonary:      Effort: No tachypnea or respiratory distress.       Breath sounds: Normal breath sounds. No stridor. Chest:      Chest wall: No mass, deformity or tenderness. Abdominal:      General: Bowel sounds are normal.      Palpations: Abdomen is soft. There is no hepatomegaly or splenomegaly. Tenderness: There is no abdominal tenderness. Musculoskeletal:      Right lower leg: Edema present. Left lower leg: Edema present. Lymphadenopathy:      Cervical: No cervical adenopathy. Skin:     Capillary Refill: Capillary refill takes less than 2 seconds. Coloration: Skin is not cyanotic or pale. Findings: No ecchymosis. Neurological:      Mental Status: She is alert. Psychiatric:         Mood and Affect: Mood normal.         Behavior: Behavior normal.         DIAGNOSTIC RESULTS     EKG: All EKG's are interpreted by the Emergency Department Physician who either signs or Co-signs this chart in the absence of a cardiologist.    The patient's EKG showed a normal sinus rhythm nondiagnostic ST and T wave changes did have a peaked T wave in V2 which was new but otherwise there was nothing specific    RADIOLOGY:   Non-plain film images such as CT, Ultrasound and MRI are read by the radiologist. Plain radiographic images are visualized and preliminarily interpreted by the emergency physician with the below findings: The radiologist read bilateral pulmonary opacities however it appears to me to be interstitial markings that are increased and the patient is in congestive heart failure although it does not look as bad as some of her previous chest x-rays    Interpretation per the Radiologist below, if available at the time of this note:    XR CHEST (2 VW)   Final Result   Right greater than left pulmonary opacity, for which some considerations   include pulmonary edema or atypical infection. Trace pleural effusions.            LABS:  Labs Reviewed   CBC WITH AUTO DIFFERENTIAL - Abnormal; Notable for the following components:       Result Value    RBC 3.05 (*)     Hemoglobin 10.2 (*) Hematocrit 30.0 (*)     Platelets 001 (*)     All other components within normal limits    Narrative:     Collection has been rescheduled by [facility] at 03/25/2022 20:05 Reason: Rn   [place]   [address] TO MG FOR LOW K - Abnormal; Notable for the following components:    CO2 19 (*)     Glucose 114 (*)     BUN 27 (*)     CREATININE 1.5 (*)     GFR Non- 34 (*)     GFR  41 (*)     All other components within normal limits    Narrative:     Collection has been rescheduled by [facility] at 03/25/2022 20:05 Reason: Rn   [place]   TROPONIN - Abnormal; Notable for the following components:    Troponin 0.27 (*)     All other components within normal limits    Narrative:     Collection has been rescheduled by [facility] at 03/25/2022 20:05 Reason: Rn   [place]   [address] - Abnormal; Notable for the following components:    Pro-BNP 18,262 (*)     All other components within normal limits    Narrative:     Collection has been rescheduled by [facility] at 03/25/2022 20:05 Reason: Rn   [place]   COVID-19, RAPID       All other labs were within normal range or not returned as of this dictation. EMERGENCY DEPARTMENT COURSE and DIFFERENTIAL DIAGNOSIS/MDM:   Vitals:    Vitals:    03/25/22 1620 03/25/22 1745 03/25/22 2145   BP: 110/61 114/65 (!) 96/53   Pulse: 86 85 100   Resp: 16 22 20   Temp: 96.6 °F (35.9 °C) 97.7 °F (36.5 °C)    TempSrc: Oral Oral    SpO2:  95% 94%   Weight: 109 lb 2 oz (49.5 kg)     Height: 5' (1.524 m)         MDM  Number of Diagnoses or Management Options  Acute congestive heart failure, unspecified heart failure type [facility])  NSTEMI (non-ST elevated myocardial infarction) [facility])  Diagnosis management comments: The patient was initially evaluated it took a very long time to get an IV and finally had to use a PICC line to get her IV and get her blood work sent. The blood work finally returned showing that she does indeed have elevated troponin and BNP.   This is consistent with her having congestive heart failure and an NSTEMI. The patient will be given Lasix and nitroglycerin. I have already discussed the case with the hospitalist he is can see the patient. She has remained stable while here in the emergency department        REASSESSMENT      Patient was reassessed multiple times  CONSULTS:  IP CONSULT TO HOSPITALIST      FINAL IMPRESSION      1. NSTEMI (non-ST elevated myocardial infarction) ([facility] [address]    2. Acute congestive heart failure, unspecified heart failure type [facility])          DISPOSITION/PLAN   DISPOSITION Decision To Admit 03/25/2022 10:22:15 PM      (Please note that portions of this note were completed with a voice recognition program.  Efforts were made to edit the dictations but occasionally words are mis-transcribed. )    iMchelle Cho MD (electronically signed)  Attending Emergency Physician            Wellington Barnes MD  03/25/22 [id number]

## 2022-03-25 NOTE — ED NOTES
Attempt x3 by Charge RN to obtain blood and place IV with US without success. ED MD Nadeen Briscoe made aware. Order placed for PICC line to be placed. Call placed to PICC team by [facility]. Lab contacted to obtain blood specimens.       Morris Carlos RN  03/25/22 [id number]

## 2022-03-25 NOTE — TELEPHONE ENCOUNTER
Spoke to patient. She has been doing cardiac rehab. She says this past Tuesday she increased her level on stair step machine. She says when she got home she noticed a pain in her neck. She saw her PCP yesterday for a \"sore throat\" and was told it was her allergies. She continues with the neck pain on and off that she noticed worsens at night. She is sleeping on 4 pillows now just for the pain. She is taking tylenol at the advise of her PCP and that relieves the pain. She has increased anxiety and has been taking more ativan. She C/O of increased sinus pressure and has some bloody nasal drng. She has been going up and down steps and doing laundry and denies any CP, SOB or neck pain with that. It sounds like her discomforts/neck pain are non cardiac. She has no pain/SOB/discomfort with exertion. Suspect her neck pain is musculoskeletal. Possibly form increased work out? She is very anxious an dwas reassured. Advised if pain progresses to CP/SOB with exertion then she should proceed to ED over the weekend. She will check back with [facility] if symptoms do not resolve.

## 2022-03-25 NOTE — PROGRESS NOTES
Pharmacy Medication Reconciliation Note     List of medications Gatito Cowan is currently taking is complete. Source of information:   1. Conversation with patient and spouse at bedside  2. EMR    Notes regarding home medications:   1. Patient and spouse report all AM and afternoon medications given PTA in the ED  2. Patient is anticoagulated with Brilinta 90 mg BID (1 of 2 taken PTA) for history of MI and stent placement   3. Spouse reports midodrine was dc'd due to cost but also lack of need due to control in BP   4.  Lorazepam is prescribed up to TID PRN, patient only uses 1-2 QHS     Patient denies taking any OTC or herbal medications other than those listed     [place] Sheron, Pharmacy Intern  3/25/2022  7:37 PM

## 2022-03-25 NOTE — TELEPHONE ENCOUNTER
Fernanda Hitchcock called in this morning stating that she went to see her PCP yesterday thinking she had a very bad sore throat but turned out that she didn't. Fernanda Hitchcock stated that she is having a very hard time breathing, hasn't slept  In two nights and has spit up a small amount of blood. Fernanda Hitchcock states that she would like to talk to HIRO Mccallum right away and she can be reached at [phone].

## 2022-03-26 PROBLEM — I50.23 ACUTE ON CHRONIC SYSTOLIC HEART FAILURE, NYHA CLASS 4 (HCC): Status: ACTIVE | Noted: 2022-01-01

## 2022-03-26 PROBLEM — I21.4 NSTEMI (NON-ST ELEVATED MYOCARDIAL INFARCTION) (HCC): Status: ACTIVE | Noted: 2021-01-01

## 2022-03-26 NOTE — PROGRESS NOTES
Late entry: Pt admitted to 2112 via bed from 5th floor. VS as documented. Dr. Wally Marie at bedside. Pt placed on bipap and arrived on Levophed and Heparin.  at bedside. Oriented to call light, room and plan of care. Will monitor.   Electronically signed by Yahaira Contreras RN on 3/26/2022 at 7:40 PM

## 2022-03-26 NOTE — CONSULTS
Pulmonary critical care consult Note     Patient's name:  Arabella Stoner Record Number: [id number]  Patient's account/billing number: [de-identified]  Patient's YOB: 1945  Age: 69 y.o. Date of Admission: 3/25/2022  4:16 PM  Date of Consult: 3/26/2022      Primary Care Physician: Didier Herrera MD      Code Status: Full Code    Reason for consult: acute respiratory failure with hypoxia     Assessment and Plan     1. Acute respiratory failure with hypoxia secondary to pulmonary edema with hemoptysis   2. Acute on chronic systolic CHF  3. Cardiogenic shock   4. NSTEMI  5. JESSICA on CKD  6. H/o CAD s/p multiple PCI in the past  7. Chronic anemia       Plan:  Pressors, will switch to [facility]. Titrate for MAP of 65 to help with diuresis   Started on Bipap, monitor closely respiratory status, discussed with patient potential intubation if she does not improve on BiPAP and she is in agreement. Gentle diuresis per nephrology. Heparin drip. Follow up echo results   discussed with   Critical care time 45 minutes        HISTORY OF PRESENT ILLNESS:   Mr./MsElsy Mosquera is a 69 y.o. lady with past medical with coronary artery disease stated below significant for extensive cardiac history with coronary artery disease status post 5 stents most recently August 2021 presented to the hospital with retrosternal chest pain has been going on for couple of days, was transferred to the ICU for hypotension and worsening respiratory distress, CT chest which I personally reviewed showed bilateral groundglass opacities with increased interlobular septal thickening consistent with CHF, patient has been complaining of cough and mild hemoptysis. Elevated troponins. Elevated proBNP.   Acute on chronic renal failure          Past Medical History:        Diagnosis Date    Anemia     Anxiety     Asthma     CAD (coronary artery disease)     s/p thrombectomy and ZAN to Circ 2011    Cerebral artery occlusion with cerebral infarction ([facility] [address]     mini strokes, told by PCP, pt doesn't recall any symptoms. many years ago.  Depression     Graves disease     ablation 4/16/21    Hiatal hernia     Hx of blood clots 2011    lungs after heart attack    Hyperlipidemia     Hypertension     IBS (irritable bowel syndrome)     w constipation and diarrhea. no bleeding    Kidney disease, chronic, stage III (moderate, EGFR 30-59 ml/min) (HCC)     MI (myocardial infarction) ([facility] [address] 08/18/2021    STEMI. PCI LAD, LCx    Pneumonia     aspiration pneumonitis 2007; 2019 bronchitis, outpt abx    Prolonged emergence from general anesthesia     Sleep apnea     does not need to use cpap per MD due to weight loss    Thyroid disease     hyper thyroidism    Urinary incontinence     UTI (urinary tract infection) 04/26/2021    Vocal cord dysfunction     Weight loss     lost 50 lbs. intentional. working out, intake reduction       Past Surgical History:        Procedure Laterality Date    ARM SURGERY Left     L shoulder replacement    CHOLECYSTECTOMY      COLONOSCOPY      EYE SURGERY      contaract    GASTRIC FUNDOPLICATION  2935    hiatal hernia repair    HAND SURGERY Left     WRIST CLOSED REDUCTION Left 05/06/2003    left wrist fracture    WRIST FRACTURE SURGERY Right 10/02/2020    OPEN REDUCTION INTERNAL FIXATION RIGHT WRIST performed by Magy Castaneda MD at [address] [place] [address]:     Allergies   Allergen Reactions    Augmentin [Amoxicillin-Pot Clavulanate]     Bactrim [Sulfamethoxazole-Trimethoprim]     Carafate [Sucralfate]     Clindamycin/Lincomycin Nausea Only     Thinks if she has a smaller dose she would be fine      Hyoscyamine     Macrobid [Nitrofurantoin]     Oxybutynin     Pcn [Penicillins]     Pravastatin     Prevacid [Lansoprazole]     Prilosec [Omeprazole]     Sulfamethoxazole     Trazodone And Nefazodone          Home Meds:   Prior to Admission medications    Medication Sig Start Date End Date Taking? Authorizing Provider   pantoprazole (PROTONIX) 40 MG tablet Take 40 mg by mouth 2 times daily   Yes Historical Provider, MD   LORazepam (ATIVAN) 1 MG tablet Take 1-2 mg by mouth every evening.    Yes Historical Provider, MD   amLODIPine (NORVASC) 2.5 MG tablet Take 2.5 mg by mouth daily   Yes Historical Provider, MD   B Complex Vitamins (B-COMPLEX/B-12) TABS Take by mouth   Yes Historical Provider, MD   Coenzyme Q10 (COQ-10) 100 MG CAPS Take 2 capsules by mouth daily   Yes Historical Provider, MD   midodrine (PROAMATINE) 10 MG tablet TAKE 1 TABLET BY MOUTH THREE TIMES A DAY  Patient not taking: Reported on 3/25/2022 1/14/22   ULYSSES Capps CNP   atorvastatin (LIPITOR) 40 MG tablet Take 1 tablet by mouth daily 12/15/21   ULYSSES Capps CNP   spironolactone (ALDACTONE) 25 MG tablet Take 0.5 tablets by mouth daily 10/7/21   ULYSSES Capps CNP   potassium chloride (KLOR-CON M) 20 MEQ extended release tablet Take 2 tablets by mouth once for 1 dose 10/1/21 2/15/22  Vega Nelson MD   metoprolol succinate (TOPROL XL) 25 MG extended release tablet Take 1 tablet by mouth daily 9/29/21   ULYSSES Capps CNP   BRILINTA 90 MG TABS tablet TAKE 1 TABLET BY MOUTH TWICE A DAY 9/21/21   Jana Aden MD   Flaxseed, Linseed, (FLAXSEED OIL MAX STR) 1300 MG CAPS Take 1 capsule by mouth daily    Historical Provider, MD   levothyroxine (SYNTHROID) 88 MCG tablet Take 88 mcg by mouth Daily Takes everyday EXCEPT Sundays--MD trying to reduce dose for patient (03/25/22)    Historical Provider, MD   aspirin EC 81 MG EC tablet Take 81 mg by mouth every morning     Historical Provider, MD   famotidine (PEPCID) 40 MG tablet Take 1 tablet by mouth nightly 2/24/20   Historical Provider, MD   Cholecalciferol (VITAMIN D3) 50 MCG (2000 UT) TABS Take 1,000 Units by mouth daily     Historical Provider, MD   nortriptyline (PAMELOR) 25 MG capsule Take 1 capsule by mouth nightly 9/17/20   Historical Provider, MD   promethazine (PHENERGAN) 12.5 MG tablet Take 12.5 mg by mouth every 6 hours as needed for Nausea    Historical Provider, MD   polyethyl glycol-propyl glycol 0.4-0.3 % (SYSTANE) 0.4-0.3 % ophthalmic solution 1 drop as needed for Dry Eyes (6 drops daily)    Historical Provider, MD       Family History:       Problem Relation Age of Onset    Stroke Mother 61    Coronary Art Dis Father     Stroke Father     Lung Cancer Father     Heart Disease Father     Stroke Sister     Hypothyroidism Sister     Breast Cancer Sister     Heart Attack Paternal Grandmother         middle aged   [facility] Heart Attack Sister         in her 62s         Social History:   TOBACCO:   reports that she has never smoked. She has never used smokeless tobacco.  ETOH:   reports no history of alcohol use. DRUGS:  reports no history of drug use. REVIEW OF SYSTEMS:  Review of Systems -   General ROS: negative  Psychological ROS: negative  Ophthalmic ROS: negative  ENT ROS: negative  Allergy and Immunology ROS: negative  Hematological and Lymphatic ROS: negative  Endocrine ROS: negative  Breast ROS: negative  Respiratory ROS: Shortness of breath, cough, hemoptysis  Cardiovascular ROS: Retrosternal chest pain. Gastrointestinal ROS:negative  Genito-Urinary ROS: negative  Musculoskeletal ROS: negative  Neurological ROS: negative  Dermatological ROS: negative        Physical Exam:    Vitals: BP (!) 71/52   Pulse 124   Temp 97.7 °F (36.5 °C) (Oral)   Resp (!) 38   Ht 5' (1.524 m)   Wt 102 lb 1.2 oz (46.3 kg)   SpO2 (!) 88%   BMI 19.93 kg/m²     Last Body weight:   Wt Readings from Last 3 Encounters:   03/26/22 102 lb 1.2 oz (46.3 kg)   02/15/22 109 lb 3.2 oz (49.5 kg)   12/15/21 114 lb (51.7 kg)       Body Mass Index : Body mass index is 19.93 kg/m².       Intake and Output summary:     Intake/Output Summary (Last 24 hours) at 3/26/2022 1329  Last data filed at 3/26/2022 1000  Gross per 24 hour   Intake 240 ml   Output    Net 240 ml       Physical Examination:     PHYSICAL EXAM:    Gen: Severe acute distress  Eyes: PERRL. Anicteric sclera. No conjunctival injection. ENT: No discharge. Posterior oropharynx clear. External appearance of ears and nose normal.  Neck: Trachea midline. No mass, no lymphadenopathy    Resp: Bilateral rales, increased work of breathing  CV: Regular rate. Regular rhythm. No murmur or rub. No edema. GI: Soft, Non-tender. Non-distended. +BS  Skin: Warm, dry, w/o erythema. Lymph: No cervical or supraclavicular LAD. M/S: No cyanosis. No clubbing. Neuro:  CN 2-12 tested, no focal neurologic deficit. Moves all extremities  Psych: Awake and alert, Oriented x 3. Judgement and insight appropriate. Mood stable. Laboratory findings:-    CBC:   Recent Labs     03/26/22  1000   WBC 13.8*   HGB 10.2*   *     BMP:    Recent Labs     03/25/22  2109 03/25/22  2109 03/26/22  0400      < > 141   K 3.6  --  3.8      < > 106   CO2 19*   < > 18*   BUN 27*   < > 28*   CREATININE 1.5*  --  1.7*   GLUCOSE 114*   < > 164*    < > = values in this interval not displayed. S. Calcium:  Recent Labs     03/26/22  0400   CALCIUM 9.3     S. Ionized Calcium:No results for input(s): IONCA in the last 72 hours. S. Magnesium:  Recent Labs     03/26/22  0400   MG 1.70*     S. Phosphorus:No results for input(s): PHOS in the last 72 hours. S. Glucose:No results for input(s): POCGLU in the last 72 hours. Glycosylated hemoglobin A1C: No results for input(s): LABA1C in the last 72 hours. INR: No results for input(s): INR in the last 72 hours. Hepatic functions: No results for input(s): ALKPHOS, ALT, AST, PROT, BILITOT, BILIDIR, LABALBU in the last 72 hours. Pancreatic functions:No results for input(s): LACTA, AMYLASE in the last 72 hours. S. Lactic Acid: No results for input(s): LACTA in the last 72 hours.   Cardiac enzymes:  Recent Labs     03/25/22  4844 03/26/22  0400 03/26/22  1000   TROPONINI 0.25* 0.43* 0.79*     BNP:No results for input(s): BNP in the last 72 hours. Lipid profile: No results for input(s): CHOL, TRIG, HDL, LDL, LDLCALC in the last 72 hours. Blood Gases: No results found for: PH, PCO2, PO2, HCO3, O2SAT  Thyroid functions: No results found for: T4, TSH       Radiology Review:  Pertinent images / reports were reviewed as a part of this visit. CT Chest w/ contrast: No results found for this or any previous visit. CT Chest w/o contrast: Results for orders placed during the hospital encounter of 03/25/22    CT CHEST WO CONTRAST    Narrative  EXAMINATION:  CT OF THE CHEST WITHOUT CONTRAST 3/25/2022 11:14 pm    TECHNIQUE:  CT of the chest was performed without the administration of intravenous  contrast. Multiplanar reformatted images are provided for review. Dose  modulation, iterative reconstruction, and/or weight based adjustment of the  mA/kV was utilized to reduce the radiation dose to as low as reasonably  achievable. COMPARISON:  08/19/2021    HISTORY:  ORDERING SYSTEM PROVIDED HISTORY: SOB, hemoptysis  TECHNOLOGIST PROVIDED HISTORY:  Reason for exam:->SOB, hemoptysis  Decision Support Exception - unselect if not a suspected or confirmed  emergency medical condition->Emergency Medical Condition (MA)  Reason for Exam: SOB, hemoptysis    FINDINGS:  Mediastinum: There is a small partially calcified aneurysm noted along the  inferior aspect of the thoracic aortic arch, unchanged from the previous  examination measuring a proximally 16 mm in greatest dimension. The thoracic  aorta otherwise appear similar to the previous examination. Great vessel as  well as heavy coronary artery disease is again identified. Cardiac size  appear stable. No focal esophageal thickening is identified. Again  identified is a 13 mm precarinal short axis lymph node.   Mild subcarinal  lymphadenopathy is identified, similar to the previous examination. Lungs/pleura: There are trace bilateral pleural effusions, similar to the  previous examination. Interlobular septal thickening is identified, with  patchy ground-glass changes seen within the lungs bilaterally. The degree of  mild consolidative changes seen on the previous examination has improved. Upper Abdomen: Mesenteric atherosclerotic disease and cholecystectomy clips  noted in the upper abdomen. No acute process is seen. Soft Tissues/Bones:  No axillary or supraclavicular lymphadenopathy is  identified. A right-sided midline is seen terminating within an axillary  vein. Diffuse osteopenia. Multilevel degenerative changes are seen within  the spine. No acute spinal fracture is identified. Impression  1. Diffuse airspace disease seen within the lungs bilaterally, with  interlobular septal thickening as well as patchy ground-glass changes. The  degree of mild diffuse multifocal consolidation seen on the previous  examination has overall improved. Findings suggestive of cardiogenic  pulmonary edema, though multifocal pneumonia could have a similar appearance. 2. Stable mild mediastinal lymphadenopathy which is likely reactive. 3. Trace bilateral effusions, similar to the prior study. 4. Severe coronary artery atherosclerosis. 5. 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 CTPA: No results found for this or any previous visit.       CXR PA/LAT: Results for orders placed during the hospital encounter of 03/25/22    XR CHEST (2 VW)    Narrative  EXAMINATION:  TWO XRAY VIEWS OF THE CHEST    3/25/2022 4:48 pm    COMPARISON:  08/27/2021    HISTORY:  ORDERING SYSTEM PROVIDED HISTORY: Short of breath  TECHNOLOGIST PROVIDED HISTORY:  Known cardiac patient with 5 cardiac stents  Reason for exam:->Short of breath  Reason for Exam: SOB    FINDINGS:  Right upper extremity PICC line has been removed. Diffuse interstitial  opacity is seen bilaterally. Most confluent opacity is seen in the right  suprahilar region. Blunting of the costophrenic angles. No gross  pneumothorax. Cardiac and mediastinal silhouettes are unchanged. Calcification of aorta. Right upper quadrant clips are seen. Impression  Right greater than left pulmonary opacity, for which some considerations  include pulmonary edema or atypical infection. Trace pleural effusions. CXR portable: Results for orders placed during the hospital encounter of 08/18/21    XR CHEST PORTABLE    Narrative  EXAMINATION:  ONE XRAY VIEW OF THE CHEST    8/24/2021 3:03 pm    COMPARISON:  Prior study(s) most recent 08/21/2021. HISTORY:  ORDERING SYSTEM PROVIDED HISTORY: hypoxia  TECHNOLOGIST PROVIDED HISTORY:  Reason for exam:->hypoxia  Reason for Exam: hypoxia  Acuity: Acute  Type of Exam: Initial    FINDINGS:  The heart is within normal limits size. There is bilateral moderate airspace  disease. This is seen centrally obscuring the central vasculature and  extending outward towards the periphery bilaterally, increased from the prior  study. There are patchy areas of involvement. Right arm PICC line extends  to the mid SVC. Impression  Moderate bilateral airspace disease fairly diffuse but with patchy  distribution. Concern is for multifocal pneumonia and/or pulmonary edema. This has increased from the prior study.                      Brinda Rachel MD, MJADA.            3/26/2022, 1:29 PM

## 2022-03-26 NOTE — ED NOTES
Report called to [facility] RN, all questions answered.      Karel Naranjo RN  03/25/22 0482

## 2022-03-26 NOTE — CONSULTS
Visit us at [facility]. com or call us at [address] [place] [address] NOTE    Patient: Nicole Grullon MRN: [id number]     YOB: 1945  Age: 69 y.o. Sex: female    Unit: [address] ICU Room/Bed: B4F-0861/2112-01 Location: [address] [place] [address]     Admitting Physician: Ashley Castellon    Primary Care Physician: Lisa Bangura MD          LOS: 1 day       DATE OF CONSULTATION:   March 26, 2022       SOURCE:   History obtained from patient, family and EHR      REASON FOR CONSULTATION:   I was asked to see Ms Amanda Lyon in consultation by Dr Fam Joiner for the evaluation and management of CKD4    HISTORY OF PRESENT ILLNESS:   This is a 69 y.o. WF with a H/O CKD, followed by Dr Jannette Denny. She also has extensive h/o CAD with s/p TBX and PCI to Cx in 2011, PCI to LAD and LCx in 8/21 and required impella at the time for cardiogenic shock. She presented to [facility] with SOB and was diagnosed with acute hypoxic respiratory failure/pulmonary edema, cardiogenic shock and NSTEMI. She was placed on biPAP. Her SBP was in the 70's on several checks. Her pro-BNP was 18,262 and troponin was elevated at 0.79. She was noted to have a SCr of 1.5 and later 1.7. Her recent baseline has been 1.69-2.0 since at least 10/2021 (per  records) with the most recent one on 3/16/22 being 1.69. She is currently in distress, on biPAP, nods her head to questions,  is at the bedside. PAST MEDICAL HISTORY:   CKD4  CAD  H/ stroke  Depression/anxiety  Anemia  MBD of CKD  H/O Graves disease  HTN  Hyperlipidemia  IBS      SOCIAL HISTORY:   Denies any tobacco or ETOH use. FAMILY HISTORY:   No h/o kidney disease in the family.       REVIEW OF SYSTEMS:   CONSTITUTIONAL: Fever (-), Chills (-), Weakness (+), Change in appetite (+; decreased)  CARDIOVASCULAR: Chest pain (-), Palpitation (-), Edema (+/-)  RESPIRATORY: ALY (++), Orthopnea (+/-), Cough (-)  GASTROINTESTINAL: Nausea (-), Vomiting (-), Diarrhea (-), Constipation (-), Bloody stool (-)   GENITOURINARY: Dysuria (-), Hematuria (-), Foaming (-)  MUSCULOSKELETAL: Arthralgia (+/-), Back pain (+/-)  SKIN: Rash (-)  NEUROLOGIC: LH (+), Dizziness (-), Headache (-)  ENDOCRINE: DM (-), Thyroid disease (+)  HEMATOLOGY: Anemia (+)      PHYSICAL EXAM:   CONSTITUTIONAL:            Vitals:    03/26/22 1330 03/26/22 1345 03/26/22 1400 03/26/22 1415   BP: (!) 90/58 102/72 98/67    Pulse: 112 112 114 114   Resp: (!) 34 (!) 35 (!) 33 (!) 36   Temp:       TempSrc:       SpO2: (!) 84% (!) 88% 98%    Weight:       Height:                   Intake/Output Summary (Last 24 hours) at 3/26/2022 1419  Last data filed at 3/26/2022 1000  Gross per 24 hour   Intake 240 ml   Output    Net 240 ml           Body habitus: lean. GENERAL APPEARANCE: in mild distress  PSYCHIATRY: Orientation: unobtainable, patient just nodding head to question. Mood: cooperative  EYES: Conjunctivae: normal.  NECK: Trachea is in midline. Thyroid: not enlarged  RESPIRATORY: Respiratory effort: increased, on biPAP. Auscultation: +crackles bilaterally  CARDIOVASCULAR: Auscultation: irregular, tachycardic. Edema: trace in LE. Pedal pulse: diminished  GASTROINTESTINAL: Soft, nontender, nondistended. Liver: normal in size  EXTREMITIES:  No cyanosis or clubbing. SKIN: Warm and dry. No significant rashes      LABS:   Labs were reviewed. Pertinent findings are highlighted under assessment and plan. RFP:   Recent Labs     03/25/22 2109 03/26/22  0400    141   K 3.6 3.8    106   CO2 19* 18*   BUN 27* 28*   CREATININE 1.5* 1.7*   MG  --  1.70*   GFRAA 41* 35*       Liver panel:  No results for input(s): ALB, TP, AST, ALT in the last 72 hours.     Invalid input(s): TBIL    Endocrine:   @[id number](VitD,PTH,TSH,aldosterone,renin activity,cortisol,metanephrine)@    CBC:   Recent Labs     03/25/22 2109 03/26/22  0400 03/26/22  1000   WBC 8.9 10.5 13.8*   HGB 10.2* 10.6* 10.2*   HCT 30.0* 31.4* 30.7*   MCV 98.3 99.3 99.0   * 115* 129*       Iron Panel:   @[id number](FERA,FE)@    Serology: @[id number](ANAS,ANCA,C3,C4,RNP,AGBM,DNA,SSA,SSB,SPEP,UPEP,ESR,HEPT)@    Urine studies: @[id number](UAPR,UCOL,UPH,UNAR,UUNR,UCRR,UCLR,UKR,UUAR,UOSM,UEOS)@  @[email or web address]@      ASSESSMENT and PLAN:     1. CKD4 (recent baseline SCr is 1.69-2 since at least 10/2021 and on multiple checks per  records; followed by Dr Noni Davis):   SCr is stable at baseline, however, in view of her presentation with hemodynamic compromise, and ADHF, she is at increased risk of developing JESSICA. - Will continue to monitor closely response to diuresis/I and O/renal function    2. Acute hypoxic respiratory failure/ADHF on chronic Systolic HF (EF 14%): - Agree with IV lasix. Further w/u per Cardiology. May need another Echo. 3. CAD/NSTEMI: with s/p TBX and PCI to Cx in 2011, PCI to LAD and LCx in 8/21 and required impella at the time for cardiogenic shock. Per cardiology    4.  Anemia of CKD: Hb at target range    Critical care time: 35min        Signed By: Lopez Mccann MD

## 2022-03-26 NOTE — CONSULTS
that includes Hand surgery (Left); Arm Surgery (Left); eye surgery; Colonoscopy; Cholecystectomy; Wrist fracture surgery (Right, 10/02/2020); Gastric fundoplication (0397); and Wrist Closed Reduction (Left, 05/06/2003). Social History:   reports that she has never smoked. She has never used smokeless tobacco. She reports that she does not drink alcohol and does not use drugs. Family History:  family history includes Breast Cancer in her sister; Coronary Art Dis in her father; Heart Attack in her paternal grandmother and sister; Heart Disease in her father; Hypothyroidism in her sister; Pearlene Proffer in her father; Stroke in her father and sister; Stroke (age of onset: 61) in her mother. Home Medications:  Were reviewed and are listed in nursing record and/or below  Prior to Admission medications    Medication Sig Start Date End Date Taking? Authorizing Provider   pantoprazole (PROTONIX) 40 MG tablet Take 40 mg by mouth 2 times daily   Yes Historical Provider, MD   LORazepam (ATIVAN) 1 MG tablet Take 1-2 mg by mouth every evening.    Yes Historical Provider, MD   amLODIPine (NORVASC) 2.5 MG tablet Take 2.5 mg by mouth daily   Yes Historical Provider, MD   B Complex Vitamins (B-COMPLEX/B-12) TABS Take by mouth   Yes Historical Provider, MD   Coenzyme Q10 (COQ-10) 100 MG CAPS Take 2 capsules by mouth daily   Yes Historical Provider, MD   midodrine (PROAMATINE) 10 MG tablet TAKE 1 TABLET BY MOUTH THREE TIMES A DAY  Patient not taking: Reported on 3/25/2022 1/14/22   ULYSSES Pretty CNP   atorvastatin (LIPITOR) 40 MG tablet Take 1 tablet by mouth daily 12/15/21   ULYSSES Pretty CNP   spironolactone (ALDACTONE) 25 MG tablet Take 0.5 tablets by mouth daily 10/7/21   ULYSSES Pretty CNP   potassium chloride (KLOR-CON M) 20 MEQ extended release tablet Take 2 tablets by mouth once for 1 dose 10/1/21 2/15/22  Venancio Smith MD   metoprolol succinate (TOPROL XL) 25 MG extended release tablet Take 1 tablet by mouth daily 9/29/21   Xena Song, APRN - CNP   BRILINTA 90 MG TABS tablet TAKE 1 TABLET BY MOUTH TWICE A DAY 9/21/21   James Meigs, MD   Flaxseed, Linseed, (FLAXSEED OIL MAX STR) 1300 MG CAPS Take 1 capsule by mouth daily    Historical Provider, MD   levothyroxine (SYNTHROID) 88 MCG tablet Take 88 mcg by mouth Daily Takes everyday EXCEPT Sundays--MD trying to reduce dose for patient (03/25/22)    Historical Provider, MD   aspirin EC 81 MG EC tablet Take 81 mg by mouth every morning     Historical Provider, MD   famotidine (PEPCID) 40 MG tablet Take 1 tablet by mouth nightly 2/24/20   Historical Provider, MD   Cholecalciferol (VITAMIN D3) 50 MCG (2000 UT) TABS Take 1,000 Units by mouth daily     Historical Provider, MD   nortriptyline (PAMELOR) 25 MG capsule Take 1 capsule by mouth nightly 9/17/20   Historical Provider, MD   promethazine (PHENERGAN) 12.5 MG tablet Take 12.5 mg by mouth every 6 hours as needed for Nausea    Historical Provider, MD   polyethyl glycol-propyl glycol 0.4-0.3 % (SYSTANE) 0.4-0.3 % ophthalmic solution 1 drop as needed for Dry Eyes (6 drops daily)    Historical Provider, MD      CURRENT Medications:  aspirin EC tablet 81 mg, QAM  atorvastatin (LIPITOR) tablet 40 mg, Daily  ticagrelor (BRILINTA) tablet 90 mg, BID  levothyroxine (SYNTHROID) tablet 88 mcg, Daily  metoprolol succinate (TOPROL XL) extended release tablet 25 mg, Daily  midodrine (PROAMATINE) tablet 10 mg, TID WC  nortriptyline (PAMELOR) capsule 25 mg, Nightly  pantoprazole (PROTONIX) tablet 40 mg, BID AC  perflutren lipid microspheres (DEFINITY) injection 1.65 mg, ONCE PRN  sodium chloride flush 0.9 % injection 5-40 mL, 2 times per day  sodium chloride flush 0.9 % injection 5-40 mL, PRN  0.9 % sodium chloride infusion, PRN  acetaminophen (TYLENOL) tablet 650 mg, Q6H PRN   Or  acetaminophen (TYLENOL) suppository 650 mg, Q6H PRN  ondansetron (ZOFRAN) injection 4 mg, Q6H PRN  heparin (porcine) injection 5,000 Units, 3 times per day  DOBUTamine (DOBUTREX) 500 mg in dextrose 5 % 250 mL infusion, Continuous  [START ON 3/27/2022] furosemide (LASIX) injection 20 mg, Daily  lidocaine PF 1 % injection 5 mL, Once    Allergies:  Augmentin [amoxicillin-pot clavulanate], Bactrim [sulfamethoxazole-trimethoprim], Carafate [sucralfate], Clindamycin/lincomycin, Hyoscyamine, Macrobid [nitrofurantoin], Oxybutynin, Pcn [penicillins], Pravastatin, Prevacid [lansoprazole], Prilosec [omeprazole], Sulfamethoxazole, and Trazodone and nefazodone     Review of Systems:   · Constitutional: no unanticipated weight loss. There's been no change in energy level, sleep pattern, or activity level. No fevers, chills. · Eyes: No visual changes or diplopia. No scleral icterus. · ENT: No Headaches, hearing loss or vertigo. No mouth sores or sore throat. · Cardiovascular: as reviewed in HPI  · Respiratory: No cough or wheezing, no sputum production. No hemoptysis. · Gastrointestinal: No abdominal pain, appetite loss, blood in stools. No change in bowel or bladder habits. · Genitourinary: No dysuria, trouble voiding, or hematuria. · Musculoskeletal:  No gait disturbance, no joint complaints. · Integumentary: No rash or pruritis. · Neurological: No headache, diplopia, change in muscle strength, numbness or tingling. · Psychiatric: No anxiety or depression. · Endocrine: No temperature intolerance. No excessive thirst, fluid intake, or urination. No tremor. · Hematologic/Lymphatic: No abnormal bruising or bleeding, blood clots or swollen lymph nodes. · Allergic/Immunologic: No nasal congestion or hives. Objective:   PHYSICAL EXAM:    Vitals:    03/26/22 0600   BP: (!) 91/56   Pulse: 111   Resp: 18   Temp: 97.7   SpO2: 95% on 4L    Weight: 102 lb 1.2 oz (46.3 kg)       General Appearance:  Alert, cooperative, no respiratory distress. Thin. Chronically ill-appearing. Wearing O2   Head:  Normocephalic, without obvious abnormality, atraumatic. Eyes:  Pupils equal and round. No scleral icterus. Mouth: Moist mucosa, no pharyngeal erythema. Poor dentition. Nose: Nares normal. No drainage or sinus tenderness. Neck: Supple, symmetrical, trachea midline. No adenopathy. No tenderness/mass/nodules. No carotid bruit or elevated JVD. Lungs:   Respirations unlabored at rest.  Bibasilar crackles. Chest Wall:  No tenderness or deformity. Heart:  Tachycardic with frequent ectopy. S1/S2 normal. No murmur, rub, or gallop. Abdomen:   Soft, non-tender, bowel sounds active. Musculoskeletal: No muscle wasting or digital clubbing. Extremities: Extremities normal, atraumatic. No cyanosis or edema. Pulses: 2+ radial and carotid pulses, symmetric. Skin: No rashes or lesions. Pysch: Normal mood and affect. Alert and oriented x 4. Neurologic: Normal gross motor and sensory exam.       Labs     CBC:   Lab Results   Component Value Date    WBC 10.5 03/26/2022    RBC 3.17 03/26/2022    HGB 10.6 03/26/2022    HCT 31.4 03/26/2022    MCV 99.3 03/26/2022    RDW 14.2 03/26/2022     03/26/2022     CMP:  Lab Results   Component Value Date     03/26/2022    K 3.8 03/26/2022    K 3.6 03/25/2022     03/26/2022    CO2 18 03/26/2022    BUN 28 03/26/2022    CREATININE 1.7 03/26/2022    GFRAA 35 03/26/2022    AGRATIO 0.7 09/16/2021    LABGLOM 29 03/26/2022    GLUCOSE 164 03/26/2022    PROT 6.6 09/16/2021    CALCIUM 9.3 03/26/2022    BILITOT 0.6 09/16/2021    ALKPHOS 112 09/16/2021    AST 17 09/16/2021    ALT 10 09/16/2021     PT/INR:  No results found for: PTINR  HgBA1c:  Lab Results   Component Value Date    LABA1C 5.8 08/19/2021     Lab Results   Component Value Date    TROPONINI 0.43 (H) 03/26/2022       Cardiac Data     EKG: Personally interpreted. 3/25/2022. Sinus rhythm with occasional PVCs. Echo: 8/27/2021.   Overall left ventricular systolic function appears severely reduced with global hypokinesis and akinesis of the jeramy and inferoseptum. Ejection fraction is visually estimated to be 30%. Normal right ventricular size and function. There are no valvular structural abnormalities appreciated. Cath: 8/24/21. 1.  95% ostial to proximal left anterior descending artery stenosis. There is 90% ostial circumflex disease. These were heavily calcified vessels. They extended into the left main to the disease. This underwent rotational atherectomy with a 1.5-mm Rotablator jung followed by Shockwave balloon angioplasty with a 3-mm balloon. These then  underwent bifurcation stenting with a 3 mm stent in the LAD overlapped by 3.5 mm [place] drug-eluting stent from the LAD into the left main coronary artery. We then did the T-stenting with some overlap from the proximal circumflex into the left main. We then performed kissing balloon angioplasty resulting in 0% residual stenosis and excellent flow. 2.  Successful removal of the Impella device from the left femoral artery access site with balloon tamponade to control hemostasis. We also removed the pulmonary artery catheter that was present. Telemetry: Personally interpreted. Sinus. Assessment and Plan   1) Acute on chronic systolic heart failure. EF 30%. NYHA IV. Discontinue dobutamine as likely contributing to hypotension and tachycardia. Patient does not appear to be in cardiogenic shock. Will attempt gentle diuresis once dobutamine metabolized and hopefully BP improves. Continue Toprol-XL as tolerated. No ACEI/ARB/Aldactone with current hypotension but will continue to reassess. Initiation of SGLT2i can be addressed as an outpatient. No emergent indication for ICD but patient due for repeat echocardiogram for ICD evaluation. 2) NSTEMI. Discontinue dobutamine. Uncertain if a type I or type II event but she is denying to me having any chest pains. Elevated troponin related to CHF in a patient with known severe CAD seems more likely.   Regardless, will anticoagulate with IV heparin in the short-term. No emergent indication for angiography. 3) Acute respiratory failure with hypoxia. Continue supplemental oxygen and diurese as tolerated. May be related to pulmonary edema. 4) Hypotension. Patient has been hypotensive in the past.  She does not appear to be septic or in cardiogenic shock. Hospitalist initiation of dobutamine may be contributing and has been discontinued. Currently denying lightheadedness. May need to initiate pressor in the short-term. Ideally would not give a fluid bolus. 5) Multivessel CAD s/p multiple PCI. Continue medical management and risk factor modification including use of aspirin, Brilinta, Lipitor, and beta-blocker. 6) CKD stage IIIb. Continue to monitor creatinine with current hypotension and ultimately attempts at diuresis. 7) Anemia. Chronic. Will check routine iron studies and defer additional work-up to primary team.    Overall, the problems requiring hospitalization are high in severity. 40 minutes spent in direct patient care of this critically ill individual.    Thank you for allowing us to participate in the care of Monique Parada. Paige Stevenson.  [address]  3/26/2022 7:01 AM

## 2022-03-26 NOTE — PROGRESS NOTES
Clinical Pharmacy Note  Heparin Dosing       Lab Results   Component Value Date    APTT 122.2 03/26/2022     Lab Results   Component Value Date    HGB 10.2 03/26/2022    HCT 30.7 03/26/2022     03/26/2022    INR 1.12 08/23/2021       Current Infusion Rate: 560 units/hr    Plan:  HOLD 1 hour  Rate: decrease to 420 units/hr  Next aPTT: 0300 3-27    Pharmacy will continue to monitor and adjust based on aPTT results.   Yajaira Arriaza, [facility], [address] 3/26/2022 7:44 PM

## 2022-03-26 NOTE — H&P
Hospital Medicine History & Physical      PCP: Wilburn Severin, MD    Date of Admission: 3/25/2022    Date of Service: Pt seen/examined on 3/25/2022 and admitted to inpatient    Chief Complaint: Chest pain, shortness of breath      History Of Present Illness: The patient is a 69 y.o. female with past medical history as below who presents to [facility] with initially main concern for what feels like persistent new onset chest pain that was persistent over the last day since this past morning, she describes as a pressure-like sensation but she is pointing to the very top of her sternum at the very divot where her throat is. She notes that she has not experienced this type of pain before. She has been increasingly somewhat short of breath the last few days but has still been functional to her knowledge. She denies any crushing or sharp or burning-like chest pain however. She denies any problems with dysphagia or trouble swallowing in general.  She denies other recent symptoms of fever, chills, dizziness, syncope, leg swelling, dysuria, blood in urine/stool/sputum, nausea/vomiting/diarrhea/abdominal pain. Past Medical History:        Diagnosis Date    Anemia     Anxiety     Asthma     CAD (coronary artery disease)     s/p thrombectomy and ZAN to Circ 2011    Cerebral artery occlusion with cerebral infarction ([address]     mini strokes, told by PCP, pt doesn't recall any symptoms. many years ago.  Depression     Graves disease     ablation 4/16/21    Hiatal hernia     Hx of blood clots 2011    lungs after heart attack    Hyperlipidemia     Hypertension     IBS (irritable bowel syndrome)     w constipation and diarrhea.  no bleeding    Kidney disease, chronic, stage III (moderate, EGFR 30-59 ml/min) (HCC)     MI (myocardial infarction) ([address] 08/18/2021 STEMI. PCI LAD, LCx    Pneumonia     aspiration pneumonitis 2007; 2019 bronchitis, outpt abx    Prolonged emergence from general anesthesia     Sleep apnea     does not need to use cpap per MD due to weight loss    Thyroid disease     hyper thyroidism    Urinary incontinence     UTI (urinary tract infection) 04/26/2021    Vocal cord dysfunction     Weight loss     lost 50 lbs. intentional. working out, intake reduction       Past Surgical History:        Procedure Laterality Date    ARM SURGERY Left     L shoulder replacement    CHOLECYSTECTOMY      COLONOSCOPY      EYE SURGERY      contaract    GASTRIC FUNDOPLICATION  2507    hiatal hernia repair    HAND SURGERY Left     WRIST CLOSED REDUCTION Left 05/06/2003    left wrist fracture    WRIST FRACTURE SURGERY Right 10/02/2020    OPEN REDUCTION INTERNAL FIXATION RIGHT WRIST performed by Mario Chaparro MD at [place] [address]       Medications Prior to Admission:    Prior to Admission medications    Medication Sig Start Date End Date Taking? Authorizing Provider   pantoprazole (PROTONIX) 40 MG tablet Take 40 mg by mouth 2 times daily   Yes Historical Provider, MD   LORazepam (ATIVAN) 1 MG tablet Take 1-2 mg by mouth every evening.    Yes Historical Provider, MD   amLODIPine (NORVASC) 2.5 MG tablet Take 2.5 mg by mouth daily   Yes Historical Provider, MD   B Complex Vitamins (B-COMPLEX/B-12) TABS Take by mouth   Yes Historical Provider, MD   Coenzyme Q10 (COQ-10) 100 MG CAPS Take 2 capsules by mouth daily   Yes Historical Provider, MD   midodrine (PROAMATINE) 10 MG tablet TAKE 1 TABLET BY MOUTH THREE TIMES A DAY  Patient not taking: Reported on 3/25/2022 1/14/22   ULYSSES Moreno CNP   atorvastatin (LIPITOR) 40 MG tablet Take 1 tablet by mouth daily 12/15/21   ULYSSES Moreno CNP   spironolactone (ALDACTONE) 25 MG tablet Take 0.5 tablets by mouth daily 10/7/21   ULYSSES Moreno CNP   potassium chloride (KLOR-CON M) 20 MEQ extended release tablet Take 2 tablets by mouth once for 1 dose 10/1/21 2/15/22  Jonathan Greer MD   metoprolol succinate (TOPROL XL) 25 MG extended release tablet Take 1 tablet by mouth daily 9/29/21   ULYSSES Anguiano - CNP   BRILINTA 90 MG TABS tablet TAKE 1 TABLET BY MOUTH TWICE A DAY 9/21/21   Didier Horton MD   Flaxseed, Linseed, (FLAXSEED OIL MAX STR) 1300 MG CAPS Take 1 capsule by mouth daily    Historical Provider, MD   levothyroxine (SYNTHROID) 88 MCG tablet Take 88 mcg by mouth Daily Takes everyday EXCEPT Sundays--MD trying to reduce dose for patient (03/25/22)    Historical Provider, MD   aspirin EC 81 MG EC tablet Take 81 mg by mouth every morning     Historical Provider, MD   famotidine (PEPCID) 40 MG tablet Take 1 tablet by mouth nightly 2/24/20   Historical Provider, MD   Cholecalciferol (VITAMIN D3) 50 MCG (2000 UT) TABS Take 1,000 Units by mouth daily     Historical Provider, MD   nortriptyline (PAMELOR) 25 MG capsule Take 1 capsule by mouth nightly 9/17/20   Historical Provider, MD   promethazine (PHENERGAN) 12.5 MG tablet Take 12.5 mg by mouth every 6 hours as needed for Nausea    Historical Provider, MD   polyethyl glycol-propyl glycol 0.4-0.3 % (SYSTANE) 0.4-0.3 % ophthalmic solution 1 drop as needed for Dry Eyes (6 drops daily)    Historical Provider, MD       Allergies:  Augmentin [amoxicillin-pot clavulanate], Bactrim [sulfamethoxazole-trimethoprim], Carafate [sucralfate], Clindamycin/lincomycin, Hyoscyamine, Macrobid [nitrofurantoin], Oxybutynin, Pcn [penicillins], Pravastatin, Prevacid [lansoprazole], Prilosec [omeprazole], Sulfamethoxazole, and Trazodone and nefazodone    Social History:  The patient currently lives home    TOBACCO:   reports that she has never smoked. She has never used smokeless tobacco.  ETOH:   reports no history of alcohol use. Family History:  Reviewed in detail and negative for DM, Early CAD, Cancer, CVA.  Positive as follows:        Problem Relation Age of Onset    Stroke Mother 61    Coronary Art Dis Father     Stroke Father     Lung Cancer Father     Heart Disease Father     Stroke Sister     Hypothyroidism Sister     Breast Cancer Sister     Heart Attack Paternal Grandmother         middle aged   Blase Liming Heart Attack Sister         in her 62s       REVIEW OF SYSTEMS:    as noted in the HPI. All other systems reviewed and negative. PHYSICAL EXAM:    BP (!) 91/56   Pulse 111   Temp 97.7 °F (36.5 °C) (Oral)   Resp (!) 31   Ht 5' (1.524 m)   Wt 102 lb 1.2 oz (46.3 kg)   SpO2 95%   BMI 19.93 kg/m²     General appearance: Frail-appearing, currently on nasal cannula oxygen, no acute respiratory stress at this time  HEENT Normal cephalic, atraumatic without obvious deformity. Pupils equal, round, and reactive to light. Extra ocular muscles intact. Conjunctivae/corneas clear. Mildly dry mucous membranes  Neck: Supple, no JVD  Lungs: Diminished breath sounds, scattered areas of crackles mainly to the bases anteriorly and posteriorly  Heart: Regular rate and rhythm with Normal S1/S2 without murmurs, rubs or gallops, point of maximum impulse non-displaced  Abdomen: Soft, nontender, nondistended, active bowel sounds  Extremities: No edema  Skin: No rashes  Neurologic: Grossly intact neurologically and strength is 5 5 all extremities  Mental status: Alert, oriented, thought content appropriate. Capillary Refill: Acceptable  < 3 seconds  Peripheral Pulses: +3 Easily felt, not easily obliterated with pressure    03/25/22 2344  CT SOFT TISSUE NECK WO CONTRAST   Performed: 03/25/22 2330  Final        Impression: No acute abnormality of the soft tissue structures of the neck within constraints of noncontrast exam. No confluent soft tissue neck mass or cervical lymphadenopathy.        03/25/22 1724  XR CHEST (2 VW)   Performed: 03/25/22 1648  Final  Specimen: Chest        Impression: Right greater than left pulmonary opacity, for which some considerations include pulmonary edema or atypical infection. Trace pleural effusions. CT chest without contrast:  1. Diffuse airspace disease seen within the lungs bilaterally, with   interlobular septal thickening as well as patchy ground-glass changes.  The   degree of mild diffuse multifocal consolidation seen on the previous   examination has overall improved.  Findings suggestive of cardiogenic   pulmonary edema, though multifocal pneumonia could have a similar appearance. 2. Stable mild mediastinal lymphadenopathy which is likely reactive. 3. Trace bilateral effusions, similar to the prior study. 4. Severe coronary artery atherosclerosis. 5. 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 CBC   Recent Labs     03/25/22 2109 03/26/22  0400   WBC 8.9 10.5   HGB 10.2* 10.6*   HCT 30.0* 31.4*   * 115*      RENAL  Recent Labs     03/25/22 2109 03/26/22  0400    141   K 3.6 3.8    106   CO2 19* 18*   BUN 27* 28*   CREATININE 1.5* 1.7*     LFT'S  No results for input(s): AST, ALT, ALB, BILIDIR, BILITOT, ALKPHOS in the last 72 hours. COAG  No results for input(s): INR in the last 72 hours.   CARDIAC ENZYMES  Recent Labs     03/25/22 2109 03/25/22  2320 03/26/22  0400   TROPONINI 0.27* 0.25* 0.43*       U/A:    Lab Results   Component Value Date    COLORU YELLOW 09/12/2021    WBCUA  08/19/2021    RBCUA see below 08/19/2021    BACTERIA 3+ 08/19/2021    CLARITYU Clear 09/12/2021    SPECGRAV 1.007 09/12/2021    LEUKOCYTESUR Negative 09/12/2021    BLOODU Negative 09/12/2021    GLUCOSEU Negative 09/12/2021       ABG    Lab Results   Component Value Date    HZO6ZRF 23.7 08/24/2021    BEART -2 08/24/2021    G1WEDHIT 86 08/24/2021    PHART 7.364 08/24/2021    NPE1RUV 41.6 08/24/2021    PO2ART 54.0 08/24/2021    URI0TTB 25 08/24/2021           Active Hospital Problems    Diagnosis Date Noted    Elevated troponin [R77.8] 03/25/2022    Acute clinical systolic heart failure (HCC) [I50.21] 83/76/3977    Acute systolic CHF (congestive heart failure), NYHA class 3 (HCC) [I50.21]    · Hypotension  · CKD  · Thyroid disease        PHYSICIANS CERTIFICATION:    I certify that Lucita Lake is expected to be hospitalized for greater than 2 midnights based on the following assessment and plan:      ASSESSMENT/PLAN:  · Acute clinical systolic heart failure  · Elevated troponin  · Hypotension  · CKD  · Thyroid disease    Plan:  · Patient's presentation is multifactorial in nature, does have findings of fluid overload on CT imaging but also is hypotensive and does have some component of elevated troponin, unable to say if truly NSTEMI but feel as though more likely it is secondary due to her decompensated heart failure although she has hypotensive and unable to be diuresed at this time  · Patient was started on dobutamine IV infusion after admission due to persistent worsening hypotension  · Patient was diuresed with 20 of IV Lasix, will continue 20 IV Lasix daily for now for gentle diuresis  · Initial troponin 0.27, repeat troponin was decreasing, will trend troponins and repeat EKG in the morning  · Cardiology consult for decompensated heart failure and possible NSTEMI given her some component of chest pain  · Restart patient's other home medications but avoid any extra antihypertensive medicines  · Repeat labs daily as well as trending troponins    DVT Prophylaxis: Lovenox  Diet: ADULT DIET; Regular; Low Sodium (2 gm); 1500 ml  Code Status: Full Code  PT/OT Eval Status: Ambulatory    Dispo -pending clinical course       Michael Junior DO    Thank you Kaylen Schulte MD for the opportunity to be involved in this patient's care. If you have any questions or concerns please feel free to contact me at [phone].

## 2022-03-26 NOTE — PROGRESS NOTES
Hospitalist Progress Note      PCP: Manuel Mart MD    Date of Admission: 3/25/2022      Subjective: Feels better, no dyspnea at rest no chest pain no muscle cramps denies dizziness lightheadedness or palpitation. Medications:  Reviewed    Infusion Medications    sodium chloride       Scheduled Medications    aspirin EC  81 mg Oral QAM    atorvastatin  40 mg Oral Daily    ticagrelor  90 mg Oral BID    levothyroxine  88 mcg Oral Daily    metoprolol succinate  25 mg Oral Daily    midodrine  10 mg Oral TID WC    nortriptyline  25 mg Oral Nightly    pantoprazole  40 mg Oral BID AC    sodium chloride flush  5-40 mL IntraVENous 2 times per day    heparin (porcine)  5,000 Units SubCUTAneous 3 times per day    [START ON 3/27/2022] furosemide  20 mg IntraVENous Daily    lidocaine 1 % injection  5 mL IntraDERmal Once     PRN Meds: perflutren lipid microspheres, sodium chloride flush, sodium chloride, acetaminophen **OR** acetaminophen, ondansetron    No intake or output data in the 24 hours ending 03/26/22 0946    Physical Exam Performed:    BP (!) 78/50   Pulse 130   Temp 97.7 °F (36.5 °C) (Oral)   Resp (!) 33   Ht 5' (1.524 m)   Wt 102 lb 1.2 oz (46.3 kg)   SpO2 95%   BMI 19.93 kg/m²     General appearance: No apparent distress  Neck: Supple  Respiratory:  Normal respiratory effort. Clear to auscultation, bilaterally without Rales/Wheezes/Rhonchi. Cardiovascular: Regular rate and rhythm with normal S1/S2 without murmurs, rubs or gallops. Abdomen: Soft, non-tender, non-distended  Musculoskeletal: No clubbing, cyanosis  Skin: Skin color, texture, turgor normal.  No rashes or lesions.   Neurologic:  No focal weakness   Psychiatric: Alert and oriented  Capillary Refill: Brisk,3 seconds, normal   Peripheral Pulses: +2 palpable, equal bilaterally       Labs:   Recent Labs     03/25/22  2109 03/26/22  0400   WBC 8.9 10.5   HGB 10.2* 10.6*   HCT 30.0* 31.4*   * 115*     Recent Labs troponin [R77.8]     Acute clinical systolic heart failure (HCC) [I50.21]     Acute systolic CHF (congestive heart failure), NYHA class 3 (HCC) [I50.21]      1. Acute likely on chronic systolic congestive heart failure, patient admitted to the hospital, with  elevated troponin, cardiology consulted, discussed with cardiology  2. Elevated troponin, could be due to acute exacerbation of systolic heart failure but possible non-STEMI also, as troponin increased cardiology consulted, discussed with Dr. Rosaura Mclean, adding heparin drip and monitoring for now patient already on aspirin, discussed with nurse also. 3. Essential hypertension, currently relatively hypotensive, hold on p.o. medications  4. CKD monitor closely especially with relative hypotension  5. Anemia, appears chronic, monitor closely, no signs of bleeding  6. Mild thrombocytopenia, monitor closely  7. Hypomagnesemia, will replace with IV supplement    Addendum  transferred to ICU  Starting on pressors  Discussed with cardiology    Critical care time including placing iv medications orders, critical labs, in a patient with potential life threatening complications 38 minutes. Diet: ADULT DIET; Regular;  Low Sodium (2 gm); 1500 ml  Code Status: Full Code        Macie Aburto MD

## 2022-03-26 NOTE — PROGRESS NOTES
Clinical Pharmacy Note  Heparin Dosing Consult    Tip Ngo is a 69 y.o. female ordered heparin per low dose nomogram by Dr. Garrett Ryder. Lab Results   Component Value Date    APTT 34.8 08/23/2021     Lab Results   Component Value Date    HGB 10.6 03/26/2022    HCT 31.4 03/26/2022     03/26/2022    INR 1.12 08/23/2021       Ht Readings from Last 1 Encounters:   03/25/22 5' (1.524 m)        Wt Readings from Last 1 Encounters:   03/26/22 102 lb 1.2 oz (46.3 kg)        Assessment/Plan:  Initial bolus: 2800 units  Initial infusion rate: 560 units/hr  Next aPTT: 1700    Pharmacy will continue to monitor adjust heparin based on aPTT results using nomogram below:     LOW DOSE HEPARIN PROTOCOL (ACS/STEMI/A FIB)     Initial Bolus: 60 units/kg Max Bolus: 4,000 units       Initial Rate: 12 units/kg/hr Max Initial Rate: 1,000 units/hr     aPTT < 45   Heparin 60 units/kg bolus Increase infusion by 4 units/kg/hr       (maximum 4,000 units)   aPTT 45-59.9   Heparin 30 units/kg bolus Increase infusion by 2 units/kg/hr       (maximum 2,000 units)   aPTT 60-90   No bolus   No change   aPTT 90.1-97.5 No bolus   Decrease infusion by 1 units/kg/hr   aPTT 97.6-105  No bolus     Decrease infusion by 2 units/kg/hr   aPTT > 105   Hold heparin for 1 hour Decrease infusion by 3 units/kg/hr     Obtain aPTT 6 hours after initial bolus and 6 hours after any dose change until two consecutive therapeutic aPTTs are achieved - then daily.   Reba Simpson, [facility], 3/26/2022 10:05 AM

## 2022-03-26 NOTE — PROCEDURES
DIT VASCULAR ACCESS SERVICES: +Nephrology clearance obtained from Dr. Tavo Prakash per [facility] Sneha Rogers PLACEMENT:  Preprocedure & timeout done w primary RN Guillermo Benitez; +Midline order verified and changed from PICC to Midline per [facility] Karen Huitron; no difficulty accessing  R BASILIC vein; an 18 cm Midline catheter is placed and advanced wo difficulty or complications; +line is patent w brisk blood return and flushes wo resistance; reported same and ok to use Midline to primary RN; pt tolerated procedure very well; she is instructed to remain in bed at rest for 30 min post procedure in order to promote hemostasis to the insertion site and she agrees to do so; pt is left in a position of comfort w siderails up x2, call light in reach and bed is locked in lowest position    NURSES:  *please replace all existing IV tubing with new IV tubing prior to connecting current infusions to the new Midline. *please refrain from obtaining BPs in the RIGHT arm. All of the above may be sources of infection or damage to the Midline and/or insertion site.     CRESENCIO Obando RN, BSN, Lul García.

## 2022-03-26 NOTE — CONSULTS
Brief Nutrition Note     Consult for diet education \"HF diet guidelines\". Pt out of room today during visit. Will see Monday for education.       Electronically signed by Arnulfo Moore RD, ROSALIA on 3/26/2022 at 12:52 PM

## 2022-03-26 NOTE — PROGRESS NOTES
Patient reports neck pain 7/10. Trop critical at 0.788. BP 70/51,  ST. Notified hospitalist via secure message. Awaiting response.

## 2022-03-27 NOTE — PROGRESS NOTES
CREATININE 1.5* 1.7* 2.8*   CALCIUM 9.3 9.3 8.9   MG  --  1.70* 2.20   PHOS  --   --  5.0*   GFRAA 41* 35* 20*       Liver panel:  No results for input(s): ALB, TP, AST, ALT in the last 72 hours. Invalid input(s): TBIL    Endocrine:   @[id number](VitD,PTH,TSH,aldosterone,renin activity,cortisol,metanephrine)@    CBC:   Recent Labs     03/26/22  0400 03/26/22  1000 03/27/22  0335   WBC 10.5 13.8* 21.9*   HGB 10.6* 10.2* 11.2*   HCT 31.4* 30.7* 33.7*   MCV 99.3 99.0 99.3   * 129* 141       Iron Panel:   @[id number](KEO,FE)@    Serology: @[id number](ANAS,ANCA,C3,C4,RNP,AGBM,DNA,SSA,SSB,SPEP,UPEP,ESR,HEPT)@    Urine studies: @[id number](UAPR,UCOL,UNAR,UUNR,UCRR,UCLR,UKR,UUAR,UOSM,EOS)@        ASSESSMENT and PLAN:     1. JESSICA on CKD4 (recent baseline SCr is 1.69-2 since at least 10/2021 and on multiple checks per  records; followed by Dr Yonis Enriquez): JESSICA is in the setting of ADHF and hemodynamic compromise (low BP) possibly leading to ATI (from the latter). SCr is on the rise, UO is in oliguric range. Not responding to lasix bolus   - Change lasix bolus to drip. - Will continue to monitor closely response to diuresis/I and O/renal function   - She will be challenging for long term HD in view of cardiac condition (suspect very poor tolerance). If RRT is needed, she will need CRRT in the short term. Prognosis is poor. Discussed at length with patient and her  re short and long term outcomes with RRT. Comfort measures are very appropriate from our standpoint. If she insists on pursuing RRT (awaiting decision), this may be needed in the next 24-48hrs.    - Hemodynamic support (on high dose midodrine now)     2. Acute hypoxic respiratory failure/ADHF on chronic Systolic HF (EF 97%): pro-BNP was >18K on admission.                - Agree with IV lasix. Further w/u per Cardiology. May need another Echo.      3.  CAD/NSTEMI: with s/p TBX and PCI to Cx in 2011, PCI to LAD and LCx in 8/21 and required impella at the time for cardiogenic shock. Per cardiology     4. Anemia of CKD: Hb at target range    5. Leukocytosis: started on empiric ABx per ICU team    6.  Abdominal pain with significant distension:   - Consider CT of the abdomen, d/w Dr Darryle So care time 40min        Signed By: Rinku Hardy MD

## 2022-03-27 NOTE — PROGRESS NOTES
Hospitalist Progress Note      PCP: Yu Pedroza MD    Date of Admission: 3/25/2022        Subjective: Feels okay, having some abdominal pain and distention,  at bedside. Still without chest pain      Medications:  Reviewed    Infusion Medications    dextrose      sodium chloride      furosemide (LASIX) 1mg/ml infusion 10 mg/hr (03/27/22 1027)    heparin (PORCINE) Infusion 420 Units/hr (03/27/22 0600)     Scheduled Medications    insulin lispro  0-12 Units SubCUTAneous TID WC    insulin lispro  0-6 Units SubCUTAneous Nightly    cefepime  1,000 mg IntraVENous Q12H    lidocaine 1 % injection  5 mL IntraDERmal Once    sodium chloride flush  5-40 mL IntraVENous 2 times per day    aspirin EC  81 mg Oral QAM    atorvastatin  40 mg Oral Daily    ticagrelor  90 mg Oral BID    levothyroxine  88 mcg Oral Daily    metoprolol succinate  25 mg Oral Daily    midodrine  10 mg Oral TID WC    nortriptyline  25 mg Oral Nightly    pantoprazole  40 mg Oral BID AC     PRN Meds: glucose, dextrose, glucagon (rDNA), dextrose, morphine, sodium chloride flush, sodium chloride, perflutren lipid microspheres, acetaminophen **OR** acetaminophen, ondansetron      Intake/Output Summary (Last 24 hours) at 3/27/2022 1118  Last data filed at 3/27/2022 0800  Gross per 24 hour   Intake 437.38 ml   Output 200 ml   Net 237.38 ml       Physical Exam Performed:    BP 95/70   Pulse 110   Temp 97.5 °F (36.4 °C) (Axillary)   Resp 23   Ht 5' (1.524 m)   Wt 99 lb 6.8 oz (45.1 kg)   SpO2 100%   BMI 19.42 kg/m²     General appearance: No apparent distress  Neck: Supple  Respiratory:  Normal respiratory effort. Clear to auscultation, bilaterally without Rales/Wheezes/Rhonchi. Cardiovascular: Regular rate and rhythm with normal S1/S2 without murmurs, rubs or gallops.   Abdomen: Soft, minimally tender some distention noted  Musculoskeletal: No clubbing, cyanosis   Skin: Skin color, texture, turgor normal.  No rashes or lesions. Neurologic: Moving all extremities  Psychiatric: Alert and oriented  Capillary Refill: Brisk,3 seconds, normal   Peripheral Pulses: +2 palpable, equal bilaterally       Labs:   Recent Labs     03/26/22  0400 03/26/22  1000 03/27/22  0335   WBC 10.5 13.8* 21.9*   HGB 10.6* 10.2* 11.2*   HCT 31.4* 30.7* 33.7*   * 129* 141     Recent Labs     03/25/22  2109 03/26/22  0400 03/27/22  0335    141 135*   K 3.6 3.8 4.4    106 97*   CO2 19* 18* 16*   BUN 27* 28* 50*   CREATININE 1.5* 1.7* 2.8*   CALCIUM 9.3 9.3 8.9   PHOS  --   --  5.0*     No results for input(s): AST, ALT, BILIDIR, BILITOT, ALKPHOS in the last 72 hours. No results for input(s): INR in the last 72 hours. Recent Labs     03/26/22  0400 03/26/22  1000 03/27/22  0335   TROPONINI 0.43* 0.79* 1.52*       Urinalysis:      Lab Results   Component Value Date    NITRU Negative 03/27/2022    WBCUA  08/19/2021    BACTERIA 3+ 08/19/2021    RBCUA see below 08/19/2021    BLOODU LARGE 03/27/2022    SPECGRAV 1.025 03/27/2022    GLUCOSEU Negative 03/27/2022       Radiology:  CT SOFT TISSUE NECK WO CONTRAST   Final Result   No acute abnormality of the soft tissue structures of the neck within   constraints of noncontrast exam.      No confluent soft tissue neck mass or cervical lymphadenopathy. CT CHEST WO CONTRAST   Final Result   1. Diffuse airspace disease seen within the lungs bilaterally, with   interlobular septal thickening as well as patchy ground-glass changes. The   degree of mild diffuse multifocal consolidation seen on the previous   examination has overall improved. Findings suggestive of cardiogenic   pulmonary edema, though multifocal pneumonia could have a similar appearance. 2. Stable mild mediastinal lymphadenopathy which is likely reactive. 3. Trace bilateral effusions, similar to the prior study. 4. Severe coronary artery atherosclerosis.    5. 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 XR CHEST (2 VW)   Final Result   Right greater than left pulmonary opacity, for which some considerations   include pulmonary edema or atypical infection. Trace pleural effusions. CT ABDOMEN PELVIS WO CONTRAST Additional Contrast? None    (Results Pending)           Assessment/Plan:    Active Hospital Problems    Diagnosis     Stage 3b chronic kidney disease ([address] [N18.32]     Elevated troponin [R77.8]     Acute on chronic systolic heart failure, NYHA class 4 (HCC) [I50.23]     CAD, multiple vessel [I25.10]     Acute systolic CHF (congestive heart failure), NYHA class 3 (HCC) [I50.21]     NSTEMI (non-ST elevated myocardial infarction) ([address] [I21.4]     Acute respiratory failure with hypoxia ([address] [J96.01]     Shock ([address] [R57.9]      1. Acute likely on chronic systolic congestive heart failure, patient admitted to the hospital, with  elevated troponin, cardiology consulted, patient was tachycardic hypotensive transferred to ICU, started on Levophed changed to Ke-Synephrine  2. Elevated troponin, could be due to acute exacerbation of systolic heart failure but possible non-STEMI also, type I or type II, heparin drip started  3. Essential hypertension, currently hypotensive  4. Acute on CKD nephrology consulted. 5.  Anemia, appears chronic, monitor closely, no signs of bleeding  6. Mild thrombocytopenia, monitor closely  7. Elevated lactic acid, likely due to hypotension  8. Questionable pneumonia, procalcitonin increased from yesterday added antibiotics, pulmonary following  9. Abdominal pain and distention, CT ordered        Diet: ADULT DIET; Regular;  Low Sodium (2 gm); 1500 ml  Code Status: Full Code      Dispo -critically ill in ICU    Puam Mathew MD

## 2022-03-27 NOTE — PROGRESS NOTES
Referring Physician: Dr. Dmitry Mccarthy  Reason for Consultation: \"Acute CHF, possible NSTEMI\"  Chief Complaint: Short of breath    Subjective:   History of Present Illness:  Tish Pemberton is a 69 y.o. patient who presented to the hospital with complaints of acute shortness of breath. The patient is typically followed by Dr. Kyrie Jon and Lucy Barrow NP. She reports being in her usual state of health until the night before admission. She awoke in the middle of the night with shortness of breath. Sitting upright improved the dyspnea. She denied associated chest pains. Throughout the day she says she was feeling well until the late afternoon where she began feeling short of breath again. Minimal exertion made the dyspnea worse and she sought medical attention. She reports compliance with her medications. Her BNP was markedly elevated and her troponin was elevated at 0.27. She has chronic hypotension and has been on midodrine. The hospitalist started her on dobutamine which made her hypotensive and tachycardic. The patient was also hypoxic and started on supplemental oxygen. Dobutamine was discontinued recently and she appears comfortable sitting upright in bed. She denies dyspnea or chest pains presently. She denies abdominal distention or worsening lower extremity edema. Interval history:  Patient had persistent hypotension after stopping dobutamine. She began complaining of neck and shoulder pain although no chest pain. She was becoming more hypoxic and transferred to the MICU to initiate BiPAP and IV pressors. Her pro calcitonin is elevated although in the setting of JESSICA on CKD. Discussed care with pulmonary/critical care rounding team.  The patient states that she still has some shortness of breath but feeling better than yesterday. She denies chest pains. In general, she states that she is fatigued from the events of the past 24 hours.     Past Medical History:   has a past medical history of Anemia, Anxiety, Asthma, CAD (coronary artery disease), Cerebral artery occlusion with cerebral infarction ([facility] [address] Depression, Graves disease, Hiatal hernia, Hx of blood clots, Hyperlipidemia, Hypertension, IBS (irritable bowel syndrome), Kidney disease, chronic, stage III (moderate, EGFR 30-59 ml/min) ([facility] [address] MI (myocardial infarction) ([facility] [address] Pneumonia, Prolonged emergence from general anesthesia, Sleep apnea, Thyroid disease, Urinary incontinence, UTI (urinary tract infection), Vocal cord dysfunction, and Weight loss. Surgical History:   has a past surgical history that includes Hand surgery (Left); Arm Surgery (Left); eye surgery; Colonoscopy; Cholecystectomy; Wrist fracture surgery (Right, 10/02/2020); Gastric fundoplication (1430); and Wrist Closed Reduction (Left, 05/06/2003). Social History:   reports that she has never smoked. She has never used smokeless tobacco. She reports that she does not drink alcohol and does not use drugs. Family History:  family history includes Breast Cancer in her sister; Coronary Art Dis in her father; Heart Attack in her paternal grandmother and sister; Heart Disease in her father; Hypothyroidism in her sister; Cleaster Libman in her father; Stroke in her father and sister; Stroke (age of onset: 61) in her mother. Home Medications:  Were reviewed and are listed in nursing record and/or below  Prior to Admission medications    Medication Sig Start Date End Date Taking? Authorizing Provider   pantoprazole (PROTONIX) 40 MG tablet Take 40 mg by mouth 2 times daily   Yes Historical Provider, MD   LORazepam (ATIVAN) 1 MG tablet Take 1-2 mg by mouth every evening.    Yes Historical Provider, MD   amLODIPine (NORVASC) 2.5 MG tablet Take 2.5 mg by mouth daily   Yes Historical Provider, MD   B Complex Vitamins (B-COMPLEX/B-12) TABS Take by mouth   Yes Historical Provider, MD   Coenzyme Q10 (COQ-10) 100 MG CAPS Take 2 capsules by mouth daily   Yes Historical Provider, MD   midodrine (PROAMATINE) 10 MG tablet TAKE 1 TABLET BY MOUTH THREE TIMES A DAY  Patient not taking: Reported on 3/25/2022 1/14/22   ULYSSES Capps CNP   atorvastatin (LIPITOR) 40 MG tablet Take 1 tablet by mouth daily 12/15/21   ULYSSES Capps CNP   spironolactone (ALDACTONE) 25 MG tablet Take 0.5 tablets by mouth daily 10/7/21   ULYSSES Capps CNP   potassium chloride (KLOR-CON M) 20 MEQ extended release tablet Take 2 tablets by mouth once for 1 dose 10/1/21 2/15/22  Vega Nelson MD   metoprolol succinate (TOPROL XL) 25 MG extended release tablet Take 1 tablet by mouth daily 9/29/21   ULYSSES Capps CNP   BRILINTA 90 MG TABS tablet TAKE 1 TABLET BY MOUTH TWICE A DAY 9/21/21   Jana Aden MD   Flaxseed, Linseed, (FLAXSEED OIL MAX STR) 1300 MG CAPS Take 1 capsule by mouth daily    Historical Provider, MD   levothyroxine (SYNTHROID) 88 MCG tablet Take 88 mcg by mouth Daily Takes everyday EXCEPT Sundays--MD trying to reduce dose for patient (03/25/22)    Historical Provider, MD   aspirin EC 81 MG EC tablet Take 81 mg by mouth every morning     Historical Provider, MD   famotidine (PEPCID) 40 MG tablet Take 1 tablet by mouth nightly 2/24/20   Historical Provider, MD   Cholecalciferol (VITAMIN D3) 50 MCG (2000 UT) TABS Take 1,000 Units by mouth daily     Historical Provider, MD   nortriptyline (PAMELOR) 25 MG capsule Take 1 capsule by mouth nightly 9/17/20   Historical Provider, MD   promethazine (PHENERGAN) 12.5 MG tablet Take 12.5 mg by mouth every 6 hours as needed for Nausea    Historical Provider, MD   polyethyl glycol-propyl glycol 0.4-0.3 % (SYSTANE) 0.4-0.3 % ophthalmic solution 1 drop as needed for Dry Eyes (6 drops daily)    Historical Provider, MD      CURRENT Medications:  glucose (GLUTOSE) 40 % oral gel 15 g, PRN  dextrose 50 % IV solution, PRN  glucagon (rDNA) injection 1 mg, PRN  dextrose 5 % solution, PRN  insulin lispro (HUMALOG) injection vial 0-12 Units, TID WC  insulin lispro (HUMALOG) injection vial 0-6 Units, Nightly  morphine (PF) injection 0.5 mg, Q4H PRN  aspirin EC tablet 81 mg, QAM  atorvastatin (LIPITOR) tablet 40 mg, Daily  ticagrelor (BRILINTA) tablet 90 mg, BID  levothyroxine (SYNTHROID) tablet 88 mcg, Daily  metoprolol succinate (TOPROL XL) extended release tablet 25 mg, Daily  midodrine (PROAMATINE) tablet 10 mg, TID WC  nortriptyline (PAMELOR) capsule 25 mg, Nightly  pantoprazole (PROTONIX) tablet 40 mg, BID AC  perflutren lipid microspheres (DEFINITY) injection 1.65 mg, ONCE PRN  sodium chloride flush 0.9 % injection 5-40 mL, 2 times per day  sodium chloride flush 0.9 % injection 5-40 mL, PRN  0.9 % sodium chloride infusion, PRN  acetaminophen (TYLENOL) tablet 650 mg, Q6H PRN   Or  acetaminophen (TYLENOL) suppository 650 mg, Q6H PRN  ondansetron (ZOFRAN) injection 4 mg, Q6H PRN  heparin 25,000 unit in sodium chloride 0.45% 250 mL (premix) infusion, Continuous  furosemide (LASIX) injection 40 mg, BID    Allergies:  Augmentin [amoxicillin-pot clavulanate], Bactrim [sulfamethoxazole-trimethoprim], Carafate [sucralfate], Clindamycin/lincomycin, Hyoscyamine, Macrobid [nitrofurantoin], Oxybutynin, Pcn [penicillins], Pravastatin, Prevacid [lansoprazole], Prilosec [omeprazole], Sulfamethoxazole, and Trazodone and nefazodone     Review of Systems:   · Constitutional: no unanticipated weight loss. There's been no change in energy level, sleep pattern, or activity level. No fevers, chills. · Eyes: No visual changes or diplopia. No scleral icterus. · ENT: No Headaches, hearing loss or vertigo. No mouth sores or sore throat. · Cardiovascular: as reviewed in HPI  · Respiratory: No cough or wheezing, no sputum production. No hemoptysis. · Gastrointestinal: No abdominal pain, appetite loss, blood in stools. No change in bowel or bladder habits. · Genitourinary: No dysuria, trouble voiding, or hematuria.   · Musculoskeletal:  No gait disturbance, no joint complaints. · Integumentary: No rash or pruritis. · Neurological: No headache, diplopia, change in muscle strength, numbness or tingling. · Psychiatric: No anxiety or depression. · Endocrine: No temperature intolerance. No excessive thirst, fluid intake, or urination. No tremor. · Hematologic/Lymphatic: No abnormal bruising or bleeding, blood clots or swollen lymph nodes. · Allergic/Immunologic: No nasal congestion or hives. Objective:   PHYSICAL EXAM:    Vitals:    03/27/22 0845   BP: 96/67   Pulse: 103   Resp: 20   Temp: 97.5 °F (36.4 °C)   SpO2: 97%    Weight: 99 lb 6.8 oz (45.1 kg)       General Appearance:  Alert, cooperative, no respiratory distress. Thin. Chronically ill-appearing. Wearing O2   Head:  Normocephalic, without obvious abnormality, atraumatic. Eyes:  Pupils equal and round. No scleral icterus. Mouth: Moist mucosa, no pharyngeal erythema. Poor dentition. Nose: Nares normal. No drainage or sinus tenderness. Neck: Supple, symmetrical, trachea midline. No adenopathy. No tenderness/mass/nodules. No carotid bruit or elevated JVD. Lungs:   Respirations unlabored at rest.  Bibasilar crackles. Chest Wall:  No tenderness or deformity. Heart:  Tachycardic. S1/S2 normal. No murmur, rub, or gallop. Abdomen:   Soft, non-tender, bowel sounds active. Musculoskeletal: No muscle wasting or digital clubbing. Extremities: Extremities normal, atraumatic. No cyanosis or edema. Pulses: 2+ radial and carotid pulses, symmetric. Skin: No rashes or lesions. Pysch: Normal mood and affect. Alert and oriented x 4.    Neurologic: Normal gross motor and sensory exam.       Labs     CBC:   Lab Results   Component Value Date    WBC 21.9 03/27/2022    RBC 3.39 03/27/2022    HGB 11.2 03/27/2022    HCT 33.7 03/27/2022    MCV 99.3 03/27/2022    RDW 14.0 03/27/2022     03/27/2022     CMP:  Lab Results   Component Value Date     03/27/2022    K 4.4 03/27/2022    K 3.6 03/25/2022    CL 97 03/27/2022    CO2 16 03/27/2022    BUN 50 03/27/2022    CREATININE 2.8 03/27/2022    GFRAA 20 03/27/2022    AGRATIO 0.7 09/16/2021    LABGLOM 16 03/27/2022    GLUCOSE 309 03/27/2022    PROT 6.6 09/16/2021    CALCIUM 8.9 03/27/2022    BILITOT 0.6 09/16/2021    ALKPHOS 112 09/16/2021    AST 17 09/16/2021    ALT 10 09/16/2021     PT/INR:  No results found for: PTINR  HgBA1c:  Lab Results   Component Value Date    LABA1C 5.8 08/19/2021     Lab Results   Component Value Date    TROPONINI 0.79 ([facility]) 03/26/2022       Cardiac Data     EKG: Personally interpreted. 3/25/2022. Sinus rhythm with occasional PVCs. Echo: 8/27/2021. Overall left ventricular systolic function appears severely reduced with global hypokinesis and akinesis of the jeramy and inferoseptum. Ejection fraction is visually estimated to be 30%. Normal right ventricular size and function. There are no valvular structural abnormalities appreciated. Cath: 8/24/21. 1.  95% ostial to proximal left anterior descending artery stenosis. There is 90% ostial circumflex disease. These were heavily calcified vessels. They extended into the left main to the disease. This underwent rotational atherectomy with a 1.5-mm Rotablator jung followed by Shockwave balloon angioplasty with a 3-mm balloon. These then  underwent bifurcation stenting with a 3 mm stent in the LAD overlapped by 3.5 mm [place] drug-eluting stent from the LAD into the left main coronary artery. We then did the T-stenting with some overlap from the proximal circumflex into the left main. We then performed kissing balloon angioplasty resulting in 0% residual stenosis and excellent flow. 2.  Successful removal of the Impella device from the left femoral artery access site with balloon tamponade to control hemostasis. We also removed the pulmonary artery catheter that was present. Telemetry: Personally interpreted. Sinus.     Assessment and Plan   1) Acute on chronic systolic heart failure. EF 30%. NYHA IV. Dobutamine may have contributed to her acute decompensation by causing hypotension and tachycardia. She does not appear to be in cardiogenic shock. No ACEI/ARB/Aldactone/SGLT2i with JESSICA. No emergent indication for ICD but patient due for repeat echocardiogram for ICD evaluation. 2) NSTEMI. Uncertain if a type I or type II event but she is denying to me having any chest pains. Elevated troponin related to CHF/tachycardia/hypotension in a patient with known severe CAD seems possible. Regardless, will anticoagulate with IV heparin in the short-term. No emergent indication for angiography. 3) Acute respiratory failure with hypoxia. Continue supplemental oxygen and diurese as tolerated. May be related to pulmonary edema. 4) Shock. Septic versus cardiogenic or medication induced. Patient has been weaned off pressors. Procalcitonin elevated. Will defer to pulmonary/critical care if initiation of antibiotics warranted. 5) Multivessel CAD s/p multiple PCI. Continue medical management and risk factor modification including use of aspirin, Brilinta, Lipitor, and beta-blocker as tolerated. 6) JESSICA on CKD. Not unexpectedly creatinine is worse today. Nephrology following. 7) Anemia. Chronic. Will defer additional work-up to primary team.    Overall, the problems requiring hospitalization are high in severity. 35 minutes spent in direct patient care of this critically ill individual.    Thank you for allowing us to participate in the care of Bianca UriarteElsy Acevedo.  Princess Saleh, [address]  3/27/2022 8:28 AM

## 2022-03-27 NOTE — FLOWSHEET NOTE
provided emotional support and comfort for patient and her .  initiated a discussion with patient about ongoing treatment decisions that she may encounter. Patient's  (of 48 years!) does not want to be a part of the decision making with his wife; he wants her to do whatever she wants. Patient and  seem especially concerned about the possible need for dialysis. Patient's chief complaint at this time is pain in her abdomen.

## 2022-03-27 NOTE — PROGRESS NOTES
Clinical Pharmacy Note  Heparin Dosing       Lab Results   Component Value Date    APTT 54.3 03/27/2022     Lab Results   Component Value Date    HGB 11.2 03/27/2022    HCT 33.7 03/27/2022     03/27/2022    INR 1.12 08/23/2021       Current Infusion Rate: 420 units/hr    Plan:  Bolus: 1400 units  NEW Rate: 510 units/hr  Next aPTT: 2130 on 3-27-22    Pharmacy will continue to monitor and adjust based on aPTT results.   YOEL Metcalf [facility]  3/27/2022  3:16 PM

## 2022-03-27 NOTE — PROGRESS NOTES
Clinical Pharmacy Note  Heparin Dosing       Lab Results   Component Value Date    APTT 76.8 03/27/2022     Lab Results   Component Value Date    HGB 11.2 03/27/2022    HCT 33.7 03/27/2022     03/27/2022    INR 1.12 08/23/2021       Current Infusion Rate: 420 units/hr    Plan:  Rate: continue at 420 units/hr  Next aPTT: 1100 3-27    Pharmacy will continue to monitor and adjust based on aPTT results.   Orville HaileD

## 2022-03-27 NOTE — PROGRESS NOTES
CHANGED OXYGEN DEVICE TO HIGH FLOW NASAL CANNULA FROM VAPOTHERM (HEATED HIGH FLOW): Changed pt flow on vapotherm to 15 liters about 30 minutes ago; Pt still SpO2 at 100%; so changed to 15 liter high flow nasal cannula. Informed nurse, pt still 100% SpO2. Will wean as tolerated, pt is calm now.  Electronically signed by Cordell Perez RCP on 3/27/2022 at 4:18 PM

## 2022-03-27 NOTE — PROGRESS NOTES
4 Eyes Skin Assessment     NAME:  Lucita Lake  YOB: 1945  MEDICAL RECORD NUMBER:  [id number]    The patient is being assess for  Shift Handoff    I agree that 2 RN's have performed a thorough Head to Toe Skin Assessment on the patient. ALL assessment sites listed below have been assessed. Areas assessed by both nurses: Dee/Todd    Head, Face, Ears, Shoulders, Back, Chest, Arms, Elbows, Hands, Sacrum. Buttock, Coccyx, Ischium and Legs. Feet and Heels        Does the Patient have a Wound?  No noted wound(s)       Ry Prevention initiated:  Yes   Wound Care Orders initiated:  No    Pressure Injury (Stage 3,4, Unstageable, DTI, NWPT, and Complex wounds) if present place consult order under [de-identified] No    New and Established Ostomies if present place consult order under : NA      Nurse 1 eSignature: Electronically signed by Kojo Alcantara RN on 3/27/22 at 6:59 AM EDT    **SHARE this note so that the co-signing nurse is able to place an eSignature**    Nurse 2 eSignature:Electronically signed by Gonzalez Wright RN on 3/27/2022 at 7:23 AM

## 2022-03-27 NOTE — PLAN OF CARE
Problem: Pain:  Goal: Pain level will decrease  Description: Pain level will decrease  Outcome: Ongoing  Goal: Control of acute pain  Description: Control of acute pain  Outcome: Ongoing  Goal: Control of chronic pain  Description: Control of chronic pain  Outcome: Ongoing     Problem: Falls - Risk of:  Goal: Will remain free from falls  Description: Will remain free from falls  Outcome: Ongoing  Goal: Absence of physical injury  Description: Absence of physical injury  Outcome: Ongoing     Problem: OXYGENATION/RESPIRATORY FUNCTION  Goal: Patient will maintain patent airway  Outcome: Ongoing  Goal: Patient will achieve/maintain normal respiratory rate/effort  Description: Respiratory rate and effort will be within normal limits for the patient  Outcome: Ongoing     Problem: HEMODYNAMIC STATUS  Goal: Patient has stable vital signs and fluid balance  Outcome: Ongoing     Problem: FLUID AND ELECTROLYTE IMBALANCE  Goal: Fluid and electrolyte balance are achieved/maintained  Outcome: Ongoing     Problem: ACTIVITY INTOLERANCE/IMPAIRED MOBILITY  Goal: Mobility/activity is maintained at optimum level for patient  Outcome: Ongoing     Problem: Skin Integrity:  Goal: Will show no infection signs and symptoms  Description: Will show no infection signs and symptoms  Outcome: Ongoing  Goal: Absence of new skin breakdown  Description: Absence of new skin breakdown  Outcome: Ongoing

## 2022-03-27 NOTE — PROGRESS NOTES
Pulmonary critical care progress Note     Patient's name:  Isabel Lou Record Number: [id number]  Patient's account/billing number: [de-identified]  Patient's YOB: 1945  Age: 69 y.o. Date of Admission: 3/25/2022  4:16 PM  Date of Consult: 3/27/2022      Primary Care Physician: Jan Saldana MD      Code Status: Full Code    Reason for consult: acute respiratory failure with hypoxia     Assessment and Plan     1. Acute respiratory failure with hypoxia secondary to pulmonary edema with hemoptysis   2. Acute on chronic systolic CHF  3. Cardiogenic shock   4. ? PNA  5. NSTEMI  6. JESSICA on CKD  7. H/o CAD s/p multiple PCI in the past  8. Chronic anemia       Plan:  Pressors Titrate for MAP of 65 to help with diuresis   Started on Bipap, monitor closely respiratory status, discussed with patient potential intubation if she does not improve on BiPAP and she is in agreement. Discussed worsening renal function with nephrology, she is not a candidate for HD,  Heparin drip. Check CT abdomen without contrast  Cefepime, follow up cultures   Critical care time 45 minutes      Subjective:  Oliguric  Worsening renal function  Neosyn off  Epigastric abdominal pain  Afebrile    HISTORY OF PRESENT ILLNESS:   Mr./Ms. Geovanni Hinton is a 69 y.o. lady with past medical with coronary artery disease stated below significant for extensive cardiac history with coronary artery disease status post 5 stents most recently August 2021 presented to the hospital with retrosternal chest pain has been going on for couple of days, was transferred to the ICU for hypotension and worsening respiratory distress, CT chest which I personally reviewed showed bilateral groundglass opacities with increased interlobular septal thickening consistent with CHF, patient has been complaining of cough and mild hemoptysis. Elevated troponins. Elevated proBNP.   Acute on chronic renal failure            Social History:   TOBACCO:   reports that she has never smoked. She has never used smokeless tobacco.  ETOH:   reports no history of alcohol use. DRUGS:  reports no history of drug use. REVIEW OF SYSTEMS:  Review of Systems -   General ROS: negative  Psychological ROS: negative  Ophthalmic ROS: negative  ENT ROS: negative  Allergy and Immunology ROS: negative  Hematological and Lymphatic ROS: negative  Endocrine ROS: negative  Breast ROS: negative  Respiratory ROS: Shortness of breath, cough, hemoptysis  Cardiovascular ROS: Retrosternal chest pain. Gastrointestinal ROS: epigastric pain   Genito-Urinary ROS: negative  Musculoskeletal ROS: negative  Neurological ROS: negative  Dermatological ROS: negative        Physical Exam:    Vitals: BP 96/67   Pulse 103   Temp 97.5 °F (36.4 °C) (Axillary)   Resp 20   Ht 5' (1.524 m)   Wt 99 lb 6.8 oz (45.1 kg)   SpO2 97%   BMI 19.42 kg/m²     Last Body weight:   Wt Readings from Last 3 Encounters:   03/27/22 99 lb 6.8 oz (45.1 kg)   02/15/22 109 lb 3.2 oz (49.5 kg)   12/15/21 114 lb (51.7 kg)       Body Mass Index : Body mass index is 19.42 kg/m². Intake and Output summary:     Intake/Output Summary (Last 24 hours) at 3/27/2022 1022  Last data filed at 3/27/2022 0800  Gross per 24 hour   Intake 437.38 ml   Output 200 ml   Net 237.38 ml       Physical Examination:     PHYSICAL EXAM:    Gen: Severe acute distress  Eyes: PERRL. Anicteric sclera. No conjunctival injection. ENT: No discharge. Posterior oropharynx clear. External appearance of ears and nose normal.  Neck: Trachea midline. No mass, no lymphadenopathy    Resp: Bilateral rales, increased work of breathing  CV: Regular rate. Regular rhythm. No murmur or rub. No edema. GI: Soft, Non-tender. Non-distended. +BS  Skin: Warm, dry, w/o erythema. Lymph: No cervical or supraclavicular LAD. M/S: No cyanosis. No clubbing. Neuro:  CN 2-12 tested, no focal neurologic deficit.   Moves all extremities  Psych: Awake and alert, Oriented x 3. Judgement and insight appropriate. Mood stable. Laboratory findings:-    CBC:   Recent Labs     03/27/22  0335   WBC 21.9*   HGB 11.2*        BMP:    Recent Labs     03/25/22  2109 03/25/22  2109 03/26/22  0400 03/26/22  0400 03/27/22  0335      < > 141   < > 135*   K 3.6  --  3.8   < > 4.4      < > 106   < > 97*   CO2 19*   < > 18*   < > 16*   BUN 27*   < > 28*   < > 50*   CREATININE 1.5*  --  1.7*  --  2.8*   GLUCOSE 114*   < > 164*   < > 309*    < > = values in this interval not displayed. S. Calcium:  Recent Labs     03/27/22  0335   CALCIUM 8.9     S. Ionized Calcium:No results for input(s): IONCA in the last 72 hours. S. Magnesium:  Recent Labs     03/27/22  0335   MG 2.20     S. Phosphorus:  Recent Labs     03/27/22  0335   PHOS 5.0*     S. Glucose:  Recent Labs     03/27/22  0915   POCGLU 231*     Glycosylated hemoglobin A1C:   Recent Labs     03/27/22  0335   LABA1C 5.4     INR: No results for input(s): INR in the last 72 hours. Hepatic functions: No results for input(s): ALKPHOS, ALT, AST, PROT, BILITOT, BILIDIR, LABALBU in the last 72 hours. Pancreatic functions:  Recent Labs     03/27/22  0610   LACTA 3.3*     S. Lactic Acid:   Recent Labs     03/27/22  0610   LACTA 3.3*     Cardiac enzymes:  Recent Labs     03/26/22  0400 03/26/22  1000 03/27/22  0335   TROPONINI 0.43* 0.79* 1.52*     BNP:No results for input(s): BNP in the last 72 hours. Lipid profile: No results for input(s): CHOL, TRIG, HDL, LDL, LDLCALC in the last 72 hours. Blood Gases: No results found for: PH, PCO2, PO2, HCO3, O2SAT  Thyroid functions: No results found for: T4, TSH       Radiology Review:  Pertinent images / reports were reviewed as a part of this visit. CT Chest w/ contrast: No results found for this or any previous visit.       CT Chest w/o contrast: Results for orders placed during the hospital encounter of 03/25/22    CT CHEST WO CONTRAST    Narrative  EXAMINATION:  CT OF THE CHEST WITHOUT CONTRAST 3/25/2022 11:14 pm    TECHNIQUE:  CT of the chest was performed without the administration of intravenous  contrast. Multiplanar reformatted images are provided for review. Dose  modulation, iterative reconstruction, and/or weight based adjustment of the  mA/kV was utilized to reduce the radiation dose to as low as reasonably  achievable. COMPARISON:  08/19/2021    HISTORY:  ORDERING SYSTEM PROVIDED HISTORY: SOB, hemoptysis  TECHNOLOGIST PROVIDED HISTORY:  Reason for exam:->SOB, hemoptysis  Decision Support Exception - unselect if not a suspected or confirmed  emergency medical condition->Emergency Medical Condition (MA)  Reason for Exam: SOB, hemoptysis    FINDINGS:  Mediastinum: There is a small partially calcified aneurysm noted along the  inferior aspect of the thoracic aortic arch, unchanged from the previous  examination measuring a proximally 16 mm in greatest dimension. The thoracic  aorta otherwise appear similar to the previous examination. Great vessel as  well as heavy coronary artery disease is again identified. Cardiac size  appear stable. No focal esophageal thickening is identified. Again  identified is a 13 mm precarinal short axis lymph node. Mild subcarinal  lymphadenopathy is identified, similar to the previous examination. Lungs/pleura: There are trace bilateral pleural effusions, similar to the  previous examination. Interlobular septal thickening is identified, with  patchy ground-glass changes seen within the lungs bilaterally. The degree of  mild consolidative changes seen on the previous examination has improved. Upper Abdomen: Mesenteric atherosclerotic disease and cholecystectomy clips  noted in the upper abdomen. No acute process is seen. Soft Tissues/Bones:  No axillary or supraclavicular lymphadenopathy is  identified. A right-sided midline is seen terminating within an axillary  vein.   Diffuse osteopenia. Multilevel degenerative changes are seen within  the spine. No acute spinal fracture is identified. Impression  1. Diffuse airspace disease seen within the lungs bilaterally, with  interlobular septal thickening as well as patchy ground-glass changes. The  degree of mild diffuse multifocal consolidation seen on the previous  examination has overall improved. Findings suggestive of cardiogenic  pulmonary edema, though multifocal pneumonia could have a similar appearance. 2. Stable mild mediastinal lymphadenopathy which is likely reactive. 3. Trace bilateral effusions, similar to the prior study. 4. Severe coronary artery atherosclerosis. 5. 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 CTPA: No results found for this or any previous visit. CXR PA/LAT: Results for orders placed during the hospital encounter of 03/25/22    XR CHEST (2 VW)    Narrative  EXAMINATION:  TWO XRAY VIEWS OF THE CHEST    3/25/2022 4:48 pm    COMPARISON:  08/27/2021    HISTORY:  ORDERING SYSTEM PROVIDED HISTORY: Short of breath  TECHNOLOGIST PROVIDED HISTORY:  Known cardiac patient with 5 cardiac stents  Reason for exam:->Short of breath  Reason for Exam: SOB    FINDINGS:  Right upper extremity PICC line has been removed. Diffuse interstitial  opacity is seen bilaterally. Most confluent opacity is seen in the right  suprahilar region. Blunting of the costophrenic angles. No gross  pneumothorax. Cardiac and mediastinal silhouettes are unchanged. Calcification of aorta. Right upper quadrant clips are seen. Impression  Right greater than left pulmonary opacity, for which some considerations  include pulmonary edema or atypical infection. Trace pleural effusions.       CXR portable: Results for orders placed during the hospital encounter of 08/18/21    XR CHEST PORTABLE    Narrative  EXAMINATION:  ONE XRAY VIEW OF THE CHEST    8/24/2021 3:03 pm    COMPARISON:  Prior study(s) most recent 08/21/2021. HISTORY:  ORDERING SYSTEM PROVIDED HISTORY: hypoxia  TECHNOLOGIST PROVIDED HISTORY:  Reason for exam:->hypoxia  Reason for Exam: hypoxia  Acuity: Acute  Type of Exam: Initial    FINDINGS:  The heart is within normal limits size. There is bilateral moderate airspace  disease. This is seen centrally obscuring the central vasculature and  extending outward towards the periphery bilaterally, increased from the prior  study. There are patchy areas of involvement. Right arm PICC line extends  to the mid SVC. Impression  Moderate bilateral airspace disease fairly diffuse but with patchy  distribution. Concern is for multifocal pneumonia and/or pulmonary edema. This has increased from the prior study.                      Avelino Lugo MD, M.D.            3/27/2022, 10:22 AM

## 2022-03-27 NOTE — PROGRESS NOTES
PICC PLACEMENT:      Upon arrival to place PICC line assessed chart for issues related to picc placement, check for consent, and did time out with RN Dee. Pt. Tolerated PICC placement well, no difficulty accessing left basilic vein, ok to place per Dee from Nephrologist Dr. Herbie Morin and 3CG technology used to verify PICC tip placement. Vein to catheter ratio was 48%. Positive P wave with no negative deflection. Printed wave form and placed In chart. Reported off to RN Dee ok to use line.

## 2022-03-28 NOTE — PROGRESS NOTES
Distention and abdominal tenderness somewhat decreased with this assessment. 1200 mL out on NG ILWS. Brown, bile colored.

## 2022-03-28 NOTE — PROGRESS NOTES
Referring Physician: Dr. Bard Hawkins  Reason for Consultation: \"Acute CHF, possible NSTEMI\"  Chief Complaint: Short of breath    Subjective:   History of Present Illness:  Melina Smith is a 69 y.o. patient who presented to the hospital with complaints of acute shortness of breath. The patient is typically followed by Dr. Renee Brown and Nicole Swift NP. She reports being in her usual state of health until the night before admission. She awoke in the middle of the night with shortness of breath. Sitting upright improved the dyspnea. She denied associated chest pains. Throughout the day she says she was feeling well until the late afternoon where she began feeling short of breath again. Minimal exertion made the dyspnea worse and she sought medical attention. She reports compliance with her medications. Her BNP was markedly elevated and her troponin was elevated at 0.27. She has chronic hypotension and has been on midodrine. The hospitalist started her on dobutamine which made her hypotensive and tachycardic. The patient was also hypoxic and started on supplemental oxygen. Dobutamine was discontinued recently and she appears comfortable sitting upright in bed. She denies dyspnea or chest pains presently. She denies abdominal distention or worsening lower extremity edema. Interval history:  Patient had persistent hypotension after stopping dobutamine. She began complaining of neck and shoulder pain although no chest pain. She was becoming more hypoxic and transferred to the MICU to initiate BiPAP and IV pressors. Her procalcitonin is elevated although in the setting of JESSICA on CKD. Today, she states that she is feeling worse. She is verbalizes that she is concerned she may not survive this hospitalization. Her  is present bedside. Readdressed code status and she wishes to remain full code but consultation from palliative care seems appropriate.  She denies chest pains and shortness of breath at rest.  She complains of generalized fatigue, malaise, and feeling unwell. Past Medical History:   has a past medical history of Anemia, Anxiety, Asthma, CAD (coronary artery disease), Cerebral artery occlusion with cerebral infarction ([facility] [address] Depression, Graves disease, Hiatal hernia, Hx of blood clots, Hyperlipidemia, Hypertension, IBS (irritable bowel syndrome), Kidney disease, chronic, stage III (moderate, EGFR 30-59 ml/min) ([facility] [address] MI (myocardial infarction) ([facility] [address] Pneumonia, Prolonged emergence from general anesthesia, Sleep apnea, Thyroid disease, Urinary incontinence, UTI (urinary tract infection), Vocal cord dysfunction, and Weight loss. Surgical History:   has a past surgical history that includes Hand surgery (Left); Arm Surgery (Left); eye surgery; Colonoscopy; Cholecystectomy; Wrist fracture surgery (Right, 10/02/2020); Gastric fundoplication (1989); and Wrist Closed Reduction (Left, 05/06/2003). Social History:   reports that she has never smoked. She has never used smokeless tobacco. She reports that she does not drink alcohol and does not use drugs. Family History:  family history includes Breast Cancer in her sister; Coronary Art Dis in her father; Heart Attack in her paternal grandmother and sister; Heart Disease in her father; Hypothyroidism in her sister; [place] in her father; Stroke in her father and sister; Stroke (age of onset: 61) in her mother. Home Medications:  Were reviewed and are listed in nursing record and/or below  Prior to Admission medications    Medication Sig Start Date End Date Taking? Authorizing Provider   pantoprazole (PROTONIX) 40 MG tablet Take 40 mg by mouth 2 times daily   Yes Historical Provider, MD   LORazepam (ATIVAN) 1 MG tablet Take 1-2 mg by mouth every evening.    Yes Historical Provider, MD   amLODIPine (NORVASC) 2.5 MG tablet Take 2.5 mg by mouth daily   Yes Historical Provider, MD   B Complex Vitamins (B-COMPLEX/B-12) TABS Take by mouth   Yes Historical Provider, MD   Coenzyme Q10 (COQ-10) 100 MG CAPS Take 2 capsules by mouth daily   Yes Historical Provider, MD   midodrine (PROAMATINE) 10 MG tablet TAKE 1 TABLET BY MOUTH THREE TIMES A DAY  Patient not taking: Reported on 3/25/2022 1/14/22   ULYSSES Arreola CNP   atorvastatin (LIPITOR) 40 MG tablet Take 1 tablet by mouth daily 12/15/21   ULYSSES Arreola CNP   spironolactone (ALDACTONE) 25 MG tablet Take 0.5 tablets by mouth daily 10/7/21   ULYSSES Arreola CNP   potassium chloride (KLOR-CON M) 20 MEQ extended release tablet Take 2 tablets by mouth once for 1 dose 10/1/21 2/15/22  Iker Sauer MD   metoprolol succinate (TOPROL XL) 25 MG extended release tablet Take 1 tablet by mouth daily 9/29/21   ULYSSES Arreola CNP   BRILINTA 90 MG TABS tablet TAKE 1 TABLET BY MOUTH TWICE A DAY 9/21/21   Federico Garcia MD   Flaxseed, Linseed, (FLAXSEED OIL MAX STR) 1300 MG CAPS Take 1 capsule by mouth daily    Historical Provider, MD   levothyroxine (SYNTHROID) 88 MCG tablet Take 88 mcg by mouth Daily Takes everyday EXCEPT Sundays--MD trying to reduce dose for patient (03/25/22)    Historical Provider, MD   aspirin EC 81 MG EC tablet Take 81 mg by mouth every morning     Historical Provider, MD   famotidine (PEPCID) 40 MG tablet Take 1 tablet by mouth nightly 2/24/20   Historical Provider, MD   Cholecalciferol (VITAMIN D3) 50 MCG (2000 UT) TABS Take 1,000 Units by mouth daily     Historical Provider, MD   nortriptyline (PAMELOR) 25 MG capsule Take 1 capsule by mouth nightly 9/17/20   Historical Provider, MD   promethazine (PHENERGAN) 12.5 MG tablet Take 12.5 mg by mouth every 6 hours as needed for Nausea    Historical Provider, MD   polyethyl glycol-propyl glycol 0.4-0.3 % (SYSTANE) 0.4-0.3 % ophthalmic solution 1 drop as needed for Dry Eyes (6 drops daily)    Historical Provider, MD      CURRENT Medications:  glucose (GLUTOSE) 40 % oral gel 15 g, PRN  dextrose 50 % IV solution, PRN  glucagon (rDNA) injection 1 mg, PRN  dextrose 5 % solution, PRN  insulin lispro (HUMALOG) injection vial 0-12 Units, TID WC  insulin lispro (HUMALOG) injection vial 0-6 Units, Nightly  morphine (PF) injection 0.5 mg, Q4H PRN  cefepime (MAXIPIME) 1000 mg IVPB minibag, Q12H  sodium chloride flush 0.9 % injection 5-40 mL, 2 times per day  sodium chloride flush 0.9 % injection 5-40 mL, PRN  0.9 % sodium chloride infusion, PRN  furosemide (LASIX) 100 mg in dextrose 5 % 100 mL infusion, Continuous  fentaNYL (SUBLIMAZE) injection 25 mcg, Q4H PRN  LORazepam (ATIVAN) injection 0.5 mg, Q8H PRN  aspirin EC tablet 81 mg, QAM  atorvastatin (LIPITOR) tablet 40 mg, Daily  ticagrelor (BRILINTA) tablet 90 mg, BID  levothyroxine (SYNTHROID) tablet 88 mcg, Daily  metoprolol succinate (TOPROL XL) extended release tablet 25 mg, Daily  midodrine (PROAMATINE) tablet 10 mg, TID WC  nortriptyline (PAMELOR) capsule 25 mg, Nightly  pantoprazole (PROTONIX) tablet 40 mg, BID AC  perflutren lipid microspheres (DEFINITY) injection 1.65 mg, ONCE PRN  acetaminophen (TYLENOL) tablet 650 mg, Q6H PRN   Or  acetaminophen (TYLENOL) suppository 650 mg, Q6H PRN  ondansetron (ZOFRAN) injection 4 mg, Q6H PRN  heparin 25,000 unit in sodium chloride 0.45% 250 mL (premix) infusion, Continuous    Allergies:  Augmentin [amoxicillin-pot clavulanate], Bactrim [sulfamethoxazole-trimethoprim], Carafate [sucralfate], Clindamycin/lincomycin, Hyoscyamine, Macrobid [nitrofurantoin], Oxybutynin, Pcn [penicillins], Pravastatin, Prevacid [lansoprazole], Prilosec [omeprazole], Sulfamethoxazole, and Trazodone and nefazodone     Review of Systems:   · Constitutional: no unanticipated weight loss. There's been no change in energy level, sleep pattern, or activity level. No fevers, chills. · Eyes: No visual changes or diplopia. No scleral icterus. · ENT: No Headaches, hearing loss or vertigo. No mouth sores or sore throat.   · Cardiovascular: as reviewed in HPI  · Respiratory: No cough or wheezing, no sputum production. No hemoptysis. · Gastrointestinal: No abdominal pain, appetite loss, blood in stools. No change in bowel or bladder habits. · Genitourinary: No dysuria, trouble voiding, or hematuria. · Musculoskeletal:  No gait disturbance, no joint complaints. · Integumentary: No rash or pruritis. · Neurological: No headache, diplopia, change in muscle strength, numbness or tingling. · Psychiatric: No anxiety or depression. · Endocrine: No temperature intolerance. No excessive thirst, fluid intake, or urination. No tremor. · Hematologic/Lymphatic: No abnormal bruising or bleeding, blood clots or swollen lymph nodes. · Allergic/Immunologic: No nasal congestion or hives. Objective:   PHYSICAL EXAM:    Vitals:    03/28/22 0800   BP: (!) 86/53   Pulse: 119   Resp: 17   Temp: 97.7 °F (36.5 °C)   SpO2: 97%    Weight: 103 lb 2.8 oz (46.8 kg)       General Appearance:  Alert, cooperative, no respiratory distress. Thin. Chronically ill-appearing. Wearing O2. NG in place. Head:  Normocephalic, without obvious abnormality, atraumatic. Eyes:  Pupils equal and round. No scleral icterus. Mouth: Moist mucosa, no pharyngeal erythema. Poor dentition. Nose: Nares normal. No drainage or sinus tenderness. Neck: Supple, symmetrical, trachea midline. No adenopathy. No tenderness/mass/nodules. No carotid bruit or elevated JVD. Lungs:   Respirations unlabored at rest.  Bibasilar crackles. Chest Wall:  No tenderness or deformity. Heart:  Tachycardic. II/VI systolic murmur. Abdomen:   Soft, non-tender, bowel sounds active. Musculoskeletal: No muscle wasting or digital clubbing. Extremities: Extremities normal, atraumatic. No cyanosis or edema. Pulses: 2+ radial and carotid pulses, symmetric. Skin: No rashes or lesions. Pysch: Depressed mood and normal affect. Alert and oriented x 4. Neurologic: Moves all extremities.   Follows commands. Labs     CBC:   Lab Results   Component Value Date    WBC 9.2 03/28/2022    RBC 2.95 03/28/2022    HGB 9.8 03/28/2022    HCT 29.2 03/28/2022    MCV 98.9 03/28/2022    RDW 14.2 03/28/2022     03/28/2022     CMP:  Lab Results   Component Value Date     03/28/2022    K 4.1 03/28/2022    K 3.6 03/25/2022    CL 96 03/28/2022    CO2 18 03/28/2022    BUN 69 03/28/2022    CREATININE 2.6 03/28/2022    GFRAA 22 03/28/2022    AGRATIO 0.7 09/16/2021    LABGLOM 18 03/28/2022    GLUCOSE 154 03/28/2022    PROT 6.6 09/16/2021    CALCIUM 9.6 03/28/2022    BILITOT 0.6 09/16/2021    ALKPHOS 112 09/16/2021    AST 17 09/16/2021    ALT 10 09/16/2021     PT/INR:  No results found for: PTINR  HgBA1c:  Lab Results   Component Value Date    LABA1C 5.4 03/27/2022     Lab Results   Component Value Date    TROPONINI 1.52 ([facility]) 03/27/2022       Cardiac Data     EKG: Personally interpreted. 3/25/2022. Sinus rhythm with occasional PVCs. Echo: 8/27/2021. Overall left ventricular systolic function appears severely reduced with global hypokinesis and akinesis of the jeramy and inferoseptum. Ejection fraction is visually estimated to be 30%. Normal right ventricular size and function. There are no valvular structural abnormalities appreciated. Cath: 8/24/21. 1.  95% ostial to proximal left anterior descending artery stenosis. There is 90% ostial circumflex disease. These were heavily calcified vessels. They extended into the left main to the disease. This underwent rotational atherectomy with a 1.5-mm Rotablator jung followed by Shockwave balloon angioplasty with a 3-mm balloon. These then  underwent bifurcation stenting with a 3 mm stent in the LAD overlapped by 3.5 mm [place] drug-eluting stent from the LAD into the left main coronary artery. We then did the T-stenting with some overlap from the proximal circumflex into the left main.   We then performed kissing balloon angioplasty resulting in 0% residual stenosis and excellent flow. 2.  Successful removal of the Impella device from the left femoral artery access site with balloon tamponade to control hemostasis. We also removed the pulmonary artery catheter that was present. Telemetry: Personally interpreted. Sinus. Assessment and Plan   1) Acute on chronic systolic heart failure. EF 30%. NYHA IV. She does not appear to be in cardiogenic shock with warm periphery. No ACEI/ARB/Aldactone/SGLT2i with JESSICA. No emergent indication for ICD. Repeat echocardiogram will be performed today. With her persistent hypotension/shock and pressor requirement, will obtain a right heart catheterization to help address volume status. 2) NSTEMI. Uncertain if a type I or type II event but she is denying to me having any chest pains. Elevated troponin related to CHF/tachycardia/hypotension in a patient with known severe CAD seems possible. Regardless, will anticoagulate with IV heparin in the short-term. No emergent indication for angiography and would be a poor candidate with JESSICA on CKD. 3) Acute respiratory failure with hypoxia. Continue supplemental oxygen. Initially felt related to pulmonary edema but clinically not improving with attempts at diuresis. 4) Shock. Septic versus cardiogenic. Patient has been weaned off pressors yesterday but more hypotensive today. Presume IV vasopressors will be restarted. Procalcitonin elevated. Will defer to pulmonary/critical care antibiotic and pressor management. 5) Multivessel CAD s/p multiple PCI. Continue medical management and risk factor modification including use of aspirin, Brilinta, Lipitor, and beta-blocker as tolerated. 6) JESSICA on CKD. Nephrology following. Currently on IV Lasix. 7) Anemia. Chronic. Will defer additional work-up to primary team.    Overall, the problems requiring hospitalization are high in severity.   Care discussed with the pulmonary/critical care team as well as the nephrologist.  40 minutes spent in direct patient care of this critically ill individual.    Addendum: Patient clinically deteriorating. Unfortunately hemodynamically unstable to proceed with right heart catheterization. Patient is also changed code status to DNR/DNICCA. Thank you for allowing us to participate in the care of Marita Gonsalez. Eduin Dutta.  Ralph Sarkar, [address] [place] [address]  3/28/2022 8:43 AM

## 2022-03-28 NOTE — PROGRESS NOTES
injury ([facility] [address]    Hypoxia    Acute cystitis without hematuria    Hemoptysis    Thrombocytopenia (HCC)    Anemia    MARSHA (generalized anxiety disorder)    Acute systolic CHF (congestive heart failure), NYHA class 3 (HCC)    AMS (altered mental status)    Hypoglycemia    JESSICA (acute kidney injury) ([facility] [address]    Hypotension    CAD, multiple vessel    DM2 (diabetes mellitus, type 2) (HCC)    Postablative hypothyroidism    CHF (congestive heart failure) (HCC)    Hyponatremia    Elevated troponin    Acute on chronic systolic heart failure, NYHA class 4 (HCC)    Stage 3b chronic kidney disease (HCC)       Allergies  Augmentin [amoxicillin-pot clavulanate], Bactrim [sulfamethoxazole-trimethoprim], Carafate [sucralfate], Clindamycin/lincomycin, Hyoscyamine, Macrobid [nitrofurantoin], Oxybutynin, Pcn [penicillins], Pravastatin, Prevacid [lansoprazole], Prilosec [omeprazole], Sulfamethoxazole, and Trazodone and nefazodone    Medications    Scheduled Meds:   insulin lispro  0-12 Units SubCUTAneous TID WC    insulin lispro  0-6 Units SubCUTAneous Nightly    cefepime  1,000 mg IntraVENous Q12H    sodium chloride flush  5-40 mL IntraVENous 2 times per day    aspirin EC  81 mg Oral QAM    atorvastatin  40 mg Oral Daily    ticagrelor  90 mg Oral BID    levothyroxine  88 mcg Oral Daily    metoprolol succinate  25 mg Oral Daily    midodrine  10 mg Oral TID WC    nortriptyline  25 mg Oral Nightly    pantoprazole  40 mg Oral BID AC     Continuous Infusions:   dextrose      sodium chloride      furosemide (LASIX) 1mg/ml infusion 10 mg/hr (03/28/22 0612)    heparin (PORCINE) Infusion 510 Units/hr (03/28/22 0612)     PRN Meds:  glucose, dextrose, glucagon (rDNA), dextrose, morphine, sodium chloride flush, sodium chloride, fentanNYL, LORazepam, perflutren lipid microspheres, acetaminophen **OR** acetaminophen, ondansetron    Vitals   Vitals /wt   Patient Vitals for the past 8 hrs:   BP Temp Temp src Pulse Resp SpO2 Weight   03/28/22 0800 (!) 86/53 97.7 °F (36.5 °C) Axillary 119 17 97 %    03/28/22 0753     26 100 %    03/28/22 0600 (!) 86/59   122 23 99 % 103 lb 2.8 oz (46.8 kg)   03/28/22 0500 89/60   119 17 99 %    03/28/22 0400 90/60   118 17 100 %    03/28/22 0349     15 99 %    03/28/22 0302 (!) 91/56 97.2 °F (36.2 °C) Axillary 123 22 100 %    03/28/22 0202 84/60   122 28 99 %    03/28/22 0100 (!) 89/57   115 17 100 %         72HR INTAKE/OUTPUT:      Intake/Output Summary (Last 24 hours) at 3/28/2022 0855  Last data filed at 3/28/2022 3033  Gross per 24 hour   Intake 841.08 ml   Output 2995 ml   Net -2153.92 ml       Exam:    Gen:   Alert and oriented ×3  Eyes: PERRL. No sclera icterus. No conjunctival injection. ENT: No discharge. Pharynx clear. External appearance of ears and nose normal.  Neck: Trachea midline. No obvious mass. Resp: No accessory muscle use. No crackles. No wheezes. No rhonchi. CV: Regular rate. Regular rhythm. No murmur or rub. No edema. GI: Non-tender. Non-distended. No hernia. Skin: Warm, dry, normal texture and turgor. Lymph: No cervical LAD. No supraclavicular LAD. M/S: / Ext. No cyanosis. No clubbing. No joint deformity. Neuro: CN 2-12 are intact,  no neurologic deficits noted. PT/INR: No results for input(s): PROTIME, INR in the last 72 hours.   APTT:   Recent Labs     03/27/22  0335 03/27/22  1435 03/27/22  2055 03/28/22  0305   APTT 76.8* 54.3* 87.4* 76.3*       CBC:   Recent Labs     03/26/22  0400 03/26/22  1000 03/27/22  0335 03/28/22  0305   WBC 10.5 13.8* 21.9* 9.2   HGB 10.6* 10.2* 11.2* 9.8*   HCT 31.4* 30.7* 33.7* 29.2*   MCV 99.3 99.0 99.3 98.9   * 129* 141 128*       BMP:   Recent Labs     03/26/22  0400 03/27/22  0335 03/27/22  1412 03/28/22  0305    135* 133* 134*   K 3.8 4.4 4.1 4.1    97* 98* 96*   CO2 18* 16* 19* 18*   PHOS  --  5.0*  --  5.7*   BUN 28* 50* 66* 69*   CREATININE 1.7* 2.8* 2.3* 2.6*       LIVER PROFILE: No results for input(s): ALKPHOS, AST, ALT, ALB, BILIDIR, BILITOT, ALKPHOS in the last 72 hours. No results for input(s): AMYLASE in the last 72 hours. No results for input(s): LIPASE in the last 72 hours. UA:  Recent Labs     03/27/22  1037   WBCUA 227*   RBCUA >100*       TROPONIN:   Recent Labs     03/26/22  0400 03/26/22  1000 03/27/22  0335   TROPONINI 0.43* 0.79* 1.52*       Lab Results   Component Value Date/Time    URRFLXCULT Not Indicated 09/12/2021 11:43 PM       No results for input(s): TSHREFLEX in the last 72 hours. No components found for: [id number]  POC GLUCOSE:    Recent Labs     03/27/22  0915 03/27/22  1210 03/27/22  1731 03/27/22  2048   POCGLU 231* 202* 175* 161*     Recent Labs     03/27/22  0335   LABA1C 5.4      Lab Results   Component Value Date    LABA1C 5.4 03/27/2022         ASSESSMENT AND PLAN      Acute likely on chronic systolic congestive heart failure,    tachycardic hypotensive  on Levophed and vasopressin      Elevated troponin,  possible non-STEMI also   heparin drip started     Essential hypertension,   currently hypotensive      Acute on CKD   nephrology following      Anemia,     appears chronic,   monitor closely, no signs of bleeding     Mild thrombocytopenia,   monitor closely      Elevated lactic acid,    likely due to hypotension      Questionable pneumonia,   procalcitonin increased from yesterday added antibiotics,     pulmonary following      Abdominal pain and distention,    CT ordered                 Code Status: Discussed with the  and the patient  They want to keep patient comfortable at this stage  CODE STATUS will be DNR CCA        Dispo -added comfort medication  Prognosis is poor        The patient and / or the family were informed of the results of any tests, a time was given to answer questions, a plan was proposed and they agreed with plan. Rowan Olvera MD    This note was transcribed using [id number] [employer].  Please disregard any translational errors.

## 2022-03-28 NOTE — CONSULTS
[facility]  Palliative Care   Consult Note    NAME:  Emerita Paula  MEDICAL RECORD NUMBER:  [id number]  AGE: 69 y.o. GENDER: female  : 1945  TODAY'S DATE:  3/28/2022    Subjective     Reason for Consult:  goals of care and code status   Visit Type: Initial Consult      Emerita Paula is a 69 y.o. female referred by:  [x] Physician    PAST MEDICAL HISTORY      Diagnosis Date    Anemia     Anxiety     Asthma     CAD (coronary artery disease)     s/p thrombectomy and ZAN to Circ     Cerebral artery occlusion with cerebral infarction ([address]     mini strokes, told by PCP, pt doesn't recall any symptoms. many years ago.  Depression     Graves disease     ablation 21    Hiatal hernia     Hx of blood clots     lungs after heart attack    Hyperlipidemia     Hypertension     IBS (irritable bowel syndrome)     w constipation and diarrhea. no bleeding    Kidney disease, chronic, stage III (moderate, EGFR 30-59 ml/min) ([facility]     MI (myocardial infarction) ([address] 2021    STEMI. PCI LAD, LCx    Pneumonia     aspiration pneumonitis ;  bronchitis, outpt abx    Prolonged emergence from general anesthesia     Sleep apnea     does not need to use cpap per MD due to weight loss    Thyroid disease     hyper thyroidism    Urinary incontinence     UTI (urinary tract infection) 2021    Vocal cord dysfunction     Weight loss     lost 50 lbs.  intentional. working out, intake reduction       PAST SURGICAL HISTORY  Past Surgical History:   Procedure Laterality Date    ARM SURGERY Left     L shoulder replacement    CHOLECYSTECTOMY      COLONOSCOPY      EYE SURGERY      contaract    GASTRIC FUNDOPLICATION  0556    hiatal hernia repair    HAND SURGERY Left     WRIST CLOSED REDUCTION Left 2003    left wrist fracture    WRIST FRACTURE SURGERY Right 10/02/2020    OPEN REDUCTION INTERNAL FIXATION RIGHT WRIST performed by Heather Shea MD at [place] [address] FAMILY HISTORY  Family History   Problem Relation Age of Onset    Stroke Mother 61    Coronary Art Dis Father     Stroke Father     Lung Cancer Father     Heart Disease Father     Stroke Sister     Hypothyroidism Sister     Breast Cancer Sister     Heart Attack Paternal Grandmother         middle aged   Lorenzo Heart Attack Sister         in her 62s       SOCIAL HISTORY  Social History     Tobacco Use    Smoking status: Never Smoker    Smokeless tobacco: Never Used   Vaping Use    Vaping Use: Never used   Substance Use Topics    Alcohol use: No     Comment: rare glass of wine    Drug use: No       ALLERGIES  Allergies   Allergen Reactions    Augmentin [Amoxicillin-Pot Clavulanate]     Bactrim [Sulfamethoxazole-Trimethoprim]     Carafate [Sucralfate]     Clindamycin/Lincomycin Nausea Only     Thinks if she has a smaller dose she would be fine      Hyoscyamine     Macrobid [Nitrofurantoin]     Oxybutynin     Pcn [Penicillins]      Patient tolerates cefdinir, ceftriaxone, and cefazolin      Pravastatin     Prevacid [Lansoprazole]     Prilosec [Omeprazole]     Sulfamethoxazole     Trazodone And Nefazodone        MEDICATIONS  No current facility-administered medications on file prior to encounter. Current Outpatient Medications on File Prior to Encounter   Medication Sig Dispense Refill    pantoprazole (PROTONIX) 40 MG tablet Take 40 mg by mouth 2 times daily      LORazepam (ATIVAN) 1 MG tablet Take 1-2 mg by mouth every evening.       amLODIPine (NORVASC) 2.5 MG tablet Take 2.5 mg by mouth daily      B Complex Vitamins (B-COMPLEX/B-12) TABS Take by mouth      Coenzyme Q10 (COQ-10) 100 MG CAPS Take 2 capsules by mouth daily      midodrine (PROAMATINE) 10 MG tablet TAKE 1 TABLET BY MOUTH THREE TIMES A DAY (Patient not taking: Reported on 3/25/2022) 90 tablet 3    atorvastatin (LIPITOR) 40 MG tablet Take 1 tablet by mouth daily 90 tablet 3    spironolactone (ALDACTONE) 25 MG tablet Take 0.5 tablets by mouth daily 45 tablet 3    potassium chloride (KLOR-CON M) 20 MEQ extended release tablet Take 2 tablets by mouth once for 1 dose 2 tablet 0    metoprolol succinate (TOPROL XL) 25 MG extended release tablet Take 1 tablet by mouth daily 90 tablet 3    BRILINTA 90 MG TABS tablet TAKE 1 TABLET BY MOUTH TWICE A DAY 30 tablet 5    Flaxseed, Linseed, (FLAXSEED OIL MAX STR) 1300 MG CAPS Take 1 capsule by mouth daily      levothyroxine (SYNTHROID) 88 MCG tablet Take 88 mcg by mouth Daily Takes everyday EXCEPT Sundays--MD trying to reduce dose for patient (03/25/22)      aspirin EC 81 MG EC tablet Take 81 mg by mouth every morning       famotidine (PEPCID) 40 MG tablet Take 1 tablet by mouth nightly      Cholecalciferol (VITAMIN D3) 50 MCG (2000 UT) TABS Take 1,000 Units by mouth daily       nortriptyline (PAMELOR) 25 MG capsule Take 1 capsule by mouth nightly      promethazine (PHENERGAN) 12.5 MG tablet Take 12.5 mg by mouth every 6 hours as needed for Nausea      polyethyl glycol-propyl glycol 0.4-0.3 % (SYSTANE) 0.4-0.3 % ophthalmic solution 1 drop as needed for Dry Eyes (6 drops daily)         Objective         BP (!) 75/57   Pulse 132   Temp 97.7 °F (36.5 °C) (Axillary)   Resp 30   Ht 5' (1.524 m)   Wt 103 lb 2.8 oz (46.8 kg)   SpO2 97%   BMI 20.15 kg/m²     Code Status: Limited    Advanced Directives: not completed her  is HPOA if needed. Assessment        Management and Education    Persons available for education:      [x] Self     [] Caregiver       [x] Spouse       [] Other Family Member   []  Other    Spiritual History:  notified: Yes,     Does the patient have a Primary Care Physician?   Yes    Level of patient/caregiver understanding able to:       [x] Verbalize Understanding   [] Demonstrate Understanding       [] Teach Back       [] Needs Reinforcement     []  Other:      Teaching Time:  1hours       Plan        Social Service Consult Made: Yes  Assistance filling out Living Will/HPOA:  No      Discharge Plan:  Education/support to family  Education/support to patient  Providing support for coping/adaptation/distress of family  Providing support for coping/adaptation/distress of patient  Clarification of medical condition to patient and family  Code status clarified: [facility]  Palliative care orders introduced    Discharge Environment:  [x] Other: pending   Discussion: Patient admitted for chest pain and increased shortness of breath, NSTEMI. I met with patient and her  to discuss goals of care. She lives with her  is usually independent, she has had a significant decline since admission. She understands her kidneys are doing worse since admission and her heart is not getting better. She is requiring increase oxygen and increase pressors. We have discussed code status and she agrees she does not want chest compressions/shocking or ventilator or resuscitative meds if her heart stops or she stops breathing. Her  agrees and will call her family to tell them of decline. Will follow up tomorrow to see how she does through the night. Dr Jayla Odonnell, Dr Td Grace informed of above orders noted. I will continue to follow Ms. Perez's care as needed. Thank you for allowing me to participate in the care of Ms. Milagro Ralph .      Electronically signed by Kate Magallon RN, BSN, [facility] on 3/28/2022 at [address]  Office: [phone]

## 2022-03-28 NOTE — PROGRESS NOTES
[facility] Inpatient Nephrology Note        SUBJECTIVE:    CC: JESSICA on CKD  HPI:  BP low. Serum creatinine worse  Soc Hx:  No family present  ROS: UOP 1.2L.  remains on O2. Medications     insulin lispro  0-12 Units SubCUTAneous TID WC    insulin lispro  0-6 Units SubCUTAneous Nightly    cefepime  1,000 mg IntraVENous Q12H    lidocaine 1 % injection  5 mL IntraDERmal Once    sodium chloride flush  5-40 mL IntraVENous 2 times per day    aspirin EC  81 mg Oral QAM    atorvastatin  40 mg Oral Daily    ticagrelor  90 mg Oral BID    levothyroxine  88 mcg Oral Daily    metoprolol succinate  25 mg Oral Daily    midodrine  10 mg Oral TID WC    nortriptyline  25 mg Oral Nightly    pantoprazole  40 mg Oral BID AC         OBJECTIVE      Physical    TEMPERATURE:  Current - Temp: 97.2 °F (36.2 °C); Max - Temp  Av.5 °F (36.4 °C)  Min: 97.2 °F (36.2 °C)  Max: 97.7 °F (36.5 °C)  RESPIRATIONS RANGE: Resp  Av.5  Min: 13  Max: 29  PULSE RANGE: Pulse  Av.5  Min: 100  Max: 123  BLOOD PRESSURE RANGE:  Systolic (81LDB), NBU:75 , Min:82 , PCP:082   ; Diastolic (34LKX), IVC:66, Min:50, Max:89    PULSE OXIMETRY RANGE: SpO2  Av.8 %  Min: 86 %  Max: 100 %  24HR INTAKE/OUTPUT:      Intake/Output Summary (Last 24 hours) at 3/28/2022 0759  Last data filed at 3/28/2022 0612  Gross per 24 hour   Intake 841.08 ml   Output 2780 ml   Net -1938.92 ml       GEN: no distress  HEENT: no icterus. + O2  NECK: Trachea midline  CV: RRR  LUNGS: rales bilaterally, unlabored  ABD: + bowel sounds. No distension. No  tenderness  EXT: trace BLE edema.    SKIN: no rash  NEURO: no tremor    Data      Lab Results   Component Value Date    CREATININE 2.6 (H) 2022    BUN 69 (H) 2022     (L) 2022    K 4.1 2022    CL 96 (L) 2022    CO2 18 (L) 2022     Lab Results   Component Value Date    WBC 9.2 2022    HGB 9.8 (L) 2022    HCT 29.2 (L) 03/28/2022    MCV 98.9 03/28/2022     (L) 03/28/2022       ASSESSMENT     Patient Active Problem List   Diagnosis    Closed fracture of right distal radius    [facility]non-[facility] elevated [facility]) [facility]    Shock ([address]    Acute respiratory failure with hypoxia ([facility])    Acute kidney injury ([facility]    Hypoxia    Acute cystitis without hematuria    Hemoptysis    Thrombocytopenia ([facility])    Anemia    MARSHA (generalized anxiety disorder)    Acute systolic CHF (congestive heart failure), NYHA class 3 ([facility]    AMS (altered mental status)    Hypoglycemia    JESSICA (acute kidney injury) ([facility]    Hypotension    CAD, multiple vessel    DM2 (diabetes mellitus, type 2) ([facility]    Postablative hypothyroidism    CHF (congestive heart failure) ([facility])    Hyponatremia    Elevated troponin    Acute on chronic systolic heart failure, NYHA class 4 ([facility])    Stage 3b chronic kidney disease ([facility])       ASSESSMENT/PLAN:    # JESSICA on CKD  - due to cardiorenal process  - worse  - discussed possibility of dialysis, but would not benefit due to low BP  - would consider palliative care input    # CKD Stage 3  - last seen in 2020   - baseline serum creatinine 1.4 in 2020, 1.7-2.5 in 2021   - worsening due to cardiorenal factors    # CHF  - acute and decompensated  - EF 30%   - modest UOP on lasix    # CAD  - NSTEMI  - per cardiology  - high risk for JESSICA requiring dialysis with dye procedure    # Hypotension  - chronic  - on midodrine  - worse than baseline likely due to acute MI and CHF    # Metabolic acidosis  - due to JESSICA and hypotension  - monitor with diuresis      Available via [employer] secure messaging

## 2022-03-28 NOTE — PROGRESS NOTES
Brief Nutrition Note     Was consulted for HF diet education over weekend, was not able to see patient at that time. Noted pt now with medical decline. In ICU and on pressors. Will defer education at this time.      Electronically signed by Aixa Greene RD, LD on 3/28/2022 at 1:12 PM

## 2022-03-28 NOTE — PROGRESS NOTES
Clinical Pharmacy Note  Heparin Dosing       Lab Results   Component Value Date    APTT 44.6 03/28/2022     Lab Results   Component Value Date    HGB 9.8 03/28/2022    HCT 29.2 03/28/2022     03/28/2022    INR 1.12 08/23/2021       Current Infusion Rate: 510 units/hr    Plan:  Bolus: 2700 units  Rate: 700 units/hr  Next aPTT: 1730 3-28    Pharmacy will continue to monitor and adjust based on aPTT results.   Mercy Ruano, [address] [place] [address] 3/28/2022 11:22 AM

## 2022-03-28 NOTE — PROGRESS NOTES
Clinical Pharmacy Note  Heparin Dosing       Lab Results   Component Value Date    APTT 76.3 03/28/2022     Lab Results   Component Value Date    HGB 9.8 03/28/2022    HCT 29.2 03/28/2022     03/28/2022    INR 1.12 08/23/2021       Current Infusion Rate: 510 units/hr    Plan:  Rate: continue at 510 units/hr  Next aPTT: 1000 on 3-28-22    Pharmacy will continue to monitor and adjust based on aPTT results.   YOEL Murry [facility]  3/28/2022  4:21 AM

## 2022-03-28 NOTE — PROGRESS NOTES
Pulmonary Progress Note    Date of Admission: 3/25/2022   LOS: 3 days     CC:  Chief Complaint   Patient presents with    Shortness of Breath     States shortness of breath onset of today. Spitting up blood complains of pain in the neck. Assessment/Plan       Acute hypoxemic respiratory failure with SPO2 less than 90% on room air  -Wean oxygen goal saturation 90%    Acute pulmonary edema, due to  Acute on chronic systolic heart failure  -Diffuse bilateral infiltrates consistent with pulmonary edema  -Was aggressively diuresed but now is with JESSICA and hypotension  -Concerned that she is overall fluid overloaded for possible presence of severe valvulopathy is affecting ability to diurese, hemodynamics, cardiac index.  -Discussed at length with cardiology and nephrology possible right heart cath today. Circulatory shock  -Suspect multifactorial from cardiogenic, possible septic  -On cefepime  -Echo and right heart cath pending  -Titrate pressors goal map 65    NSTEMI  -Has significant underlying CAD along with hypoperfused state  -Troponin rising due in part to shock and JESSICA    JESSICA on CKD  -Nephrology following      Due to the immediate potential for life-threatening deterioration due to shock, I spent 77 minutes providing critical care. This time is excluding time spent performing separately billable procedures. HPI/Subjective  Patient with worsening hemodynamics. Pressors restarted    ROS:   No nausea  No Vomiting  No chest pain      Intake/Output Summary (Last 24 hours) at 3/28/2022 1125  Last data filed at 3/28/2022 0800  Gross per 24 hour   Intake 841.08 ml   Output 2995 ml   Net -2153.92 ml         PHYSICAL EXAM:   Blood pressure (!) 75/57, pulse 132, temperature 97.7 °F (36.5 °C), temperature source Axillary, resp. rate 30, height 5' (1.524 m), weight 103 lb 2.8 oz (46.8 kg), SpO2 97 %.'  Gen:  No acute distress. Eyes: PERRL. Anicteric sclera. No conjunctival injection. ENT: No discharge. Posterior oropharynx clear. External appearance of ears and nose normal.  Neck: Trachea midline. No mass   Resp:  No crackles. No wheezes. No rhonchi. No dullness on percussion. CV: Regular rate. Regular rhythm. No murmur or rub. No edema. GI: Soft, Non-tender. Non-distended. +BS  Skin: Warm, dry, w/o erythema. Lymph: No cervical or supraclavicular LAD. M/S: No cyanosis. No clubbing. Neuro:  no focal neurologic deficit. Moves all extremities  Psych: Awake and alert, mood stable.       Medications:    Scheduled Meds:   heparin (porcine)  2,700 Units IntraVENous Once    insulin lispro  0-12 Units SubCUTAneous TID WC    insulin lispro  0-6 Units SubCUTAneous Nightly    cefepime  1,000 mg IntraVENous Q12H    sodium chloride flush  5-40 mL IntraVENous 2 times per day    aspirin EC  81 mg Oral QAM    atorvastatin  40 mg Oral Daily    ticagrelor  90 mg Oral BID    levothyroxine  88 mcg Oral Daily    metoprolol succinate  25 mg Oral Daily    midodrine  10 mg Oral TID WC    nortriptyline  25 mg Oral Nightly    pantoprazole  40 mg Oral BID AC       Continuous Infusions:   norepinephrine 5 mcg/min (03/28/22 1029)    vasopressin (Septic Shock) infusion      dextrose      sodium chloride      furosemide (LASIX) 1mg/ml infusion 10 mg/hr (03/28/22 0612)    heparin (PORCINE) Infusion 510 Units/hr (03/28/22 0612)       PRN Meds:  glucose, dextrose, glucagon (rDNA), dextrose, morphine, sodium chloride flush, sodium chloride, fentanNYL, LORazepam, perflutren lipid microspheres, acetaminophen **OR** acetaminophen, ondansetron    Labs reviewed:  CBC:   Recent Labs     03/26/22  1000 03/27/22  0335 03/28/22  0305   WBC 13.8* 21.9* 9.2   HGB 10.2* 11.2* 9.8*   HCT 30.7* 33.7* 29.2*   MCV 99.0 99.3 98.9   * 141 128*     BMP:   Recent Labs     03/27/22  0335 03/27/22  1412 03/28/22  0305   * 133* 134*   K 4.4 4.1 4.1   CL 97* 98* 96*   CO2 16* 19* 18*   PHOS 5.0*  --  5.7*   BUN 50* 66* 69* CREATININE 2.8* 2.3* 2.6*     LIVER PROFILE: No results for input(s): AST, ALT, LIPASE, BILIDIR, BILITOT, ALKPHOS in the last 72 hours. Invalid input(s): AMYLASE,  ALB  PT/INR: No results for input(s): PROTIME, INR in the last 72 hours. APTT:   Recent Labs     03/27/22  2055 03/28/22  0305 03/28/22  1028   APTT 87.4* 76.3* 44.6*     UA:  Recent Labs     03/27/22  1037   COLORU Yellow   PHUR 5.0   WBCUA 227*   RBCUA >100*   BACTERIA 4+*   CLARITYU CLOUDY*   SPECGRAV 1.025   LEUKOCYTESUR LARGE*   UROBILINOGEN 0.2   BILIRUBINUR Negative   BLOODU LARGE*   GLUCOSEU Negative     No results for input(s): PH, PCO2, PO2 in the last 72 hours. Films:  Radiology Review:  Pertinent images / reports were reviewed as a part of this visit. XR CHEST PORTABLE  Narrative: EXAMINATION:  ONE XRAY VIEW OF THE CHEST    3/27/2022 9:03 pm    COMPARISON:  None. HISTORY:  ORDERING SYSTEM PROVIDED HISTORY: NGT placement  TECHNOLOGIST PROVIDED HISTORY:  Reason for exam:->NGT placement  Reason for Exam: NG placement    FINDINGS:  1. NG tube tip overlies the prominently distended gas bubble, side port at  the esophagogastric junction level. This should be advanced about 6 cm, such  that the side port of the NG tube is beyond the esophagogastric junction  level. 2.  Congestive heart failure is most likely given the radiographic findings;  pneumonia is also a consideration in areas of consolidation with pleural  effusion. 3. Left upper extremity PICC tip overlies the distal superior vena cava level. 4. Right upper extremity PICC tip terminates over the right axilla level. 5. Prominently dilated small bowel loops visualized upper abdomen  corresponding to findings of adynamic ileus on CT. Impression: 1. NG tube tip overlies the prominently distended gas bubble, side port at  the esophagogastric junction level.   This should be advanced about 6 cm, such  that the side port of the NG tube is beyond the esophagogastric junction  level. 2.  Congestive heart failure is most likely given the radiographic findings;  pneumonia is also a consideration in areas of consolidation with pleural  effusion. 3. Left upper extremity PICC tip overlies the distal superior vena cava level. 4. Right upper extremity PICC tip terminates over the right axilla level. 5. Prominently dilated small bowel loops visualized upper abdomen  corresponding to findings of adynamic ileus on CT. CT ABDOMEN PELVIS WO CONTRAST Additional Contrast? None  Narrative: EXAMINATION:  CT OF THE ABDOMEN AND PELVIS WITHOUT CONTRAST 3/27/2022 3:13 pm    TECHNIQUE:  CT of the abdomen and pelvis was performed without the administration of  intravenous contrast. Multiplanar reformatted images are provided for review. Dose modulation, iterative reconstruction, and/or weight based adjustment of  the mA/kV was utilized to reduce the radiation dose to as low as reasonably  achievable. COMPARISON:  None. HISTORY:  ORDERING SYSTEM PROVIDED HISTORY: abdominal pain and distention    FINDINGS:  Lower Chest: Nonspecific mixed ground-glass and interstitial opacities both  lung bases with associated bilateral pleural effusion. Mild cardiomegaly. Posterior mediastinal structures appear unremarkable with exception of small  hiatal hernia. Organs: Normal attenuation pattern throughout the liver. No discrete hepatic  lesion. No intrahepatic bile duct dilatation is seen. Common bile duct  measures 4 mm. The gallbladder is absent status post cholecystectomy. The  kidneys, spleen, adrenal glands and pancreas appear unremarkable. GI/Bowel: Prominently dilated appearance of the stomach, with less severe  gaseous distension and fluid level seen throughout the small bowel and  proximal portions of the colon. No definite acute inflammatory process  demonstrated. Only a few colonic diverticula are demonstrated at the distal  colon without inflammatory changes adjacent.     Pelvis: Intervention New 03/27/22 1800   Reason Not Rotated Not due 03/27/22 1800   Number of days: 0     CVC                   Thank you for this consult,    Elvis Patricia MD  [facility] Pulmonary, Critical Care, and Sleep Medicine

## 2022-03-28 NOTE — PROGRESS NOTES
Call from Dr. Juan Jose Sorenson regarding patient's CT results. Order for NG to low intermittent wall suction and keep NPO.

## 2022-03-28 NOTE — PLAN OF CARE
Problem: Pain:  Description: Pain management should include both nonpharmacologic and pharmacologic interventions.   Goal: Pain level will decrease  Description: Pain level will decrease  Outcome: Ongoing  Goal: Control of acute pain  Description: Control of acute pain  Outcome: Ongoing  Goal: Control of chronic pain  Description: Control of chronic pain  Outcome: Ongoing     Problem: Falls - Risk of:  Goal: Will remain free from falls  Description: Will remain free from falls  Outcome: Ongoing  Goal: Absence of physical injury  Description: Absence of physical injury  Outcome: Ongoing     Problem: OXYGENATION/RESPIRATORY FUNCTION  Goal: Patient will maintain patent airway  Outcome: Ongoing  Goal: Patient will achieve/maintain normal respiratory rate/effort  Description: Respiratory rate and effort will be within normal limits for the patient  Outcome: Ongoing     Problem: HEMODYNAMIC STATUS  Goal: Patient has stable vital signs and fluid balance  Outcome: Ongoing     Problem: FLUID AND ELECTROLYTE IMBALANCE  Goal: Fluid and electrolyte balance are achieved/maintained  Outcome: Ongoing     Problem: ACTIVITY INTOLERANCE/IMPAIRED MOBILITY  Goal: Mobility/activity is maintained at optimum level for patient  Outcome: Ongoing     Problem: Skin Integrity:  Goal: Will show no infection signs and symptoms  Description: Will show no infection signs and symptoms  Outcome: Ongoing  Goal: Absence of new skin breakdown  Description: Absence of new skin breakdown  Outcome: Ongoing

## 2022-03-28 NOTE — PROGRESS NOTES
Clinical Pharmacy Note  Heparin Dosing       Lab Results   Component Value Date    APTT 148.9 03/28/2022     Lab Results   Component Value Date    HGB 9.8 03/28/2022    HCT 29.2 03/28/2022     03/28/2022    INR 1.12 08/23/2021       Current Infusion Rate: 560 units/hr    Plan:  Hold heparin for 1 hour  Rate: 560 units/hr  Next aPTT: 0230 3/29/22    Pharmacy will continue to monitor and adjust based on aPTT results.

## 2022-03-28 NOTE — PROGRESS NOTES
Clinical Pharmacy Note  Heparin Dosing       Lab Results   Component Value Date    APTT 87.4 03/27/2022     Lab Results   Component Value Date    HGB 11.2 03/27/2022    HCT 33.7 03/27/2022     03/27/2022    INR 1.12 08/23/2021       Current Infusion Rate: 510 units/hr    Plan:  NEW Rate: 510 units/hr  Next aPTT: 0300 on 3-28-22    Pharmacy will continue to monitor and adjust based on aPTT results.   Nelson Hampton [facility]  3/27/2022  9:58 PM

## 2022-03-29 ENCOUNTER — APPOINTMENT (OUTPATIENT)
Dept: CARDIAC REHAB | Age: 77
End: 2022-03-29
Payer: MEDICARE

## 2022-03-29 NOTE — PROGRESS NOTES
2146: [facility]. Patient not medically suitable. 2152: Body released from . 2200: family members visiting at bedside. 0015: security notified, [facility] to [facility] [place] [facility]. [facility] [address] [facility] notified.

## 2022-03-29 NOTE — PROGRESS NOTES
2000: BP 58/23, heart rate 130. Patient not responsive, labored breathing, respiratory rate of 45.  Giancarlo at bedside. This nurse discussed concerns with patients  for patient declining vital signs and status despite medications. He verbalizes understanding, all questions answered. Ke drip started at 30mcg/min. 2036: BP 62/16, heart rate 98. Epi drip started at 1mcg/min. Patients  remains at bedside. 2050: Patient with agonal breathing, unable to  pulse ox sat. 2051: Asystole on monitor.  Giancarlo at bedside.

## 2022-03-29 NOTE — DEATH NOTES
Death Pronouncement Note  Patient's Name: Pierre Zamora   Patient's YOB: 1945  MRN Number: [id number]    Admitting Provider: Coral Israel DO  Attending Provider: Tiara Scruggs MD      Called to see patient Re:     Pt was seen and examined:  No heartbeat  No Respirations  No pupillary response  No response to painful stimuli    Time of Death: 20:51  Date of Death: 2022    Pt's RN asked to have Death Certificate directed to the Attending Physician    Gilda Mccarty MD  Electronically signed by Gilda Mccarty MD on 3/28/22 at 11:32 PM EDT

## 2022-03-30 LAB
ORGANISM: ABNORMAL
URINE CULTURE, ROUTINE: ABNORMAL

## 2022-03-31 ENCOUNTER — APPOINTMENT (OUTPATIENT)
Dept: CARDIAC REHAB | Age: 77
End: 2022-03-31
Payer: MEDICARE

## 2022-03-31 LAB — BLOOD CULTURE, ROUTINE: NORMAL

## 2022-04-19 NOTE — DISCHARGE SUMMARY
Hospital Medicine Discharge and death summary. Patient ID: Minnie Tapia , [id number]     Patient's PCP: Wilburn Severin, MD    Admit Date: 3/25/2022     Discharge Date: 3/29/2022      Admitting Physician: Dotty Barreto DO    Discharge Physician: Maribel Rodriguez MD     Discharge Diagnoses: Active Hospital Problems    Diagnosis Date Noted    Stage 3b chronic kidney disease ([address] [N18.32]     Elevated troponin [R77.8] 03/25/2022    Acute on chronic systolic heart failure, NYHA class 4 (HCC) [I50.23] 03/25/2022    CAD, multiple vessel [I25.10] 37/94/0232    Acute systolic CHF (congestive heart failure), NYHA class 3 (HCC) [I50.21]     NSTEMI (non-ST elevated myocardial infarction) ([address] [I21.4] 08/18/2021    Acute respiratory failure with hypoxia ([address] [J96.01]     Shock ([address] [R57.9]          The patient was seen and examined on the day of discharge and this discharge summary is in conjunction with any daily progress note from day of discharge.     HOSPITAL COURSE    Patient demographics:  The patient  Minnie Tapia is a 69 y.o. female     Significant past medical history:   Patient Active Problem List   Diagnosis    Closed fracture of right distal radius    NSTEMI (non-ST elevated myocardial infarction) ([address]    Shock ([address]    Acute respiratory failure with hypoxia ([address]    Acute kidney injury ([address]    Hypoxia    Acute cystitis without hematuria    Hemoptysis    Thrombocytopenia (HCC)    Anemia    MARSHA (generalized anxiety disorder)    Acute systolic CHF (congestive heart failure), NYHA class 3 ([address]    AMS (altered mental status)    Hypoglycemia    JESSICA (acute kidney injury) ([address]    Hypotension    CAD, multiple vessel    DM2 (diabetes mellitus, type 2) ([address]    Postablative hypothyroidism    CHF (congestive heart failure) (HCC)    Hyponatremia    Elevated troponin    Acute on chronic systolic heart failure, NYHA class 4 (HCC)    Stage 3b chronic kidney disease ([facility]         Presenting symptoms:      Diagnostic workup:      CONSULTS DURING ADMISSION :   IP CONSULT TO HOSPITALIST  IP CONSULT TO HEART FAILURE NURSE/COORDINATOR  IP CONSULT TO DIETITIAN  IP CONSULT TO CARDIOLOGY  IP CONSULT TO PALLIATIVE CARE      Patient was diagnosed with:  Acute respiratory failure  Acute on chronic systolic CHF with ejection fraction of 30%  Bilateral multifocal pneumonia      Treatment while inpatient:  69years old male who presented to the emergency room with worsening shortness of breath. Patient was diagnosed with non-ST elevation MI acute respiratory failure and cardiogenic shock. Patient was started on heparin infusion. Patient was supported with BiPAP. Patient's blood pressure was supported with pressors  CT scan of the chest was consistent with bilateral opacities with groundglass appearance and consistent with congestive heart failure and pulmonary edema. Multifocal pneumonia could not be ruled out. Patient has a history of CKD and patient developed acute on chronic renal failure due to cardio renal process. Aggressive diuresis also contributed and acute renal failure. Pulmonary cardiology and nephrology followed the patient. Patient's response to the treatment was limited. Patient needed more pressors renal function also deteriorated and no significant improvement in cardiac function. Patient's condition was discussed with the family palliative care was involved patient was made a DNR CCA. Patient was declared dead on 28 March 2022 at 2051. Labs:  For convenience and continuity at follow-up the following most recent labs are provided:      CBC:   Lab Results   Component Value Date    WBC 9.2 03/28/2022    HGB 9.8 03/28/2022    HCT 29.2 03/28/2022     03/28/2022       RENAL:   Lab Results   Component Value Date     03/28/2022    K 4.1 03/28/2022    K 3.6 03/25/2022    CL 96 03/28/2022    CO2 18 03/28/2022    BUN 69 03/28/2022 CREATININE 2.6 03/28/2022           Discharge Medications:      Medication List      ASK your doctor about these medications    amLODIPine 2.5 MG tablet  Commonly known as: NORVASC     aspirin EC 81 MG EC tablet     atorvastatin 40 MG tablet  Commonly known as: LIPITOR  Take 1 tablet by mouth daily     B-Complex/B-12 Tabs     Brilinta 90 MG Tabs tablet  Generic drug: ticagrelor  TAKE 1 TABLET BY MOUTH TWICE A DAY     CoQ-10 100 MG Caps  Ask about: Which instructions should I use?     famotidine 40 MG tablet  Commonly known as: PEPCID     Flaxseed Oil Max Str 1300 MG Caps     levothyroxine 88 MCG tablet  Commonly known as: SYNTHROID     LORazepam 1 MG tablet  Commonly known as: ATIVAN     metoprolol succinate 25 MG extended release tablet  Commonly known as: TOPROL XL  Take 1 tablet by mouth daily     midodrine 10 MG tablet  Commonly known as: PROAMATINE  TAKE 1 TABLET BY MOUTH THREE TIMES A DAY     nortriptyline 25 MG capsule  Commonly known as: PAMELOR     pantoprazole 40 MG tablet  Commonly known as: PROTONIX     polyethyl glycol-propyl glycol 0.4-0.3 % 0.4-0.3 % ophthalmic solution  Commonly known as: SYSTANE     potassium chloride 20 MEQ extended release tablet  Commonly known as: KLOR-CON M  Take 2 tablets by mouth once for 1 dose     promethazine 12.5 MG tablet  Commonly known as: PHENERGAN     spironolactone 25 MG tablet  Commonly known as: ALDACTONE  Take 0.5 tablets by mouth daily     Vitamin D3 50 MCG (2000 UT) Tabs                 Signed:  Hattie Lyons MD   4/18/2022      Thank you Trina Arnold MD for the opportunity to be involved in this patient's care. If you have any questions or concerns please feel free to contact me at [phone]. This note was transcribed using [id number] [employer]. Please disregard any translational errors.

## 2025-07-11 NOTE — PROGRESS NOTES
Call pt back at 1300 for interview    Per Crystal Stager will do H/P DOS re: that pt sees Cardiologist Follow up with your doctors at [facility] on discharge. Enroll in counseling/rehab program to get back on transplant list.     Nutrition Tips for Home:    · Try to eat at least 6 small meals or snacks a day. You may want to set an alarm to eat every 2-3 hours.    · Remember to include protein foods often such as eggs, nuts, nut butters, beans, meats, poultry and fish, milk, yogurt and cottage cheese, tofu/soy products.    · Include high calorie foods like cheese, avocado, olive or canola oil, dried fruit and nuts, granola, and cream cheese.    · Drink most fluids between meals instead of with meals.    · You may try oral nutrition supplement drinks such as [employer] Instant Breakfast, Boost or Ensure, or store brand version of supplements such as [employer]/[facility].    Tips for adding oral supplements to your daily routine:      Drink when taking medications if they can be taken with food      Serve chilled or pour over ice      Freeze into a popsicle or blend with ice cream and fruit into a shake      Pour over cereal instead of milk      Add to coffee instead of milk or cream      Enjoy as a snack

## (undated) DEVICE — SUTURE VCRL SZ 3-0 L18IN ABSRB UD L26MM SH 1/2 CIR J864D

## (undated) DEVICE — NEEDLE HYPO 25GA L1.5IN BVL ORIENTED ECLIPSE

## (undated) DEVICE — KIRSCHNER WIRE
Type: IMPLANTABLE DEVICE | Site: WRIST | Status: NON-FUNCTIONAL
Brand: VARIAX
Removed: 2020-10-02

## (undated) DEVICE — Z DISCONTINUED USE 2275686 GLOVE SURG SZ 8 L12IN FNGR THK13MIL WHT ISOLEX POLYISOPRENE

## (undated) DEVICE — 3M™ STERI-STRIP™ COMPOUND BENZOIN TINCTURE 40 BAGS/CARTON 4 CARTONS/CASE C1544: Brand: 3M™ STERI-STRIP™

## (undated) DEVICE — TUBING, SUCTION, 1/4" X 12', STRAIGHT: Brand: MEDLINE

## (undated) DEVICE — ZIMMER® STERILE DISPOSABLE TOURNIQUET CUFF WITH PLC, DUAL PORT, SINGLE BLADDER, 18 IN. (46 CM)

## (undated) DEVICE — SOLUTION IV IRRIG POUR BRL 0.9% SODIUM CHL 2F7124

## (undated) DEVICE — CHLORAPREP 26ML ORANGE

## (undated) DEVICE — DRAPE HND W114XL142IN BLU POLYPR W O PCH FEN CRD AND TB HLDR

## (undated) DEVICE — DRESSING,GAUZE,XEROFORM,CURAD,1"X8",ST: Brand: CURAD

## (undated) DEVICE — INTENDED FOR TISSUE SEPARATION, AND OTHER PROCEDURES THAT REQUIRE A SHARP SURGICAL BLADE TO PUNCTURE OR CUT.: Brand: BARD-PARKER ® STAINLESS STEEL BLADES

## (undated) DEVICE — MERCY HEALTH WEST TURNOVER: Brand: MEDLINE INDUSTRIES, INC.

## (undated) DEVICE — BANDAGE COMPR W4INXL12FT E DISP ESMARCH EVEN

## (undated) DEVICE — GLOVE SURG SZ 8 CRM LTX FREE POLYISOPRENE POLYMER BEAD ANTI

## (undated) DEVICE — SHEET,DRAPE,53X77,STERILE: Brand: MEDLINE

## (undated) DEVICE — PADDING UNDERCAST W4INXL4YD 100% COT CRIMPED FINISH WBRL II

## (undated) DEVICE — STRIP,CLOSURE,WOUND,MEDI-STRIP,1/2X4: Brand: MEDLINE

## (undated) DEVICE — CANISTER, RIGID, 1200CC: Brand: MEDLINE INDUSTRIES, INC.

## (undated) DEVICE — Z DISCONTINUED PER MEDLINE (LOW STOCK)  USE 2422770 DRAPE C ARM W54XL78IN FOR FLROSCN

## (undated) DEVICE — PENCIL ES L3M BTTN SWCH S STL HEX LOK BLDE ELECTRD HOLSTER

## (undated) DEVICE — SYRINGE IRRIG 60ML SFT PLIABLE BLB EZ TO GRP 1 HND USE W/

## (undated) DEVICE — DRILL, AO, SCALED: Brand: VARIAX

## (undated) DEVICE — MINOR SET UP PK

## (undated) DEVICE — GLOVE SURG SZ 6 CRM LTX FREE POLYISOPRENE POLYMER BEAD ANTI

## (undated) DEVICE — GLOVE SURG SZ 6 L12IN FNGR THK79MIL GRN LTX FREE

## (undated) DEVICE — BANDAGE COMPR W4INXL15FT BGE E SGL LAYERED CLP CLSR

## (undated) DEVICE — COVER LT HNDL BLU PLAS

## (undated) DEVICE — ELECTRODE PT RET AD L9FT HI MOIST COND ADH HYDRGEL CORDED

## (undated) DEVICE — SPLINT ORTH W3XL12IN LAYERED FBRGLS FOAM PD BRTH BK MOLD

## (undated) DEVICE — SPONGE GZ W4XL4IN COT 12 PLY TYP VII WVN C FLD DSGN

## (undated) DEVICE — UNTHREADED GUIDE WIRE
Type: IMPLANTABLE DEVICE | Site: WRIST | Status: NON-FUNCTIONAL
Brand: FIXOS
Removed: 2020-10-02

## (undated) DEVICE — GOWN SIRUS NONREIN LG W/TWL: Brand: MEDLINE INDUSTRIES, INC.